# Patient Record
Sex: MALE | Race: BLACK OR AFRICAN AMERICAN | Employment: OTHER | URBAN - METROPOLITAN AREA
[De-identification: names, ages, dates, MRNs, and addresses within clinical notes are randomized per-mention and may not be internally consistent; named-entity substitution may affect disease eponyms.]

---

## 2017-02-21 ENCOUNTER — HOSPITAL ENCOUNTER (OUTPATIENT)
Dept: PAIN MANAGEMENT | Age: 62
Discharge: HOME | End: 2017-02-21
Attending: PSYCHOLOGIST | Admitting: PSYCHOLOGIST
Payer: MEDICARE

## 2017-02-21 ENCOUNTER — HOSPITAL ENCOUNTER (OUTPATIENT)
Dept: PAIN MANAGEMENT | Age: 62
Discharge: HOME | End: 2017-02-21
Attending: ANESTHESIOLOGY | Admitting: ANESTHESIOLOGY
Payer: MEDICARE

## 2017-02-21 VITALS
TEMPERATURE: 98 F | HEART RATE: 68 BPM | DIASTOLIC BLOOD PRESSURE: 70 MMHG | SYSTOLIC BLOOD PRESSURE: 156 MMHG | RESPIRATION RATE: 12 BRPM

## 2017-02-21 DIAGNOSIS — M48.061 SPINAL STENOSIS, LUMBAR REGION, WITHOUT NEUROGENIC CLAUDICATION: Primary | ICD-10-CM

## 2017-02-21 PROCEDURE — 99213 OFFICE O/P EST LOW 20 MIN: CPT

## 2017-02-21 PROCEDURE — 99214 OFFICE O/P EST MOD 30 MIN: CPT

## 2017-02-21 RX ORDER — MELOXICAM 15 MG/1
15 TABLET ORAL DAILY
Qty: 30 TABLET | Refills: 0 | Status: SHIPPED | OUTPATIENT
Start: 2017-02-23 | End: 2017-03-20 | Stop reason: SDUPTHER

## 2017-02-21 RX ORDER — OXYCODONE HYDROCHLORIDE 5 MG/1
5 TABLET ORAL EVERY 6 HOURS PRN
Qty: 120 TABLET | Refills: 0 | Status: SHIPPED | OUTPATIENT
Start: 2017-02-23 | End: 2017-03-20 | Stop reason: SDUPTHER

## 2017-02-21 ASSESSMENT — PAIN SCALES - GENERAL: PAINLEVEL_OUTOF10: 7

## 2017-02-21 ASSESSMENT — PAIN DESCRIPTION - PROGRESSION: CLINICAL_PROGRESSION: GRADUALLY WORSENING

## 2017-02-21 ASSESSMENT — PAIN DESCRIPTION - DESCRIPTORS: DESCRIPTORS: CONSTANT;ACHING;DULL

## 2017-02-21 ASSESSMENT — PAIN DESCRIPTION - PAIN TYPE: TYPE: CHRONIC PAIN

## 2017-02-21 ASSESSMENT — PAIN DESCRIPTION - ORIENTATION: ORIENTATION: LEFT;LOWER

## 2017-02-21 ASSESSMENT — PAIN DESCRIPTION - LOCATION: LOCATION: LEG;BACK

## 2017-03-13 RX ORDER — GABAPENTIN 300 MG/1
300 CAPSULE ORAL 3 TIMES DAILY
Qty: 90 CAPSULE | Refills: 5 | Status: SHIPPED | OUTPATIENT
Start: 2017-03-13 | End: 2017-04-17 | Stop reason: SDUPTHER

## 2017-03-20 ENCOUNTER — HOSPITAL ENCOUNTER (OUTPATIENT)
Dept: PAIN MANAGEMENT | Age: 62
Discharge: HOME | End: 2017-03-20
Payer: MEDICARE

## 2017-03-20 VITALS
HEIGHT: 72 IN | BODY MASS INDEX: 32.51 KG/M2 | HEART RATE: 66 BPM | DIASTOLIC BLOOD PRESSURE: 56 MMHG | SYSTOLIC BLOOD PRESSURE: 134 MMHG | TEMPERATURE: 98 F | WEIGHT: 240 LBS | RESPIRATION RATE: 16 BRPM

## 2017-03-20 DIAGNOSIS — M48.061 SPINAL STENOSIS, LUMBAR REGION, WITHOUT NEUROGENIC CLAUDICATION: Primary | ICD-10-CM

## 2017-03-20 PROCEDURE — 99214 OFFICE O/P EST MOD 30 MIN: CPT

## 2017-03-20 RX ORDER — MELOXICAM 15 MG/1
15 TABLET ORAL DAILY
Qty: 30 TABLET | Refills: 0 | Status: SHIPPED | OUTPATIENT
Start: 2017-03-25 | End: 2017-04-17 | Stop reason: SDUPTHER

## 2017-03-20 RX ORDER — OXYCODONE HYDROCHLORIDE 5 MG/1
5 TABLET ORAL EVERY 6 HOURS PRN
Qty: 120 TABLET | Refills: 0 | Status: SHIPPED | OUTPATIENT
Start: 2017-03-25 | End: 2017-04-21 | Stop reason: SDUPTHER

## 2017-03-20 ASSESSMENT — PAIN DESCRIPTION - FREQUENCY: FREQUENCY: CONTINUOUS

## 2017-03-20 ASSESSMENT — PAIN SCALES - GENERAL: PAINLEVEL_OUTOF10: 5

## 2017-03-20 ASSESSMENT — PAIN DESCRIPTION - PAIN TYPE: TYPE: CHRONIC PAIN

## 2017-03-20 ASSESSMENT — PAIN DESCRIPTION - PROGRESSION: CLINICAL_PROGRESSION: NOT CHANGED

## 2017-03-20 ASSESSMENT — PAIN DESCRIPTION - DESCRIPTORS: DESCRIPTORS: CONSTANT;ACHING

## 2017-03-20 ASSESSMENT — PAIN DESCRIPTION - LOCATION: LOCATION: BACK;BUTTOCKS;LEG

## 2017-03-20 ASSESSMENT — PAIN DESCRIPTION - ORIENTATION: ORIENTATION: LEFT

## 2017-04-03 ENCOUNTER — HOSPITAL ENCOUNTER (OUTPATIENT)
Dept: PAIN MANAGEMENT | Age: 62
Discharge: HOME | End: 2017-04-03
Payer: MEDICARE

## 2017-04-03 VITALS
HEART RATE: 60 BPM | OXYGEN SATURATION: 98 % | BODY MASS INDEX: 33.18 KG/M2 | DIASTOLIC BLOOD PRESSURE: 77 MMHG | RESPIRATION RATE: 16 BRPM | SYSTOLIC BLOOD PRESSURE: 159 MMHG | HEIGHT: 72 IN | WEIGHT: 245 LBS | TEMPERATURE: 97 F

## 2017-04-03 DIAGNOSIS — M54.9 BACKACHE, UNSPECIFIED: ICD-10-CM

## 2017-04-03 DIAGNOSIS — M48.061 SPINAL STENOSIS, LUMBAR REGION, WITHOUT NEUROGENIC CLAUDICATION: ICD-10-CM

## 2017-04-03 PROCEDURE — 6360000002 HC RX W HCPCS

## 2017-04-03 PROCEDURE — 2500000003 HC RX 250 WO HCPCS

## 2017-04-03 PROCEDURE — 62323 NJX INTERLAMINAR LMBR/SAC: CPT

## 2017-04-03 PROCEDURE — 2580000003 HC RX 258: Performed by: ANESTHESIOLOGY

## 2017-04-03 PROCEDURE — 6360000002 HC RX W HCPCS: Performed by: ANESTHESIOLOGY

## 2017-04-03 RX ORDER — MIDAZOLAM HYDROCHLORIDE 1 MG/ML
0.5 INJECTION INTRAMUSCULAR; INTRAVENOUS 4 TIMES DAILY PRN
Status: DISCONTINUED | OUTPATIENT
Start: 2017-04-03 | End: 2017-04-04 | Stop reason: HOSPADM

## 2017-04-03 RX ORDER — FENTANYL CITRATE 50 UG/ML
50 INJECTION, SOLUTION INTRAMUSCULAR; INTRAVENOUS PRN
Status: COMPLETED | OUTPATIENT
Start: 2017-04-03 | End: 2017-04-03

## 2017-04-03 RX ORDER — LIDOCAINE HYDROCHLORIDE 10 MG/ML
1 INJECTION, SOLUTION EPIDURAL; INFILTRATION; INTRACAUDAL; PERINEURAL ONCE
Status: DISCONTINUED | OUTPATIENT
Start: 2017-04-03 | End: 2017-04-04 | Stop reason: HOSPADM

## 2017-04-03 RX ORDER — SODIUM CHLORIDE, SODIUM LACTATE, POTASSIUM CHLORIDE, CALCIUM CHLORIDE 600; 310; 30; 20 MG/100ML; MG/100ML; MG/100ML; MG/100ML
500 INJECTION, SOLUTION INTRAVENOUS CONTINUOUS
Status: DISCONTINUED | OUTPATIENT
Start: 2017-04-03 | End: 2017-04-04 | Stop reason: HOSPADM

## 2017-04-03 RX ADMIN — FENTANYL CITRATE 50 MCG: 50 INJECTION, SOLUTION INTRAMUSCULAR; INTRAVENOUS at 10:43

## 2017-04-03 RX ADMIN — MIDAZOLAM HYDROCHLORIDE 1 MG: 1 INJECTION, SOLUTION INTRAMUSCULAR; INTRAVENOUS at 10:39

## 2017-04-03 RX ADMIN — SODIUM CHLORIDE, POTASSIUM CHLORIDE, SODIUM LACTATE AND CALCIUM CHLORIDE 500 ML: 600; 310; 30; 20 INJECTION, SOLUTION INTRAVENOUS at 10:26

## 2017-04-03 RX ADMIN — FENTANYL CITRATE 50 MCG: 50 INJECTION, SOLUTION INTRAMUSCULAR; INTRAVENOUS at 10:39

## 2017-04-03 RX ADMIN — MIDAZOLAM HYDROCHLORIDE 1 MG: 1 INJECTION, SOLUTION INTRAMUSCULAR; INTRAVENOUS at 10:43

## 2017-04-03 ASSESSMENT — PAIN SCALES - GENERAL: PAINLEVEL_OUTOF10: 4

## 2017-04-03 ASSESSMENT — PAIN - FUNCTIONAL ASSESSMENT
PAIN_FUNCTIONAL_ASSESSMENT: 0-10
PAIN_FUNCTIONAL_ASSESSMENT: 0-10

## 2017-04-03 ASSESSMENT — PAIN DESCRIPTION - DESCRIPTORS: DESCRIPTORS: CONSTANT;ACHING;SHARP

## 2017-04-17 ENCOUNTER — HOSPITAL ENCOUNTER (OUTPATIENT)
Age: 62
Discharge: HOME | End: 2017-04-17
Payer: MEDICARE

## 2017-04-17 ENCOUNTER — HOSPITAL ENCOUNTER (OUTPATIENT)
Dept: GENERAL RADIOLOGY | Age: 62
Discharge: HOME | End: 2017-04-17
Payer: MEDICARE

## 2017-04-17 ENCOUNTER — OFFICE VISIT (OUTPATIENT)
Dept: INTERNAL MEDICINE | Age: 62
End: 2017-04-17
Payer: MEDICARE

## 2017-04-17 VITALS
DIASTOLIC BLOOD PRESSURE: 58 MMHG | HEART RATE: 68 BPM | WEIGHT: 245 LBS | HEIGHT: 72 IN | SYSTOLIC BLOOD PRESSURE: 139 MMHG | OXYGEN SATURATION: 98 % | BODY MASS INDEX: 33.18 KG/M2

## 2017-04-17 DIAGNOSIS — E79.0 HYPERURICEMIA: ICD-10-CM

## 2017-04-17 DIAGNOSIS — N18.30 CKD (CHRONIC KIDNEY DISEASE) STAGE 3, GFR 30-59 ML/MIN (HCC): ICD-10-CM

## 2017-04-17 DIAGNOSIS — D17.1 LIPOMA OF TORSO: ICD-10-CM

## 2017-04-17 DIAGNOSIS — R07.89 RIGHT-SIDED CHEST WALL PAIN: ICD-10-CM

## 2017-04-17 DIAGNOSIS — I10 ESSENTIAL HYPERTENSION: ICD-10-CM

## 2017-04-17 DIAGNOSIS — R07.89 RIGHT-SIDED CHEST WALL PAIN: Primary | ICD-10-CM

## 2017-04-17 DIAGNOSIS — E78.2 MIXED HYPERLIPIDEMIA: ICD-10-CM

## 2017-04-17 DIAGNOSIS — N18.3 CKD (CHRONIC KIDNEY DISEASE), STAGE 3 (MODERATE): ICD-10-CM

## 2017-04-17 LAB
-: ABNORMAL
ALBUMIN SERPL-MCNC: 4.6 G/DL (ref 3.5–5.2)
ALBUMIN/GLOBULIN RATIO: 1.3 (ref 1–2.5)
ALP BLD-CCNC: 92 U/L (ref 40–129)
ALT SERPL-CCNC: 30 U/L (ref 5–41)
AMORPHOUS: ABNORMAL
ANION GAP SERPL CALCULATED.3IONS-SCNC: 15 MMOL/L (ref 9–17)
AST SERPL-CCNC: 34 U/L
BACTERIA: ABNORMAL
BILIRUB SERPL-MCNC: 0.88 MG/DL (ref 0.3–1.2)
BILIRUBIN DIRECT: 0.21 MG/DL
BILIRUBIN URINE: NEGATIVE
BILIRUBIN, INDIRECT: 0.67 MG/DL (ref 0–1)
BUN BLDV-MCNC: 14 MG/DL (ref 6–20)
BUN/CREAT BLD: ABNORMAL (ref 9–20)
CALCIUM SERPL-MCNC: 9.2 MG/DL (ref 8.6–10.4)
CASTS UA: ABNORMAL /LPF (ref 0–8)
CHLORIDE BLD-SCNC: 101 MMOL/L (ref 98–107)
CHOLESTEROL/HDL RATIO: 4.4
CHOLESTEROL: 209 MG/DL
CO2: 24 MMOL/L (ref 20–31)
COLOR: YELLOW
CREAT SERPL-MCNC: 1.29 MG/DL (ref 0.7–1.2)
CRYSTALS, UA: ABNORMAL /HPF
EPITHELIAL CELLS UA: ABNORMAL /HPF (ref 0–5)
GFR AFRICAN AMERICAN: >60 ML/MIN
GFR NON-AFRICAN AMERICAN: 57 ML/MIN
GFR SERPL CREATININE-BSD FRML MDRD: ABNORMAL ML/MIN/{1.73_M2}
GFR SERPL CREATININE-BSD FRML MDRD: ABNORMAL ML/MIN/{1.73_M2}
GLOBULIN: NORMAL G/DL (ref 1.5–3.8)
GLUCOSE BLD-MCNC: 99 MG/DL (ref 70–99)
GLUCOSE URINE: NEGATIVE
HCT VFR BLD CALC: 37.3 % (ref 41–53)
HDLC SERPL-MCNC: 47 MG/DL
HEMOGLOBIN: 12.6 G/DL (ref 13.5–17.5)
KETONES, URINE: ABNORMAL
LDL CHOLESTEROL: 114 MG/DL (ref 0–130)
LEUKOCYTE ESTERASE, URINE: ABNORMAL
MCH RBC QN AUTO: 30.9 PG (ref 26–34)
MCHC RBC AUTO-ENTMCNC: 33.7 G/DL (ref 31–37)
MCV RBC AUTO: 91.7 FL (ref 80–100)
MUCUS: ABNORMAL
NITRITE, URINE: NEGATIVE
OTHER OBSERVATIONS UA: ABNORMAL
PDW BLD-RTO: 13.4 % (ref 12.5–15.4)
PH UA: 5.5 (ref 5–8)
PLATELET # BLD: 307 K/UL (ref 140–450)
PMV BLD AUTO: 8 FL (ref 6–12)
POTASSIUM SERPL-SCNC: 4.4 MMOL/L (ref 3.7–5.3)
PROTEIN UA: NEGATIVE
RBC # BLD: 4.07 M/UL (ref 4.5–5.9)
RBC UA: ABNORMAL /HPF (ref 0–4)
RENAL EPITHELIAL, UA: ABNORMAL /HPF
SODIUM BLD-SCNC: 140 MMOL/L (ref 135–144)
SPECIFIC GRAVITY UA: 1.02 (ref 1–1.03)
TOTAL PROTEIN: 8.1 G/DL (ref 6.4–8.3)
TRICHOMONAS: ABNORMAL
TRIGL SERPL-MCNC: 242 MG/DL
TURBIDITY: CLEAR
URIC ACID: 4.9 MG/DL (ref 3.4–7)
URINE HGB: NEGATIVE
UROBILINOGEN, URINE: NORMAL
VLDLC SERPL CALC-MCNC: ABNORMAL MG/DL (ref 1–30)
WBC # BLD: 4.1 K/UL (ref 3.5–11)
WBC UA: ABNORMAL /HPF (ref 0–5)
YEAST: ABNORMAL

## 2017-04-17 PROCEDURE — 80061 LIPID PANEL: CPT

## 2017-04-17 PROCEDURE — 81001 URINALYSIS AUTO W/SCOPE: CPT

## 2017-04-17 PROCEDURE — 80076 HEPATIC FUNCTION PANEL: CPT

## 2017-04-17 PROCEDURE — 85027 COMPLETE CBC AUTOMATED: CPT

## 2017-04-17 PROCEDURE — 36415 COLL VENOUS BLD VENIPUNCTURE: CPT

## 2017-04-17 PROCEDURE — 99214 OFFICE O/P EST MOD 30 MIN: CPT | Performed by: INTERNAL MEDICINE

## 2017-04-17 PROCEDURE — 80048 BASIC METABOLIC PNL TOTAL CA: CPT

## 2017-04-17 PROCEDURE — 71101 X-RAY EXAM UNILAT RIBS/CHEST: CPT

## 2017-04-17 PROCEDURE — 84550 ASSAY OF BLOOD/URIC ACID: CPT

## 2017-04-17 RX ORDER — GABAPENTIN 300 MG/1
300 CAPSULE ORAL 3 TIMES DAILY
Qty: 90 CAPSULE | Refills: 5 | Status: SHIPPED | OUTPATIENT
Start: 2017-04-17 | End: 2017-06-20 | Stop reason: SDUPTHER

## 2017-04-17 RX ORDER — PANTOPRAZOLE SODIUM 20 MG/1
TABLET, DELAYED RELEASE ORAL
Qty: 30 TABLET | Refills: 5 | Status: SHIPPED | OUTPATIENT
Start: 2017-04-17 | End: 2017-06-20 | Stop reason: SDUPTHER

## 2017-04-17 RX ORDER — ALLOPURINOL 300 MG/1
TABLET ORAL
Qty: 30 TABLET | Refills: 5 | Status: SHIPPED | OUTPATIENT
Start: 2017-04-17 | End: 2017-06-20 | Stop reason: SDUPTHER

## 2017-04-17 RX ORDER — MELOXICAM 15 MG/1
15 TABLET ORAL DAILY
Qty: 30 TABLET | Refills: 2 | Status: SHIPPED | OUTPATIENT
Start: 2017-04-17 | End: 2017-05-17

## 2017-04-20 ENCOUNTER — TELEPHONE (OUTPATIENT)
Dept: INTERNAL MEDICINE | Age: 62
End: 2017-04-20

## 2017-04-21 ENCOUNTER — HOSPITAL ENCOUNTER (OUTPATIENT)
Dept: PAIN MANAGEMENT | Age: 62
Discharge: HOME | End: 2017-04-21
Payer: MEDICARE

## 2017-04-21 VITALS
TEMPERATURE: 97.8 F | DIASTOLIC BLOOD PRESSURE: 69 MMHG | HEART RATE: 69 BPM | SYSTOLIC BLOOD PRESSURE: 158 MMHG | RESPIRATION RATE: 12 BRPM

## 2017-04-21 DIAGNOSIS — M25.50 PAIN IN JOINT, MULTIPLE SITES: Primary | Chronic | ICD-10-CM

## 2017-04-21 DIAGNOSIS — M48.061 SPINAL STENOSIS, LUMBAR REGION, WITHOUT NEUROGENIC CLAUDICATION: ICD-10-CM

## 2017-04-21 PROCEDURE — 99214 OFFICE O/P EST MOD 30 MIN: CPT

## 2017-04-21 RX ORDER — TIZANIDINE 2 MG/1
2 TABLET ORAL NIGHTLY PRN
Qty: 14 TABLET | Refills: 0 | Status: SHIPPED | OUTPATIENT
Start: 2017-04-21 | End: 2017-05-09 | Stop reason: SDUPTHER

## 2017-04-21 RX ORDER — OXYCODONE HYDROCHLORIDE 5 MG/1
5 TABLET ORAL EVERY 6 HOURS PRN
Qty: 120 TABLET | Refills: 0 | Status: SHIPPED | OUTPATIENT
Start: 2017-04-24 | End: 2017-05-19 | Stop reason: SDUPTHER

## 2017-04-21 ASSESSMENT — ENCOUNTER SYMPTOMS
SHORTNESS OF BREATH: 0
COUGH: 0
BACK PAIN: 1
CONSTIPATION: 0

## 2017-04-21 ASSESSMENT — PAIN DESCRIPTION - PROGRESSION: CLINICAL_PROGRESSION: GRADUALLY WORSENING

## 2017-04-21 ASSESSMENT — PAIN SCALES - GENERAL: PAINLEVEL_OUTOF10: 7

## 2017-04-21 ASSESSMENT — PAIN DESCRIPTION - FREQUENCY: FREQUENCY: CONTINUOUS

## 2017-04-21 ASSESSMENT — PAIN DESCRIPTION - PAIN TYPE: TYPE: CHRONIC PAIN

## 2017-04-21 ASSESSMENT — PAIN DESCRIPTION - ORIENTATION: ORIENTATION: LOWER;MID

## 2017-04-21 ASSESSMENT — PAIN DESCRIPTION - LOCATION: LOCATION: BACK

## 2017-04-24 ASSESSMENT — ENCOUNTER SYMPTOMS
NAUSEA: 0
BACK PAIN: 1
ABDOMINAL PAIN: 0
EYE REDNESS: 0
BLURRED VISION: 0
CONSTIPATION: 0
SHORTNESS OF BREATH: 0
COUGH: 0
WHEEZING: 0

## 2017-05-05 ENCOUNTER — TELEPHONE (OUTPATIENT)
Dept: INTERNAL MEDICINE | Age: 62
End: 2017-05-05

## 2017-05-09 RX ORDER — TIZANIDINE 4 MG/1
4 TABLET ORAL NIGHTLY PRN
Qty: 30 TABLET | Refills: 3 | Status: SHIPPED | OUTPATIENT
Start: 2017-05-09 | End: 2017-05-19 | Stop reason: SDUPTHER

## 2017-05-16 ENCOUNTER — OFFICE VISIT (OUTPATIENT)
Dept: INTERNAL MEDICINE | Age: 62
End: 2017-05-16
Payer: MEDICARE

## 2017-05-16 VITALS
HEIGHT: 72 IN | DIASTOLIC BLOOD PRESSURE: 76 MMHG | BODY MASS INDEX: 32.51 KG/M2 | HEART RATE: 65 BPM | SYSTOLIC BLOOD PRESSURE: 163 MMHG | WEIGHT: 240 LBS

## 2017-05-16 DIAGNOSIS — E78.00 PURE HYPERCHOLESTEROLEMIA: ICD-10-CM

## 2017-05-16 DIAGNOSIS — N18.3 CKD (CHRONIC KIDNEY DISEASE), STAGE 3 (MODERATE): ICD-10-CM

## 2017-05-16 DIAGNOSIS — R03.0 ELEVATED BP WITHOUT DIAGNOSIS OF HYPERTENSION: Primary | ICD-10-CM

## 2017-05-16 PROCEDURE — 99213 OFFICE O/P EST LOW 20 MIN: CPT | Performed by: INTERNAL MEDICINE

## 2017-05-16 RX ORDER — AMLODIPINE BESYLATE 2.5 MG/1
2.5 TABLET ORAL DAILY
Qty: 30 TABLET | Refills: 1 | Status: SHIPPED | OUTPATIENT
Start: 2017-05-16 | End: 2017-06-20 | Stop reason: SDUPTHER

## 2017-05-16 RX ORDER — BLOOD PRESSURE TEST KIT
KIT MISCELLANEOUS
Qty: 1 DEVICE | Refills: 0 | Status: SHIPPED | OUTPATIENT
Start: 2017-05-16 | End: 2017-06-20

## 2017-05-19 ENCOUNTER — HOSPITAL ENCOUNTER (OUTPATIENT)
Dept: PAIN MANAGEMENT | Age: 62
Discharge: HOME | End: 2017-05-19
Payer: MEDICARE

## 2017-05-19 VITALS
BODY MASS INDEX: 32.23 KG/M2 | HEART RATE: 63 BPM | RESPIRATION RATE: 16 BRPM | SYSTOLIC BLOOD PRESSURE: 145 MMHG | DIASTOLIC BLOOD PRESSURE: 61 MMHG | TEMPERATURE: 98 F | WEIGHT: 238 LBS | HEIGHT: 72 IN

## 2017-05-19 DIAGNOSIS — M48.061 SPINAL STENOSIS, LUMBAR REGION, WITHOUT NEUROGENIC CLAUDICATION: ICD-10-CM

## 2017-05-19 PROCEDURE — 80307 DRUG TEST PRSMV CHEM ANLYZR: CPT

## 2017-05-19 PROCEDURE — 99213 OFFICE O/P EST LOW 20 MIN: CPT

## 2017-05-19 PROCEDURE — 99214 OFFICE O/P EST MOD 30 MIN: CPT

## 2017-05-19 RX ORDER — TIZANIDINE 4 MG/1
4 TABLET ORAL NIGHTLY PRN
Qty: 30 TABLET | Refills: 3 | Status: SHIPPED | OUTPATIENT
Start: 2017-05-24 | End: 2017-09-15 | Stop reason: SDUPTHER

## 2017-05-19 RX ORDER — OXYCODONE HYDROCHLORIDE 5 MG/1
5 TABLET ORAL EVERY 6 HOURS PRN
Qty: 120 TABLET | Refills: 0 | Status: SHIPPED | OUTPATIENT
Start: 2017-05-24 | End: 2017-06-20 | Stop reason: SDUPTHER

## 2017-05-19 ASSESSMENT — PAIN DESCRIPTION - LOCATION: LOCATION: BACK;BUTTOCKS;LEG

## 2017-05-19 ASSESSMENT — PAIN DESCRIPTION - DESCRIPTORS: DESCRIPTORS: CONSTANT;ACHING;DULL

## 2017-05-19 ASSESSMENT — PAIN DESCRIPTION - PROGRESSION: CLINICAL_PROGRESSION: NOT CHANGED

## 2017-05-19 ASSESSMENT — ENCOUNTER SYMPTOMS
CONSTIPATION: 0
COUGH: 0
SHORTNESS OF BREATH: 0
BACK PAIN: 1

## 2017-05-19 ASSESSMENT — PAIN DESCRIPTION - ORIENTATION: ORIENTATION: LEFT

## 2017-05-19 ASSESSMENT — PAIN DESCRIPTION - PAIN TYPE: TYPE: CHRONIC PAIN

## 2017-05-19 ASSESSMENT — PAIN SCALES - GENERAL: PAINLEVEL_OUTOF10: 5

## 2017-05-19 ASSESSMENT — PAIN DESCRIPTION - FREQUENCY: FREQUENCY: CONTINUOUS

## 2017-05-21 LAB
6-ACETYLMORPHINE, UR: NOT DETECTED
7-AMINOCLONAZEPAM, URINE: NOT DETECTED
ALPHA-OH-ALPRAZ, URINE: NOT DETECTED
ALPRAZOLAM, URINE: NOT DETECTED
AMPHETAMINES, URINE: NOT DETECTED
BARBITURATES, URINE: NOT DETECTED
BENZOYLECGONINE, UR: NOT DETECTED
BUPRENORPHINE URINE: NOT DETECTED
CARISOPRODOL, UR: NOT DETECTED
CLONAZEPAM, URINE: NOT DETECTED
CODEINE, URINE: NOT DETECTED
CREATININE URINE: 247.2 MG/DL (ref 20–400)
DIAZEPAM, URINE: NOT DETECTED
EER PAIN MGT DRUG PANEL, HIGH RES/EMIT U: NORMAL
ETHYL GLUCURONIDE UR: NOT DETECTED
FENTANYL URINE: NOT DETECTED
HYDROCODONE, URINE: NOT DETECTED
HYDROMORPHONE, URINE: NOT DETECTED
LORAZEPAM, URINE: NOT DETECTED
MARIJUANA METAB, UR: NOT DETECTED
MDA, UR: NOT DETECTED
MDEA, EVE, UR: NOT DETECTED
MDMA URINE: NOT DETECTED
MEPERIDINE METAB, UR: NOT DETECTED
METHADONE, URINE: NOT DETECTED
METHAMPHETAMINE, URINE: NOT DETECTED
METHYLPHENIDATE: NOT DETECTED
MIDAZOLAM, URINE: NOT DETECTED
MORPHINE URINE: NOT DETECTED
NORBUPRENORPHINE, URINE: NOT DETECTED
NORDIAZEPAM, URINE: NOT DETECTED
NORFENTANYL, URINE: NOT DETECTED
NORHYDROCODONE, URINE: NOT DETECTED
NOROXYCODONE, URINE: PRESENT
NOROXYMORPHONE, URINE: PRESENT
OXAZEPAM, URINE: NOT DETECTED
OXYCODONE URINE: PRESENT
OXYMORPHONE, URINE: PRESENT
PAIN MGT DRUG PANEL, HI RES, UR: NORMAL
PCP,URINE: NOT DETECTED
PHENTERMINE, UR: NOT DETECTED
PROPOXYPHENE, URINE: NOT DETECTED
TAPENTADOL, URINE: NOT DETECTED
TAPENTADOL-O-SULFATE, URINE: NOT DETECTED
TEMAZEPAM, URINE: NOT DETECTED
TRAMADOL, URINE: NOT DETECTED
ZOLPIDEM, URINE: NOT DETECTED

## 2017-05-21 ASSESSMENT — ENCOUNTER SYMPTOMS
EYE REDNESS: 0
ABDOMINAL PAIN: 0
SHORTNESS OF BREATH: 0
BACK PAIN: 1
COUGH: 0
BLURRED VISION: 0
CONSTIPATION: 0
NAUSEA: 0

## 2017-06-05 ENCOUNTER — TELEPHONE (OUTPATIENT)
Dept: INTERNAL MEDICINE | Age: 62
End: 2017-06-05

## 2017-06-08 ENCOUNTER — HOSPITAL ENCOUNTER (OUTPATIENT)
Age: 62
Discharge: HOME | End: 2017-06-08
Payer: MEDICARE

## 2017-06-08 DIAGNOSIS — D64.9 NORMOCYTIC ANEMIA: ICD-10-CM

## 2017-06-08 LAB
FOLATE: >20 NG/ML
IRON SATURATION: 5 % (ref 20–55)
IRON: 12 UG/DL (ref 59–158)
TOTAL IRON BINDING CAPACITY: 257 UG/DL (ref 250–450)
UNSATURATED IRON BINDING CAPACITY: 245 UG/DL (ref 112–347)
VITAMIN B-12: 1059 PG/ML (ref 211–946)

## 2017-06-08 PROCEDURE — 83550 IRON BINDING TEST: CPT

## 2017-06-08 PROCEDURE — 82746 ASSAY OF FOLIC ACID SERUM: CPT

## 2017-06-08 PROCEDURE — 36415 COLL VENOUS BLD VENIPUNCTURE: CPT

## 2017-06-08 PROCEDURE — 82607 VITAMIN B-12: CPT

## 2017-06-08 PROCEDURE — 83540 ASSAY OF IRON: CPT

## 2017-06-20 ENCOUNTER — HOSPITAL ENCOUNTER (OUTPATIENT)
Dept: PAIN MANAGEMENT | Age: 62
Discharge: HOME | End: 2017-06-20
Payer: MEDICARE

## 2017-06-20 ENCOUNTER — OFFICE VISIT (OUTPATIENT)
Dept: INTERNAL MEDICINE | Age: 62
End: 2017-06-20
Payer: MEDICARE

## 2017-06-20 VITALS
HEIGHT: 72 IN | HEART RATE: 62 BPM | SYSTOLIC BLOOD PRESSURE: 134 MMHG | WEIGHT: 239 LBS | BODY MASS INDEX: 32.37 KG/M2 | DIASTOLIC BLOOD PRESSURE: 62 MMHG

## 2017-06-20 VITALS
BODY MASS INDEX: 32.23 KG/M2 | RESPIRATION RATE: 16 BRPM | DIASTOLIC BLOOD PRESSURE: 74 MMHG | WEIGHT: 238 LBS | TEMPERATURE: 98 F | HEART RATE: 65 BPM | SYSTOLIC BLOOD PRESSURE: 165 MMHG | HEIGHT: 72 IN

## 2017-06-20 DIAGNOSIS — I10 ESSENTIAL HYPERTENSION: Primary | ICD-10-CM

## 2017-06-20 DIAGNOSIS — N18.30 CKD (CHRONIC KIDNEY DISEASE) STAGE 3, GFR 30-59 ML/MIN (HCC): ICD-10-CM

## 2017-06-20 DIAGNOSIS — M25.511 CHRONIC PAIN OF BOTH SHOULDERS: ICD-10-CM

## 2017-06-20 DIAGNOSIS — D50.9 IRON DEFICIENCY ANEMIA, UNSPECIFIED IRON DEFICIENCY ANEMIA TYPE: ICD-10-CM

## 2017-06-20 DIAGNOSIS — G89.29 CHRONIC PAIN OF BOTH SHOULDERS: ICD-10-CM

## 2017-06-20 DIAGNOSIS — H65.191 OTHER ACUTE NONSUPPURATIVE OTITIS MEDIA OF RIGHT EAR, RECURRENCE NOT SPECIFIED: ICD-10-CM

## 2017-06-20 DIAGNOSIS — M25.512 CHRONIC PAIN OF BOTH SHOULDERS: ICD-10-CM

## 2017-06-20 PROCEDURE — 99214 OFFICE O/P EST MOD 30 MIN: CPT | Performed by: INTERNAL MEDICINE

## 2017-06-20 PROCEDURE — 99214 OFFICE O/P EST MOD 30 MIN: CPT

## 2017-06-20 RX ORDER — MELOXICAM 15 MG/1
15 TABLET ORAL DAILY
COMMUNITY
End: 2017-06-20 | Stop reason: SDUPTHER

## 2017-06-20 RX ORDER — OXYCODONE HYDROCHLORIDE 5 MG/1
5 TABLET ORAL EVERY 6 HOURS PRN
Qty: 120 TABLET | Refills: 0 | Status: SHIPPED | OUTPATIENT
Start: 2017-06-23 | End: 2017-06-20 | Stop reason: SDUPTHER

## 2017-06-20 RX ORDER — PANTOPRAZOLE SODIUM 20 MG/1
TABLET, DELAYED RELEASE ORAL
Qty: 30 TABLET | Refills: 5 | Status: SHIPPED | OUTPATIENT
Start: 2017-06-20 | End: 2017-11-14 | Stop reason: SDUPTHER

## 2017-06-20 RX ORDER — AMLODIPINE BESYLATE 2.5 MG/1
2.5 TABLET ORAL DAILY
Qty: 30 TABLET | Refills: 5 | Status: SHIPPED | OUTPATIENT
Start: 2017-06-20 | End: 2017-12-14 | Stop reason: SDUPTHER

## 2017-06-20 RX ORDER — LANOLIN ALCOHOL/MO/W.PET/CERES
325 CREAM (GRAM) TOPICAL 2 TIMES DAILY
Qty: 60 TABLET | Refills: 3 | Status: SHIPPED | OUTPATIENT
Start: 2017-06-20 | End: 2017-10-14 | Stop reason: SDUPTHER

## 2017-06-20 RX ORDER — MELOXICAM 15 MG/1
15 TABLET ORAL DAILY
Qty: 30 TABLET | Refills: 0 | Status: SHIPPED | OUTPATIENT
Start: 2017-06-20 | End: 2017-08-07 | Stop reason: SDUPTHER

## 2017-06-20 RX ORDER — ALLOPURINOL 300 MG/1
TABLET ORAL
Qty: 30 TABLET | Refills: 5 | Status: SHIPPED | OUTPATIENT
Start: 2017-06-20 | End: 2017-11-21 | Stop reason: SDUPTHER

## 2017-06-20 RX ORDER — OXYCODONE HYDROCHLORIDE 5 MG/1
5 TABLET ORAL EVERY 8 HOURS PRN
Qty: 90 TABLET | Refills: 0 | Status: SHIPPED | OUTPATIENT
Start: 2017-06-23 | End: 2017-07-23

## 2017-06-20 RX ORDER — AMOXICILLIN 500 MG/1
500 CAPSULE ORAL 3 TIMES DAILY
Qty: 21 CAPSULE | Refills: 0 | Status: SHIPPED | OUTPATIENT
Start: 2017-06-20 | End: 2017-06-27

## 2017-06-20 RX ORDER — GABAPENTIN 300 MG/1
300 CAPSULE ORAL 3 TIMES DAILY
Qty: 90 CAPSULE | Refills: 5 | Status: SHIPPED | OUTPATIENT
Start: 2017-06-20 | End: 2017-11-21 | Stop reason: SDUPTHER

## 2017-06-20 ASSESSMENT — ENCOUNTER SYMPTOMS
VOMITING: 0
BLURRED VISION: 0
CONSTIPATION: 0
HEARTBURN: 0
SHORTNESS OF BREATH: 0
BACK PAIN: 1
EYE REDNESS: 0
ABDOMINAL PAIN: 0
COUGH: 0
SORE THROAT: 0
BLOOD IN STOOL: 0
NAUSEA: 0

## 2017-06-20 ASSESSMENT — PAIN DESCRIPTION - LOCATION: LOCATION: BACK;ARM;SHOULDER

## 2017-06-20 ASSESSMENT — PAIN DESCRIPTION - FREQUENCY: FREQUENCY: CONTINUOUS

## 2017-06-20 ASSESSMENT — PAIN DESCRIPTION - ONSET: ONSET: ON-GOING

## 2017-06-20 ASSESSMENT — PAIN DESCRIPTION - ORIENTATION: ORIENTATION: LEFT;RIGHT;LOWER

## 2017-06-20 ASSESSMENT — PAIN SCALES - GENERAL: PAINLEVEL_OUTOF10: 8

## 2017-06-20 ASSESSMENT — PAIN DESCRIPTION - PAIN TYPE: TYPE: CHRONIC PAIN

## 2017-06-20 ASSESSMENT — PAIN DESCRIPTION - PROGRESSION: CLINICAL_PROGRESSION: NOT CHANGED

## 2017-06-20 ASSESSMENT — PAIN DESCRIPTION - DESCRIPTORS: DESCRIPTORS: CONSTANT;DULL;ACHING

## 2017-06-22 ENCOUNTER — TELEPHONE (OUTPATIENT)
Dept: ORTHOPEDIC SURGERY | Age: 62
End: 2017-06-22

## 2017-06-28 ENCOUNTER — TELEPHONE (OUTPATIENT)
Dept: ORTHOPEDIC SURGERY | Age: 62
End: 2017-06-28

## 2017-07-21 ENCOUNTER — HOSPITAL ENCOUNTER (OUTPATIENT)
Dept: PAIN MANAGEMENT | Age: 62
Discharge: HOME | End: 2017-07-21
Payer: MEDICARE

## 2017-07-21 VITALS
TEMPERATURE: 98 F | RESPIRATION RATE: 16 BRPM | HEART RATE: 67 BPM | DIASTOLIC BLOOD PRESSURE: 54 MMHG | SYSTOLIC BLOOD PRESSURE: 111 MMHG

## 2017-07-21 DIAGNOSIS — M47.816 FACET ARTHROPATHY, LUMBAR: ICD-10-CM

## 2017-07-21 DIAGNOSIS — M48.061 SPINAL STENOSIS, LUMBAR REGION, WITHOUT NEUROGENIC CLAUDICATION: Primary | ICD-10-CM

## 2017-07-21 PROCEDURE — 99214 OFFICE O/P EST MOD 30 MIN: CPT

## 2017-07-21 RX ORDER — MELOXICAM 15 MG/1
15 TABLET ORAL DAILY
Qty: 30 TABLET | Refills: 0 | Status: SHIPPED | OUTPATIENT
Start: 2017-07-23 | End: 2017-08-22 | Stop reason: SDUPTHER

## 2017-07-21 RX ORDER — OXYCODONE HYDROCHLORIDE 5 MG/1
5 TABLET ORAL EVERY 12 HOURS
Qty: 60 TABLET | Refills: 0 | Status: SHIPPED | OUTPATIENT
Start: 2017-07-23 | End: 2017-08-22 | Stop reason: SDUPTHER

## 2017-07-21 ASSESSMENT — ENCOUNTER SYMPTOMS
COUGH: 0
BACK PAIN: 1
SHORTNESS OF BREATH: 0
CONSTIPATION: 0

## 2017-07-21 ASSESSMENT — PAIN DESCRIPTION - FREQUENCY: FREQUENCY: INTERMITTENT

## 2017-07-21 ASSESSMENT — PAIN DESCRIPTION - LOCATION: LOCATION: BACK

## 2017-07-21 ASSESSMENT — PAIN DESCRIPTION - ONSET: ONSET: ON-GOING

## 2017-07-21 ASSESSMENT — PAIN DESCRIPTION - PAIN TYPE: TYPE: CHRONIC PAIN

## 2017-07-21 ASSESSMENT — PAIN DESCRIPTION - DESCRIPTORS: DESCRIPTORS: SHARP

## 2017-07-21 ASSESSMENT — PAIN SCALES - GENERAL: PAINLEVEL_OUTOF10: 7

## 2017-07-21 ASSESSMENT — PAIN DESCRIPTION - PROGRESSION: CLINICAL_PROGRESSION: NOT CHANGED

## 2017-07-21 ASSESSMENT — PAIN DESCRIPTION - ORIENTATION: ORIENTATION: RIGHT

## 2017-07-26 ENCOUNTER — TELEPHONE (OUTPATIENT)
Dept: INTERNAL MEDICINE | Age: 62
End: 2017-07-26

## 2017-07-26 DIAGNOSIS — M25.512 BILATERAL SHOULDER PAIN, UNSPECIFIED CHRONICITY: Primary | ICD-10-CM

## 2017-07-26 DIAGNOSIS — M25.511 BILATERAL SHOULDER PAIN, UNSPECIFIED CHRONICITY: Primary | ICD-10-CM

## 2017-08-07 ENCOUNTER — NURSE ONLY (OUTPATIENT)
Dept: INTERNAL MEDICINE | Age: 62
End: 2017-08-07

## 2017-08-07 ENCOUNTER — OFFICE VISIT (OUTPATIENT)
Dept: INTERNAL MEDICINE | Age: 62
End: 2017-08-07
Payer: MEDICARE

## 2017-08-07 VITALS
WEIGHT: 238 LBS | DIASTOLIC BLOOD PRESSURE: 68 MMHG | SYSTOLIC BLOOD PRESSURE: 164 MMHG | HEIGHT: 72 IN | BODY MASS INDEX: 32.23 KG/M2 | HEART RATE: 62 BPM

## 2017-08-07 DIAGNOSIS — Z71.89 ACP (ADVANCE CARE PLANNING): Primary | ICD-10-CM

## 2017-08-07 DIAGNOSIS — N18.30 CKD (CHRONIC KIDNEY DISEASE) STAGE 3, GFR 30-59 ML/MIN (HCC): ICD-10-CM

## 2017-08-07 DIAGNOSIS — G89.29 CHRONIC BILATERAL LOW BACK PAIN WITHOUT SCIATICA: ICD-10-CM

## 2017-08-07 DIAGNOSIS — D50.9 IRON DEFICIENCY ANEMIA, UNSPECIFIED IRON DEFICIENCY ANEMIA TYPE: Primary | ICD-10-CM

## 2017-08-07 DIAGNOSIS — I10 ESSENTIAL HYPERTENSION: ICD-10-CM

## 2017-08-07 DIAGNOSIS — E78.00 PURE HYPERCHOLESTEROLEMIA: ICD-10-CM

## 2017-08-07 DIAGNOSIS — M54.50 CHRONIC BILATERAL LOW BACK PAIN WITHOUT SCIATICA: ICD-10-CM

## 2017-08-07 PROCEDURE — 99214 OFFICE O/P EST MOD 30 MIN: CPT | Performed by: INTERNAL MEDICINE

## 2017-08-07 ASSESSMENT — ENCOUNTER SYMPTOMS
BLOOD IN STOOL: 0
SHORTNESS OF BREATH: 0
ABDOMINAL PAIN: 0
BACK PAIN: 1
CONSTIPATION: 0
NAUSEA: 0
COUGH: 0

## 2017-08-18 ENCOUNTER — TELEPHONE (OUTPATIENT)
Dept: INTERNAL MEDICINE | Age: 62
End: 2017-08-18

## 2017-08-21 ENCOUNTER — OFFICE VISIT (OUTPATIENT)
Dept: ORTHOPEDIC SURGERY | Age: 62
End: 2017-08-21
Payer: MEDICARE

## 2017-08-21 DIAGNOSIS — M75.42 SHOULDER IMPINGEMENT, LEFT: Primary | ICD-10-CM

## 2017-08-21 PROCEDURE — 20610 DRAIN/INJ JOINT/BURSA W/O US: CPT | Performed by: STUDENT IN AN ORGANIZED HEALTH CARE EDUCATION/TRAINING PROGRAM

## 2017-08-21 PROCEDURE — 99202 OFFICE O/P NEW SF 15 MIN: CPT | Performed by: STUDENT IN AN ORGANIZED HEALTH CARE EDUCATION/TRAINING PROGRAM

## 2017-08-21 RX ORDER — BUPIVACAINE HYDROCHLORIDE 2.5 MG/ML
2 INJECTION, SOLUTION INFILTRATION; PERINEURAL ONCE
Status: COMPLETED | OUTPATIENT
Start: 2017-08-21 | End: 2017-08-21

## 2017-08-21 RX ORDER — METHYLPREDNISOLONE ACETATE 80 MG/ML
80 INJECTION, SUSPENSION INTRA-ARTICULAR; INTRALESIONAL; INTRAMUSCULAR; SOFT TISSUE ONCE
Status: COMPLETED | OUTPATIENT
Start: 2017-08-21 | End: 2017-08-21

## 2017-08-21 RX ADMIN — METHYLPREDNISOLONE ACETATE 80 MG: 80 INJECTION, SUSPENSION INTRA-ARTICULAR; INTRALESIONAL; INTRAMUSCULAR; SOFT TISSUE at 17:05

## 2017-08-21 RX ADMIN — BUPIVACAINE HYDROCHLORIDE 5 MG: 2.5 INJECTION, SOLUTION INFILTRATION; PERINEURAL at 17:04

## 2017-08-22 ENCOUNTER — HOSPITAL ENCOUNTER (OUTPATIENT)
Dept: PAIN MANAGEMENT | Age: 62
Discharge: HOME | End: 2017-08-22
Payer: MEDICARE

## 2017-08-22 VITALS
HEART RATE: 67 BPM | SYSTOLIC BLOOD PRESSURE: 132 MMHG | DIASTOLIC BLOOD PRESSURE: 68 MMHG | RESPIRATION RATE: 16 BRPM | TEMPERATURE: 97.9 F

## 2017-08-22 DIAGNOSIS — M48.061 SPINAL STENOSIS, LUMBAR REGION, WITHOUT NEUROGENIC CLAUDICATION: ICD-10-CM

## 2017-08-22 DIAGNOSIS — M47.816 FACET ARTHROPATHY, LUMBAR: ICD-10-CM

## 2017-08-22 PROCEDURE — 99213 OFFICE O/P EST LOW 20 MIN: CPT

## 2017-08-22 PROCEDURE — 99214 OFFICE O/P EST MOD 30 MIN: CPT

## 2017-08-22 RX ORDER — MELOXICAM 15 MG/1
15 TABLET ORAL DAILY
Qty: 30 TABLET | Refills: 0 | Status: SHIPPED | OUTPATIENT
Start: 2017-08-22 | End: 2017-09-19 | Stop reason: SDUPTHER

## 2017-08-22 RX ORDER — OXYCODONE HYDROCHLORIDE 5 MG/1
5 TABLET ORAL EVERY 12 HOURS
Qty: 60 TABLET | Refills: 0 | Status: SHIPPED | OUTPATIENT
Start: 2017-08-22 | End: 2017-09-19 | Stop reason: SDUPTHER

## 2017-08-22 ASSESSMENT — PAIN SCALES - GENERAL: PAINLEVEL_OUTOF10: 7

## 2017-08-22 ASSESSMENT — PAIN DESCRIPTION - PAIN TYPE: TYPE: CHRONIC PAIN

## 2017-08-22 ASSESSMENT — PAIN DESCRIPTION - ONSET: ONSET: ON-GOING

## 2017-08-22 ASSESSMENT — ENCOUNTER SYMPTOMS
SHORTNESS OF BREATH: 0
BOWEL INCONTINENCE: 0
CONSTIPATION: 0
BACK PAIN: 1
COUGH: 0

## 2017-08-22 ASSESSMENT — PAIN DESCRIPTION - FREQUENCY: FREQUENCY: CONTINUOUS

## 2017-08-22 ASSESSMENT — PAIN DESCRIPTION - DESCRIPTORS: DESCRIPTORS: CONSTANT;ACHING

## 2017-08-22 ASSESSMENT — PAIN DESCRIPTION - ORIENTATION: ORIENTATION: RIGHT;LEFT;LOWER

## 2017-09-18 RX ORDER — TIZANIDINE 4 MG/1
4 TABLET ORAL NIGHTLY PRN
Qty: 30 TABLET | Refills: 3 | Status: SHIPPED | OUTPATIENT
Start: 2017-09-18 | End: 2018-01-15 | Stop reason: SDUPTHER

## 2017-09-19 ENCOUNTER — HOSPITAL ENCOUNTER (OUTPATIENT)
Dept: PAIN MANAGEMENT | Age: 62
Discharge: HOME | End: 2017-09-19
Payer: MEDICARE

## 2017-09-19 VITALS
RESPIRATION RATE: 16 BRPM | HEIGHT: 72 IN | HEART RATE: 67 BPM | BODY MASS INDEX: 32.23 KG/M2 | SYSTOLIC BLOOD PRESSURE: 151 MMHG | DIASTOLIC BLOOD PRESSURE: 68 MMHG | TEMPERATURE: 98 F | WEIGHT: 238 LBS

## 2017-09-19 DIAGNOSIS — M47.816 FACET ARTHROPATHY, LUMBAR: ICD-10-CM

## 2017-09-19 DIAGNOSIS — M48.061 SPINAL STENOSIS, LUMBAR REGION, WITHOUT NEUROGENIC CLAUDICATION: ICD-10-CM

## 2017-09-19 PROCEDURE — 99214 OFFICE O/P EST MOD 30 MIN: CPT

## 2017-09-19 PROCEDURE — 99214 OFFICE O/P EST MOD 30 MIN: CPT | Performed by: NURSE PRACTITIONER

## 2017-09-19 RX ORDER — OXYCODONE HYDROCHLORIDE 5 MG/1
5 TABLET ORAL EVERY 12 HOURS
Qty: 60 TABLET | Refills: 0 | Status: SHIPPED | OUTPATIENT
Start: 2017-09-21 | End: 2017-10-17 | Stop reason: SDUPTHER

## 2017-09-19 RX ORDER — MELOXICAM 15 MG/1
15 TABLET ORAL DAILY
Qty: 30 TABLET | Refills: 0 | Status: SHIPPED | OUTPATIENT
Start: 2017-09-21 | End: 2017-11-21 | Stop reason: SDUPTHER

## 2017-09-19 ASSESSMENT — PAIN DESCRIPTION - ORIENTATION: ORIENTATION: LOWER

## 2017-09-19 ASSESSMENT — PAIN DESCRIPTION - PROGRESSION: CLINICAL_PROGRESSION: NOT CHANGED

## 2017-09-19 ASSESSMENT — PAIN DESCRIPTION - FREQUENCY: FREQUENCY: CONTINUOUS

## 2017-09-19 ASSESSMENT — ENCOUNTER SYMPTOMS
BOWEL INCONTINENCE: 0
COUGH: 0
SHORTNESS OF BREATH: 0
CONSTIPATION: 0
BACK PAIN: 1

## 2017-09-19 ASSESSMENT — PAIN SCALES - GENERAL: PAINLEVEL_OUTOF10: 7

## 2017-09-19 ASSESSMENT — PAIN DESCRIPTION - DESCRIPTORS: DESCRIPTORS: ACHING;CONSTANT

## 2017-09-19 ASSESSMENT — PAIN DESCRIPTION - LOCATION: LOCATION: BACK

## 2017-09-19 ASSESSMENT — PAIN DESCRIPTION - PAIN TYPE: TYPE: CHRONIC PAIN

## 2017-09-19 ASSESSMENT — PAIN DESCRIPTION - ONSET: ONSET: ON-GOING

## 2017-09-25 ENCOUNTER — HOSPITAL ENCOUNTER (OUTPATIENT)
Dept: PAIN MANAGEMENT | Age: 62
Discharge: HOME | End: 2017-09-25
Payer: MEDICARE

## 2017-09-25 DIAGNOSIS — M54.9 CHRONIC BACK PAIN, UNSPECIFIED BACK LOCATION, UNSPECIFIED BACK PAIN LATERALITY: ICD-10-CM

## 2017-09-25 DIAGNOSIS — G89.29 CHRONIC BACK PAIN, UNSPECIFIED BACK LOCATION, UNSPECIFIED BACK PAIN LATERALITY: ICD-10-CM

## 2017-09-25 RX ORDER — LIDOCAINE HYDROCHLORIDE 10 MG/ML
0.3 INJECTION, SOLUTION EPIDURAL; INFILTRATION; INTRACAUDAL; PERINEURAL ONCE
Status: DISCONTINUED | OUTPATIENT
Start: 2017-09-25 | End: 2017-09-26 | Stop reason: HOSPADM

## 2017-09-25 RX ORDER — SODIUM CHLORIDE, SODIUM LACTATE, POTASSIUM CHLORIDE, CALCIUM CHLORIDE 600; 310; 30; 20 MG/100ML; MG/100ML; MG/100ML; MG/100ML
500 INJECTION, SOLUTION INTRAVENOUS CONTINUOUS
Status: DISCONTINUED | OUTPATIENT
Start: 2017-09-25 | End: 2017-09-26 | Stop reason: HOSPADM

## 2017-09-25 RX ORDER — MIDAZOLAM HYDROCHLORIDE 1 MG/ML
0.5 INJECTION INTRAMUSCULAR; INTRAVENOUS 4 TIMES DAILY PRN
Status: DISCONTINUED | OUTPATIENT
Start: 2017-09-25 | End: 2017-09-26 | Stop reason: HOSPADM

## 2017-09-25 RX ORDER — FENTANYL CITRATE 50 UG/ML
50 INJECTION, SOLUTION INTRAMUSCULAR; INTRAVENOUS PRN
Status: DISCONTINUED | OUTPATIENT
Start: 2017-09-25 | End: 2017-09-26 | Stop reason: HOSPADM

## 2017-10-09 ENCOUNTER — HOSPITAL ENCOUNTER (OUTPATIENT)
Dept: PAIN MANAGEMENT | Age: 62
Discharge: HOME | End: 2017-10-09
Payer: MEDICARE

## 2017-10-09 VITALS
TEMPERATURE: 98 F | DIASTOLIC BLOOD PRESSURE: 74 MMHG | BODY MASS INDEX: 32.23 KG/M2 | HEIGHT: 72 IN | HEART RATE: 63 BPM | OXYGEN SATURATION: 98 % | WEIGHT: 238 LBS | SYSTOLIC BLOOD PRESSURE: 171 MMHG | RESPIRATION RATE: 16 BRPM

## 2017-10-09 DIAGNOSIS — M54.9 CHRONIC BACK PAIN, UNSPECIFIED BACK LOCATION, UNSPECIFIED BACK PAIN LATERALITY: ICD-10-CM

## 2017-10-09 DIAGNOSIS — G89.29 CHRONIC BACK PAIN, UNSPECIFIED BACK LOCATION, UNSPECIFIED BACK PAIN LATERALITY: ICD-10-CM

## 2017-10-09 DIAGNOSIS — M48.061 SPINAL STENOSIS, LUMBAR REGION, WITHOUT NEUROGENIC CLAUDICATION: ICD-10-CM

## 2017-10-09 DIAGNOSIS — M47.816 FACET ARTHROPATHY, LUMBAR: ICD-10-CM

## 2017-10-09 PROCEDURE — 62323 NJX INTERLAMINAR LMBR/SAC: CPT | Performed by: ANESTHESIOLOGY

## 2017-10-09 PROCEDURE — 6360000002 HC RX W HCPCS

## 2017-10-09 PROCEDURE — 62323 NJX INTERLAMINAR LMBR/SAC: CPT

## 2017-10-09 PROCEDURE — 2500000003 HC RX 250 WO HCPCS

## 2017-10-09 PROCEDURE — 6360000002 HC RX W HCPCS: Performed by: ANESTHESIOLOGY

## 2017-10-09 PROCEDURE — 2580000003 HC RX 258: Performed by: ANESTHESIOLOGY

## 2017-10-09 RX ORDER — MIDAZOLAM HYDROCHLORIDE 1 MG/ML
0.5 INJECTION INTRAMUSCULAR; INTRAVENOUS 4 TIMES DAILY PRN
Status: DISCONTINUED | OUTPATIENT
Start: 2017-10-09 | End: 2017-10-10 | Stop reason: HOSPADM

## 2017-10-09 RX ORDER — LIDOCAINE HYDROCHLORIDE 10 MG/ML
0.3 INJECTION, SOLUTION EPIDURAL; INFILTRATION; INTRACAUDAL; PERINEURAL ONCE
Status: DISCONTINUED | OUTPATIENT
Start: 2017-10-09 | End: 2017-10-10 | Stop reason: HOSPADM

## 2017-10-09 RX ORDER — SODIUM CHLORIDE, SODIUM LACTATE, POTASSIUM CHLORIDE, CALCIUM CHLORIDE 600; 310; 30; 20 MG/100ML; MG/100ML; MG/100ML; MG/100ML
500 INJECTION, SOLUTION INTRAVENOUS CONTINUOUS
Status: DISCONTINUED | OUTPATIENT
Start: 2017-10-09 | End: 2017-10-10 | Stop reason: HOSPADM

## 2017-10-09 RX ORDER — FENTANYL CITRATE 50 UG/ML
50 INJECTION, SOLUTION INTRAMUSCULAR; INTRAVENOUS PRN
Status: DISCONTINUED | OUTPATIENT
Start: 2017-10-09 | End: 2017-10-10 | Stop reason: HOSPADM

## 2017-10-09 RX ADMIN — MIDAZOLAM HYDROCHLORIDE 1 MG: 1 INJECTION, SOLUTION INTRAMUSCULAR; INTRAVENOUS at 12:37

## 2017-10-09 RX ADMIN — FENTANYL CITRATE 50 MCG: 50 INJECTION INTRAMUSCULAR; INTRAVENOUS at 12:37

## 2017-10-09 RX ADMIN — SODIUM CHLORIDE, POTASSIUM CHLORIDE, SODIUM LACTATE AND CALCIUM CHLORIDE 500 ML: 600; 310; 30; 20 INJECTION, SOLUTION INTRAVENOUS at 12:24

## 2017-10-09 ASSESSMENT — PAIN DESCRIPTION - DESCRIPTORS: DESCRIPTORS: CONSTANT;ACHING;DULL

## 2017-10-09 ASSESSMENT — PAIN - FUNCTIONAL ASSESSMENT
PAIN_FUNCTIONAL_ASSESSMENT: 0-10

## 2017-10-09 ASSESSMENT — PAIN SCALES - GENERAL: PAINLEVEL_OUTOF10: 5

## 2017-10-09 ASSESSMENT — ACTIVITIES OF DAILY LIVING (ADL): EFFECT OF PAIN ON DAILY ACTIVITIES: 4/5

## 2017-10-10 NOTE — PROCEDURES
morphine is similar  to what he has had in the past.  We will follow up with him.         Vicky Dallas    D: 10/09/2017 15:18:21       T: 10/09/2017 17:32:09     [email or web address]  Job#: [id number]     Doc#: [id number]    CC:  Dr. Aleksandr Peng

## 2017-10-14 DIAGNOSIS — D50.9 IRON DEFICIENCY ANEMIA, UNSPECIFIED IRON DEFICIENCY ANEMIA TYPE: ICD-10-CM

## 2017-10-16 RX ORDER — FERROUS SULFATE 325(65) MG
TABLET ORAL
Qty: 60 TABLET | Refills: 0 | Status: SHIPPED | OUTPATIENT
Start: 2017-10-16 | End: 2017-11-14 | Stop reason: SDUPTHER

## 2017-10-17 ENCOUNTER — HOSPITAL ENCOUNTER (OUTPATIENT)
Dept: PAIN MANAGEMENT | Age: 62
Discharge: HOME | End: 2017-10-17
Payer: MEDICARE

## 2017-10-17 VITALS
SYSTOLIC BLOOD PRESSURE: 140 MMHG | HEART RATE: 78 BPM | DIASTOLIC BLOOD PRESSURE: 84 MMHG | RESPIRATION RATE: 14 BRPM | TEMPERATURE: 97.9 F

## 2017-10-17 PROCEDURE — 80307 DRUG TEST PRSMV CHEM ANLYZR: CPT

## 2017-10-17 PROCEDURE — 99213 OFFICE O/P EST LOW 20 MIN: CPT | Performed by: ANESTHESIOLOGY

## 2017-10-17 PROCEDURE — 99215 OFFICE O/P EST HI 40 MIN: CPT

## 2017-10-17 RX ORDER — OXYCODONE HYDROCHLORIDE 5 MG/1
5 TABLET ORAL EVERY 12 HOURS
Qty: 60 TABLET | Refills: 0 | Status: SHIPPED | OUTPATIENT
Start: 2017-10-21 | End: 2017-11-15 | Stop reason: HOSPADM

## 2017-10-17 ASSESSMENT — PAIN DESCRIPTION - ORIENTATION: ORIENTATION: LEFT

## 2017-10-17 ASSESSMENT — PAIN DESCRIPTION - FREQUENCY: FREQUENCY: CONTINUOUS

## 2017-10-17 ASSESSMENT — PAIN SCALES - GENERAL: PAINLEVEL_OUTOF10: 4

## 2017-10-17 ASSESSMENT — PAIN DESCRIPTION - PROGRESSION: CLINICAL_PROGRESSION: GRADUALLY WORSENING

## 2017-10-17 ASSESSMENT — PAIN DESCRIPTION - PAIN TYPE: TYPE: CHRONIC PAIN

## 2017-10-17 ASSESSMENT — PAIN DESCRIPTION - DESCRIPTORS: DESCRIPTORS: ACHING;CONSTANT;DULL

## 2017-10-17 ASSESSMENT — PAIN DESCRIPTION - LOCATION: LOCATION: BACK;SHOULDER

## 2017-10-20 LAB
6-ACETYLMORPHINE, UR: NOT DETECTED
7-AMINOCLONAZEPAM, URINE: NOT DETECTED
ALPHA-OH-ALPRAZ, URINE: NOT DETECTED
ALPRAZOLAM, URINE: NOT DETECTED
AMPHETAMINES, URINE: NOT DETECTED
BARBITURATES, URINE: NOT DETECTED
BENZOYLECGONINE, UR: NOT DETECTED
BUPRENORPHINE URINE: NOT DETECTED
CARISOPRODOL, UR: NOT DETECTED
CLONAZEPAM, URINE: NOT DETECTED
CODEINE, URINE: NOT DETECTED
CREATININE URINE: 149.1 MG/DL (ref 20–400)
DIAZEPAM, URINE: NOT DETECTED
EER PAIN MGT DRUG PANEL, HIGH RES/EMIT U: NORMAL
ETHYL GLUCURONIDE UR: PRESENT
FENTANYL URINE: NOT DETECTED
HYDROCODONE, URINE: NOT DETECTED
HYDROMORPHONE, URINE: NOT DETECTED
LORAZEPAM, URINE: NOT DETECTED
MARIJUANA METAB, UR: NOT DETECTED
MDA, UR: NOT DETECTED
MDEA, EVE, UR: NOT DETECTED
MDMA URINE: NOT DETECTED
MEPERIDINE METAB, UR: NOT DETECTED
METHADONE, URINE: NOT DETECTED
METHAMPHETAMINE, URINE: NOT DETECTED
METHYLPHENIDATE: NOT DETECTED
MIDAZOLAM, URINE: NOT DETECTED
MORPHINE URINE: NOT DETECTED
NORBUPRENORPHINE, URINE: NOT DETECTED
NORDIAZEPAM, URINE: NOT DETECTED
NORFENTANYL, URINE: NOT DETECTED
NORHYDROCODONE, URINE: NOT DETECTED
NOROXYCODONE, URINE: PRESENT
NOROXYMORPHONE, URINE: PRESENT
OXAZEPAM, URINE: NOT DETECTED
OXYCODONE URINE: PRESENT
OXYMORPHONE, URINE: PRESENT
PAIN MGT DRUG PANEL, HI RES, UR: NORMAL
PCP,URINE: NOT DETECTED
PHENTERMINE, UR: NOT DETECTED
PROPOXYPHENE, URINE: NOT DETECTED
TAPENTADOL, URINE: NOT DETECTED
TAPENTADOL-O-SULFATE, URINE: NOT DETECTED
TEMAZEPAM, URINE: NOT DETECTED
TRAMADOL, URINE: NOT DETECTED
ZOLPIDEM, URINE: NOT DETECTED

## 2017-10-23 ENCOUNTER — OFFICE VISIT (OUTPATIENT)
Dept: ORTHOPEDIC SURGERY | Age: 62
End: 2017-10-23
Payer: MEDICARE

## 2017-10-23 VITALS — HEIGHT: 72 IN | WEIGHT: 238.1 LBS | BODY MASS INDEX: 32.25 KG/M2

## 2017-10-23 DIAGNOSIS — G89.29 CHRONIC LEFT SHOULDER PAIN: Primary | ICD-10-CM

## 2017-10-23 DIAGNOSIS — M25.512 CHRONIC LEFT SHOULDER PAIN: Primary | ICD-10-CM

## 2017-10-23 PROCEDURE — 1036F TOBACCO NON-USER: CPT | Performed by: STUDENT IN AN ORGANIZED HEALTH CARE EDUCATION/TRAINING PROGRAM

## 2017-10-23 PROCEDURE — 3017F COLORECTAL CA SCREEN DOC REV: CPT | Performed by: STUDENT IN AN ORGANIZED HEALTH CARE EDUCATION/TRAINING PROGRAM

## 2017-10-23 PROCEDURE — G8427 DOCREV CUR MEDS BY ELIG CLIN: HCPCS | Performed by: STUDENT IN AN ORGANIZED HEALTH CARE EDUCATION/TRAINING PROGRAM

## 2017-10-23 PROCEDURE — G8484 FLU IMMUNIZE NO ADMIN: HCPCS | Performed by: STUDENT IN AN ORGANIZED HEALTH CARE EDUCATION/TRAINING PROGRAM

## 2017-10-23 PROCEDURE — 99213 OFFICE O/P EST LOW 20 MIN: CPT | Performed by: STUDENT IN AN ORGANIZED HEALTH CARE EDUCATION/TRAINING PROGRAM

## 2017-10-23 PROCEDURE — G8417 CALC BMI ABV UP PARAM F/U: HCPCS | Performed by: STUDENT IN AN ORGANIZED HEALTH CARE EDUCATION/TRAINING PROGRAM

## 2017-11-13 ENCOUNTER — APPOINTMENT (OUTPATIENT)
Dept: GENERAL RADIOLOGY | Age: 62
End: 2017-11-13
Payer: MEDICARE

## 2017-11-13 ENCOUNTER — HOSPITAL ENCOUNTER (EMERGENCY)
Age: 62
Discharge: HOME | End: 2017-11-13
Attending: EMERGENCY MEDICINE
Payer: MEDICARE

## 2017-11-13 VITALS
SYSTOLIC BLOOD PRESSURE: 176 MMHG | RESPIRATION RATE: 16 BRPM | HEIGHT: 72 IN | TEMPERATURE: 96.6 F | OXYGEN SATURATION: 97 % | DIASTOLIC BLOOD PRESSURE: 73 MMHG | HEART RATE: 68 BPM | BODY MASS INDEX: 32.51 KG/M2 | WEIGHT: 240 LBS

## 2017-11-13 DIAGNOSIS — S61.212A LACERATION OF RIGHT MIDDLE FINGER WITHOUT FOREIGN BODY WITHOUT DAMAGE TO NAIL, INITIAL ENCOUNTER: Primary | ICD-10-CM

## 2017-11-13 PROCEDURE — 6370000000 HC RX 637 (ALT 250 FOR IP): Performed by: EMERGENCY MEDICINE

## 2017-11-13 PROCEDURE — 99283 EMERGENCY DEPT VISIT LOW MDM: CPT

## 2017-11-13 PROCEDURE — 73140 X-RAY EXAM OF FINGER(S): CPT

## 2017-11-13 RX ORDER — AMOXICILLIN AND CLAVULANATE POTASSIUM 875; 125 MG/1; MG/1
1 TABLET, FILM COATED ORAL 2 TIMES DAILY
Qty: 10 TABLET | Refills: 0 | Status: SHIPPED | OUTPATIENT
Start: 2017-11-13 | End: 2017-11-18

## 2017-11-13 RX ORDER — AMOXICILLIN AND CLAVULANATE POTASSIUM 875; 125 MG/1; MG/1
1 TABLET, FILM COATED ORAL ONCE
Status: COMPLETED | OUTPATIENT
Start: 2017-11-13 | End: 2017-11-13

## 2017-11-13 RX ADMIN — AMOXICILLIN AND CLAVULANATE POTASSIUM 1 TABLET: 875; 125 TABLET, FILM COATED ORAL at 11:39

## 2017-11-13 ASSESSMENT — PAIN DESCRIPTION - LOCATION: LOCATION: FINGER (COMMENT WHICH ONE)

## 2017-11-13 ASSESSMENT — ENCOUNTER SYMPTOMS
COUGH: 0
NAUSEA: 0
SORE THROAT: 0
VOMITING: 0
EYE PAIN: 0
DIARRHEA: 0
ABDOMINAL PAIN: 0
EYE DISCHARGE: 0
SHORTNESS OF BREATH: 0

## 2017-11-13 ASSESSMENT — PAIN DESCRIPTION - ORIENTATION: ORIENTATION: RIGHT

## 2017-11-13 ASSESSMENT — PAIN SCALES - GENERAL: PAINLEVEL_OUTOF10: 5

## 2017-11-13 ASSESSMENT — PAIN DESCRIPTION - PAIN TYPE: TYPE: ACUTE PAIN
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
## 2017-11-14 DIAGNOSIS — D50.9 IRON DEFICIENCY ANEMIA, UNSPECIFIED IRON DEFICIENCY ANEMIA TYPE: ICD-10-CM

## 2017-11-14 RX ORDER — FERROUS SULFATE 325(65) MG
TABLET ORAL
Qty: 60 TABLET | Refills: 0 | Status: SHIPPED | OUTPATIENT
Start: 2017-11-14 | End: 2017-12-14 | Stop reason: SDUPTHER

## 2017-11-14 RX ORDER — PANTOPRAZOLE SODIUM 20 MG/1
TABLET, DELAYED RELEASE ORAL
Qty: 30 TABLET | Refills: 0 | Status: SHIPPED | OUTPATIENT
Start: 2017-11-14 | End: 2017-12-14 | Stop reason: SDUPTHER

## 2017-11-14 NOTE — TELEPHONE ENCOUNTER
E-scribe request for FERROUS SULFATE 325 MG TABLET. Please review and e-scribe if applicable. Last Visit Date:  8/7/17  Next Visit Date:  11/21/2017    Hemoglobin A1C (%)   Date Value   12/14/2016 5.1   01/17/2013 4.9             ( goal A1C is < 7)   Microalb/Crt.  Ratio (mcg/mg creat)   Date Value   01/22/2013 9     LDL Cholesterol (mg/dL)   Date Value   04/17/2017 114       (goal LDL is <100)   AST (U/L)   Date Value   04/17/2017 34     ALT (U/L)   Date Value   04/17/2017 30     BUN (mg/dL)   Date Value   04/17/2017 14     BP Readings from Last 3 Encounters:   11/13/17 (!) 176/73   10/17/17 (!) 140/84   10/09/17 (!) 171/74          (goal 120/80)        Patient Active Problem List:     Hyperlipidemia     Gout     CKD (chronic kidney disease) stage 3, GFR 30-59 ml/min     Wrist pain, right     Swelling of joint, wrist, right     Pain in joint, multiple sites     Obesity     Adjustment reaction     Vertigo     Pain in joint     Spinal stenosis, lumbar region, without neurogenic claudication     Facet arthropathy, lumbar     Chronic back pain     Acute bilateral low back pain with sciatica      ----JF

## 2017-11-21 ENCOUNTER — OFFICE VISIT (OUTPATIENT)
Dept: INTERNAL MEDICINE | Age: 62
End: 2017-11-21
Payer: MEDICARE

## 2017-11-21 VITALS
DIASTOLIC BLOOD PRESSURE: 72 MMHG | WEIGHT: 243.8 LBS | BODY MASS INDEX: 33.07 KG/M2 | HEART RATE: 74 BPM | SYSTOLIC BLOOD PRESSURE: 147 MMHG

## 2017-11-21 DIAGNOSIS — M48.061 SPINAL STENOSIS, LUMBAR REGION, WITHOUT NEUROGENIC CLAUDICATION: Primary | ICD-10-CM

## 2017-11-21 DIAGNOSIS — N18.30 CKD (CHRONIC KIDNEY DISEASE) STAGE 3, GFR 30-59 ML/MIN (HCC): ICD-10-CM

## 2017-11-21 DIAGNOSIS — R42 DIZZINESS: ICD-10-CM

## 2017-11-21 DIAGNOSIS — Z23 NEEDS FLU SHOT: ICD-10-CM

## 2017-11-21 DIAGNOSIS — Z13.1 SCREENING FOR DIABETES MELLITUS: ICD-10-CM

## 2017-11-21 DIAGNOSIS — I10 ESSENTIAL HYPERTENSION: ICD-10-CM

## 2017-11-21 DIAGNOSIS — E79.0 HYPERURICEMIA: ICD-10-CM

## 2017-11-21 DIAGNOSIS — S61.212D LACERATION OF RIGHT MIDDLE FINGER WITHOUT FOREIGN BODY WITHOUT DAMAGE TO NAIL, SUBSEQUENT ENCOUNTER: ICD-10-CM

## 2017-11-21 LAB — HBA1C MFR BLD: 5 %

## 2017-11-21 PROCEDURE — 90688 IIV4 VACCINE SPLT 0.5 ML IM: CPT | Performed by: INTERNAL MEDICINE

## 2017-11-21 PROCEDURE — G8417 CALC BMI ABV UP PARAM F/U: HCPCS | Performed by: INTERNAL MEDICINE

## 2017-11-21 PROCEDURE — 90471 IMMUNIZATION ADMIN: CPT | Performed by: INTERNAL MEDICINE

## 2017-11-21 PROCEDURE — 83036 HEMOGLOBIN GLYCOSYLATED A1C: CPT | Performed by: INTERNAL MEDICINE

## 2017-11-21 PROCEDURE — 1036F TOBACCO NON-USER: CPT | Performed by: INTERNAL MEDICINE

## 2017-11-21 PROCEDURE — 3017F COLORECTAL CA SCREEN DOC REV: CPT | Performed by: INTERNAL MEDICINE

## 2017-11-21 PROCEDURE — G8427 DOCREV CUR MEDS BY ELIG CLIN: HCPCS | Performed by: INTERNAL MEDICINE

## 2017-11-21 PROCEDURE — 99214 OFFICE O/P EST MOD 30 MIN: CPT | Performed by: INTERNAL MEDICINE

## 2017-11-21 PROCEDURE — G8484 FLU IMMUNIZE NO ADMIN: HCPCS | Performed by: INTERNAL MEDICINE

## 2017-11-21 RX ORDER — OXYCODONE HYDROCHLORIDE 5 MG/1
5 TABLET ORAL EVERY 12 HOURS
Qty: 30 TABLET | Refills: 0 | Status: SHIPPED | OUTPATIENT
Start: 2017-11-21 | End: 2017-12-20 | Stop reason: SDUPTHER

## 2017-11-21 RX ORDER — GABAPENTIN 300 MG/1
300 CAPSULE ORAL 3 TIMES DAILY
Qty: 90 CAPSULE | Refills: 5 | Status: SHIPPED | OUTPATIENT
Start: 2017-11-21 | End: 2018-07-16 | Stop reason: SDUPTHER

## 2017-11-21 RX ORDER — ALLOPURINOL 300 MG/1
TABLET ORAL
Qty: 30 TABLET | Refills: 5 | Status: SHIPPED | OUTPATIENT
Start: 2017-11-21 | End: 2018-06-17 | Stop reason: SDUPTHER

## 2017-11-21 ASSESSMENT — ENCOUNTER SYMPTOMS: BACK PAIN: 1

## 2017-11-21 NOTE — PATIENT INSTRUCTIONS
Your script for oxycodone has been printed and given to you, you can take it to the pharmacy of your choice. Your medications for this visit were escribed to your preferred pharmacy. Your referral for Pain Management has Faxed to Dr Yoel Arciniega at [phone] P # [phone]. Avs was given and reviewed appt card given with next appt.  MM
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
## 2017-11-22 RX ORDER — MELOXICAM 15 MG/1
15 TABLET ORAL DAILY
Qty: 30 TABLET | Refills: 0 | Status: SHIPPED | OUTPATIENT
Start: 2017-11-22 | End: 2017-12-14 | Stop reason: SDUPTHER

## 2017-11-26 ASSESSMENT — ENCOUNTER SYMPTOMS
SINUS PAIN: 0
NAUSEA: 0
ABDOMINAL PAIN: 0
COUGH: 0
DIARRHEA: 0
EYE REDNESS: 0
BLOOD IN STOOL: 0
SHORTNESS OF BREATH: 0
HEARTBURN: 0
SPUTUM PRODUCTION: 0
BLURRED VISION: 0

## 2017-12-14 DIAGNOSIS — I10 ESSENTIAL HYPERTENSION: ICD-10-CM

## 2017-12-14 DIAGNOSIS — M48.061 SPINAL STENOSIS, LUMBAR REGION, WITHOUT NEUROGENIC CLAUDICATION: ICD-10-CM

## 2017-12-14 DIAGNOSIS — D50.9 IRON DEFICIENCY ANEMIA, UNSPECIFIED IRON DEFICIENCY ANEMIA TYPE: ICD-10-CM

## 2017-12-14 DIAGNOSIS — S61.212D LACERATION OF RIGHT MIDDLE FINGER WITHOUT FOREIGN BODY WITHOUT DAMAGE TO NAIL, SUBSEQUENT ENCOUNTER: ICD-10-CM

## 2017-12-14 RX ORDER — MELOXICAM 15 MG/1
TABLET ORAL
Qty: 30 TABLET | Refills: 0 | OUTPATIENT
Start: 2017-12-14

## 2017-12-14 RX ORDER — OXYCODONE HYDROCHLORIDE 5 MG/1
5 TABLET ORAL EVERY 12 HOURS
Qty: 30 TABLET | Refills: 0 | Status: CANCELLED | OUTPATIENT
Start: 2017-12-14 | End: 2017-12-29

## 2017-12-14 RX ORDER — PANTOPRAZOLE SODIUM 20 MG/1
TABLET, DELAYED RELEASE ORAL
Qty: 30 TABLET | Refills: 0 | Status: SHIPPED | OUTPATIENT
Start: 2017-12-14 | End: 2018-01-15 | Stop reason: SDUPTHER

## 2017-12-14 RX ORDER — AMLODIPINE BESYLATE 2.5 MG/1
2.5 TABLET ORAL DAILY
Qty: 30 TABLET | Refills: 5 | Status: SHIPPED | OUTPATIENT
Start: 2017-12-14 | End: 2018-03-15 | Stop reason: SDUPTHER

## 2017-12-14 RX ORDER — OXYCODONE HYDROCHLORIDE 5 MG/1
TABLET ORAL
Qty: 30 TABLET | Refills: 0 | OUTPATIENT
Start: 2017-12-14

## 2017-12-14 RX ORDER — FERROUS SULFATE 325(65) MG
TABLET ORAL
Qty: 60 TABLET | Refills: 0 | OUTPATIENT
Start: 2017-12-14

## 2017-12-14 RX ORDER — MELOXICAM 15 MG/1
15 TABLET ORAL DAILY
Qty: 30 TABLET | Refills: 0 | Status: SHIPPED | OUTPATIENT
Start: 2017-12-14 | End: 2018-01-15 | Stop reason: SDUPTHER

## 2017-12-14 RX ORDER — PANTOPRAZOLE SODIUM 20 MG/1
TABLET, DELAYED RELEASE ORAL
Qty: 30 TABLET | Refills: 0 | OUTPATIENT
Start: 2017-12-14

## 2017-12-20 ENCOUNTER — TELEPHONE (OUTPATIENT)
Dept: INTERNAL MEDICINE | Age: 62
End: 2017-12-20

## 2017-12-20 DIAGNOSIS — S61.212D LACERATION OF RIGHT MIDDLE FINGER WITHOUT FOREIGN BODY WITHOUT DAMAGE TO NAIL, SUBSEQUENT ENCOUNTER: ICD-10-CM

## 2017-12-20 DIAGNOSIS — M48.061 SPINAL STENOSIS, LUMBAR REGION, WITHOUT NEUROGENIC CLAUDICATION: ICD-10-CM

## 2017-12-20 RX ORDER — OXYCODONE HYDROCHLORIDE 5 MG/1
5 TABLET ORAL EVERY 12 HOURS
Qty: 30 TABLET | Refills: 0 | Status: SHIPPED | OUTPATIENT
Start: 2017-12-20 | End: 2018-01-04

## 2017-12-20 RX ORDER — OXYCODONE HYDROCHLORIDE 5 MG/1
5 TABLET ORAL EVERY 12 HOURS
Qty: 60 TABLET | Refills: 0 | Status: CANCELLED | OUTPATIENT
Start: 2017-12-20 | End: 2018-01-19

## 2017-12-20 NOTE — TELEPHONE ENCOUNTER
Pt calling pain mgmt appt not starting to January pt needs pain meds refilled   Next Visit Date:  Future Appointments  Date Time Provider Jonathan Manei   3/6/2018 10:00 AM Mara Arciniega MD [address] [facility]   Topic Date Due    Hepatitis C screen  1955    HIV screen  04/21/1972    Zostavax vaccine  12/20/2017 (Originally 4/21/2017)    DTaP/Tdap/Td vaccine (1 - Tdap) 12/14/2022 (Originally 9/14/2014)    Lipid screen  04/17/2022    Colon cancer screen colonoscopy  11/06/2024    Flu vaccine  Completed       Hemoglobin A1C (%)   Date Value   11/21/2017 5.0   12/14/2016 5.1   01/17/2013 4.9             ( goal A1C is < 7)   Microalb/Crt.  Ratio (mcg/mg creat)   Date Value   01/22/2013 9     LDL Cholesterol (mg/dL)   Date Value   04/17/2017 114       (goal LDL is <100)   AST (U/L)   Date Value   04/17/2017 34     ALT (U/L)   Date Value   04/17/2017 30     BUN (mg/dL)   Date Value   04/17/2017 14     BP Readings from Last 3 Encounters:   11/21/17 (!) 147/72   11/13/17 (!) 176/73   10/17/17 (!) 140/84          (goal 120/80)    All Future Testing planned in [facility]  Lab Frequency Next Occurrence   Fluoro For Surgical Procedures Once 03/29/2017   XR Shoulder Right 2 VW Once 12/10/2017   XR Shoulder Left 2 VW Once 12/10/2017   Fluoro For Surgical Procedures Once 10/04/2017   MRI SHOULDER LEFT WO CONTRAST Once 12/24/2017   Nasal cannula oxygen                 Patient Active Problem List:     Hyperlipidemia     Gout     CKD (chronic kidney disease) stage 3, GFR 30-59 ml/min     Wrist pain, right     Swelling of joint, wrist, right     Pain in joint, multiple sites     Obesity     Adjustment reaction     Vertigo     Pain in joint     Spinal stenosis, lumbar region, without neurogenic claudication     Facet arthropathy, lumbar     Chronic back pain     Acute bilateral low back pain with sciatica

## 2017-12-29 RX ORDER — EPINEPHRINE 0.3 MG/.3ML
0.3 INJECTION SUBCUTANEOUS
Qty: 1 EACH | Refills: 1 | Status: SHIPPED | OUTPATIENT
Start: 2017-12-29 | End: 2017-12-29

## 2018-01-09 DIAGNOSIS — S61.212D LACERATION OF RIGHT MIDDLE FINGER WITHOUT FOREIGN BODY WITHOUT DAMAGE TO NAIL, SUBSEQUENT ENCOUNTER: ICD-10-CM

## 2018-01-09 DIAGNOSIS — M48.061 SPINAL STENOSIS, LUMBAR REGION, WITHOUT NEUROGENIC CLAUDICATION: ICD-10-CM

## 2018-01-09 NOTE — TELEPHONE ENCOUNTER
Patient request for Oxycodone 5 MG. Please review and e-scribe if applicable. Last Visit Date: 11/21/17  Next Visit Date:  3/6/2018    Hemoglobin A1C (%)   Date Value   11/21/2017 5.0   12/14/2016 5.1   01/17/2013 4.9             ( goal A1C is < 7)   Microalb/Crt.  Ratio (mcg/mg creat)   Date Value   01/22/2013 9     LDL Cholesterol (mg/dL)   Date Value   04/17/2017 114       (goal LDL is <100)   AST (U/L)   Date Value   04/17/2017 34     ALT (U/L)   Date Value   04/17/2017 30     BUN (mg/dL)   Date Value   04/17/2017 14     BP Readings from Last 3 Encounters:   11/21/17 (!) 147/72   11/13/17 (!) 176/73   10/17/17 (!) 140/84          (goal 120/80)        Patient Active Problem List:     Hyperlipidemia     Gout     CKD (chronic kidney disease) stage 3, GFR 30-59 ml/min     Wrist pain, right     Swelling of joint, wrist, right     Pain in joint, multiple sites     Obesity     Adjustment reaction     Vertigo     Pain in joint     Spinal stenosis, lumbar region, without neurogenic claudication     Facet arthropathy, lumbar     Chronic back pain     Acute bilateral low back pain with sciatica      MA

## 2018-01-10 RX ORDER — OXYCODONE HYDROCHLORIDE 5 MG/1
5 TABLET ORAL EVERY 12 HOURS
Qty: 30 TABLET | Refills: 0 | OUTPATIENT
Start: 2018-01-10 | End: 2018-01-25

## 2018-01-19 ENCOUNTER — TELEPHONE (OUTPATIENT)
Dept: INTERNAL MEDICINE | Age: 63
End: 2018-01-19

## 2018-03-15 ENCOUNTER — OFFICE VISIT (OUTPATIENT)
Dept: INTERNAL MEDICINE | Age: 63
End: 2018-03-15
Payer: MEDICARE

## 2018-03-15 ENCOUNTER — HOSPITAL ENCOUNTER (OUTPATIENT)
Age: 63
Setting detail: SPECIMEN
Discharge: HOME | End: 2018-03-15
Payer: MEDICARE

## 2018-03-15 VITALS
BODY MASS INDEX: 32.78 KG/M2 | OXYGEN SATURATION: 100 % | WEIGHT: 242 LBS | HEIGHT: 72 IN | HEART RATE: 68 BPM | DIASTOLIC BLOOD PRESSURE: 62 MMHG | SYSTOLIC BLOOD PRESSURE: 128 MMHG

## 2018-03-15 DIAGNOSIS — M54.6 CHRONIC BILATERAL THORACIC BACK PAIN: ICD-10-CM

## 2018-03-15 DIAGNOSIS — M25.50 PAIN IN JOINT, MULTIPLE SITES: Chronic | ICD-10-CM

## 2018-03-15 DIAGNOSIS — I10 ESSENTIAL HYPERTENSION: Primary | ICD-10-CM

## 2018-03-15 DIAGNOSIS — Z23 NEED FOR PROPHYLACTIC VACCINATION AND INOCULATION AGAINST VARICELLA: ICD-10-CM

## 2018-03-15 DIAGNOSIS — G89.29 CHRONIC PAIN OF LEFT KNEE: ICD-10-CM

## 2018-03-15 DIAGNOSIS — I10 ESSENTIAL HYPERTENSION: ICD-10-CM

## 2018-03-15 DIAGNOSIS — N18.30 CKD (CHRONIC KIDNEY DISEASE) STAGE 3, GFR 30-59 ML/MIN (HCC): ICD-10-CM

## 2018-03-15 DIAGNOSIS — G89.29 CHRONIC BILATERAL THORACIC BACK PAIN: ICD-10-CM

## 2018-03-15 DIAGNOSIS — M25.562 CHRONIC PAIN OF LEFT KNEE: ICD-10-CM

## 2018-03-15 LAB
ANION GAP SERPL CALCULATED.3IONS-SCNC: 14 MMOL/L (ref 9–17)
BUN BLDV-MCNC: 25 MG/DL (ref 8–23)
BUN/CREAT BLD: ABNORMAL (ref 9–20)
CALCIUM SERPL-MCNC: 9.5 MG/DL (ref 8.6–10.4)
CHLORIDE BLD-SCNC: 102 MMOL/L (ref 98–107)
CO2: 24 MMOL/L (ref 20–31)
CREAT SERPL-MCNC: 1.38 MG/DL (ref 0.7–1.2)
GFR AFRICAN AMERICAN: >60 ML/MIN
GFR NON-AFRICAN AMERICAN: 53 ML/MIN
GFR SERPL CREATININE-BSD FRML MDRD: ABNORMAL ML/MIN/{1.73_M2}
GFR SERPL CREATININE-BSD FRML MDRD: ABNORMAL ML/MIN/{1.73_M2}
GLUCOSE BLD-MCNC: 95 MG/DL (ref 70–99)
POTASSIUM SERPL-SCNC: 4.7 MMOL/L (ref 3.7–5.3)
SODIUM BLD-SCNC: 140 MMOL/L (ref 135–144)

## 2018-03-15 PROCEDURE — G8427 DOCREV CUR MEDS BY ELIG CLIN: HCPCS | Performed by: INTERNAL MEDICINE

## 2018-03-15 PROCEDURE — 99214 OFFICE O/P EST MOD 30 MIN: CPT | Performed by: INTERNAL MEDICINE

## 2018-03-15 PROCEDURE — G8482 FLU IMMUNIZE ORDER/ADMIN: HCPCS | Performed by: INTERNAL MEDICINE

## 2018-03-15 PROCEDURE — 99213 OFFICE O/P EST LOW 20 MIN: CPT

## 2018-03-15 PROCEDURE — 3017F COLORECTAL CA SCREEN DOC REV: CPT | Performed by: INTERNAL MEDICINE

## 2018-03-15 PROCEDURE — 36415 COLL VENOUS BLD VENIPUNCTURE: CPT

## 2018-03-15 PROCEDURE — 80048 BASIC METABOLIC PNL TOTAL CA: CPT

## 2018-03-15 PROCEDURE — G8417 CALC BMI ABV UP PARAM F/U: HCPCS | Performed by: INTERNAL MEDICINE

## 2018-03-15 PROCEDURE — 1036F TOBACCO NON-USER: CPT | Performed by: INTERNAL MEDICINE

## 2018-03-15 RX ORDER — TIZANIDINE 4 MG/1
4 TABLET ORAL NIGHTLY PRN
Qty: 30 TABLET | Refills: 3 | Status: SHIPPED | OUTPATIENT
Start: 2018-03-15 | End: 2018-05-21 | Stop reason: SDUPTHER

## 2018-03-15 RX ORDER — AMLODIPINE BESYLATE 2.5 MG/1
2.5 TABLET ORAL DAILY
Qty: 30 TABLET | Refills: 5 | Status: SHIPPED | OUTPATIENT
Start: 2018-03-15 | End: 2018-09-16 | Stop reason: SDUPTHER

## 2018-03-15 ASSESSMENT — PATIENT HEALTH QUESTIONNAIRE - PHQ9
SUM OF ALL RESPONSES TO PHQ9 QUESTIONS 1 & 2: 0
1. LITTLE INTEREST OR PLEASURE IN DOING THINGS: 0
SUM OF ALL RESPONSES TO PHQ QUESTIONS 1-9: 0
2. FEELING DOWN, DEPRESSED OR HOPELESS: 0

## 2018-03-15 ASSESSMENT — ENCOUNTER SYMPTOMS
BLURRED VISION: 0
SPUTUM PRODUCTION: 0
NAUSEA: 0
COUGH: 0
BACK PAIN: 1
ABDOMINAL PAIN: 0
BLOOD IN STOOL: 0
CONSTIPATION: 0
EYE REDNESS: 0
SHORTNESS OF BREATH: 0

## 2018-03-15 NOTE — PROGRESS NOTES
[facility]    Progress Note    Date of patient's visit: 3/15/2018    Patient's Name:  Aristides Julian    YOB: 1955            Patient Care Team:  Mary Marinelli MD as PCP - Willow Coley MD as Referring Physician (Internal Medicine)    REASON FOR VISIT: Routine outpatient follow     Chief Complaint   Patient presents with    Hypertension     follow up   [place] refused vaccine          HISTORY OF PRESENT ILLNESS:    History was obtained from the patient. Aristides Julian is a 63 y.o. is here for Follow-up of his hypertension and chronic kidney disease. He continues to have aches and pains in most of his body. He has bilateral shoulder pain. He is complaining of left knee pain and feels there is fluid around it. He is also having mid back pain. He says he is going to pain management and they're always focusing on his lower back. He says he's had some injections in his spine which help only temporarily. He is on Percocet for pain. 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 He is again doing weight training. He also does martial arts. He is worried about not being able to lose abdominal wall fat. MRI Lumbar spine     Impression:       Prominent circumferential disc bulge and posterior osteophyte complex as well as facet arthropathy, ligamentum flavum hypertrophy and significant anterior epidural fat, especially posterior to the mid L3 vertebral body resulting in marked spinal canal    stenosis. There is also marked right and moderate left neuroforaminal stenosis.       Circumferential disc and facet arthropathy at L4-L5 producing moderate spinal canal and marked left neuroforaminal stenosis. Right neuroforamina is moderately stenosed.       For better details of individual levels refer above.       Thoracic spine 2014  TECHNIQUE AND FINDINGS:   Bilateral oblique views of the Height: 6' (1.829 m)     Body mass index is 32.82 kg/m². BP Readings from Last 3 Encounters:   03/15/18 (!) 149/60   11/21/17 (!) 147/72   11/13/17 (!) 176/73        Wt Readings from Last 3 Encounters:   03/15/18 242 lb (109.8 kg)   11/21/17 243 lb 12.8 oz (110.6 kg)   11/13/17 240 lb (108.9 kg)       Physical Exam   Constitutional: He is oriented to person, place, and time and well-developed, well-nourished, and in no distress. No distress. HENT:   Head: Normocephalic and atraumatic. Mouth/Throat: Oropharynx is clear and moist.   Eyes: Conjunctivae and EOM are normal. Pupils are equal, round, and reactive to light. No scleral icterus. Neck: Normal range of motion. Neck supple. No thyromegaly present. Cardiovascular: Normal rate, regular rhythm and normal heart sounds. Pulmonary/Chest: Effort normal and breath sounds normal. He has no wheezes. He has no rales. Abdominal: Soft. Bowel sounds are normal.   Musculoskeletal: He exhibits tenderness. He exhibits no edema. Left knee: He exhibits decreased range of motion and swelling. He exhibits no effusion. Tenderness found. Medial joint line tenderness noted. Thoracic back: He exhibits decreased range of motion and tenderness. He exhibits no bony tenderness, no deformity and no spasm. Neurological: He is alert and oriented to person, place, and time. Skin: He is not diaphoretic. Nursing note and vitals reviewed.           LABORATORY FINDINGS:    CBC:  Lab Results   Component Value Date    WBC 4.1 04/17/2017    HGB 12.6 04/17/2017     04/17/2017     09/10/2011     BMP:    Lab Results   Component Value Date     04/17/2017    K 4.4 04/17/2017     04/17/2017    CO2 24 04/17/2017    BUN 14 04/17/2017    CREATININE 1.29 04/17/2017    GLUCOSE 99 04/17/2017    GLUCOSE 108 09/10/2011     HEMOGLOBIN A1C:   Lab Results   Component Value Date    LABA1C 5.0 11/21/2017     MICROALBUMIN URINE:   Lab Results   Component Value Date    MICROALBUR <6 01/22/2013     FASTING LIPID PANEL:  Lab Results   Component Value Date    CHOL 209 (H) 04/17/2017    HDL 47 04/17/2017    TRIG 242 (H) 04/17/2017     Lab Results   Component Value Date    LDLCHOLESTEROL 114 04/17/2017       LIVER PROFILE:  Lab Results   Component Value Date    ALT 30 04/17/2017    AST 34 04/17/2017    PROT 8.1 04/17/2017    PROT 7.1 01/17/2013    BILITOT 0.88 04/17/2017    BILIDIR 0.21 04/17/2017    LABALBU 4.6 04/17/2017    LABALBU 4.4 09/10/2011      THYROID FUNCTION:   Lab Results   Component Value Date    TSH 2.26 03/27/2015      URINE ANALYSIS: No results found for: LABURIN  ASSESSMENT AND PLAN:    1. Essential hypertension    - Basic Metabolic Panel; Future  - amLODIPine (NORVASC) 2.5 MG tablet; Take 1 tablet by mouth daily  Dispense: 30 tablet; Refill: 5    2. Chronic pain of left knee    - XR Knee Left Ap Lateral and Oblique; Future    3. CKD (chronic kidney disease) stage 3, GFR 30-59 ml/min    - Basic Metabolic Panel; Future    4. Chronic bilateral thoracic back pain    - XR THORACIC SPINE (2 VIEWS); Future  - tiZANidine (ZANAFLEX) 4 MG tablet; Take 1 tablet by mouth nightly as needed (spasms)  Dispense: 30 tablet; Refill: 3    5. Pain in joint, multiple sites    - tiZANidine (ZANAFLEX) 4 MG tablet; Take 1 tablet by mouth nightly as needed (spasms)  Dispense: 30 tablet; Refill: 3              FOLLOW UP AND INSTRUCTIONS:   1. No Follow-up on file. 2. V[place]ha [place] received counseling on the following healthy behaviors: nutrition, exercise and medication adherence    3. Reviewed prior labs and health maintenance. 4. Discussed use, benefit, and side effects of prescribed medications. Barriers to medication compliance addressed. All patient questions answered. Pt voiced understanding.          Kvng Lindquist  Attending Physician, [address] [place] [address], Internal Medicine Residency Program  [address] [place] [address]  3/15/2018, 1:52 PM

## 2018-03-20 ENCOUNTER — HOSPITAL ENCOUNTER (OUTPATIENT)
Dept: GENERAL RADIOLOGY | Age: 63
Discharge: HOME | End: 2018-03-22
Payer: MEDICARE

## 2018-03-20 ENCOUNTER — HOSPITAL ENCOUNTER (OUTPATIENT)
Age: 63
Discharge: HOME | End: 2018-03-22
Payer: MEDICARE

## 2018-03-20 DIAGNOSIS — G89.29 CHRONIC PAIN OF LEFT KNEE: ICD-10-CM

## 2018-03-20 DIAGNOSIS — M25.562 CHRONIC PAIN OF LEFT KNEE: ICD-10-CM

## 2018-03-20 PROCEDURE — 73562 X-RAY EXAM OF KNEE 3: CPT

## 2018-04-09 ENCOUNTER — OFFICE VISIT (OUTPATIENT)
Dept: ORTHOPEDIC SURGERY | Age: 63
End: 2018-04-09
Payer: MEDICARE

## 2018-04-09 VITALS — BODY MASS INDEX: 32.78 KG/M2 | HEIGHT: 72 IN | WEIGHT: 242 LBS

## 2018-04-09 DIAGNOSIS — M75.41 ROTATOR CUFF IMPINGEMENT SYNDROME OF RIGHT SHOULDER: ICD-10-CM

## 2018-04-09 DIAGNOSIS — G89.29 CHRONIC PAIN OF BOTH SHOULDERS: Primary | ICD-10-CM

## 2018-04-09 DIAGNOSIS — M25.512 CHRONIC PAIN OF BOTH SHOULDERS: Primary | ICD-10-CM

## 2018-04-09 DIAGNOSIS — M25.511 CHRONIC PAIN OF BOTH SHOULDERS: Primary | ICD-10-CM

## 2018-04-09 DIAGNOSIS — M75.102 ROTATOR CUFF SYNDROME OF LEFT SHOULDER: ICD-10-CM

## 2018-04-09 PROCEDURE — G8417 CALC BMI ABV UP PARAM F/U: HCPCS | Performed by: STUDENT IN AN ORGANIZED HEALTH CARE EDUCATION/TRAINING PROGRAM

## 2018-04-09 PROCEDURE — 3017F COLORECTAL CA SCREEN DOC REV: CPT | Performed by: STUDENT IN AN ORGANIZED HEALTH CARE EDUCATION/TRAINING PROGRAM

## 2018-04-09 PROCEDURE — 1036F TOBACCO NON-USER: CPT | Performed by: STUDENT IN AN ORGANIZED HEALTH CARE EDUCATION/TRAINING PROGRAM

## 2018-04-09 PROCEDURE — 99213 OFFICE O/P EST LOW 20 MIN: CPT | Performed by: STUDENT IN AN ORGANIZED HEALTH CARE EDUCATION/TRAINING PROGRAM

## 2018-04-09 PROCEDURE — 20610 DRAIN/INJ JOINT/BURSA W/O US: CPT | Performed by: STUDENT IN AN ORGANIZED HEALTH CARE EDUCATION/TRAINING PROGRAM

## 2018-04-09 PROCEDURE — G8427 DOCREV CUR MEDS BY ELIG CLIN: HCPCS | Performed by: STUDENT IN AN ORGANIZED HEALTH CARE EDUCATION/TRAINING PROGRAM

## 2018-04-09 RX ORDER — METHYLPREDNISOLONE ACETATE 80 MG/ML
80 INJECTION, SUSPENSION INTRA-ARTICULAR; INTRALESIONAL; INTRAMUSCULAR; SOFT TISSUE ONCE
Status: COMPLETED | OUTPATIENT
Start: 2018-04-09 | End: 2018-04-10

## 2018-04-09 RX ORDER — BUPIVACAINE HYDROCHLORIDE 2.5 MG/ML
2 INJECTION, SOLUTION INFILTRATION; PERINEURAL ONCE
Status: COMPLETED | OUTPATIENT
Start: 2018-04-09 | End: 2018-04-10

## 2018-04-09 RX ORDER — OXYCODONE HYDROCHLORIDE AND ACETAMINOPHEN 5; 325 MG/1; MG/1
1 TABLET ORAL EVERY 4 HOURS PRN
COMMUNITY
End: 2020-10-12 | Stop reason: DRUGHIGH

## 2018-04-10 RX ADMIN — BUPIVACAINE HYDROCHLORIDE 5 MG: 2.5 INJECTION, SOLUTION INFILTRATION; PERINEURAL at 10:50

## 2018-04-10 RX ADMIN — METHYLPREDNISOLONE ACETATE 80 MG: 80 INJECTION, SUSPENSION INTRA-ARTICULAR; INTRALESIONAL; INTRAMUSCULAR; SOFT TISSUE at 10:50

## 2018-04-10 RX ADMIN — BUPIVACAINE HYDROCHLORIDE 5 MG: 2.5 INJECTION, SOLUTION INFILTRATION; PERINEURAL at 10:49

## 2018-04-16 DIAGNOSIS — D50.9 IRON DEFICIENCY ANEMIA, UNSPECIFIED IRON DEFICIENCY ANEMIA TYPE: ICD-10-CM

## 2018-04-16 DIAGNOSIS — M25.50 PAIN IN JOINT, MULTIPLE SITES: Chronic | ICD-10-CM

## 2018-04-17 RX ORDER — FERROUS SULFATE 325(65) MG
TABLET ORAL
Qty: 60 TABLET | Refills: 3 | Status: SHIPPED | OUTPATIENT
Start: 2018-04-17 | End: 2018-08-16 | Stop reason: SDUPTHER

## 2018-04-17 RX ORDER — MELOXICAM 15 MG/1
TABLET ORAL
Qty: 30 TABLET | Refills: 1 | Status: SHIPPED | OUTPATIENT
Start: 2018-04-17 | End: 2018-06-17 | Stop reason: SDUPTHER

## 2018-05-10 ENCOUNTER — TELEPHONE (OUTPATIENT)
Dept: ORTHOPEDIC SURGERY | Age: 63
End: 2018-05-10

## 2018-05-11 DIAGNOSIS — M25.511 CHRONIC PAIN OF BOTH SHOULDERS: Primary | ICD-10-CM

## 2018-05-11 DIAGNOSIS — M75.102 ROTATOR CUFF SYNDROME OF LEFT SHOULDER: ICD-10-CM

## 2018-05-11 DIAGNOSIS — M25.512 CHRONIC PAIN OF BOTH SHOULDERS: Primary | ICD-10-CM

## 2018-05-11 DIAGNOSIS — M75.41 ROTATOR CUFF IMPINGEMENT SYNDROME OF RIGHT SHOULDER: ICD-10-CM

## 2018-05-11 DIAGNOSIS — G89.29 CHRONIC PAIN OF BOTH SHOULDERS: Primary | ICD-10-CM

## 2018-05-21 DIAGNOSIS — M25.50 PAIN IN JOINT, MULTIPLE SITES: Chronic | ICD-10-CM

## 2018-05-21 DIAGNOSIS — M54.6 CHRONIC BILATERAL THORACIC BACK PAIN: ICD-10-CM

## 2018-05-21 DIAGNOSIS — G89.29 CHRONIC BILATERAL THORACIC BACK PAIN: ICD-10-CM

## 2018-05-22 RX ORDER — TIZANIDINE 4 MG/1
4 TABLET ORAL NIGHTLY PRN
Qty: 30 TABLET | Refills: 3 | Status: SHIPPED | OUTPATIENT
Start: 2018-05-22 | End: 2018-12-27 | Stop reason: SDUPTHER

## 2018-05-23 ENCOUNTER — HOSPITAL ENCOUNTER (OUTPATIENT)
Dept: MRI IMAGING | Age: 63
Discharge: HOME | End: 2018-05-25
Payer: MEDICARE

## 2018-05-23 DIAGNOSIS — M25.512 CHRONIC PAIN OF BOTH SHOULDERS: ICD-10-CM

## 2018-05-23 DIAGNOSIS — M75.102 ROTATOR CUFF SYNDROME OF LEFT SHOULDER: ICD-10-CM

## 2018-05-23 DIAGNOSIS — M75.41 ROTATOR CUFF IMPINGEMENT SYNDROME OF RIGHT SHOULDER: ICD-10-CM

## 2018-05-23 DIAGNOSIS — G89.29 CHRONIC PAIN OF BOTH SHOULDERS: ICD-10-CM

## 2018-05-23 DIAGNOSIS — M25.511 CHRONIC PAIN OF BOTH SHOULDERS: ICD-10-CM

## 2018-05-23 PROCEDURE — 73221 MRI JOINT UPR EXTREM W/O DYE: CPT

## 2018-06-17 DIAGNOSIS — E79.0 HYPERURICEMIA: ICD-10-CM

## 2018-06-17 DIAGNOSIS — M25.50 PAIN IN JOINT, MULTIPLE SITES: Chronic | ICD-10-CM

## 2018-06-18 RX ORDER — ALLOPURINOL 300 MG/1
TABLET ORAL
Qty: 30 TABLET | Refills: 5 | Status: SHIPPED | OUTPATIENT
Start: 2018-06-18 | End: 2018-12-17 | Stop reason: SDUPTHER

## 2018-06-18 RX ORDER — PANTOPRAZOLE SODIUM 20 MG/1
TABLET, DELAYED RELEASE ORAL
Qty: 30 TABLET | Refills: 3 | Status: SHIPPED | OUTPATIENT
Start: 2018-06-18 | End: 2018-10-16 | Stop reason: SDUPTHER

## 2018-06-18 RX ORDER — MELOXICAM 15 MG/1
TABLET ORAL
Qty: 30 TABLET | Refills: 1 | Status: SHIPPED | OUTPATIENT
Start: 2018-06-18 | End: 2018-08-16 | Stop reason: SDUPTHER

## 2018-06-20 ENCOUNTER — TELEPHONE (OUTPATIENT)
Dept: ORTHOPEDIC SURGERY | Age: 63
End: 2018-06-20

## 2018-06-21 ENCOUNTER — TELEPHONE (OUTPATIENT)
Dept: INTERNAL MEDICINE | Age: 63
End: 2018-06-21

## 2018-06-25 ENCOUNTER — OFFICE VISIT (OUTPATIENT)
Dept: ORTHOPEDIC SURGERY | Age: 63
End: 2018-06-25
Payer: MEDICARE

## 2018-06-25 VITALS — HEIGHT: 72 IN | WEIGHT: 246.6 LBS | BODY MASS INDEX: 33.4 KG/M2

## 2018-06-25 DIAGNOSIS — M75.102 ROTATOR CUFF TEAR ARTHROPATHY OF BOTH SHOULDERS: Primary | ICD-10-CM

## 2018-06-25 DIAGNOSIS — M75.101 ROTATOR CUFF TEAR ARTHROPATHY OF BOTH SHOULDERS: Primary | ICD-10-CM

## 2018-06-25 DIAGNOSIS — M12.812 ROTATOR CUFF TEAR ARTHROPATHY OF BOTH SHOULDERS: Primary | ICD-10-CM

## 2018-06-25 DIAGNOSIS — M12.811 ROTATOR CUFF TEAR ARTHROPATHY OF BOTH SHOULDERS: Primary | ICD-10-CM

## 2018-06-25 PROCEDURE — G8427 DOCREV CUR MEDS BY ELIG CLIN: HCPCS | Performed by: STUDENT IN AN ORGANIZED HEALTH CARE EDUCATION/TRAINING PROGRAM

## 2018-06-25 PROCEDURE — 1036F TOBACCO NON-USER: CPT | Performed by: STUDENT IN AN ORGANIZED HEALTH CARE EDUCATION/TRAINING PROGRAM

## 2018-06-25 PROCEDURE — 20610 DRAIN/INJ JOINT/BURSA W/O US: CPT | Performed by: STUDENT IN AN ORGANIZED HEALTH CARE EDUCATION/TRAINING PROGRAM

## 2018-06-25 PROCEDURE — 99213 OFFICE O/P EST LOW 20 MIN: CPT | Performed by: STUDENT IN AN ORGANIZED HEALTH CARE EDUCATION/TRAINING PROGRAM

## 2018-06-25 PROCEDURE — G8417 CALC BMI ABV UP PARAM F/U: HCPCS | Performed by: STUDENT IN AN ORGANIZED HEALTH CARE EDUCATION/TRAINING PROGRAM

## 2018-06-25 PROCEDURE — 3017F COLORECTAL CA SCREEN DOC REV: CPT | Performed by: STUDENT IN AN ORGANIZED HEALTH CARE EDUCATION/TRAINING PROGRAM

## 2018-06-25 RX ORDER — BUPIVACAINE HYDROCHLORIDE 2.5 MG/ML
2 INJECTION, SOLUTION INFILTRATION; PERINEURAL ONCE
Status: COMPLETED | OUTPATIENT
Start: 2018-06-25 | End: 2018-06-26

## 2018-06-25 RX ORDER — METHYLPREDNISOLONE ACETATE 80 MG/ML
80 INJECTION, SUSPENSION INTRA-ARTICULAR; INTRALESIONAL; INTRAMUSCULAR; SOFT TISSUE ONCE
Status: COMPLETED | OUTPATIENT
Start: 2018-06-25 | End: 2018-06-26

## 2018-06-26 ENCOUNTER — HOSPITAL ENCOUNTER (OUTPATIENT)
Age: 63
Setting detail: SPECIMEN
Discharge: HOME | End: 2018-06-26
Payer: MEDICARE

## 2018-06-26 ENCOUNTER — OFFICE VISIT (OUTPATIENT)
Dept: INTERNAL MEDICINE | Age: 63
End: 2018-06-26
Payer: MEDICARE

## 2018-06-26 VITALS
SYSTOLIC BLOOD PRESSURE: 138 MMHG | DIASTOLIC BLOOD PRESSURE: 68 MMHG | WEIGHT: 248 LBS | HEART RATE: 73 BPM | BODY MASS INDEX: 33.59 KG/M2 | HEIGHT: 72 IN

## 2018-06-26 DIAGNOSIS — Z11.3 SCREENING EXAMINATION FOR STD (SEXUALLY TRANSMITTED DISEASE): ICD-10-CM

## 2018-06-26 DIAGNOSIS — Z11.3 SCREENING EXAMINATION FOR STD (SEXUALLY TRANSMITTED DISEASE): Primary | ICD-10-CM

## 2018-06-26 LAB
HEPATITIS C ANTIBODY: NONREACTIVE
HIV AG/AB: NONREACTIVE
T. PALLIDUM, IGG: NONREACTIVE

## 2018-06-26 PROCEDURE — 3017F COLORECTAL CA SCREEN DOC REV: CPT | Performed by: INTERNAL MEDICINE

## 2018-06-26 PROCEDURE — 87389 HIV-1 AG W/HIV-1&-2 AB AG IA: CPT

## 2018-06-26 PROCEDURE — 36415 COLL VENOUS BLD VENIPUNCTURE: CPT

## 2018-06-26 PROCEDURE — 1036F TOBACCO NON-USER: CPT | Performed by: INTERNAL MEDICINE

## 2018-06-26 PROCEDURE — 99213 OFFICE O/P EST LOW 20 MIN: CPT | Performed by: INTERNAL MEDICINE

## 2018-06-26 PROCEDURE — G8427 DOCREV CUR MEDS BY ELIG CLIN: HCPCS | Performed by: INTERNAL MEDICINE

## 2018-06-26 PROCEDURE — G8417 CALC BMI ABV UP PARAM F/U: HCPCS | Performed by: INTERNAL MEDICINE

## 2018-06-26 PROCEDURE — 87491 CHLMYD TRACH DNA AMP PROBE: CPT

## 2018-06-26 PROCEDURE — 87591 N.GONORRHOEAE DNA AMP PROB: CPT

## 2018-06-26 PROCEDURE — 86803 HEPATITIS C AB TEST: CPT

## 2018-06-26 PROCEDURE — 86695 HERPES SIMPLEX TYPE 1 TEST: CPT

## 2018-06-26 PROCEDURE — 86780 TREPONEMA PALLIDUM: CPT

## 2018-06-26 PROCEDURE — 86696 HERPES SIMPLEX TYPE 2 TEST: CPT

## 2018-06-26 RX ORDER — LANOLIN ALCOHOL/MO/W.PET/CERES
400 CREAM (GRAM) TOPICAL DAILY
COMMUNITY
Start: 2018-06-18 | End: 2018-11-20 | Stop reason: SDUPTHER

## 2018-06-26 RX ORDER — BACLOFEN 10 MG/1
10 TABLET ORAL 2 TIMES DAILY
COMMUNITY
Start: 2018-06-18 | End: 2018-12-28 | Stop reason: ALTCHOICE

## 2018-06-26 RX ADMIN — BUPIVACAINE HYDROCHLORIDE 5 MG: 2.5 INJECTION, SOLUTION INFILTRATION; PERINEURAL at 09:07

## 2018-06-26 RX ADMIN — METHYLPREDNISOLONE ACETATE 80 MG: 80 INJECTION, SUSPENSION INTRA-ARTICULAR; INTRALESIONAL; INTRAMUSCULAR; SOFT TISSUE at 09:07

## 2018-06-26 ASSESSMENT — ENCOUNTER SYMPTOMS
COUGH: 0
SPUTUM PRODUCTION: 0
CONSTIPATION: 0
ABDOMINAL PAIN: 0
SHORTNESS OF BREATH: 0
BACK PAIN: 0
NAUSEA: 0

## 2018-06-27 LAB
C. TRACHOMATIS DNA ,URINE: NEGATIVE
N. GONORRHOEAE DNA, URINE: NEGATIVE

## 2018-06-28 LAB
HERPES SIMPLEX VIRUS 1 IGG: 1.68
HERPES SIMPLEX VIRUS 2 IGG: 8.34

## 2018-07-05 ENCOUNTER — OFFICE VISIT (OUTPATIENT)
Dept: INTERNAL MEDICINE | Age: 63
End: 2018-07-05
Payer: MEDICARE

## 2018-07-05 VITALS
DIASTOLIC BLOOD PRESSURE: 86 MMHG | SYSTOLIC BLOOD PRESSURE: 166 MMHG | WEIGHT: 238 LBS | BODY MASS INDEX: 32.28 KG/M2 | HEART RATE: 69 BPM

## 2018-07-05 DIAGNOSIS — R89.4 POSITIVE TEST FOR HERPES SIMPLEX VIRUS ANTIBODY: ICD-10-CM

## 2018-07-05 DIAGNOSIS — N18.30 CKD (CHRONIC KIDNEY DISEASE) STAGE 3, GFR 30-59 ML/MIN (HCC): ICD-10-CM

## 2018-07-05 DIAGNOSIS — I10 ESSENTIAL HYPERTENSION: Primary | ICD-10-CM

## 2018-07-05 PROCEDURE — G8427 DOCREV CUR MEDS BY ELIG CLIN: HCPCS | Performed by: INTERNAL MEDICINE

## 2018-07-05 PROCEDURE — G8417 CALC BMI ABV UP PARAM F/U: HCPCS | Performed by: INTERNAL MEDICINE

## 2018-07-05 PROCEDURE — 1036F TOBACCO NON-USER: CPT | Performed by: INTERNAL MEDICINE

## 2018-07-05 PROCEDURE — 99211 OFF/OP EST MAY X REQ PHY/QHP: CPT | Performed by: INTERNAL MEDICINE

## 2018-07-05 PROCEDURE — 3017F COLORECTAL CA SCREEN DOC REV: CPT | Performed by: INTERNAL MEDICINE

## 2018-07-05 PROCEDURE — 99213 OFFICE O/P EST LOW 20 MIN: CPT | Performed by: INTERNAL MEDICINE

## 2018-07-05 NOTE — PROGRESS NOTES
[facility]    Progress Note    Date of patient's visit: 7/5/2018    Patient's Name:  Javier Garay    YOB: 1955            Patient Care Team:  Efrem Caballero MD as PCP - Carol Dunlap MD as PCP - S Attributed Provider  Efrem Caballero MD as Referring Physician (Internal Medicine)    REASON FOR VISIT: Routine outpatient follow     Chief Complaint   Patient presents with   Larrie Bulls Results     pt had labs 6/26/18 present for results          HISTORY OF PRESENT ILLNESS:    History was obtained from the patient. Javier Garay is a 66 y.o. is here for Follow-up on his test results. Herpes virus IgG is positive. Other labs are negative. He is a little distressed about the herpes diagnosis. Blood pressure is high. No acute complaints.         Past Medical History:   Diagnosis Date    Allergic     allergic reaction to shrimp    Chronic back pain     Chronic kidney disease     Gout     Hyperlipidemia     Hypertension     Obesity 9/19/2014    Osteoarthritis     Restless leg syndrome     Shoulder pain     Spinal stenosis, lumbar region, without neurogenic claudication 4/13/2016    Swelling of joint, wrist, right     Thoracic back pain     Wears glasses     READING    Wrist pain, right        Past Surgical History:   Procedure Laterality Date    COLONOSCOPY  11/2014    int hemorrhoids, repeat in 10 years    NASAL ENDOSCOPY Bilateral 10/17/2016    NASAL ENDOSCOPY; RIGHT NASAL CAUTERY    NERVE BLOCK  11/02/2016    duramorph 1.5mg celestone 9mg    NERVE BLOCK  04/03/2017    duramorph 1.5mg & decadron 10mg    NERVE BLOCK  10/09/2017    duramorph epidural, decadron 10mg, morphine 1.5mg         ALLERGIES      Allergies   Allergen Reactions    Codeine Other (See Comments)     Don;t agree with him    Shrimp Flavor Hives     ITCH, HIVES       MEDICATIONS:      Current Outpatient Prescriptions on File Prior to Visit   Medication Sig Left Arm    Position: Sitting    Cuff Size: Large Adult    Pulse: 69    Weight: 238 lb (108 kg)      Body mass index is 32.28 kg/m². BP Readings from Last 3 Encounters:   07/05/18 (!) 166/86   06/26/18 138/68   03/15/18 128/62        Wt Readings from Last 3 Encounters:   07/05/18 238 lb (108 kg)   06/26/18 248 lb (112.5 kg)   06/25/18 246 lb 9.6 oz (111.9 kg)       Physical Exam      HENT:  Normocephalic, Atraumatic Neck- Normal range of motion, No tenderness, Supple, No stridor. Eyes:  PERRL, EOMI, Conjunctiva normal, No discharge. Respiratory:  Normal breath sounds, No respiratory distress, No wheezing  Cardiovascular:  Normal heart rate, Normal rhythm,  Musculoskeletal:  Intact distal pulses, No edema  Integument:  Warm, Dry, No erythema, No rash. Lymphatic:  No lymphadenopathy noted.    Neurologic:  Alert & oriented x 3    LABORATORY FINDINGS:    CBC:  Lab Results   Component Value Date    WBC 4.1 04/17/2017    HGB 12.6 04/17/2017     04/17/2017     09/10/2011     BMP:    Lab Results   Component Value Date     03/15/2018    K 4.7 03/15/2018     03/15/2018    CO2 24 03/15/2018    BUN 25 03/15/2018    CREATININE 1.38 03/15/2018    GLUCOSE 95 03/15/2018    GLUCOSE 108 09/10/2011     HEMOGLOBIN A1C:   Lab Results   Component Value Date    LABA1C 5.0 11/21/2017     MICROALBUMIN URINE:   Lab Results   Component Value Date    MICROALBUR <6 01/22/2013     FASTING LIPID PANEL:  Lab Results   Component Value Date    CHOL 209 (H) 04/17/2017    HDL 47 04/17/2017    TRIG 242 (H) 04/17/2017     Lab Results   Component Value Date    LDLCHOLESTEROL 114 04/17/2017       LIVER PROFILE:  Lab Results   Component Value Date    ALT 30 04/17/2017    AST 34 04/17/2017    PROT 8.1 04/17/2017    PROT 7.1 01/17/2013    BILITOT 0.88 04/17/2017    BILIDIR 0.21 04/17/2017    LABALBU 4.6 04/17/2017    LABALBU 4.4 09/10/2011      THYROID FUNCTION:   Lab Results   Component Value Date    TSH 2.26 03/27/2015 URINE ANALYSIS: No results found for: LABURIN  ASSESSMENT AND PLAN:    1. Essential hypertension  Same meds    2. CKD (chronic kidney disease) stage 3, GFR 30-59 ml/min  Avoid NSAID's    3. Positive test for herpes simplex virus antibody  Asymptomatic  Barrier contraception  Call if symptoms          FOLLOW UP AND INSTRUCTIONS:   1. No Follow-up on file. 2. Fely Grandchild received counseling on the following healthy behaviors: nutrition and exercise    3. Reviewed prior labs and health maintenance.           Simone Clark  Attending Physician, [address] [place] [address], Internal Medicine Residency Program  [address] [place] [address]  7/5/2018, 4:04 PM

## 2018-07-05 NOTE — PROGRESS NOTES
Visit Information    Have you changed or started any medications since your last visit including any over-the-counter medicines, vitamins, or herbal medicines? no   Are you having any side effects from any of your medications? -  no  Have you stopped taking any of your medications? Is so, why? -  no    Have you seen any other physician or provider since your last visit? No  Have you had any other diagnostic tests since your last visit? Yes - Records Obtained  Have you been seen in the emergency room and/or had an admission to a hospital since we last saw you? No  Have you had your routine dental cleaning in the past 6 months? no    Have you activated your [employer] account? If not, what are your barriers?  Yes     Patient Care Team:  Shanti Bridges MD as PCP - Elina Wilcox MD as PCP - S Attributed Provider  Shanti Bridges MD as Referring Physician (Internal Medicine)    Medical History Review  Past Medical, Family, and Social History reviewed and does not contribute to the patient presenting condition    Health Maintenance   Topic Date Due    Shingles Vaccine (1 of 2 - 2 Dose Series) 09/26/2018 (Originally 4/21/2007)    DTaP/Tdap/Td vaccine (1 - Tdap) 12/14/2022 (Originally 9/14/2014)    Flu vaccine (1) 09/01/2018    Potassium monitoring  03/15/2019    Creatinine monitoring  03/15/2019    Lipid screen  04/17/2022    Colon cancer screen colonoscopy  11/06/2024    Hepatitis C screen  Completed    HIV screen  Completed

## 2018-07-08 ASSESSMENT — ENCOUNTER SYMPTOMS
COUGH: 0
BACK PAIN: 1
SHORTNESS OF BREATH: 0

## 2018-07-16 DIAGNOSIS — M48.061 SPINAL STENOSIS, LUMBAR REGION, WITHOUT NEUROGENIC CLAUDICATION: ICD-10-CM

## 2018-07-17 RX ORDER — GABAPENTIN 300 MG/1
CAPSULE ORAL
Qty: 90 CAPSULE | Refills: 5 | Status: SHIPPED | OUTPATIENT
Start: 2018-07-17 | End: 2019-01-17 | Stop reason: SDUPTHER

## 2018-08-16 DIAGNOSIS — M25.50 PAIN IN JOINT, MULTIPLE SITES: Chronic | ICD-10-CM

## 2018-08-16 DIAGNOSIS — D50.9 IRON DEFICIENCY ANEMIA, UNSPECIFIED IRON DEFICIENCY ANEMIA TYPE: ICD-10-CM

## 2018-08-17 RX ORDER — FERROUS SULFATE 325(65) MG
TABLET ORAL
Qty: 60 TABLET | Refills: 3 | Status: SHIPPED | OUTPATIENT
Start: 2018-08-17 | End: 2018-12-17 | Stop reason: SDUPTHER

## 2018-08-17 RX ORDER — MELOXICAM 15 MG/1
TABLET ORAL
Qty: 30 TABLET | Refills: 1 | Status: SHIPPED | OUTPATIENT
Start: 2018-08-17 | End: 2018-10-22 | Stop reason: SDUPTHER

## 2018-09-16 DIAGNOSIS — I10 ESSENTIAL HYPERTENSION: ICD-10-CM

## 2018-09-17 RX ORDER — AMLODIPINE BESYLATE 2.5 MG/1
TABLET ORAL
Qty: 30 TABLET | Refills: 5 | Status: SHIPPED | OUTPATIENT
Start: 2018-09-17 | End: 2019-03-04 | Stop reason: SDUPTHER

## 2018-10-18 DIAGNOSIS — M25.50 PAIN IN JOINT, MULTIPLE SITES: Chronic | ICD-10-CM

## 2018-10-18 NOTE — TELEPHONE ENCOUNTER
mobic pending for refill       Health Maintenance   Topic Date Due    Shingles Vaccine (1 of 2 - 2 Dose Series) 04/21/2007    Flu vaccine (1) 09/01/2018    DTaP/Tdap/Td vaccine (1 - Tdap) 12/14/2022 (Originally 9/14/2014)    Potassium monitoring  03/15/2019    Creatinine monitoring  03/15/2019    Lipid screen  04/17/2022    Colon cancer screen colonoscopy  11/06/2024    Hepatitis C screen  Completed    HIV screen  Completed             (applicable per patient's age: Cancer Screenings, Depression Screening, Fall Risk Screening, Immunizations)    Hemoglobin A1C (%)   Date Value   11/21/2017 5.0   12/14/2016 5.1   01/17/2013 4.9     Microalb/Crt.  Ratio (mcg/mg creat)   Date Value   01/22/2013 9     LDL Cholesterol (mg/dL)   Date Value   04/17/2017 114     AST (U/L)   Date Value   04/17/2017 34     ALT (U/L)   Date Value   04/17/2017 30     BUN (mg/dL)   Date Value   03/15/2018 25 (H)      (goal A1C is < 7)   (goal LDL is <100) need 30-50% reduction from baseline     BP Readings from Last 3 Encounters:   07/05/18 (!) 166/86   06/26/18 138/68   03/15/18 128/62    (goal /80)      All Future Testing planned in [facility]:  Lab Frequency Next Occurrence   XR THORACIC SPINE (2 VIEWS) Once 10/17/2018   Nasal cannula oxygen         Next Visit Date:  Future Appointments  Date Time Provider Jonathan Manei   10/22/2018 10:15 AM Malachi Pastrana MD [facility] [address] [facility]   10/29/2018 3:10 PM SCHEDULE, [facility] [address]            Patient Active Problem List:     Hyperlipidemia     Gout     CKD (chronic kidney disease) stage 3, GFR 30-59 ml/min ([facility])     Wrist pain, right     Swelling of joint, wrist, right     Pain in joint, multiple sites     Obesity     Adjustment reaction     Vertigo     Pain in joint     Spinal stenosis, lumbar region, without neurogenic claudication     Facet arthropathy, lumbar     Chronic back pain     Acute bilateral low back pain with sciatica

## 2018-10-19 RX ORDER — MELOXICAM 15 MG/1
TABLET ORAL
Qty: 30 TABLET | Refills: 1 | Status: SHIPPED | OUTPATIENT
Start: 2018-10-19 | End: 2018-10-22

## 2018-10-22 ENCOUNTER — TELEPHONE (OUTPATIENT)
Dept: INTERNAL MEDICINE | Age: 63
End: 2018-10-22

## 2018-10-22 ENCOUNTER — OFFICE VISIT (OUTPATIENT)
Dept: INTERNAL MEDICINE | Age: 63
End: 2018-10-22
Payer: MEDICARE

## 2018-10-22 VITALS
DIASTOLIC BLOOD PRESSURE: 73 MMHG | WEIGHT: 248 LBS | BODY MASS INDEX: 33.63 KG/M2 | HEART RATE: 59 BPM | SYSTOLIC BLOOD PRESSURE: 138 MMHG

## 2018-10-22 DIAGNOSIS — I10 ESSENTIAL HYPERTENSION: Primary | ICD-10-CM

## 2018-10-22 DIAGNOSIS — M48.061 SPINAL STENOSIS, LUMBAR REGION, WITHOUT NEUROGENIC CLAUDICATION: ICD-10-CM

## 2018-10-22 DIAGNOSIS — D64.9 NORMOCYTIC ANEMIA: ICD-10-CM

## 2018-10-22 DIAGNOSIS — E78.00 PURE HYPERCHOLESTEROLEMIA: ICD-10-CM

## 2018-10-22 DIAGNOSIS — F51.01 PRIMARY INSOMNIA: ICD-10-CM

## 2018-10-22 DIAGNOSIS — N18.30 CKD (CHRONIC KIDNEY DISEASE) STAGE 3, GFR 30-59 ML/MIN (HCC): ICD-10-CM

## 2018-10-22 DIAGNOSIS — B35.1 ONYCHOMYCOSIS OF TOENAIL: ICD-10-CM

## 2018-10-22 DIAGNOSIS — Z23 NEEDS FLU SHOT: ICD-10-CM

## 2018-10-22 PROCEDURE — G8417 CALC BMI ABV UP PARAM F/U: HCPCS | Performed by: INTERNAL MEDICINE

## 2018-10-22 PROCEDURE — G8427 DOCREV CUR MEDS BY ELIG CLIN: HCPCS | Performed by: INTERNAL MEDICINE

## 2018-10-22 PROCEDURE — 99211 OFF/OP EST MAY X REQ PHY/QHP: CPT | Performed by: INTERNAL MEDICINE

## 2018-10-22 PROCEDURE — 1036F TOBACCO NON-USER: CPT | Performed by: INTERNAL MEDICINE

## 2018-10-22 PROCEDURE — G8482 FLU IMMUNIZE ORDER/ADMIN: HCPCS | Performed by: INTERNAL MEDICINE

## 2018-10-22 PROCEDURE — 99213 OFFICE O/P EST LOW 20 MIN: CPT | Performed by: INTERNAL MEDICINE

## 2018-10-22 PROCEDURE — 90686 IIV4 VACC NO PRSV 0.5 ML IM: CPT | Performed by: INTERNAL MEDICINE

## 2018-10-22 PROCEDURE — 3017F COLORECTAL CA SCREEN DOC REV: CPT | Performed by: INTERNAL MEDICINE

## 2018-10-22 RX ORDER — ESZOPICLONE 2 MG/1
2 TABLET, FILM COATED ORAL NIGHTLY
Qty: 30 TABLET | Refills: 0 | Status: SHIPPED | OUTPATIENT
Start: 2018-10-22 | End: 2018-12-17 | Stop reason: SDUPTHER

## 2018-10-22 ASSESSMENT — ENCOUNTER SYMPTOMS: BACK PAIN: 1

## 2018-10-22 NOTE — PROGRESS NOTES
[facility]    Progress Note    Date of patient's visit: 10/22/2018    Patient's Name:  Judge Yang    YOB: 1955            Patient Care Team:  Rosalio Swartz MD as PCP - Karin Billingsley MD as PCP - S Attributed Provider  Rosalio Swartz MD as Referring Physician (Internal Medicine)    REASON FOR VISIT: Routine outpatient follow     Chief Complaint   Patient presents with   [facility] Hypertension    Health Maintenance         HISTORY OF PRESENT ILLNESS:    History was obtained from the patient. Judge Yang is a 66 y.o. is here for Follow-up on his chronic medical problems including hypertension, chronic kidney disease, chronic low back pain and osteoarthritis of several joints. He has severe osteoarthritis also of shoulders. He is seeing pain management and orthopedics. He has been told he needs surgery but should not be lifting heavy weights more than 20 pounds. Patient is not agreeable to that. He feels he needs to keep bodybuilding. He is currently lifting more than 250 pounds. He wants to defer surgery and continue with pain management and injections. He says his pain management physician needs MRI this and he is going to get it from the hospital.  No other current complaints. His blood pressure is controlled. He needs lab work done. Has had onychomycosis of his toenails especially the great toe on both feet with significant thickening and darkening of his nails. He would like to treat it. He says he has cut back on his beer intake. He understands he may have liver function problems and will need to be followed up      MRI shoulder right  mpression   1. Chronic retracted full-thickness tear of supraspinatus to the level of the   glenohumeral joint.  Severe atrophy and fatty degeneration of supraspinatus.    2. Probable at least chronic full-thickness tearing of the anterior superior   half of infraspinatus with severe state.   Neurological: Negative for dizziness, tremors, seizures, weakness and numbness. Hematological: Negative for adenopathy. Does not bruise/bleed easily. Psychiatric/Behavioral: Positive for sleep disturbance. Negative for decreased concentration, dysphoric mood, self-injury and suicidal ideas. The patient is not nervous/anxious. PHYSICAL EXAM:     Vitals:    10/22/18 1021   BP: 138/73   Site: Right Upper Arm   Position: Sitting   Cuff Size: Large Adult   Pulse: 59   Weight: 248 lb (112.5 kg)     Body mass index is 33.63 kg/m². BP Readings from Last 3 Encounters:   10/22/18 138/73   07/05/18 (!) 166/86   06/26/18 138/68        Wt Readings from Last 3 Encounters:   10/22/18 248 lb (112.5 kg)   07/05/18 238 lb (108 kg)   06/26/18 248 lb (112.5 kg)       Physical Exam      HENT: Normocephalic, Atraumatic, Neck- Normal range of motion, No tenderness, Supple, No stridor. Eyes:  PERRL, EOMI, Conjunctiva normal, No discharge. Respiratory:  Normalbreath sounds, No respiratory distress, No wheezing, No chest tenderness. Cardiovascular:  Normal heart rate, Normal rhythm, No murmurs  Musculoskeletal:  Intact distal pulses, No edema, No tenderness, decreased ROM of shoulders. Back- Notenderness. Integument:  Warm, Dry, No erythema, No rash. Lymphatic:  No lymphadenopathy noted.    Neurologic:  Alert & oriented x 3, Normal motor function, Normal sensory function    LABORATORY FINDINGS:    CBC:  Lab Results   Component Value Date    WBC 4.1 04/17/2017    HGB 12.6 04/17/2017     04/17/2017     09/10/2011     BMP:    Lab Results   Component Value Date     03/15/2018    K 4.7 03/15/2018     03/15/2018    CO2 24 03/15/2018    BUN 25 03/15/2018    CREATININE 1.38 03/15/2018    GLUCOSE 95 03/15/2018    GLUCOSE 108 09/10/2011     HEMOGLOBIN A1C:   Lab Results   Component Value Date    LABA1C 5.0 11/21/2017     MICROALBUMIN URINE:   Lab Results   Component Value Date    MICROALBUR <6 01/22/2013     FASTING LIPID [email or web address]  Lab Results   Component Value Date    LDLCHOLESTEROL 114 04/17/2017       LIVER PROFILE:  Lab Results   Component Value Date    ALT 30 04/17/2017    AST 34 04/17/2017    PROT 8.1 04/17/2017    PROT 7.1 01/17/2013    BILITOT 0.88 04/17/2017    BILIDIR 0.21 04/17/2017    LABALBU 4.6 04/17/2017    LABALBU 4.4 09/10/2011      THYROID FUNCTION:   Lab Results   Component Value Date    TSH 2.26 03/27/2015      URINEANALYSIS: No results found for: LABURIN  ASSESSMENT AND PLAN:    1. Essential hypertension  Same meds    - Basic Metabolic Panel; Future    2. CKD (chronic kidney disease) stage 3, GFR 30-59 ml/min (HCC)  Avoid NSAID's    - Basic Metabolic Panel; Future    3. Onychomycosis of toenail  lamisil after labs    - Hepatic Function Panel; Standing    4. Pure hypercholesterolemia    - Hepatic Function Panel; Standing  - Lipid Panel; Future    5. Normocytic anemia    - CBC With Auto Differential; Future    6. Primary insomnia  Advised caution  Avoid daily use    - eszopiclone (LUNESTA) 2 MG TABS; Take 1 tablet by mouth nightly. Spreadshirt Rad Dispense: 30 tablet; Refill: 0    7. Needs flu shot    - INFLUENZA, QUADV, 3 YRS AND OLDER, IM, PF, PREFILL SYR OR SDV, 0.5ML (FLUZONE QUADV, PF)  - AK ADMIN INFLUENZA VIRUS VAC    8. Spinal stenosis, lumbar region, without neurogenic claudication  Follow up with Pain management          FOLLOW UP AND INSTRUCTIONS:   Return in about 6 months (around 4/22/2019). 1. Gayathri Cortez received counseling on the following healthy behaviors: nutrition, exercise and medication adherence    2. Reviewed prior labs and health maintenance. 3. Discussed use, benefit, and side effects of prescribed medications. Barriers to medication compliance addressed. All patient questions answered. Pt voiced understanding.          Arlette Dixon  Attending Physician, [address] [place] [address], Internal Medicine Residency

## 2018-10-29 ENCOUNTER — OFFICE VISIT (OUTPATIENT)
Dept: ORTHOPEDIC SURGERY | Age: 63
End: 2018-10-29
Payer: MEDICARE

## 2018-10-29 VITALS — BODY MASS INDEX: 33.59 KG/M2 | WEIGHT: 248.02 LBS | HEIGHT: 72 IN

## 2018-10-29 DIAGNOSIS — M12.812 ROTATOR CUFF ARTHROPATHY OF BOTH SHOULDERS: Primary | ICD-10-CM

## 2018-10-29 DIAGNOSIS — M12.811 ROTATOR CUFF ARTHROPATHY OF BOTH SHOULDERS: Primary | ICD-10-CM

## 2018-10-29 PROCEDURE — 3017F COLORECTAL CA SCREEN DOC REV: CPT | Performed by: STUDENT IN AN ORGANIZED HEALTH CARE EDUCATION/TRAINING PROGRAM

## 2018-10-29 PROCEDURE — G8482 FLU IMMUNIZE ORDER/ADMIN: HCPCS | Performed by: STUDENT IN AN ORGANIZED HEALTH CARE EDUCATION/TRAINING PROGRAM

## 2018-10-29 PROCEDURE — G8427 DOCREV CUR MEDS BY ELIG CLIN: HCPCS | Performed by: STUDENT IN AN ORGANIZED HEALTH CARE EDUCATION/TRAINING PROGRAM

## 2018-10-29 PROCEDURE — 1036F TOBACCO NON-USER: CPT | Performed by: STUDENT IN AN ORGANIZED HEALTH CARE EDUCATION/TRAINING PROGRAM

## 2018-10-29 PROCEDURE — G8417 CALC BMI ABV UP PARAM F/U: HCPCS | Performed by: STUDENT IN AN ORGANIZED HEALTH CARE EDUCATION/TRAINING PROGRAM

## 2018-10-29 PROCEDURE — 20610 DRAIN/INJ JOINT/BURSA W/O US: CPT | Performed by: STUDENT IN AN ORGANIZED HEALTH CARE EDUCATION/TRAINING PROGRAM

## 2018-10-29 RX ORDER — BUPIVACAINE HYDROCHLORIDE 2.5 MG/ML
2 INJECTION, SOLUTION INFILTRATION; PERINEURAL ONCE
Status: COMPLETED | OUTPATIENT
Start: 2018-10-29 | End: 2018-10-30

## 2018-10-29 RX ORDER — METHYLPREDNISOLONE ACETATE 80 MG/ML
80 INJECTION, SUSPENSION INTRA-ARTICULAR; INTRALESIONAL; INTRAMUSCULAR; SOFT TISSUE ONCE
Status: COMPLETED | OUTPATIENT
Start: 2018-10-29 | End: 2018-10-30

## 2018-10-29 ASSESSMENT — ENCOUNTER SYMPTOMS
SHORTNESS OF BREATH: 0
DIARRHEA: 0
WHEEZING: 0
CONSTIPATION: 0
COUGH: 0
ABDOMINAL PAIN: 0

## 2018-10-29 NOTE — PROGRESS NOTES
[address] [place] [address]  Dept: [phone]  Dept Fax: [phone]        Ambulatory Follow Up    Subjective:   Chico Mcgill is a 66y.o. year old male who presents to our office today for routine followup regarding his   1. Rotator cuff arthropathy of both shoulders    . Chief Complaint   Patient presents with    Shoulder Pain     bilateral- L>R       HPI   The patient is a 66 y.o. male who is being seen for bilateral shoulder pain worse on the left. Patient was here 4 months ago received corticosteroid injections at that time. Patient states that he had moderate relief after the last injection. The patient continues to lift weights despite the pain. Patient states that he has significant pain when he does this refuses to stop because he desires to stay fit and active. MRIs were performed of his bilateral shoulders at his last visit which demonstrates significant rotator cuff arthropathy in bilateral shoulders with glenohumeral arthritis. He is requesting further injections today into his bilateral shoulders. We had an extensive discussion with the patient regarding options of total shoulder replacement versus reverse total shoulder, and patient states that he would be unable to follow the weight restrictions. The patient reports he is going to see a pain management specialist for possible nerve block and ablations. The patient also requests a referral to a physician that performs stem cell injections. Patient denies any fevers,chills, nausea, vomiting, chest pain, shortness of breath. Review of Systems  Constitutional: Negative for fever and chills. Respiratory: Negative for cough, shortness of breath and wheezing. Cardiovascular: Negative for chest pain and palpitations. GI: Denies any nausea, vomiting, abdominal pain   Musculoskeletal: Positive for Bilateral shoulder pain worse on the left.     I have reviewed the CC, HPI, ROS,

## 2018-10-30 RX ADMIN — BUPIVACAINE HYDROCHLORIDE 5 MG: 2.5 INJECTION, SOLUTION INFILTRATION; PERINEURAL at 10:05

## 2018-10-30 RX ADMIN — METHYLPREDNISOLONE ACETATE 80 MG: 80 INJECTION, SUSPENSION INTRA-ARTICULAR; INTRALESIONAL; INTRAMUSCULAR; SOFT TISSUE at 10:06

## 2018-10-30 RX ADMIN — BUPIVACAINE HYDROCHLORIDE 5 MG: 2.5 INJECTION, SOLUTION INFILTRATION; PERINEURAL at 10:06

## 2018-11-20 NOTE — TELEPHONE ENCOUNTER
region, without neurogenic claudication     Facet arthropathy, lumbar     Chronic back pain     Acute bilateral low back pain with sciatica

## 2018-11-21 RX ORDER — LANOLIN ALCOHOL/MO/W.PET/CERES
400 CREAM (GRAM) TOPICAL DAILY
Qty: 30 TABLET | Refills: 0 | Status: SHIPPED | OUTPATIENT
Start: 2018-11-21 | End: 2018-12-17 | Stop reason: SDUPTHER

## 2018-12-27 DIAGNOSIS — M54.6 CHRONIC BILATERAL THORACIC BACK PAIN: ICD-10-CM

## 2018-12-27 DIAGNOSIS — G89.29 CHRONIC BILATERAL THORACIC BACK PAIN: ICD-10-CM

## 2018-12-27 DIAGNOSIS — M25.50 PAIN IN JOINT, MULTIPLE SITES: Chronic | ICD-10-CM

## 2018-12-28 RX ORDER — TIZANIDINE 4 MG/1
TABLET ORAL
Qty: 30 TABLET | Refills: 3 | Status: SHIPPED | OUTPATIENT
Start: 2018-12-28 | End: 2019-05-24 | Stop reason: SDUPTHER

## 2019-01-17 DIAGNOSIS — M48.061 SPINAL STENOSIS, LUMBAR REGION, WITHOUT NEUROGENIC CLAUDICATION: ICD-10-CM

## 2019-01-18 RX ORDER — GABAPENTIN 300 MG/1
CAPSULE ORAL
Qty: 90 CAPSULE | Refills: 5 | Status: SHIPPED | OUTPATIENT
Start: 2019-01-18 | End: 2019-08-29 | Stop reason: SDUPTHER

## 2019-02-01 DIAGNOSIS — M25.511 RIGHT SHOULDER PAIN, UNSPECIFIED CHRONICITY: Primary | ICD-10-CM

## 2019-02-04 ENCOUNTER — OFFICE VISIT (OUTPATIENT)
Dept: ORTHOPEDIC SURGERY | Age: 64
End: 2019-02-04
Payer: MEDICARE

## 2019-02-04 VITALS — WEIGHT: 248.02 LBS | BODY MASS INDEX: 33.59 KG/M2 | HEIGHT: 72 IN

## 2019-02-04 DIAGNOSIS — M12.812 ROTATOR CUFF ARTHROPATHY OF BOTH SHOULDERS: Primary | ICD-10-CM

## 2019-02-04 DIAGNOSIS — M12.811 ROTATOR CUFF ARTHROPATHY OF BOTH SHOULDERS: Primary | ICD-10-CM

## 2019-02-04 PROCEDURE — G8427 DOCREV CUR MEDS BY ELIG CLIN: HCPCS | Performed by: STUDENT IN AN ORGANIZED HEALTH CARE EDUCATION/TRAINING PROGRAM

## 2019-02-04 PROCEDURE — 20550 NJX 1 TENDON SHEATH/LIGAMENT: CPT | Performed by: STUDENT IN AN ORGANIZED HEALTH CARE EDUCATION/TRAINING PROGRAM

## 2019-02-04 PROCEDURE — G8482 FLU IMMUNIZE ORDER/ADMIN: HCPCS | Performed by: STUDENT IN AN ORGANIZED HEALTH CARE EDUCATION/TRAINING PROGRAM

## 2019-02-04 PROCEDURE — G8417 CALC BMI ABV UP PARAM F/U: HCPCS | Performed by: STUDENT IN AN ORGANIZED HEALTH CARE EDUCATION/TRAINING PROGRAM

## 2019-02-04 PROCEDURE — 3017F COLORECTAL CA SCREEN DOC REV: CPT | Performed by: STUDENT IN AN ORGANIZED HEALTH CARE EDUCATION/TRAINING PROGRAM

## 2019-02-04 PROCEDURE — 1036F TOBACCO NON-USER: CPT | Performed by: STUDENT IN AN ORGANIZED HEALTH CARE EDUCATION/TRAINING PROGRAM

## 2019-02-04 PROCEDURE — 99213 OFFICE O/P EST LOW 20 MIN: CPT | Performed by: STUDENT IN AN ORGANIZED HEALTH CARE EDUCATION/TRAINING PROGRAM

## 2019-02-07 DIAGNOSIS — F51.01 PRIMARY INSOMNIA: ICD-10-CM

## 2019-02-11 RX ORDER — PANTOPRAZOLE SODIUM 20 MG/1
TABLET, DELAYED RELEASE ORAL
Qty: 30 TABLET | Refills: 3 | Status: SHIPPED | OUTPATIENT
Start: 2019-02-11 | End: 2019-06-01 | Stop reason: HOSPADM

## 2019-02-11 RX ORDER — ESZOPICLONE 2 MG/1
TABLET, FILM COATED ORAL
Qty: 30 TABLET | Refills: 1 | Status: SHIPPED | OUTPATIENT
Start: 2019-02-11 | End: 2019-03-13

## 2019-04-02 ENCOUNTER — HOSPITAL ENCOUNTER (OUTPATIENT)
Age: 64
Setting detail: SPECIMEN
Discharge: HOME | End: 2019-04-02
Payer: MEDICARE

## 2019-04-02 DIAGNOSIS — E78.00 PURE HYPERCHOLESTEROLEMIA: ICD-10-CM

## 2019-04-02 DIAGNOSIS — D64.9 NORMOCYTIC ANEMIA: ICD-10-CM

## 2019-04-02 DIAGNOSIS — I10 ESSENTIAL HYPERTENSION: ICD-10-CM

## 2019-04-02 DIAGNOSIS — N18.30 CKD (CHRONIC KIDNEY DISEASE) STAGE 3, GFR 30-59 ML/MIN (HCC): ICD-10-CM

## 2019-04-02 LAB
ABSOLUTE EOS #: 0.17 K/UL (ref 0–0.44)
ABSOLUTE IMMATURE GRANULOCYTE: <0.03 K/UL (ref 0–0.3)
ABSOLUTE LYMPH #: 1.13 K/UL (ref 1.1–3.7)
ABSOLUTE MONO #: 0.64 K/UL (ref 0.1–1.2)
ALBUMIN SERPL-MCNC: 4.6 G/DL (ref 3.5–5.2)
ALBUMIN/GLOBULIN RATIO: 1.4 (ref 1–2.5)
ALP BLD-CCNC: 85 U/L (ref 40–129)
ALT SERPL-CCNC: 20 U/L (ref 5–41)
ANION GAP SERPL CALCULATED.3IONS-SCNC: 16 MMOL/L (ref 9–17)
AST SERPL-CCNC: 32 U/L
BASOPHILS # BLD: 1 % (ref 0–2)
BASOPHILS ABSOLUTE: 0.05 K/UL (ref 0–0.2)
BILIRUB SERPL-MCNC: 1.45 MG/DL (ref 0.3–1.2)
BILIRUBIN DIRECT: 0.28 MG/DL
BILIRUBIN, INDIRECT: 1.17 MG/DL (ref 0–1)
BUN BLDV-MCNC: 14 MG/DL (ref 8–23)
BUN/CREAT BLD: NORMAL (ref 9–20)
CALCIUM SERPL-MCNC: 9.7 MG/DL (ref 8.6–10.4)
CHLORIDE BLD-SCNC: 102 MMOL/L (ref 98–107)
CHOLESTEROL/HDL RATIO: 5.2
CHOLESTEROL: 257 MG/DL
CO2: 23 MMOL/L (ref 20–31)
CREAT SERPL-MCNC: 1.1 MG/DL (ref 0.7–1.2)
DIFFERENTIAL TYPE: ABNORMAL
EOSINOPHILS RELATIVE PERCENT: 4 % (ref 1–4)
GFR AFRICAN AMERICAN: >60 ML/MIN
GFR NON-AFRICAN AMERICAN: >60 ML/MIN
GFR SERPL CREATININE-BSD FRML MDRD: NORMAL ML/MIN/{1.73_M2}
GFR SERPL CREATININE-BSD FRML MDRD: NORMAL ML/MIN/{1.73_M2}
GLOBULIN: ABNORMAL G/DL (ref 1.5–3.8)
GLUCOSE BLD-MCNC: 98 MG/DL (ref 70–99)
HCT VFR BLD CALC: 43.2 % (ref 40.7–50.3)
HDLC SERPL-MCNC: 49 MG/DL
HEMOGLOBIN: 13.8 G/DL (ref 13–17)
IMMATURE GRANULOCYTES: 0 %
LDL CHOLESTEROL: 184 MG/DL (ref 0–130)
LYMPHOCYTES # BLD: 28 % (ref 24–43)
MCH RBC QN AUTO: 31.2 PG (ref 25.2–33.5)
MCHC RBC AUTO-ENTMCNC: 31.9 G/DL (ref 28.4–34.8)
MCV RBC AUTO: 97.5 FL (ref 82.6–102.9)
MONOCYTES # BLD: 16 % (ref 3–12)
NRBC AUTOMATED: 0 PER 100 WBC
PDW BLD-RTO: 12.1 % (ref 11.8–14.4)
PLATELET # BLD: 251 K/UL (ref 138–453)
PLATELET ESTIMATE: ABNORMAL
PMV BLD AUTO: 11.8 FL (ref 8.1–13.5)
POTASSIUM SERPL-SCNC: 4.4 MMOL/L (ref 3.7–5.3)
RBC # BLD: 4.43 M/UL (ref 4.21–5.77)
RBC # BLD: ABNORMAL 10*6/UL
SEG NEUTROPHILS: 51 % (ref 36–65)
SEGMENTED NEUTROPHILS ABSOLUTE COUNT: 1.99 K/UL (ref 1.5–8.1)
SODIUM BLD-SCNC: 141 MMOL/L (ref 135–144)
TOTAL PROTEIN: 8 G/DL (ref 6.4–8.3)
TRIGL SERPL-MCNC: 120 MG/DL
VLDLC SERPL CALC-MCNC: ABNORMAL MG/DL (ref 1–30)
WBC # BLD: 4 K/UL (ref 3.5–11.3)
WBC # BLD: ABNORMAL 10*3/UL

## 2019-04-02 PROCEDURE — 80076 HEPATIC FUNCTION PANEL: CPT

## 2019-04-02 PROCEDURE — 80061 LIPID PANEL: CPT

## 2019-04-02 PROCEDURE — 36415 COLL VENOUS BLD VENIPUNCTURE: CPT

## 2019-04-02 PROCEDURE — 80048 BASIC METABOLIC PNL TOTAL CA: CPT

## 2019-04-02 PROCEDURE — 85025 COMPLETE CBC W/AUTO DIFF WBC: CPT

## 2019-04-03 DIAGNOSIS — E78.5 DYSLIPIDEMIA: Primary | ICD-10-CM

## 2019-04-03 RX ORDER — ATORVASTATIN CALCIUM 80 MG/1
80 TABLET, FILM COATED ORAL DAILY
Qty: 30 TABLET | Refills: 2 | Status: SHIPPED | OUTPATIENT
Start: 2019-04-03 | End: 2019-07-05 | Stop reason: SDUPTHER

## 2019-04-25 ENCOUNTER — TELEPHONE (OUTPATIENT)
Dept: INTERNAL MEDICINE | Age: 64
End: 2019-04-25

## 2019-05-24 DIAGNOSIS — M25.50 PAIN IN JOINT, MULTIPLE SITES: Chronic | ICD-10-CM

## 2019-05-24 DIAGNOSIS — M54.6 CHRONIC BILATERAL THORACIC BACK PAIN: ICD-10-CM

## 2019-05-24 DIAGNOSIS — G89.29 CHRONIC BILATERAL THORACIC BACK PAIN: ICD-10-CM

## 2019-05-24 DIAGNOSIS — D50.9 IRON DEFICIENCY ANEMIA, UNSPECIFIED IRON DEFICIENCY ANEMIA TYPE: ICD-10-CM

## 2019-05-24 RX ORDER — TIZANIDINE 4 MG/1
TABLET ORAL
Qty: 30 TABLET | Refills: 1 | Status: SHIPPED | OUTPATIENT
Start: 2019-05-24 | End: 2019-07-25 | Stop reason: SDUPTHER

## 2019-05-24 NOTE — TELEPHONE ENCOUNTER
Request for meloxicam and iron. Next Visit Date:  Future Appointments   Date Time Provider Jonathan Manei   6/12/2019 10:30 AM Pablo Salgado MD [address] [facility] Maintenance   Topic Date Due    Shingles Vaccine (1 of 2) 04/21/2007    DTaP/Tdap/Td vaccine (1 - Tdap) 12/14/2022 (Originally 9/14/2014)    Potassium monitoring  04/02/2020    Creatinine monitoring  04/02/2020    Lipid screen  04/02/2024    Colon cancer screen colonoscopy  11/06/2024    Flu vaccine  Completed    Hepatitis C screen  Completed    HIV screen  Completed    Pneumococcal 0-64 years Vaccine  Aged Out       Hemoglobin A1C (%)   Date Value   11/21/2017 5.0   12/14/2016 5.1   01/17/2013 4.9             ( goal A1C is < 7)   Microalb/Crt.  Ratio (mcg/mg creat)   Date Value   01/22/2013 9     LDL Cholesterol (mg/dL)   Date Value   04/02/2019 184 (H)       (goal LDL is <100)   AST (U/L)   Date Value   04/02/2019 32     ALT (U/L)   Date Value   04/02/2019 20     BUN (mg/dL)   Date Value   04/02/2019 14     BP Readings from Last 3 Encounters:   10/22/18 138/73   07/05/18 (!) 166/86   06/26/18 138/68          (goal 120/80)    All Future Testing planned in [facility]  Lab Frequency Next Occurrence   XR THORACIC SPINE (2 VIEWS) Once 07/31/2019   Nasal cannula oxygen           Patient Active Problem List:     Hyperlipidemia     Gout     CKD (chronic kidney disease) stage 3, GFR 30-59 ml/min ([facility])     Wrist pain, right     Swelling of joint, wrist, right     Pain in joint, multiple sites     Obesity     Adjustment reaction     Vertigo     Pain in joint     Spinal stenosis, lumbar region, without neurogenic claudication     Facet arthropathy, lumbar     Chronic back pain     Acute bilateral low back pain with sciatica

## 2019-05-24 NOTE — TELEPHONE ENCOUNTER
Darrell request for tizanidine    Next Visit Date:  Future Appointments   Date Time Provider Jonathan Lundberg   6/12/2019 10:30 AM Camryn Leonardo MD [address] [facility]   Topic Date Due    Shingles Vaccine (1 of 2) 04/21/2007    DTaP/Tdap/Td vaccine (1 - Tdap) 12/14/2022 (Originally 9/14/2014)    Potassium monitoring  04/02/2020    Creatinine monitoring  04/02/2020    Lipid screen  04/02/2024    Colon cancer screen colonoscopy  11/06/2024    Flu vaccine  Completed    Hepatitis C screen  Completed    HIV screen  Completed    Pneumococcal 0-64 years Vaccine  Aged Out             (applicable per patient's age: Cancer Screenings, Depression Screening, Fall Risk Screening, Immunizations)    Hemoglobin A1C (%)   Date Value   11/21/2017 5.0   12/14/2016 5.1   01/17/2013 4.9     Microalb/Crt.  Ratio (mcg/mg creat)   Date Value   01/22/2013 9     LDL Cholesterol (mg/dL)   Date Value   04/02/2019 184 (H)     AST (U/L)   Date Value   04/02/2019 32     ALT (U/L)   Date Value   04/02/2019 20     BUN (mg/dL)   Date Value   04/02/2019 14      (goal A1C is < 7)   (goal LDL is <100) need 30-50% reduction from baseline     BP Readings from Last 3 Encounters:   10/22/18 138/73   07/05/18 (!) 166/86   06/26/18 138/68    (goal /80)      All Future Testing planned in [facility]:  Lab Frequency Next Occurrence   XR THORACIC SPINE (2 VIEWS) Once 07/31/2019   Nasal cannula oxygen              Patient Active Problem List:     Hyperlipidemia     Gout     CKD (chronic kidney disease) stage 3, GFR 30-59 ml/min (HCC)     Wrist pain, right     Swelling of joint, wrist, right     Pain in joint, multiple sites     Obesity     Adjustment reaction     Vertigo     Pain in joint     Spinal stenosis, lumbar region, without neurogenic claudication     Facet arthropathy, lumbar     Chronic back pain     Acute bilateral low back pain with sciatica

## 2019-05-28 RX ORDER — FERROUS SULFATE 325(65) MG
TABLET ORAL
Qty: 60 TABLET | Refills: 3 | Status: SHIPPED | OUTPATIENT
Start: 2019-05-28 | End: 2019-09-26 | Stop reason: SDUPTHER

## 2019-05-28 RX ORDER — MELOXICAM 15 MG/1
TABLET ORAL
Qty: 30 TABLET | Refills: 1 | Status: SHIPPED | OUTPATIENT
Start: 2019-05-28 | End: 2019-07-25 | Stop reason: SDUPTHER

## 2019-05-31 ENCOUNTER — HOSPITAL ENCOUNTER (EMERGENCY)
Age: 64
Discharge: HOME | End: 2019-06-01
Attending: EMERGENCY MEDICINE
Payer: MEDICARE

## 2019-05-31 DIAGNOSIS — L50.9 URTICARIA: Primary | ICD-10-CM

## 2019-05-31 PROCEDURE — 99283 EMERGENCY DEPT VISIT LOW MDM: CPT

## 2019-05-31 PROCEDURE — 6370000000 HC RX 637 (ALT 250 FOR IP): Performed by: EMERGENCY MEDICINE

## 2019-05-31 RX ORDER — FAMOTIDINE 20 MG/1
20 TABLET, FILM COATED ORAL ONCE
Status: COMPLETED | OUTPATIENT
Start: 2019-05-31 | End: 2019-05-31

## 2019-05-31 RX ORDER — PREDNISONE 20 MG/1
60 TABLET ORAL ONCE
Status: COMPLETED | OUTPATIENT
Start: 2019-05-31 | End: 2019-05-31

## 2019-05-31 RX ORDER — DIPHENHYDRAMINE HCL 25 MG
50 TABLET ORAL ONCE
Status: COMPLETED | OUTPATIENT
Start: 2019-05-31 | End: 2019-05-31

## 2019-05-31 RX ADMIN — DIPHENHYDRAMINE HCL 50 MG: 25 TABLET ORAL at 23:32

## 2019-05-31 RX ADMIN — FAMOTIDINE 20 MG: 20 TABLET, FILM COATED ORAL at 23:32

## 2019-05-31 RX ADMIN — PREDNISONE 60 MG: 20 TABLET ORAL at 23:33

## 2019-05-31 ASSESSMENT — ENCOUNTER SYMPTOMS
TROUBLE SWALLOWING: 0
SHORTNESS OF BREATH: 0

## 2019-06-01 VITALS
SYSTOLIC BLOOD PRESSURE: 133 MMHG | OXYGEN SATURATION: 99 % | DIASTOLIC BLOOD PRESSURE: 59 MMHG | TEMPERATURE: 98.4 F | BODY MASS INDEX: 31.15 KG/M2 | WEIGHT: 230 LBS | HEIGHT: 72 IN | RESPIRATION RATE: 18 BRPM | HEART RATE: 75 BPM

## 2019-06-01 RX ORDER — DIPHENHYDRAMINE HCL 25 MG
CAPSULE ORAL
Qty: 20 CAPSULE | Refills: 0 | Status: SHIPPED | OUTPATIENT
Start: 2019-06-01 | End: 2020-10-12 | Stop reason: ALTCHOICE

## 2019-06-01 RX ORDER — FAMOTIDINE 20 MG/1
20 TABLET, FILM COATED ORAL 2 TIMES DAILY
Qty: 10 TABLET | Refills: 0 | Status: SHIPPED | OUTPATIENT
Start: 2019-06-01 | End: 2019-06-12

## 2019-06-01 RX ORDER — PREDNISONE 10 MG/1
TABLET ORAL
Qty: 20 TABLET | Refills: 0 | Status: SHIPPED | OUTPATIENT
Start: 2019-06-01 | End: 2019-06-11

## 2019-06-01 NOTE — ED PROVIDER NOTES
ANTIHISTAMINE, H2 BLOCKER, STEROIDS. NO MORE SHRIMP. F/U WITH DR. GONZALES-CALL Monday. RETURN IF SX WORSEN, PERSIST OR PROGRESS         Mavis Duke MD  06/01/19 0012  SIGNED OUT TO DR. CAVAZOS.       Mavis Duke MD  06/01/19 2608

## 2019-06-01 NOTE — ED PROVIDER NOTES
daily 4/3/19   Amairani Box MD   amLODIPine (NORVASC) 2.5 MG tablet TAKE 1 TABLET BY MOUTH ONCE DAILY. 3/5/19   Gomez Ordonez MD   pantoprazole (PROTONIX) 20 MG tablet TAKE 1 TABLET BY MOUTH ONCE DAILY. 2/11/19   Gomez Ordonez MD   gabapentin (NEURONTIN) 300 MG capsule TAKE 1 CAPSULE BY MOUTH 3 TIMES A DAY. 1/18/19 2/17/19  Gomez Ordonez MD   magnesium oxide (MAG-OX) 400 (241.3 Mg) MG TABS tablet TAKE 1 TABLET BY MOUTH ONCE DAILY. 12/18/18   Gomez Ordonez MD   allopurinol (ZYLOPRIM) 300 MG tablet TAKE 1 TABLET BY MOUTH ONCE DAILY. 12/18/18   Gomez Ordonez MD   oxyCODONE-acetaminophen (PERCOCET) 5-325 MG per tablet Take 1 tablet by mouth every 4 hours as needed for Pain. Historical Provider, MD       REVIEW OF SYSTEMS    (2-9 systems for level 4, 10 or more for level 5)      Review of Systems   HENT: Negative for drooling and trouble swallowing. No throat swelling or voice change   Respiratory: Negative for shortness of breath. Skin: Positive for rash (hives). PHYSICAL EXAM   (up to 7 for level 4, 8 or more for level 5)      INITIAL VITALS:   BP (!) 134/109   Pulse 75   Temp 98.4 °F (36.9 °C) (Oral)   Resp 18   Ht 6' (1.829 m)   Wt 230 lb (104.3 kg)   SpO2 97%   BMI 31.19 kg/m²     Physical Exam   Constitutional: He is oriented to person, place, and time. He appears well-developed and well-nourished. No distress. Sitting comfortably on exam room bed speaking in full non labored sentences and paragraphs. HENT:   Head: Normocephalic and atraumatic. No drooling   Eyes: Right eye exhibits no discharge. Left eye exhibits no discharge. Neck: No tracheal deviation present. Cardiovascular: Normal rate, regular rhythm and normal heart sounds. Pulmonary/Chest: Effort normal and breath sounds normal. No stridor. No respiratory distress. He has no wheezes. He has no rales. Musculoskeletal: Normal range of motion. He exhibits no deformity.    Neurological: He is alert and oriented to person, place, and time. Skin: Skin is warm and dry. Rash (urticarial rash to patient's face and a few spots on chest) noted. He is not diaphoretic. Psychiatric: He has a normal mood and affect. His speech is normal and behavior is normal.       WORK-UP     PLAN (LABS / IMAGING /EKG):  No orders of the defined types were placed in this encounter. MEDICATIONS ORDERED:  Orders Placed This Encounter   Medications    famotidine (PEPCID) tablet 20 mg    diphenhydrAMINE (BENADRYL) tablet 50 mg    predniSONE (DELTASONE) tablet 60 mg       DIAGNOSTIC RESULTS / EMERGENCY DEPARTMENT COURSE /MDM / DIFFERENTIAL DIAGNOSIS     LABS:  No results found for this visit on 05/31/19. RADIOLOGY:  None    EKG  None    All EKG's are interpreted by the Emergency Department Physician who either signs or Co-signs this chart in the absence of a cardiologist.    DIFFERENTIAL DIAGNOSIS:  Urticaria, anaphylaxis    EMERGENCY DEPARTMENT COURSE & MDM:  60 y.o. male presents with a chief complaint of urticaria. Patient's vitals are stable. Patient is well-appearing and in no acute distress. Clinical picture not consistent with anaphylaxis. We'll give patient prednisone, Pepcid, and Benadryl for his allergic reaction. As patient has no airway concerns or compromise at this time, we'll hold off on epinephrine. ED Course as of Jun 01 0033   Sat Jun 01, 2019   0029 On reassessment, patient reports that his pruritus is better, the rash is better, and he is having no difficulty breathing, trouble swallowing, or sensation that his throat is swelling up. We will discharge patient this time with recommendation to use Benadryl and Pepcid and steroids. Patient strongly encouraged to return if he has any worsening of his symptoms or for any other care concern. Additional verbal and written discharge instructions and return precautions were given to patient. All questions were answered prior to discharge.      [BJ] ED Course User Index  [BJ] Kylie Gilmore DO     PROCEDURES:  None    CONSULTS:  None    CRITICAL CARE:  Please see attending physician note. FINAL IMPRESSION      1. Urticaria          DISPOSITION / PLAN     DISPOSITION  Discharge    PATIENT REFERRED TO:  No follow-up provider specified.     DISCHARGE MEDICATIONS:  New Prescriptions    No medications on file       Kylie Gilmore DO  Emergency Medicine Resident    (Please note that portions ofthis note were completed with a voice recognition program.  Efforts were made to edit the dictations but occasionally words are mis-transcribed.)        Kylie Gilmore DO  Resident  06/01/19 [id number]

## 2019-06-01 NOTE — ED NOTES
Patient alert and oriented x3. No acute distress. Patient with hives all over body after eating shrimp.    Will medicate and continue to monitor     Laila Smalls RN  05/31/19 0083

## 2019-06-01 NOTE — ED PROVIDER NOTES
EMERGENCY MEDICINE SIGN-OUT    EMERGENCY DEPARTMENT COURSE:     The pt enjoys shrimp but has occasional allergic reaction to it. The pt has epipen at home but does not take it this time has no airway involvmenet and breathing well on discharge no distress    Current Medications:   Previous Medications    ALLOPURINOL (ZYLOPRIM) 300 MG TABLET    TAKE 1 TABLET BY MOUTH ONCE DAILY. AMLODIPINE (NORVASC) 2.5 MG TABLET    TAKE 1 TABLET BY MOUTH ONCE DAILY. ATORVASTATIN (LIPITOR) 80 MG TABLET    Take 1 tablet by mouth daily    FERROUS SULFATE 325 (65 FE) MG TABLET    TAKE 1 TABLET BY MOUTH TWICE DAILY. GABAPENTIN (NEURONTIN) 300 MG CAPSULE    TAKE 1 CAPSULE BY MOUTH 3 TIMES A DAY. MAGNESIUM OXIDE (MAG-OX) 400 (241.3 MG) MG TABS TABLET    TAKE 1 TABLET BY MOUTH ONCE DAILY. MELOXICAM (MOBIC) 15 MG TABLET    TAKE 1 TABLET BY MOUTH ONE TIME A DAY    OXYCODONE-ACETAMINOPHEN (PERCOCET) 5-325 MG PER TABLET    Take 1 tablet by mouth every 4 hours as needed for Pain. TIZANIDINE (ZANAFLEX) 4 MG TABLET    TAKE 1 TABLET BY MOUTH ONCE DAILY AT NIGHT AS NEEDED FOR SPASMS. Likely Diagnosis and Pending tests   Allergic reaction    Probable DISPOSITION:       Pt given steroids and H2 blockers with no airway involvement and has epipen at home.       Jayde Crum DO  6/1/2019  12:41 AM            Jayde Crum,   06/01/19 [id number]

## 2019-06-12 ENCOUNTER — OFFICE VISIT (OUTPATIENT)
Dept: INTERNAL MEDICINE | Age: 64
End: 2019-06-12
Payer: MEDICARE

## 2019-06-12 VITALS
HEART RATE: 64 BPM | TEMPERATURE: 97.8 F | WEIGHT: 243.2 LBS | OXYGEN SATURATION: 95 % | SYSTOLIC BLOOD PRESSURE: 134 MMHG | DIASTOLIC BLOOD PRESSURE: 58 MMHG | BODY MASS INDEX: 32.98 KG/M2

## 2019-06-12 DIAGNOSIS — I10 ESSENTIAL HYPERTENSION: ICD-10-CM

## 2019-06-12 DIAGNOSIS — M12.811 ROTATOR CUFF ARTHROPATHY OF BOTH SHOULDERS: ICD-10-CM

## 2019-06-12 DIAGNOSIS — E79.0 HYPERURICEMIA: ICD-10-CM

## 2019-06-12 DIAGNOSIS — Z91.018 FOOD ALLERGY: Primary | ICD-10-CM

## 2019-06-12 DIAGNOSIS — M17.0 PRIMARY OSTEOARTHRITIS OF BOTH KNEES: ICD-10-CM

## 2019-06-12 DIAGNOSIS — M25.50 PAIN IN JOINT, MULTIPLE SITES: ICD-10-CM

## 2019-06-12 DIAGNOSIS — M12.812 ROTATOR CUFF ARTHROPATHY OF BOTH SHOULDERS: ICD-10-CM

## 2019-06-12 DIAGNOSIS — E78.00 PURE HYPERCHOLESTEROLEMIA: ICD-10-CM

## 2019-06-12 PROCEDURE — 99213 OFFICE O/P EST LOW 20 MIN: CPT | Performed by: INTERNAL MEDICINE

## 2019-06-12 PROCEDURE — 3017F COLORECTAL CA SCREEN DOC REV: CPT | Performed by: INTERNAL MEDICINE

## 2019-06-12 PROCEDURE — 1036F TOBACCO NON-USER: CPT | Performed by: INTERNAL MEDICINE

## 2019-06-12 PROCEDURE — 99211 OFF/OP EST MAY X REQ PHY/QHP: CPT | Performed by: INTERNAL MEDICINE

## 2019-06-12 PROCEDURE — G8427 DOCREV CUR MEDS BY ELIG CLIN: HCPCS | Performed by: INTERNAL MEDICINE

## 2019-06-12 PROCEDURE — G8417 CALC BMI ABV UP PARAM F/U: HCPCS | Performed by: INTERNAL MEDICINE

## 2019-06-12 RX ORDER — ALLOPURINOL 300 MG/1
TABLET ORAL
Qty: 30 TABLET | Refills: 5 | Status: SHIPPED | OUTPATIENT
Start: 2019-06-12 | End: 2019-12-27

## 2019-06-12 RX ORDER — AMLODIPINE BESYLATE 5 MG/1
TABLET ORAL
Qty: 30 TABLET | Refills: 3 | Status: SHIPPED | OUTPATIENT
Start: 2019-06-12 | End: 2019-10-25 | Stop reason: SDUPTHER

## 2019-06-12 RX ORDER — PREDNISONE 50 MG/1
TABLET ORAL
COMMUNITY
Start: 2019-06-05 | End: 2020-06-11 | Stop reason: ALTCHOICE

## 2019-06-12 RX ORDER — EPINEPHRINE 0.3 MG/.3ML
0.3 INJECTION SUBCUTANEOUS ONCE
Qty: 0.3 ML | Refills: 0 | Status: SHIPPED | OUTPATIENT
Start: 2019-06-12 | End: 2019-06-12

## 2019-06-12 RX ORDER — MAGNESIUM OXIDE 400 MG/1
TABLET ORAL
COMMUNITY
Start: 2019-06-05 | End: 2019-10-11 | Stop reason: SDUPTHER

## 2019-06-12 RX ORDER — BACLOFEN 10 MG/1
TABLET ORAL
COMMUNITY
Start: 2019-06-05 | End: 2019-09-30 | Stop reason: SDUPTHER

## 2019-06-12 ASSESSMENT — PATIENT HEALTH QUESTIONNAIRE - PHQ9
SUM OF ALL RESPONSES TO PHQ QUESTIONS 1-9: 0
2. FEELING DOWN, DEPRESSED OR HOPELESS: 0
SUM OF ALL RESPONSES TO PHQ QUESTIONS 1-9: 0
1. LITTLE INTEREST OR PLEASURE IN DOING THINGS: 0
SUM OF ALL RESPONSES TO PHQ9 QUESTIONS 1 & 2: 0

## 2019-06-12 NOTE — PATIENT INSTRUCTIONS
Labs given to patient, they will have them done before their next visit. -Xray order given to pt, this test does not need to be scheduled, please take order with you to registration. Referral to Orthopedic was placed, summary of care printed and faxed to office, phone numbers given to pt, they will contact office for an appt    Patient was given a printed script for a Handicap Placard. Patient needs to take the printed script to the [facility]. Patient will be contacted to schedule their next appointment.

## 2019-06-12 NOTE — PROGRESS NOTES
Visit Information    Have you changed or started any medications since your last visit including any over-the-counter medicines, vitamins, or herbal medicines? no   Are you having any side effects from any of your medications? -  no  Have you stopped taking any of your medications? Is so, why? -  no    Have you seen any other physician or provider since your last visit? Yes - Records Obtained  Have you had any other diagnostic tests since your last visit? Yes - Records Obtained  Have you been seen in the emergency room and/or had an admission to a hospital since we last saw you? Yes - Records Obtained  Have you had your routine dental cleaning in the past 6 months? no    Have you activated your [employer] account? If not, what are your barriers?  Yes     Patient Care Team:  Mariposa Mendez MD as PCP - Claudetta Florence, MD as PCP - [facility]  Mariposa Mendez MD as Referring Physician (Internal Medicine)    Medical History Review  Past Medical, Family, and Social History reviewed and does contribute to the patient presenting condition    Health Maintenance   Topic Date Due    Shingles Vaccine (1 of 2) 04/21/2007    DTaP/Tdap/Td vaccine (1 - Tdap) 12/14/2022 (Originally 9/14/2014)    Potassium monitoring  04/02/2020    Creatinine monitoring  04/02/2020    Lipid screen  04/02/2024    Colon cancer screen colonoscopy  11/06/2024    Flu vaccine  Completed    Hepatitis C screen  Completed    HIV screen  Completed    Pneumococcal 0-64 years Vaccine  Aged Out

## 2019-06-12 NOTE — PROGRESS NOTES
[facility]    Progress Note    Date of patient's visit: 6/12/2019    Patient's Name:  Elvia Mullins    YOB: 1955            Patient Care Team:  Emre Regan MD as PCP - Ambreen Fraser MD as PCP - [facility] Empaneled Provider  Emre Regan MD as Referring Physician (Internal Medicine)    REASON FOR VISIT: Routine outpatient follow     Chief Complaint   Patient presents with    Hypertension     Follow up from missed appt on 5/31.  Urticaria     Pt was seen at [facility] ER on 5/31 after eating shrimp. He states that he knows he is allergic but ate shrimp anyways.  Medication Refill     States that he needs his water pills, steroids, and other medications refilled. He states that he has a tournament coming up and its very important for him to have a high dose water pill refilled or he will get them on the street          HISTORY OF PRESENT ILLNESS:    History was obtained from the patient. Elvia Mullins is a 60 y.o. is here for follow-up. He continues to have a lot of pain in his shoulders and back. But he is also now noticing pain around his right elbow and knee. He has been going to pain management and they are doing injections in these areas. He has more of a bursitis. He has seen his orthopedic physician at [place] [address] at [facility].  They have referred him to Dr. Ruy Troncoso as he was interested in stem cell injections but he has had problems getting an appointment. He would like me to send another referral over. He states he has been compliant with all his medications. His largest lipid profile was worse than previous. He was on a statin. He was told to increase his statin to 80 mg daily and he says he has been taking Lipitor though he does not bring his medications. His blood pressure is also still a little high. He is on low-dose amlodipine. He was recently seen in the ER for urticaria secondary to shrimp allergy.   He needs a new EpiPen. He was treated with prednisone and Benadryl. He is requesting handicap placard as he has difficulty ambulating to 4 because of his back and knee pains. Past Medical History:   Diagnosis Date    Allergic     allergic reaction to shrimp    Chronic back pain     Chronic kidney disease     Gout     Hyperlipidemia     Hypertension     Obesity 9/19/2014    Osteoarthritis     Restless leg syndrome     Shoulder pain     Spinal stenosis, lumbar region, without neurogenic claudication 4/13/2016    Swelling of joint, wrist, right     Thoracic back pain     Wears glasses     READING    Wrist pain, right        Past Surgical History:   Procedure Laterality Date    COLONOSCOPY  11/2014    int hemorrhoids, repeat in 10 years    NASAL ENDOSCOPY Bilateral 10/17/2016    NASAL ENDOSCOPY; RIGHT NASAL CAUTERY    NERVE BLOCK  11/02/2016    duramorph 1.5mg celestone 9mg    NERVE BLOCK  04/03/2017    duramorph 1.5mg & decadron 10mg    NERVE BLOCK  10/09/2017    duramorph epidural, decadron 10mg, morphine 1.5mg         ALLERGIES      Allergies   Allergen Reactions    Codeine Other (See Comments)     Don;t agree with him    Shrimp (Diagnostic) Hives       MEDICATIONS:      Current Outpatient Medications on File Prior to Visit   Medication Sig Dispense Refill    baclofen (LIORESAL) 10 MG tablet       predniSONE (DELTASONE) 50 MG tablet       magnesium oxide (MAG-OX) 400 MG tablet       famotidine (PEPCID) 20 MG tablet Take 1 tablet by mouth 2 times daily for 5 days 10 tablet 0    diphenhydrAMINE (BENADRYL) 25 MG capsule Take 1-2 tablets by mouth every 6 hours as needed for itching. 20 capsule 0    ferrous sulfate 325 (65 Fe) MG tablet TAKE 1 TABLET BY MOUTH TWICE DAILY. 60 tablet 3    meloxicam (MOBIC) 15 MG tablet TAKE 1 TABLET BY MOUTH ONE TIME A DAY 30 tablet 1    tiZANidine (ZANAFLEX) 4 MG tablet TAKE 1 TABLET BY MOUTH ONCE DAILY AT NIGHT AS NEEDED FOR SPASMS.  30 tablet 1    MICROALBUMIN URINE:   Lab Results   Component Value Date    MICROALBUR <6 01/22/2013     FASTING LIPID [email or web address]  Lab Results   Component Value Date    LDLCHOLESTEROL 184 (H) 04/02/2019       LIVER PROFILE:  Lab Results   Component Value Date    ALT 20 04/02/2019    AST 32 04/02/2019    PROT 8.0 04/02/2019    PROT 7.1 01/17/2013    BILITOT 1.45 04/02/2019    BILIDIR 0.28 04/02/2019    LABALBU 4.6 04/02/2019    LABALBU 4.4 09/10/2011      THYROID FUNCTION:   Lab Results   Component Value Date    TSH 2.26 03/27/2015      URINEANALYSIS: No results found for: LABURIN  ASSESSMENT AND PLAN:    1. Pain in joint, multiple sites    - Handicap Placard MISC; by Does not apply route OA knees, LBP chronic  Dispense: 1 each; Refill: 0  - Handicap Placard MISC; by Does not apply route OA knees, low back pain chronic  Dispense: 1 each; Refill: 0    2. Pure hypercholesterolemia    - Lipid Panel; Future  - Hepatic Function Panel; Future    3. Essential hypertension  Low salt diet    - amLODIPine (NORVASC) 5 MG tablet; TAKE 1 TABLET BY MOUTH ONCE DAILY. Dispense: 30 tablet; Refill: 3    4. Hyperuricemia    - Uric Acid; Future  - allopurinol (ZYLOPRIM) 300 MG tablet; TAKE 1 TABLET BY MOUTH ONCE DAILY. Dispense: 30 tablet; Refill: 5    5. Primary osteoarthritis of both knees    - XR KNEE BILATERAL STANDING; Future  - External Referral To Orthopedic Surgery  - Handicap Placard MISC; by Does not apply route OA knees, LBP chronic  Dispense: 1 each; Refill: 0  - Handicap Placard MISC; by Does not apply route OA knees, low back pain chronic  Dispense: 1 each; Refill: 0    6. Rotator cuff arthropathy of both shoulders    - External Referral To Orthopedic Surgery    7. Food allergy  Avoid allergens  911 after using epipen or to ER    - EPINEPHrine (EPIPEN 2-TANG) 0.3 MG/0.3ML SOAJ injection;  Inject 0.3 mLs into the skin once for 1 dose Use as directed for allergic reaction  Dispense: 0.3 mL; Refill: 0          FOLLOW UP AND INSTRUCTIONS:   Return in about 4 months (around 10/12/2019). 1. Pb Jacobo received counseling on the following healthy behaviors: nutrition, exercise and medication adherence    2. Reviewed prior labs and health maintenance. 3. Discussed use, benefit, and side effects of prescribed medications. Barriers to medication compliance addressed. All patient questions answered. Pt voiced understanding.      4. Patient given educational materials - see patient instructions    Yanira Alvarez  Attending Physician, [address] [place] [address], Internal Medicine Residency Program  [address] [place] [address]  6/12/2019, 10:56 AM

## 2019-06-21 ENCOUNTER — TELEPHONE (OUTPATIENT)
Dept: INTERNAL MEDICINE | Age: 64
End: 2019-06-21

## 2019-06-21 NOTE — TELEPHONE ENCOUNTER
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 Patient was seen on 6/12/19 requesting a high dose waterpill at the visit and is stating he never received one. No water pill was noted in patient's chart. Health Maintenance   Topic Date Due    Shingles Vaccine (1 of 2) 04/21/2007    DTaP/Tdap/Td vaccine (1 - Tdap) 12/14/2022 (Originally 9/14/2014)    Potassium monitoring  04/02/2020    Creatinine monitoring  04/02/2020    Lipid screen  04/02/2024    Colon cancer screen colonoscopy  11/06/2024    Flu vaccine  Completed    Hepatitis C screen  Completed    HIV screen  Completed    Pneumococcal 0-64 years Vaccine  Aged Out             (applicable per patient's age: Cancer Screenings, Depression Screening, Fall Risk Screening, Immunizations)    Hemoglobin A1C (%)   Date Value   11/21/2017 5.0   12/14/2016 5.1   01/17/2013 4.9     Microalb/Crt. Ratio (mcg/mg creat)   Date Value   01/22/2013 9     LDL Cholesterol (mg/dL)   Date Value   04/02/2019 184 (H)     AST (U/L)   Date Value   04/02/2019 32     ALT (U/L)   Date Value   04/02/2019 20     BUN (mg/dL)   Date Value   04/02/2019 14      (goal A1C is < 7)   (goal LDL is <100) need 30-50% reduction from baseline     BP Readings from Last 3 Encounters:   06/12/19 (!) 134/58   06/01/19 (!) 133/59   10/22/18 138/73    (goal /80)      All Future Testing planned in [facility]:  Lab Frequency Next Occurrence   XR THORACIC SPINE (2 VIEWS) Once 07/31/2019   Uric Acid Once 09/13/2019   Lipid Panel Once 09/13/2019   Hepatic Function Panel Once 09/13/2019   XR KNEE BILATERAL STANDING Once 06/12/2019   Nasal cannula oxygen         Next Visit Date:  No future appointments.          Patient Active Problem List:     Hyperlipidemia     Gout     CKD (chronic kidney disease) stage 3, GFR 30-59 ml/min ([facility])     Wrist pain, right     Swelling of joint, wrist, right     Pain in joint, multiple sites     Obesity     Adjustment reaction Vertigo     Pain in joint     Spinal stenosis, lumbar region, without neurogenic claudication     Facet arthropathy, lumbar     Chronic back pain     Acute bilateral low back pain with sciatica

## 2019-06-28 RX ORDER — PANTOPRAZOLE SODIUM 20 MG/1
TABLET, DELAYED RELEASE ORAL
Qty: 30 TABLET | Refills: 3 | Status: SHIPPED | OUTPATIENT
Start: 2019-06-28 | End: 2019-10-26 | Stop reason: SDUPTHER

## 2019-06-28 NOTE — TELEPHONE ENCOUNTER
Request for protonix. Next Visit Date:  No future appointments. Health Maintenance   Topic Date Due    Shingles Vaccine (1 of 2) 04/21/2007    DTaP/Tdap/Td vaccine (1 - Tdap) 12/14/2022 (Originally 9/14/2014)    Potassium monitoring  04/02/2020    Creatinine monitoring  04/02/2020    Lipid screen  04/02/2024    Colon cancer screen colonoscopy  11/06/2024    Flu vaccine  Completed    Hepatitis C screen  Completed    HIV screen  Completed    Pneumococcal 0-64 years Vaccine  Aged Out       Hemoglobin A1C (%)   Date Value   11/21/2017 5.0   12/14/2016 5.1   01/17/2013 4.9             ( goal A1C is < 7)   Microalb/Crt.  Ratio (mcg/mg creat)   Date Value   01/22/2013 9     LDL Cholesterol (mg/dL)   Date Value   04/02/2019 184 (H)       (goal LDL is <100)   AST (U/L)   Date Value   04/02/2019 32     ALT (U/L)   Date Value   04/02/2019 20     BUN (mg/dL)   Date Value   04/02/2019 14     BP Readings from Last 3 Encounters:   06/12/19 (!) 134/58   06/01/19 (!) 133/59   10/22/18 138/73          (goal 120/80)    All Future Testing planned in [facility]  Lab Frequency Next Occurrence   XR THORACIC SPINE (2 VIEWS) Once 07/31/2019   Uric Acid Once 09/13/2019   Lipid Panel Once 09/13/2019   Hepatic Function Panel Once 09/13/2019   XR KNEE BILATERAL STANDING Once 09/27/2019   Nasal cannula oxygen           Patient Active Problem List:     Hyperlipidemia     Gout     CKD (chronic kidney disease) stage 3, GFR 30-59 ml/min ([facility])     Wrist pain, right     Swelling of joint, wrist, right     Pain in joint, multiple sites     Obesity     Adjustment reaction     Vertigo     Pain in joint     Spinal stenosis, lumbar region, without neurogenic claudication     Facet arthropathy, lumbar     Chronic back pain     Acute bilateral low back pain with sciatica

## 2019-07-05 DIAGNOSIS — E78.5 DYSLIPIDEMIA: ICD-10-CM

## 2019-07-08 RX ORDER — ATORVASTATIN CALCIUM 80 MG/1
TABLET, FILM COATED ORAL
Qty: 30 TABLET | Refills: 2 | Status: SHIPPED | OUTPATIENT
Start: 2019-07-08 | End: 2019-10-25 | Stop reason: SDUPTHER

## 2019-07-25 DIAGNOSIS — G89.29 CHRONIC BILATERAL THORACIC BACK PAIN: ICD-10-CM

## 2019-07-25 DIAGNOSIS — M25.50 PAIN IN JOINT, MULTIPLE SITES: Chronic | ICD-10-CM

## 2019-07-25 DIAGNOSIS — M54.6 CHRONIC BILATERAL THORACIC BACK PAIN: ICD-10-CM

## 2019-07-26 RX ORDER — MELOXICAM 15 MG/1
TABLET ORAL
Qty: 30 TABLET | Refills: 2 | Status: SHIPPED | OUTPATIENT
Start: 2019-07-26 | End: 2019-10-26 | Stop reason: SDUPTHER

## 2019-07-26 RX ORDER — TIZANIDINE 4 MG/1
TABLET ORAL
Qty: 30 TABLET | Refills: 2 | Status: SHIPPED | OUTPATIENT
Start: 2019-07-26 | End: 2019-10-26 | Stop reason: SDUPTHER

## 2019-08-29 DIAGNOSIS — M48.061 SPINAL STENOSIS, LUMBAR REGION, WITHOUT NEUROGENIC CLAUDICATION: ICD-10-CM

## 2019-08-30 RX ORDER — GABAPENTIN 300 MG/1
CAPSULE ORAL
Qty: 90 CAPSULE | Refills: 0 | Status: SHIPPED | OUTPATIENT
Start: 2019-08-30 | End: 2019-10-03 | Stop reason: SDUPTHER

## 2019-09-23 ENCOUNTER — TELEPHONE (OUTPATIENT)
Dept: INTERNAL MEDICINE | Age: 64
End: 2019-09-23

## 2019-09-26 DIAGNOSIS — D50.9 IRON DEFICIENCY ANEMIA, UNSPECIFIED IRON DEFICIENCY ANEMIA TYPE: ICD-10-CM

## 2019-09-27 RX ORDER — FERROUS SULFATE 325(65) MG
TABLET ORAL
Qty: 60 TABLET | Refills: 3 | Status: SHIPPED | OUTPATIENT
Start: 2019-09-27 | End: 2020-01-27

## 2019-09-27 NOTE — TELEPHONE ENCOUNTER
E-scribing request for ferrous sulfate pt has no future appt. Last seen 6/12/19. Please advise. Health Maintenance   Topic Date Due    Shingles Vaccine (1 of 2) 04/21/2007    Flu vaccine (1) 09/01/2019    DTaP/Tdap/Td vaccine (1 - Tdap) 12/14/2022 (Originally 9/14/2014)    Lipid screen  04/02/2020    Potassium monitoring  04/02/2020    Creatinine monitoring  04/02/2020    Colon cancer screen colonoscopy  11/06/2024    Hepatitis C screen  Completed    HIV screen  Completed    Pneumococcal 0-64 years Vaccine  Aged Out             (applicable per patient's age: Cancer Screenings, Depression Screening, Fall Risk Screening, Immunizations)    Hemoglobin A1C (%)   Date Value   11/21/2017 5.0   12/14/2016 5.1   01/17/2013 4.9     Microalb/Crt. Ratio (mcg/mg creat)   Date Value   01/22/2013 9     LDL Cholesterol (mg/dL)   Date Value   04/02/2019 184 (H)     AST (U/L)   Date Value   04/02/2019 32     ALT (U/L)   Date Value   04/02/2019 20     BUN (mg/dL)   Date Value   04/02/2019 14      (goal A1C is < 7)   (goal LDL is <100) need 30-50% reduction from baseline     BP Readings from Last 3 Encounters:   06/12/19 (!) 134/58   06/01/19 (!) 133/59   10/22/18 138/73    (goal /80)      All Future Testing planned in [facility]:  Lab Frequency Next Occurrence   Uric Acid Once 12/23/2019   Lipid Panel Once 12/23/2019   Hepatic Function Panel Once 12/23/2019   XR KNEE BILATERAL STANDING Once 09/27/2019   Nasal cannula oxygen         Next Visit Date:  No future appointments.          Patient Active Problem List:     Hyperlipidemia     Gout     CKD (chronic kidney disease) stage 3, GFR 30-59 ml/min ([facility])     Wrist pain, right     Swelling of joint, wrist, right     Pain in joint, multiple sites     Obesity     Adjustment reaction     Vertigo     Pain in joint     Spinal stenosis, lumbar region, without neurogenic claudication     Facet arthropathy, lumbar     Chronic back pain     Acute bilateral low back pain with

## 2019-09-30 RX ORDER — MAGNESIUM OXIDE 400 MG/1
TABLET ORAL
Qty: 30 TABLET | OUTPATIENT
Start: 2019-09-30

## 2019-09-30 RX ORDER — PREDNISONE 50 MG/1
TABLET ORAL
OUTPATIENT
Start: 2019-09-30

## 2019-09-30 RX ORDER — BACLOFEN 10 MG/1
10 TABLET ORAL 2 TIMES DAILY
Qty: 30 TABLET | Refills: 0 | Status: SHIPPED | OUTPATIENT
Start: 2019-09-30 | End: 2020-03-31 | Stop reason: SDUPTHER

## 2019-10-03 DIAGNOSIS — M48.061 SPINAL STENOSIS, LUMBAR REGION, WITHOUT NEUROGENIC CLAUDICATION: ICD-10-CM

## 2019-10-03 RX ORDER — GABAPENTIN 300 MG/1
CAPSULE ORAL
Qty: 90 CAPSULE | Refills: 0 | Status: SHIPPED | OUTPATIENT
Start: 2019-10-03 | End: 2019-10-26 | Stop reason: SDUPTHER

## 2019-10-11 RX ORDER — MAGNESIUM OXIDE 400 MG/1
400 TABLET ORAL DAILY
Qty: 30 TABLET | Refills: 2 | Status: SHIPPED | OUTPATIENT
Start: 2019-10-11 | End: 2020-04-01 | Stop reason: SDUPTHER

## 2019-10-14 RX ORDER — PREDNISONE 50 MG/1
TABLET ORAL
OUTPATIENT
Start: 2019-10-14

## 2019-10-25 ENCOUNTER — OFFICE VISIT (OUTPATIENT)
Dept: INTERNAL MEDICINE | Age: 64
End: 2019-10-25
Payer: MEDICARE

## 2019-10-25 VITALS
WEIGHT: 224.6 LBS | HEART RATE: 66 BPM | HEIGHT: 72 IN | BODY MASS INDEX: 30.42 KG/M2 | SYSTOLIC BLOOD PRESSURE: 133 MMHG | DIASTOLIC BLOOD PRESSURE: 73 MMHG

## 2019-10-25 DIAGNOSIS — Z23 NEEDS FLU SHOT: ICD-10-CM

## 2019-10-25 DIAGNOSIS — I10 ESSENTIAL HYPERTENSION: Primary | ICD-10-CM

## 2019-10-25 DIAGNOSIS — M12.811 ROTATOR CUFF ARTHROPATHY OF BOTH SHOULDERS: ICD-10-CM

## 2019-10-25 DIAGNOSIS — M79.18 MYOFASCIAL PAIN SYNDROME OF THORACIC SPINE: ICD-10-CM

## 2019-10-25 DIAGNOSIS — M25.50 PAIN IN JOINT, MULTIPLE SITES: ICD-10-CM

## 2019-10-25 DIAGNOSIS — M54.50 CHRONIC BILATERAL LOW BACK PAIN WITHOUT SCIATICA: ICD-10-CM

## 2019-10-25 DIAGNOSIS — N18.30 CKD (CHRONIC KIDNEY DISEASE) STAGE 3, GFR 30-59 ML/MIN (HCC): ICD-10-CM

## 2019-10-25 DIAGNOSIS — E78.5 DYSLIPIDEMIA: ICD-10-CM

## 2019-10-25 DIAGNOSIS — M17.0 PRIMARY OSTEOARTHRITIS OF BOTH KNEES: ICD-10-CM

## 2019-10-25 DIAGNOSIS — M12.812 ROTATOR CUFF ARTHROPATHY OF BOTH SHOULDERS: ICD-10-CM

## 2019-10-25 DIAGNOSIS — G89.29 CHRONIC BILATERAL LOW BACK PAIN WITHOUT SCIATICA: ICD-10-CM

## 2019-10-25 PROCEDURE — G8482 FLU IMMUNIZE ORDER/ADMIN: HCPCS | Performed by: INTERNAL MEDICINE

## 2019-10-25 PROCEDURE — G8427 DOCREV CUR MEDS BY ELIG CLIN: HCPCS | Performed by: INTERNAL MEDICINE

## 2019-10-25 PROCEDURE — G8417 CALC BMI ABV UP PARAM F/U: HCPCS | Performed by: INTERNAL MEDICINE

## 2019-10-25 PROCEDURE — 1036F TOBACCO NON-USER: CPT | Performed by: INTERNAL MEDICINE

## 2019-10-25 PROCEDURE — 90688 IIV4 VACCINE SPLT 0.5 ML IM: CPT | Performed by: INTERNAL MEDICINE

## 2019-10-25 PROCEDURE — 99213 OFFICE O/P EST LOW 20 MIN: CPT | Performed by: INTERNAL MEDICINE

## 2019-10-25 PROCEDURE — 3017F COLORECTAL CA SCREEN DOC REV: CPT | Performed by: INTERNAL MEDICINE

## 2019-10-25 RX ORDER — ATORVASTATIN CALCIUM 80 MG/1
TABLET, FILM COATED ORAL
Qty: 30 TABLET | Refills: 3 | Status: SHIPPED | OUTPATIENT
Start: 2019-10-25 | End: 2020-04-01 | Stop reason: SDUPTHER

## 2019-10-25 RX ORDER — AMLODIPINE BESYLATE 5 MG/1
TABLET ORAL
Qty: 30 TABLET | Refills: 3 | Status: SHIPPED | OUTPATIENT
Start: 2019-10-25 | End: 2020-04-01 | Stop reason: SDUPTHER

## 2019-10-25 ASSESSMENT — ENCOUNTER SYMPTOMS
WHEEZING: 0
BACK PAIN: 1
SHORTNESS OF BREATH: 0
COUGH: 0
CONSTIPATION: 0
ABDOMINAL PAIN: 0

## 2019-10-28 RX ORDER — PANTOPRAZOLE SODIUM 20 MG/1
TABLET, DELAYED RELEASE ORAL
Qty: 30 TABLET | Refills: 3 | Status: SHIPPED | OUTPATIENT
Start: 2019-10-28 | End: 2020-02-28

## 2019-11-12 ENCOUNTER — HOSPITAL ENCOUNTER (OUTPATIENT)
Dept: GENERAL RADIOLOGY | Age: 64
Discharge: HOME | End: 2019-11-14
Payer: MEDICARE

## 2019-11-12 ENCOUNTER — HOSPITAL ENCOUNTER (OUTPATIENT)
Dept: MRI IMAGING | Age: 64
Discharge: HOME | End: 2019-11-14
Payer: MEDICARE

## 2019-11-12 ENCOUNTER — HOSPITAL ENCOUNTER (OUTPATIENT)
Age: 64
Discharge: HOME | End: 2019-11-14
Payer: MEDICARE

## 2019-11-12 DIAGNOSIS — M79.18 MYOFASCIAL PAIN SYNDROME OF THORACIC SPINE: ICD-10-CM

## 2019-11-12 PROCEDURE — 72146 MRI CHEST SPINE W/O DYE: CPT

## 2019-11-12 PROCEDURE — 72072 X-RAY EXAM THORAC SPINE 3VWS: CPT

## 2019-11-27 DIAGNOSIS — M48.061 SPINAL STENOSIS, LUMBAR REGION, WITHOUT NEUROGENIC CLAUDICATION: ICD-10-CM

## 2019-11-27 RX ORDER — GABAPENTIN 300 MG/1
CAPSULE ORAL
Qty: 90 CAPSULE | Refills: 0 | Status: SHIPPED | OUTPATIENT
Start: 2019-11-27 | End: 2019-12-27

## 2019-12-27 DIAGNOSIS — M48.061 SPINAL STENOSIS, LUMBAR REGION, WITHOUT NEUROGENIC CLAUDICATION: ICD-10-CM

## 2019-12-27 DIAGNOSIS — E79.0 HYPERURICEMIA: ICD-10-CM

## 2019-12-27 RX ORDER — ALLOPURINOL 300 MG/1
TABLET ORAL
Qty: 30 TABLET | Refills: 5 | Status: SHIPPED | OUTPATIENT
Start: 2019-12-27 | End: 2020-04-01 | Stop reason: SDUPTHER

## 2019-12-27 RX ORDER — GABAPENTIN 300 MG/1
CAPSULE ORAL
Qty: 90 CAPSULE | Refills: 2 | Status: SHIPPED | OUTPATIENT
Start: 2019-12-27 | End: 2020-03-31

## 2019-12-31 ENCOUNTER — TELEPHONE (OUTPATIENT)
Dept: INTERNAL MEDICINE | Age: 64
End: 2019-12-31

## 2020-01-01 ENCOUNTER — APPOINTMENT (OUTPATIENT)
Dept: GENERAL RADIOLOGY | Age: 65
End: 2020-01-01
Payer: MEDICARE

## 2020-01-01 ENCOUNTER — HOSPITAL ENCOUNTER (EMERGENCY)
Age: 65
Discharge: HOME | End: 2020-01-01
Attending: EMERGENCY MEDICINE
Payer: MEDICARE

## 2020-01-01 ENCOUNTER — APPOINTMENT (OUTPATIENT)
Dept: CT IMAGING | Age: 65
End: 2020-01-01
Payer: MEDICARE

## 2020-01-01 VITALS
HEART RATE: 69 BPM | TEMPERATURE: 98.4 F | DIASTOLIC BLOOD PRESSURE: 66 MMHG | SYSTOLIC BLOOD PRESSURE: 174 MMHG | OXYGEN SATURATION: 97 % | HEIGHT: 72 IN | BODY MASS INDEX: 30.34 KG/M2 | WEIGHT: 224 LBS | RESPIRATION RATE: 12 BRPM

## 2020-01-01 LAB
ABSOLUTE EOS #: 0.28 K/UL (ref 0–0.44)
ABSOLUTE IMMATURE GRANULOCYTE: <0.03 K/UL (ref 0–0.3)
ABSOLUTE LYMPH #: 1.23 K/UL (ref 1.1–3.7)
ABSOLUTE MONO #: 0.65 K/UL (ref 0.1–1.2)
ALBUMIN SERPL-MCNC: 4 G/DL (ref 3.5–5.2)
ALBUMIN/GLOBULIN RATIO: 1.3 (ref 1–2.5)
ALP BLD-CCNC: 104 U/L (ref 40–129)
ALT SERPL-CCNC: 54 U/L (ref 5–41)
ANION GAP SERPL CALCULATED.3IONS-SCNC: 13 MMOL/L (ref 9–17)
AST SERPL-CCNC: 58 U/L
BASOPHILS # BLD: 1 % (ref 0–2)
BASOPHILS ABSOLUTE: 0.06 K/UL (ref 0–0.2)
BILIRUB SERPL-MCNC: 1 MG/DL (ref 0.3–1.2)
BUN BLDV-MCNC: 17 MG/DL (ref 8–23)
BUN/CREAT BLD: ABNORMAL (ref 9–20)
CALCIUM SERPL-MCNC: 8.6 MG/DL (ref 8.6–10.4)
CHLORIDE BLD-SCNC: 104 MMOL/L (ref 98–107)
CO2: 22 MMOL/L (ref 20–31)
CREAT SERPL-MCNC: 1.1 MG/DL (ref 0.7–1.2)
DIFFERENTIAL TYPE: ABNORMAL
EOSINOPHILS RELATIVE PERCENT: 7 % (ref 1–4)
GFR AFRICAN AMERICAN: >60 ML/MIN
GFR NON-AFRICAN AMERICAN: >60 ML/MIN
GFR SERPL CREATININE-BSD FRML MDRD: ABNORMAL ML/MIN/{1.73_M2}
GFR SERPL CREATININE-BSD FRML MDRD: ABNORMAL ML/MIN/{1.73_M2}
GLUCOSE BLD-MCNC: 94 MG/DL (ref 70–99)
HCT VFR BLD CALC: 36 % (ref 40.7–50.3)
HEMOGLOBIN: 12 G/DL (ref 13–17)
IMMATURE GRANULOCYTES: 0 %
LIPASE: 19 U/L (ref 13–60)
LYMPHOCYTES # BLD: 29 % (ref 24–43)
MCH RBC QN AUTO: 30.8 PG (ref 25.2–33.5)
MCHC RBC AUTO-ENTMCNC: 33.3 G/DL (ref 28.4–34.8)
MCV RBC AUTO: 92.5 FL (ref 82.6–102.9)
MONOCYTES # BLD: 15 % (ref 3–12)
NRBC AUTOMATED: 0 PER 100 WBC
PDW BLD-RTO: 12.1 % (ref 11.8–14.4)
PLATELET # BLD: 291 K/UL (ref 138–453)
PLATELET ESTIMATE: ABNORMAL
PMV BLD AUTO: 9.8 FL (ref 8.1–13.5)
POTASSIUM SERPL-SCNC: 4.3 MMOL/L (ref 3.7–5.3)
RBC # BLD: 3.89 M/UL (ref 4.21–5.77)
RBC # BLD: ABNORMAL 10*6/UL
SEG NEUTROPHILS: 48 % (ref 36–65)
SEGMENTED NEUTROPHILS ABSOLUTE COUNT: 1.99 K/UL (ref 1.5–8.1)
SODIUM BLD-SCNC: 139 MMOL/L (ref 135–144)
TOTAL PROTEIN: 7.2 G/DL (ref 6.4–8.3)
WBC # BLD: 4.2 K/UL (ref 3.5–11.3)
WBC # BLD: ABNORMAL 10*3/UL

## 2020-01-01 PROCEDURE — 85025 COMPLETE CBC W/AUTO DIFF WBC: CPT

## 2020-01-01 PROCEDURE — 96374 THER/PROPH/DIAG INJ IV PUSH: CPT

## 2020-01-01 PROCEDURE — 83690 ASSAY OF LIPASE: CPT

## 2020-01-01 PROCEDURE — 6360000004 HC RX CONTRAST MEDICATION: Performed by: STUDENT IN AN ORGANIZED HEALTH CARE EDUCATION/TRAINING PROGRAM

## 2020-01-01 PROCEDURE — 6360000002 HC RX W HCPCS: Performed by: STUDENT IN AN ORGANIZED HEALTH CARE EDUCATION/TRAINING PROGRAM

## 2020-01-01 PROCEDURE — 99284 EMERGENCY DEPT VISIT MOD MDM: CPT

## 2020-01-01 PROCEDURE — 74177 CT ABD & PELVIS W/CONTRAST: CPT

## 2020-01-01 PROCEDURE — 71046 X-RAY EXAM CHEST 2 VIEWS: CPT

## 2020-01-01 PROCEDURE — 80053 COMPREHEN METABOLIC PANEL: CPT

## 2020-01-01 RX ORDER — POLYETHYLENE GLYCOL 3350 17 G/17G
17 POWDER, FOR SOLUTION ORAL DAILY
Qty: 1 BOTTLE | Refills: 0 | Status: SHIPPED | OUTPATIENT
Start: 2020-01-01 | End: 2020-01-08

## 2020-01-01 RX ORDER — ONDANSETRON 4 MG/1
4 TABLET, FILM COATED ORAL EVERY 8 HOURS PRN
Qty: 20 TABLET | Refills: 0 | Status: SHIPPED | OUTPATIENT
Start: 2020-01-01 | End: 2020-04-01 | Stop reason: SDUPTHER

## 2020-01-01 RX ORDER — ONDANSETRON 2 MG/ML
4 INJECTION INTRAMUSCULAR; INTRAVENOUS ONCE
Status: COMPLETED | OUTPATIENT
Start: 2020-01-01 | End: 2020-01-01

## 2020-01-01 RX ORDER — DOCUSATE SODIUM 100 MG/1
100 CAPSULE, LIQUID FILLED ORAL 2 TIMES DAILY
Qty: 30 CAPSULE | Refills: 0 | Status: SHIPPED | OUTPATIENT
Start: 2020-01-01 | End: 2020-04-29 | Stop reason: SDUPTHER

## 2020-01-01 RX ADMIN — ONDANSETRON 4 MG: 2 INJECTION INTRAMUSCULAR; INTRAVENOUS at 09:55

## 2020-01-01 RX ADMIN — IOHEXOL 75 ML: 350 INJECTION, SOLUTION INTRAVENOUS at 10:47

## 2020-01-01 ASSESSMENT — ENCOUNTER SYMPTOMS
BACK PAIN: 0
COUGH: 1
BLOOD IN STOOL: 1
SHORTNESS OF BREATH: 0
VOMITING: 1
NAUSEA: 1
ABDOMINAL PAIN: 1

## 2020-01-01 NOTE — ED NOTES
Writer chaperoned Dr. Javier Pendleton with rectal exam, pt tolerated well. Hem Occult negative.       Zeb Client, RN  01/01/20 1002

## 2020-01-01 NOTE — ED PROVIDER NOTES
and regular rhythm. Heart sounds: No murmur. Pulmonary:      Effort: Pulmonary effort is normal. No respiratory distress. Breath sounds: No stridor. No wheezing. Abdominal:      General: There is no distension. Palpations: Abdomen is soft. Tenderness: There is tenderness in the periumbilical area. Musculoskeletal: Normal range of motion. General: No tenderness. Skin:     General: Skin is warm and dry. Capillary Refill: Capillary refill takes less than 2 seconds. Findings: No erythema or rash. Neurological:      Mental Status: He is alert and oriented to person, place, and time. Sensory: No sensory deficit. Deep Tendon Reflexes: Reflexes normal.   Psychiatric:         Behavior: Behavior normal.         DIFFERENTIAL  DIAGNOSIS     PLAN (LABS / IMAGING / EKG):  Orders Placed This Encounter   Procedures    CT ABDOMEN PELVIS W IV CONTRAST Additional Contrast? Oral    XR CHEST STANDARD (2 VW)    CBC Auto Differential    Comprehensive Metabolic Panel w/ Reflex to MG    Lipase       MEDICATIONS ORDERED:  Orders Placed This Encounter   Medications    ondansetron (ZOFRAN) injection 4 mg    iohexol (OMNIPAQUE 350) solution 75 mL    ondansetron (ZOFRAN) 4 MG tablet     Sig: Take 1 tablet by mouth every 8 hours as needed for Nausea     Dispense:  20 tablet     Refill:  0    docusate sodium (COLACE) 100 MG capsule     Sig: Take 1 capsule by mouth 2 times daily for 15 days     Dispense:  30 capsule     Refill:  0    polyethylene glycol (MIRALAX) powder     Sig: Take 17 g by mouth daily for 7 days PRN constipation     Dispense:  1 Bottle     Refill:  0       DDX: The patient arrives complaining of diffuse abdominal pain. Concerns for this patient that needed to be investigated include:   Aortic aneurysm  Aortic Dissection  Appendicitis  Bowel Obstruction  Metabolic disorders (DKA, SANJU, [place]'s and such)  Pancreatitis  Perforated bowel  Peritonitis  Mesenteric Ischemia        Initial MDM/Plan: 60 y.o. male who presents with periumbilical abdominal pain, hematemesis, hemoptysis, rectal bleeding that started 2 weeks ago, says he has been feeling more full, decreased appetite for the past 2 weeks. States that he does not have any fevers or chills but is noticed that he is feeling more nauseous, patient is inducing emesis manually, retching more and more, now noticing blood in his emesis. Will get basic blood work, belly labs, chest x-ray and CT abdomen pelvis with IV/oral contrast.  Patient is a 72-year-old and does not have history of colon cancer, had a colonoscopy done 4 years ago which was unremarkable according to the patient. Has no family history of colon cancer.      DIAGNOSTIC RESULTS / EMERGENCYDEPARTMENT COURSE / MDM     LABS:  Results for orders placed or performed during the hospital encounter of 01/01/20   CBC Auto Differential   Result Value Ref Range    WBC 4.2 3.5 - 11.3 k/uL    RBC 3.89 (L) 4.21 - 5.77 m/uL    Hemoglobin 12.0 (L) 13.0 - 17.0 g/dL    Hematocrit 36.0 (L) 40.7 - 50.3 %    MCV 92.5 82.6 - 102.9 fL    MCH 30.8 25.2 - 33.5 pg    MCHC 33.3 28.4 - 34.8 g/dL    RDW 12.1 11.8 - 14.4 %    Platelets 470 655 - 598 k/uL    MPV 9.8 8.1 - 13.5 fL    NRBC Automated 0.0 0.0 per 100 WBC    Differential Type NOT REPORTED     Seg Neutrophils 48 36 - 65 %    Lymphocytes 29 24 - 43 %    Monocytes 15 (H) 3 - 12 %    Eosinophils % 7 (H) 1 - 4 %    Basophils 1 0 - 2 %    Immature Granulocytes 0 0 %    Segs Absolute 1.99 1.50 - 8.10 k/uL    Absolute Lymph # 1.23 1.10 - 3.70 k/uL    Absolute Mono # 0.65 0.10 - 1.20 k/uL    Absolute Eos # 0.28 0.00 - 0.44 k/uL    Basophils Absolute 0.06 0.00 - 0.20 k/uL    Absolute Immature Granulocyte <0.03 0.00 - 0.30 k/uL    WBC Morphology NOT REPORTED     RBC Morphology NOT REPORTED     Platelet Estimate NOT REPORTED    Comprehensive Metabolic Panel w/ Reflex to MG   Result Value Ref Range    Glucose 94 70 - 99 mg/dL    BUN 17 8 edit the dictations but occasionally words are mis-transcribed.)     Vipul Collins MD  Resident  01/01/20 [id number]

## 2020-01-27 RX ORDER — MELOXICAM 15 MG/1
TABLET ORAL
Qty: 30 TABLET | Refills: 2 | Status: SHIPPED | OUTPATIENT
Start: 2020-01-27 | End: 2020-05-06

## 2020-01-27 RX ORDER — FERROUS SULFATE 325(65) MG
TABLET ORAL
Qty: 60 TABLET | Refills: 3 | Status: SHIPPED | OUTPATIENT
Start: 2020-01-27 | End: 2020-04-01 | Stop reason: SDUPTHER

## 2020-01-27 RX ORDER — TIZANIDINE 4 MG/1
TABLET ORAL
Qty: 30 TABLET | Refills: 2 | Status: SHIPPED | OUTPATIENT
Start: 2020-01-27 | End: 2020-04-01 | Stop reason: SDUPTHER

## 2020-01-27 NOTE — TELEPHONE ENCOUNTER
Request for tizanidine, meloxicam, ferrous sulfate - medications pended. Please fill if appropriate. Next Visit Date:  No future appointments. Health Maintenance   Topic Date Due    Shingles Vaccine (1 of 2) 04/21/2007    DTaP/Tdap/Td vaccine (1 - Tdap) 12/14/2022 (Originally 4/21/1966)    Lipid screen  04/02/2020    Potassium monitoring  01/01/2021    Creatinine monitoring  01/01/2021    Colon cancer screen colonoscopy  11/06/2024    Flu vaccine  Completed    Hepatitis C screen  Completed    HIV screen  Completed    Pneumococcal 0-64 years Vaccine  Aged Out       Hemoglobin A1C (%)   Date Value   11/21/2017 5.0   12/14/2016 5.1   01/17/2013 4.9             ( goal A1C is < 7)   Microalb/Crt.  Ratio (mcg/mg creat)   Date Value   01/22/2013 9     LDL Cholesterol (mg/dL)   Date Value   04/02/2019 184 (H)       (goal LDL is <100)   AST (U/L)   Date Value   01/01/2020 58 (H)     ALT (U/L)   Date Value   01/01/2020 54 (H)     BUN (mg/dL)   Date Value   01/01/2020 17     BP Readings from Last 3 Encounters:   01/01/20 (!) 174/66   10/25/19 133/73   06/12/19 (!) 134/58          (goal 120/80)    All Future Testing planned in [facility]  Lab Frequency Next Occurrence   Uric Acid Once 03/31/2020   Lipid Panel Once 03/31/2020   Hepatic Function Panel Once 03/31/2020   XR KNEE BILATERAL STANDING Once 06/12/2020   Nasal cannula oxygen           Patient Active Problem List:     Hyperlipidemia     Gout     CKD (chronic kidney disease) stage 3, GFR 30-59 ml/min ([facility])     Wrist pain, right     Swelling of joint, wrist, right     Pain in joint, multiple sites     Obesity     Adjustment reaction     Vertigo     Pain in joint     Spinal stenosis, lumbar region, without neurogenic claudication     Facet arthropathy, lumbar     Chronic back pain     Acute bilateral low back pain with sciatica

## 2020-02-27 NOTE — TELEPHONE ENCOUNTER
Request for Protonix. Pt on wait list for appt in April    Next Visit Date:  No future appointments. Health Maintenance   Topic Date Due    Shingles Vaccine (1 of 2) 04/21/2007    DTaP/Tdap/Td vaccine (1 - Tdap) 12/14/2022 (Originally 4/21/1966)    Lipid screen  04/02/2020    Potassium monitoring  01/01/2021    Creatinine monitoring  01/01/2021    Colon cancer screen colonoscopy  11/06/2024    Flu vaccine  Completed    Hepatitis C screen  Completed    HIV screen  Completed    Hepatitis A vaccine  Aged Out    Hepatitis B vaccine  Aged Out    Hib vaccine  Aged Out    Meningococcal (ACWY) vaccine  Aged Out    Pneumococcal 0-64 years Vaccine  Aged Out       Hemoglobin A1C (%)   Date Value   11/21/2017 5.0   12/14/2016 5.1   01/17/2013 4.9             ( goal A1C is < 7)   Microalb/Crt.  Ratio (mcg/mg creat)   Date Value   01/22/2013 9     LDL Cholesterol (mg/dL)   Date Value   04/02/2019 184 (H)       (goal LDL is <100)   AST (U/L)   Date Value   01/01/2020 58 (H)     ALT (U/L)   Date Value   01/01/2020 54 (H)     BUN (mg/dL)   Date Value   01/01/2020 17     BP Readings from Last 3 Encounters:   01/01/20 (!) 174/66   10/25/19 133/73   06/12/19 (!) 134/58          (goal 120/80)    All Future Testing planned in [facility]  Lab Frequency Next Occurrence   Uric Acid Once 03/31/2020   Lipid Panel Once 03/31/2020   Hepatic Function Panel Once 03/31/2020   XR KNEE BILATERAL STANDING Once 06/12/2020   Nasal cannula oxygen           Patient Active Problem List:     Hyperlipidemia     Gout     CKD (chronic kidney disease) stage 3, GFR 30-59 ml/min ([facility])     Wrist pain, right     Swelling of joint, wrist, right     Pain in joint, multiple sites     Obesity     Adjustment reaction     Vertigo     Pain in joint     Spinal stenosis, lumbar region, without neurogenic claudication     Facet arthropathy, lumbar     Chronic back pain     Acute bilateral low back pain with sciatica

## 2020-02-28 RX ORDER — PANTOPRAZOLE SODIUM 20 MG/1
TABLET, DELAYED RELEASE ORAL
Qty: 30 TABLET | Refills: 3 | Status: SHIPPED | OUTPATIENT
Start: 2020-02-28 | End: 2020-04-01 | Stop reason: SDUPTHER

## 2020-03-10 ENCOUNTER — TELEPHONE (OUTPATIENT)
Dept: INTERNAL MEDICINE | Age: 65
End: 2020-03-10

## 2020-03-10 NOTE — TELEPHONE ENCOUNTER
PC from pt stating he has been feeling dizzy the past couple of days. Pt states that he will be laying in his bed on his back and all of sudden get dizzy. He only wants to see Dr. Abraham Wyatt. Told pt Dr. Abraham Wyatt will not be here this week. I suggested other providers and walk in but pt declined. Please advise.

## 2020-03-30 NOTE — TELEPHONE ENCOUNTER
Request for gabapentin - medication pended. Patient on wait list. Please fill if appropriate. Next Visit Date:  No future appointments. Health Maintenance   Topic Date Due    Shingles Vaccine (1 of 2) 04/21/2007    Lipid screen  04/02/2020    DTaP/Tdap/Td vaccine (1 - Tdap) 12/14/2022 (Originally 4[birth year]21/1974)    Potassium monitoring  01/01/2021    Creatinine monitoring  01/01/2021    Colon cancer screen colonoscopy  11/06/2024    Flu vaccine  Completed    Hepatitis C screen  Completed    HIV screen  Completed    Hepatitis A vaccine  Aged Out    Hepatitis B vaccine  Aged Out    Hib vaccine  Aged Out    Meningococcal (ACWY) vaccine  Aged Out    Pneumococcal 0-64 years Vaccine  Aged Out       Hemoglobin A1C (%)   Date Value   11/21/2017 5.0   12/14/2016 5.1   01/17/2013 4.9             ( goal A1C is < 7)   Microalb/Crt.  Ratio (mcg/mg creat)   Date Value   01/22/2013 9     LDL Cholesterol (mg/dL)   Date Value   04/02/2019 184 (H)       (goal LDL is <100)   AST (U/L)   Date Value   01/01/2020 58 (H)     ALT (U/L)   Date Value   01/01/2020 54 (H)     BUN (mg/dL)   Date Value   01/01/2020 17     BP Readings from Last 3 Encounters:   01/01/20 (!) 174/66   10/25/19 133/73   06/12/19 (!) 134/58          (goal 120/80)    All Future Testing planned in [facility]  Lab Frequency Next Occurrence   Uric Acid Once 03/31/2020   Lipid Panel Once 03/31/2020   Hepatic Function Panel Once 03/31/2020   XR KNEE BILATERAL STANDING Once 06/12/2020   Nasal cannula oxygen PRN          Patient Active Problem List:     Hyperlipidemia     Gout     CKD (chronic kidney disease) stage 3, GFR 30-59 ml/min ([facility])     Wrist pain, right     Swelling of joint, wrist, right     Pain in joint, multiple sites     Obesity     Adjustment reaction     Vertigo     Pain in joint     Spinal stenosis, lumbar region, without neurogenic claudication     Facet arthropathy, lumbar     Chronic back pain     Acute bilateral low back pain with sciatica

## 2020-03-31 RX ORDER — BACLOFEN 10 MG/1
10 TABLET ORAL 2 TIMES DAILY
Qty: 30 TABLET | Refills: 0 | Status: SHIPPED | OUTPATIENT
Start: 2020-03-31 | End: 2020-04-01 | Stop reason: SDUPTHER

## 2020-03-31 RX ORDER — GABAPENTIN 300 MG/1
CAPSULE ORAL
Qty: 90 CAPSULE | Refills: 2 | OUTPATIENT
Start: 2020-03-31 | End: 2020-04-30

## 2020-03-31 RX ORDER — GABAPENTIN 300 MG/1
CAPSULE ORAL
Qty: 90 CAPSULE | Refills: 2 | Status: SHIPPED | OUTPATIENT
Start: 2020-03-31 | End: 2020-06-30

## 2020-04-01 ENCOUNTER — TELEMEDICINE (OUTPATIENT)
Dept: INTERNAL MEDICINE | Age: 65
End: 2020-04-01
Payer: MEDICARE

## 2020-04-01 PROCEDURE — 99214 OFFICE O/P EST MOD 30 MIN: CPT | Performed by: INTERNAL MEDICINE

## 2020-04-01 RX ORDER — ALLOPURINOL 300 MG/1
300 TABLET ORAL DAILY
Qty: 30 TABLET | Refills: 5 | Status: SHIPPED | OUTPATIENT
Start: 2020-04-01 | End: 2020-10-12 | Stop reason: SDUPTHER

## 2020-04-01 RX ORDER — MELOXICAM 15 MG/1
15 TABLET ORAL DAILY
Qty: 30 TABLET | Refills: 5 | Status: CANCELLED | OUTPATIENT
Start: 2020-04-01

## 2020-04-01 RX ORDER — GABAPENTIN 300 MG/1
CAPSULE ORAL
Qty: 90 CAPSULE | Refills: 2 | Status: CANCELLED | OUTPATIENT
Start: 2020-04-01

## 2020-04-01 RX ORDER — BACLOFEN 10 MG/1
10 TABLET ORAL 2 TIMES DAILY
Qty: 30 TABLET | Refills: 5 | Status: SHIPPED | OUTPATIENT
Start: 2020-04-01 | End: 2020-05-01

## 2020-04-01 RX ORDER — PANTOPRAZOLE SODIUM 40 MG/1
40 TABLET, DELAYED RELEASE ORAL DAILY
Qty: 30 TABLET | Refills: 2 | Status: SHIPPED | OUTPATIENT
Start: 2020-04-01 | End: 2020-07-30

## 2020-04-01 RX ORDER — TIZANIDINE 4 MG/1
4 TABLET ORAL EVERY 6 HOURS PRN
Qty: 30 TABLET | Refills: 5 | Status: SHIPPED | OUTPATIENT
Start: 2020-04-01 | End: 2020-06-30

## 2020-04-01 RX ORDER — FERROUS SULFATE 325(65) MG
TABLET ORAL
Qty: 60 TABLET | Refills: 5 | Status: SHIPPED | OUTPATIENT
Start: 2020-04-01 | End: 2020-05-01

## 2020-04-01 RX ORDER — MAGNESIUM OXIDE 400 MG/1
400 TABLET ORAL DAILY
Qty: 30 TABLET | Refills: 2 | Status: SHIPPED | OUTPATIENT
Start: 2020-04-01 | End: 2020-07-30

## 2020-04-01 RX ORDER — MECLIZINE HYDROCHLORIDE 25 MG/1
25 TABLET ORAL 3 TIMES DAILY PRN
Qty: 15 TABLET | Refills: 0 | Status: SHIPPED | OUTPATIENT
Start: 2020-04-01 | End: 2020-05-29

## 2020-04-01 RX ORDER — ONDANSETRON 4 MG/1
4 TABLET, FILM COATED ORAL EVERY 8 HOURS PRN
Qty: 20 TABLET | Refills: 0 | Status: SHIPPED | OUTPATIENT
Start: 2020-04-01 | End: 2020-05-01

## 2020-04-01 RX ORDER — ATORVASTATIN CALCIUM 80 MG/1
TABLET, FILM COATED ORAL
Qty: 30 TABLET | Refills: 5 | Status: SHIPPED | OUTPATIENT
Start: 2020-04-01 | End: 2020-10-12 | Stop reason: SDUPTHER

## 2020-04-01 RX ORDER — AMLODIPINE BESYLATE 5 MG/1
TABLET ORAL
Qty: 30 TABLET | Refills: 5 | Status: SHIPPED | OUTPATIENT
Start: 2020-04-01 | End: 2020-06-11 | Stop reason: ALTCHOICE

## 2020-04-01 ASSESSMENT — ENCOUNTER SYMPTOMS
COUGH: 0
SHORTNESS OF BREATH: 0
SORE THROAT: 0
VOMITING: 1
SINUS PAIN: 0
ABDOMINAL PAIN: 0
WHEEZING: 0
CONSTIPATION: 0
VOICE CHANGE: 0
DIARRHEA: 0
PHOTOPHOBIA: 0
NAUSEA: 1
RHINORRHEA: 0
ANAL BLEEDING: 1
BACK PAIN: 1

## 2020-04-01 NOTE — PROGRESS NOTES
(LIPITOR) 80 MG tablet TAKE 1 TABLET BY MOUTH ONCE DAILY. Yes Etelvina Walton MD   amLODIPine (NORVASC) 5 MG tablet TAKE 1 TABLET BY MOUTH ONCE DAILY. Yes Etelvina Walton MD   magnesium oxide (MAG-OX) 400 MG tablet Take 1 tablet by mouth daily Yes Etelvina Walton MD   diphenhydrAMINE (BENADRYL) 25 MG capsule Take 1-2 tablets by mouth every 6 hours as needed for itching. Yes Gamaliel Quintanilla DO   oxyCODONE-acetaminophen (PERCOCET) 5-325 MG per tablet Take 1 tablet by mouth every 4 hours as needed for Pain. Yes Historical Provider, MD   predniSONE (DELTASONE) 50 MG tablet   Historical Provider, MD       Social History     Tobacco Use    Smoking status: Never Smoker    Smokeless tobacco: Never Used   Substance Use Topics    Alcohol use: No    Drug use: No            PHYSICAL EXAMINATION:  [ INSTRUCTIONS:  \"[x]\" Indicates a positive item  \"[]\" Indicates a negative item  -- DELETE ALL ITEMS NOT EXAMINED]  Vital Signs: (As obtained by patient/caregiver or practitioner observation)    Blood pressure-  Heart rate-    Respiratory rate-    Temperature-  Pulse oximetry-     Constitutional: [x] Appears well-developed and well-nourished [x] No apparent distress      [] Abnormal-   Mental status  [x] Alert and awake  [x] Oriented to person/place/time [x]Able to follow commands      Eyes:  EOM    [x]  Normal  [] Abnormal-  Sclera  [x]  Normal  [] Abnormal -         Discharge [x]  None visible  [] Abnormal -    HENT:   [x] Normocephalic, atraumatic.   [] Abnormal   [] Mouth/Throat: Mucous membranes are moist.     External Ears [] Normal  [] Abnormal-     Neck: [x] No visualized mass     Pulmonary/Chest: [x] Respiratory effort normal.  [x] No visualized signs of difficulty breathing or respiratory distress        [] Abnormal-      Musculoskeletal:   [x] Normal gait with no signs of ataxia         [] Normal range of motion of neck        [] Abnormal-       Neurological:        [] No Facial Asymmetry (Cranial nerve 7 motor

## 2020-04-24 ENCOUNTER — TELEPHONE (OUTPATIENT)
Dept: GASTROENTEROLOGY | Age: 65
End: 2020-04-24

## 2020-04-28 ENCOUNTER — VIRTUAL VISIT (OUTPATIENT)
Dept: GASTROENTEROLOGY | Age: 65
End: 2020-04-28
Payer: MEDICARE

## 2020-04-28 PROCEDURE — G8417 CALC BMI ABV UP PARAM F/U: HCPCS | Performed by: INTERNAL MEDICINE

## 2020-04-28 PROCEDURE — 99204 OFFICE O/P NEW MOD 45 MIN: CPT | Performed by: INTERNAL MEDICINE

## 2020-04-28 PROCEDURE — 1036F TOBACCO NON-USER: CPT | Performed by: INTERNAL MEDICINE

## 2020-04-28 PROCEDURE — 3017F COLORECTAL CA SCREEN DOC REV: CPT | Performed by: INTERNAL MEDICINE

## 2020-04-28 ASSESSMENT — ENCOUNTER SYMPTOMS
TROUBLE SWALLOWING: 0
BACK PAIN: 0
NAUSEA: 0
WHEEZING: 0
VOICE CHANGE: 0
DIARRHEA: 0
ANAL BLEEDING: 0
COUGH: 0
SORE THROAT: 0
ABDOMINAL PAIN: 1
ABDOMINAL DISTENTION: 0
CONSTIPATION: 0
RECTAL PAIN: 0
BLOOD IN STOOL: 0
SINUS PRESSURE: 0
VOMITING: 0
CHOKING: 0

## 2020-04-28 NOTE — PROGRESS NOTES
Review of Systems   Constitutional: Positive for fatigue. Negative for appetite change and unexpected weight change. HENT: Negative for dental problem, postnasal drip, sinus pressure, sore throat, trouble swallowing and voice change. Eyes: Positive for visual disturbance. Respiratory: Negative for cough, choking and wheezing. Cardiovascular: Negative for chest pain, palpitations and leg swelling. Gastrointestinal: Positive for abdominal pain. Negative for abdominal distention, anal bleeding, blood in stool, constipation, diarrhea, nausea, rectal pain and vomiting. Genitourinary: Negative for difficulty urinating. Musculoskeletal: Negative for arthralgias, back pain, gait problem and myalgias. Allergic/Immunologic: Negative for environmental allergies and food allergies. Neurological: Positive for light-headedness. Negative for dizziness, weakness, numbness and headaches. Hematological: Does not bruise/bleed easily. Psychiatric/Behavioral: Positive for sleep disturbance. The patient is nervous/anxious.
Flulaval, Fluarix and 3 yrs and older Afluria) 11/21/2017, 10/25/2019    Influenza, Nohelia Hasting, IM, PF (6 mo and older Fluzone, Flulaval, Fluarix, and 3 yrs and older Afluria) 10/22/2018    Td, unspecified formulation 09/13/2014   ,   Health Maintenance   Topic Date Due    Shingles Vaccine (1 of 2) 04/21/2007    Lipid screen  04/02/2020    DTaP/Tdap/Td vaccine (1 - Tdap) 12/14/2022 (Originally 4/21/1974)    Potassium monitoring  01/01/2021    Creatinine monitoring  01/01/2021    Colon cancer screen colonoscopy  11/06/2024    Flu vaccine  Completed    Hepatitis C screen  Completed    HIV screen  Completed    Hepatitis A vaccine  Aged Out    Hepatitis B vaccine  Aged Out    Hib vaccine  Aged Out    Meningococcal (ACWY) vaccine  Aged Out    Pneumococcal 0-64 years Vaccine  Aged Out       PHYSICAL EXAMINATION:  [ INSTRUCTIONS:  \"[x]\" Indicates a positive item  \"[]\" Indicates a negative item  -- DELETE ALL ITEMS NOT EXAMINED]  Vital Signs: (As obtained by patient/caregiver or practitioner observation)    Blood pressure-  Heart rate-    Respiratory rate-    Temperature-  Pulse oximetry-     Constitutional: [] Appears well-developed and well-nourished [] No apparent distress      [] Abnormal-   Mental status  [] Alert and awake  [] Oriented to person/place/time []Able to follow commands      Eyes:  EOM    []  Normal  [] Abnormal-  Sclera  []  Normal  [] Abnormal -         Discharge []  None visible  [] Abnormal -    HENT:   [] Normocephalic, atraumatic.   [] Abnormal   [] Mouth/Throat: Mucous membranes are moist.     External Ears [] Normal  [] Abnormal-     Neck: [] No visualized mass     Pulmonary/Chest: [] Respiratory effort normal.  [] No visualized signs of difficulty breathing or respiratory distress        [] Abnormal-      Musculoskeletal:   [] Normal gait with no signs of ataxia         [] Normal range of motion of neck        [] Abnormal-       Neurological:        [] No Facial Asymmetry (Cranial nerve

## 2020-04-29 RX ORDER — DOCUSATE SODIUM 100 MG/1
100 CAPSULE, LIQUID FILLED ORAL 2 TIMES DAILY
Qty: 30 CAPSULE | Refills: 0 | Status: SHIPPED | OUTPATIENT
Start: 2020-04-29 | End: 2020-05-14

## 2020-04-29 NOTE — TELEPHONE ENCOUNTER
Refill request for Dokusate. If appropriate please send medication(s) to patients pharmacy. Next appt: Patient is currently on wait list for provider. Last appt: 4/1/2020    Health Maintenance   Topic Date Due    Shingles Vaccine (1 of 2) 04/21/2007    Lipid screen  04/02/2020    DTaP/Tdap/Td vaccine (1 - Tdap) 12/14/2022 (Originally 4/21/1976)    Potassium monitoring  01/01/2021    Creatinine monitoring  01/01/2021    Colon cancer screen colonoscopy  11/06/2024    Flu vaccine  Completed    Hepatitis C screen  Completed    HIV screen  Completed    Hepatitis A vaccine  Aged Out    Hepatitis B vaccine  Aged Out    Hib vaccine  Aged Out    Meningococcal (ACWY) vaccine  Aged Out    Pneumococcal 0-64 years Vaccine  Aged Out       Hemoglobin A1C (%)   Date Value   11/21/2017 5.0   12/14/2016 5.1   01/17/2013 4.9             ( goal A1C is < 7)   Microalb/Crt.  Ratio (mcg/mg creat)   Date Value   01/22/2013 9     LDL Cholesterol (mg/dL)   Date Value   04/02/2019 184 (H)       (goal LDL is <100)   AST (U/L)   Date Value   01/01/2020 58 (H)     ALT (U/L)   Date Value   01/01/2020 54 (H)     BUN (mg/dL)   Date Value   01/01/2020 17     BP Readings from Last 3 Encounters:   01/01/20 (!) 174/66   10/25/19 133/73   06/12/19 (!) 134/58          (goal 120/80)          Patient Active Problem List:     Hyperlipidemia     Gout     CKD (chronic kidney disease) stage 3, GFR 30-59 ml/min ([facility])     Wrist pain, right     Swelling of joint, wrist, right     Pain in joint, multiple sites     Obesity     Adjustment reaction     Vertigo     Pain in joint     Spinal stenosis, lumbar region, without neurogenic claudication     Facet arthropathy, lumbar     Chronic back pain     Acute bilateral low back pain with sciatica

## 2020-04-30 ENCOUNTER — TELEPHONE (OUTPATIENT)
Dept: GASTROENTEROLOGY | Age: 65
End: 2020-04-30

## 2020-05-06 RX ORDER — MELOXICAM 15 MG/1
TABLET ORAL
Qty: 30 TABLET | Refills: 2 | Status: SHIPPED | OUTPATIENT
Start: 2020-05-06 | End: 2020-06-30

## 2020-05-06 NOTE — TELEPHONE ENCOUNTER
Health Maintenance   Topic Date Due    Shingles Vaccine (1 of 2) 04/21/2007    Lipid screen  04/02/2020    DTaP/Tdap/Td vaccine (1 - Tdap) 12/14/2022 (Originally 4/21/1976)    Potassium monitoring  01/01/2021    Creatinine monitoring  01/01/2021    Colon cancer screen colonoscopy  11/06/2024    Flu vaccine  Completed    Hepatitis C screen  Completed    HIV screen  Completed    Hepatitis A vaccine  Aged Out    Hepatitis B vaccine  Aged Out    Hib vaccine  Aged Out    Meningococcal (ACWY) vaccine  Aged Out    Pneumococcal 0-64 years Vaccine  Aged Out             (applicable per patient's age: Cancer Screenings, Depression Screening, Fall Risk Screening, Immunizations)    Hemoglobin A1C (%)   Date Value   11/21/2017 5.0   12/14/2016 5.1   01/17/2013 4.9     Microalb/Crt. Ratio (mcg/mg creat)   Date Value   01/22/2013 9     LDL Cholesterol (mg/dL)   Date Value   04/02/2019 184 (H)     AST (U/L)   Date Value   01/01/2020 58 (H)     ALT (U/L)   Date Value   01/01/2020 54 (H)     BUN (mg/dL)   Date Value   01/01/2020 17      (goal A1C is < 7)   (goal LDL is <100) need 30-50% reduction from baseline     BP Readings from Last 3 Encounters:   01/01/20 (!) 174/66   10/25/19 133/73   06/12/19 (!) 134/58    (goal /80)      All Future Testing planned in [facility]:  Lab Frequency Next Occurrence   XR KNEE BILATERAL STANDING Once 06/12/2020   CBC With Auto Differential Once 27/80/8579   Basic Metabolic Panel Once 56/72/1553   Hepatic Function Panel Once 07/03/2020   EGD Once 07/29/2020   Nasal cannula oxygen PRN        Next Visit Date:  No future appointments.          Patient Active Problem List:     Hyperlipidemia     Gout     CKD (chronic kidney disease) stage 3, GFR 30-59 ml/min ([facility])     Wrist pain, right     Swelling of joint, wrist, right     Pain in joint, multiple sites     Obesity     Adjustment reaction     Vertigo     Pain in joint     Spinal stenosis, lumbar region, without neurogenic claudication Facet arthropathy, lumbar     Chronic back pain     Acute bilateral low back pain with sciatica

## 2020-05-12 RX ORDER — POLYETHYLENE GLYCOL 3350, SODIUM SULFATE ANHYDROUS, SODIUM BICARBONATE, SODIUM CHLORIDE, POTASSIUM CHLORIDE 236; 22.74; 6.74; 5.86; 2.97 G/4L; G/4L; G/4L; G/4L; G/4L
4 POWDER, FOR SOLUTION ORAL ONCE
Qty: 4000 ML | Refills: 0 | Status: SHIPPED | OUTPATIENT
Start: 2020-05-12 | End: 2020-05-12

## 2020-05-12 NOTE — TELEPHONE ENCOUNTER
Contacted [facility] to schedule colon.egd and follow up appointment. Hailey Matthews stated that he does not remember speaking with Dr. Edwardo Warren and handed the phone to his significant other Dillingham Player to assist with scheduling. Patient is now scheduled for colon/egd at [facility] with Dr. Edwardo Warren on 5/27/20, Covid testing is scheduled at the [facility] site on 5/24/20 at 330PM. Golyltely bowel prep and CLD was discussed and routed for signature and 3 month follow up scheduled.  Instructions and appointment card e-mailed to [email or web address].

## 2020-05-24 ENCOUNTER — HOSPITAL ENCOUNTER (OUTPATIENT)
Dept: PREADMISSION TESTING | Age: 65
Setting detail: SPECIMEN
Discharge: HOME | End: 2020-05-28
Payer: MEDICARE

## 2020-05-24 PROCEDURE — U0004 COV-19 TEST NON-CDC HGH THRU: HCPCS

## 2020-05-26 LAB
SARS-COV-2, PCR: NOT DETECTED
SARS-COV-2, RAPID: NORMAL
SARS-COV-2: NORMAL
SOURCE: NORMAL

## 2020-05-27 ENCOUNTER — TELEPHONE (OUTPATIENT)
Dept: PRIMARY CARE CLINIC | Age: 65
End: 2020-05-27

## 2020-05-27 ENCOUNTER — ANESTHESIA EVENT (OUTPATIENT)
Dept: ENDOSCOPY | Age: 65
End: 2020-05-27
Payer: MEDICARE

## 2020-05-27 ENCOUNTER — HOSPITAL ENCOUNTER (OUTPATIENT)
Age: 65
Setting detail: OUTPATIENT SURGERY
Discharge: HOME | End: 2020-05-27
Attending: INTERNAL MEDICINE | Admitting: INTERNAL MEDICINE
Payer: MEDICARE

## 2020-05-27 ENCOUNTER — ANESTHESIA (OUTPATIENT)
Dept: ENDOSCOPY | Age: 65
End: 2020-05-27
Payer: MEDICARE

## 2020-05-27 VITALS
SYSTOLIC BLOOD PRESSURE: 158 MMHG | HEART RATE: 66 BPM | DIASTOLIC BLOOD PRESSURE: 53 MMHG | TEMPERATURE: 97.7 F | BODY MASS INDEX: 31.15 KG/M2 | OXYGEN SATURATION: 98 % | HEIGHT: 72 IN | WEIGHT: 230 LBS | RESPIRATION RATE: 18 BRPM

## 2020-05-27 VITALS
OXYGEN SATURATION: 95 % | SYSTOLIC BLOOD PRESSURE: 103 MMHG | DIASTOLIC BLOOD PRESSURE: 37 MMHG | RESPIRATION RATE: 24 BRPM

## 2020-05-27 LAB
EKG ATRIAL RATE: 68 BPM
EKG P AXIS: 4 DEGREES
EKG P-R INTERVAL: 202 MS
EKG Q-T INTERVAL: 408 MS
EKG QRS DURATION: 86 MS
EKG QTC CALCULATION (BAZETT): 433 MS
EKG R AXIS: -6 DEGREES
EKG T AXIS: 21 DEGREES
EKG VENTRICULAR RATE: 68 BPM
GFR NON-AFRICAN AMERICAN: >60 ML/MIN
GFR SERPL CREATININE-BSD FRML MDRD: >60 ML/MIN
GFR SERPL CREATININE-BSD FRML MDRD: NORMAL ML/MIN/{1.73_M2}
GLUCOSE BLD-MCNC: 108 MG/DL (ref 74–100)
POC CREATININE: 1.08 MG/DL (ref 0.51–1.19)
POC POTASSIUM: 3.7 MMOL/L (ref 3.5–4.5)

## 2020-05-27 PROCEDURE — 93005 ELECTROCARDIOGRAM TRACING: CPT | Performed by: ANESTHESIOLOGY

## 2020-05-27 PROCEDURE — 2580000003 HC RX 258: Performed by: ANESTHESIOLOGY

## 2020-05-27 PROCEDURE — 45380 COLONOSCOPY AND BIOPSY: CPT | Performed by: INTERNAL MEDICINE

## 2020-05-27 PROCEDURE — 2709999900 HC NON-CHARGEABLE SUPPLY: Performed by: INTERNAL MEDICINE

## 2020-05-27 PROCEDURE — 43239 EGD BIOPSY SINGLE/MULTIPLE: CPT | Performed by: INTERNAL MEDICINE

## 2020-05-27 PROCEDURE — 82947 ASSAY GLUCOSE BLOOD QUANT: CPT

## 2020-05-27 PROCEDURE — 6360000002 HC RX W HCPCS: Performed by: NURSE ANESTHETIST, CERTIFIED REGISTERED

## 2020-05-27 PROCEDURE — 88305 TISSUE EXAM BY PATHOLOGIST: CPT

## 2020-05-27 PROCEDURE — 7100000040 HC SPAR PHASE II RECOVERY - FIRST 15 MIN: Performed by: INTERNAL MEDICINE

## 2020-05-27 PROCEDURE — 84132 ASSAY OF SERUM POTASSIUM: CPT

## 2020-05-27 PROCEDURE — 82565 ASSAY OF CREATININE: CPT

## 2020-05-27 PROCEDURE — 3700000000 HC ANESTHESIA ATTENDED CARE: Performed by: INTERNAL MEDICINE

## 2020-05-27 PROCEDURE — 3700000001 HC ADD 15 MINUTES (ANESTHESIA): Performed by: INTERNAL MEDICINE

## 2020-05-27 PROCEDURE — 88312 SPECIAL STAINS GROUP 1: CPT

## 2020-05-27 PROCEDURE — 93010 ELECTROCARDIOGRAM REPORT: CPT | Performed by: INTERNAL MEDICINE

## 2020-05-27 PROCEDURE — 3609010300 HC COLONOSCOPY W/BIOPSY SINGLE/MULTIPLE: Performed by: INTERNAL MEDICINE

## 2020-05-27 PROCEDURE — 3609012400 HC EGD TRANSORAL BIOPSY SINGLE/MULTIPLE: Performed by: INTERNAL MEDICINE

## 2020-05-27 PROCEDURE — 7100000041 HC SPAR PHASE II RECOVERY - ADDTL 15 MIN: Performed by: INTERNAL MEDICINE

## 2020-05-27 RX ORDER — LABETALOL HYDROCHLORIDE 5 MG/ML
5 INJECTION, SOLUTION INTRAVENOUS EVERY 10 MIN PRN
Status: DISCONTINUED | OUTPATIENT
Start: 2020-05-27 | End: 2020-05-27 | Stop reason: HOSPADM

## 2020-05-27 RX ORDER — FENTANYL CITRATE 50 UG/ML
25 INJECTION, SOLUTION INTRAMUSCULAR; INTRAVENOUS EVERY 5 MIN PRN
Status: DISCONTINUED | OUTPATIENT
Start: 2020-05-27 | End: 2020-05-27 | Stop reason: HOSPADM

## 2020-05-27 RX ORDER — PROPOFOL 10 MG/ML
INJECTION, EMULSION INTRAVENOUS PRN
Status: DISCONTINUED | OUTPATIENT
Start: 2020-05-27 | End: 2020-05-27 | Stop reason: SDUPTHER

## 2020-05-27 RX ORDER — DIPHENHYDRAMINE HYDROCHLORIDE 50 MG/ML
12.5 INJECTION INTRAMUSCULAR; INTRAVENOUS
Status: DISCONTINUED | OUTPATIENT
Start: 2020-05-27 | End: 2020-05-27 | Stop reason: HOSPADM

## 2020-05-27 RX ORDER — MIDAZOLAM HYDROCHLORIDE 1 MG/ML
0.5 INJECTION INTRAMUSCULAR; INTRAVENOUS 4 TIMES DAILY PRN
Status: DISCONTINUED | OUTPATIENT
Start: 2020-05-27 | End: 2020-05-27 | Stop reason: HOSPADM

## 2020-05-27 RX ORDER — ONDANSETRON 2 MG/ML
4 INJECTION INTRAMUSCULAR; INTRAVENOUS
Status: DISCONTINUED | OUTPATIENT
Start: 2020-05-27 | End: 2020-05-27 | Stop reason: HOSPADM

## 2020-05-27 RX ORDER — SODIUM CHLORIDE, SODIUM LACTATE, POTASSIUM CHLORIDE, CALCIUM CHLORIDE 600; 310; 30; 20 MG/100ML; MG/100ML; MG/100ML; MG/100ML
INJECTION, SOLUTION INTRAVENOUS CONTINUOUS
Status: DISCONTINUED | OUTPATIENT
Start: 2020-05-27 | End: 2020-05-27 | Stop reason: HOSPADM

## 2020-05-27 RX ADMIN — PROPOFOL 50 MG: 10 INJECTION, EMULSION INTRAVENOUS at 12:12

## 2020-05-27 RX ADMIN — PROPOFOL 100 MG: 10 INJECTION, EMULSION INTRAVENOUS at 12:22

## 2020-05-27 RX ADMIN — PROPOFOL 50 MG: 10 INJECTION, EMULSION INTRAVENOUS at 12:27

## 2020-05-27 RX ADMIN — PROPOFOL 100 MG: 10 INJECTION, EMULSION INTRAVENOUS at 12:25

## 2020-05-27 RX ADMIN — PROPOFOL 100 MG: 10 INJECTION, EMULSION INTRAVENOUS at 12:11

## 2020-05-27 RX ADMIN — PROPOFOL 50 MG: 10 INJECTION, EMULSION INTRAVENOUS at 12:30

## 2020-05-27 RX ADMIN — PROPOFOL 50 MG: 10 INJECTION, EMULSION INTRAVENOUS at 12:13

## 2020-05-27 RX ADMIN — PROPOFOL 50 MG: 10 INJECTION, EMULSION INTRAVENOUS at 12:17

## 2020-05-27 RX ADMIN — PROPOFOL 50 MG: 10 INJECTION, EMULSION INTRAVENOUS at 12:32

## 2020-05-27 RX ADMIN — PROPOFOL 50 MG: 10 INJECTION, EMULSION INTRAVENOUS at 12:28

## 2020-05-27 RX ADMIN — PROPOFOL 50 MG: 10 INJECTION, EMULSION INTRAVENOUS at 12:18

## 2020-05-27 RX ADMIN — PROPOFOL 50 MG: 10 INJECTION, EMULSION INTRAVENOUS at 12:16

## 2020-05-27 RX ADMIN — PROPOFOL 50 MG: 10 INJECTION, EMULSION INTRAVENOUS at 12:14

## 2020-05-27 RX ADMIN — PROPOFOL 50 MG: 10 INJECTION, EMULSION INTRAVENOUS at 12:15

## 2020-05-27 RX ADMIN — PROPOFOL 50 MG: 10 INJECTION, EMULSION INTRAVENOUS at 12:26

## 2020-05-27 RX ADMIN — SODIUM CHLORIDE, POTASSIUM CHLORIDE, SODIUM LACTATE AND CALCIUM CHLORIDE: 600; 310; 30; 20 INJECTION, SOLUTION INTRAVENOUS at 11:26

## 2020-05-27 ASSESSMENT — PULMONARY FUNCTION TESTS
PIF_VALUE: 0

## 2020-05-27 ASSESSMENT — PAIN SCALES - GENERAL
PAINLEVEL_OUTOF10: 0

## 2020-05-27 ASSESSMENT — PAIN - FUNCTIONAL ASSESSMENT: PAIN_FUNCTIONAL_ASSESSMENT: 0-10

## 2020-05-27 ASSESSMENT — LIFESTYLE VARIABLES: SMOKING_STATUS: 0

## 2020-05-27 NOTE — OP NOTE
[facility]     PROCEDURE DATE: 05/27/20    REFERRING PHYSICIAN: No ref. provider found     PRIMARY CARE PROVIDER: Etelvina Walton MD    ATTENDING PHYSICIAN: Chin Tracey MD     HISTORY: Mr. Luis Bradford is a 63 y.o. male who presents to the [place] [address] Endoscopy unit for upper endoscopy. The patient's clinical history is remarkable for Fe def anemia. He is currently medically stable and appropriate for the planned procedure. PREOPERATIVE DIAGNOSIS: Fe def anemia. PROCEDURES:   1) Transoral Upper Endoscopy, biopsy. POSTOPERATIVE DIAGNOSIS:   Moderate esophagitis (?Pill induced)     SPECIMENS: biopsy    MEDICATIONS:   MAC per anesthesia    EBL: minimal    INSTRUMENT: Olympus GIF-H190 flexible Gastroscope. PREPARATION: The nature and character of the procedure as well as risks, benefits, and alternatives were discussed with the patient and informed consent was obtained. Complications were said to include, but were not limited to: medication allergy, medication reaction, cardiovascular and respiratory problems, bleeding, perforation, infection, and/or missed diagnosis. Following arrival in the endoscopy room, the patient was placed in the left lateral decubitus position and final time-out accomplished in the presence of the nursing staff. Baseline vital signs were obtained and reviewed, and IV sedation was subsequently initiated. FINDINGS:   Esophagus: The esophagus was inspected to the Z-line. The endoscopic exam showed linear ulceration and erythema in mid esophagus (? Pill induced). Biopsy obtained. Small sliding hiatal hernia. Stomach: The stomach was inspected in both forward and retroflex fashion and was appropriately distensible. The cardia, fundus, incisura, antrum and pylorus were identified via direct visualization. The endoscopic exam showed no pathology.      Duodenum: The proximal small bowel was inspected through the bulb, sweep, and second

## 2020-05-27 NOTE — H&P
History and Physical    Pt Name: Lennox Degree  MRN: [id number]  YOB: 1955  Date of evaluation: 5/27/2020    SUBJECTIVE:   History of Chief Complaint:    Patient complains of nausea/vomiting, occasional hematemesis. She denies any abdominal pain, does take NSAIDs regularly. He has not had an EGD in the past as far as he remembers, but has had a colonoscopy. Patient also complains of intermittent blood per rectum. He has been scheduled for EGD today. Past Medical History    has a past medical history of Chronic back pain, Chronic kidney disease, Gout, Hyperlipidemia, Hypertension, Obesity, Osteoarthritis, Restless leg syndrome, Shoulder pain, Shrimp allergy, Spinal stenosis, lumbar region, without neurogenic claudication, Swelling of joint, wrist, right, Thoracic back pain, Vertigo, Wears glasses, and Wrist pain, right. Past Surgical History   has a past surgical history that includes nasal endoscopy (Bilateral, 10/17/2016); Nerve Block (11/02/2016); Nerve Block (04/03/2017); Colonoscopy (11/2014); and Nerve Block (10/09/2017). Medications  Prior to Admission medications    Medication Sig Start Date End Date Taking? Authorizing Provider   meloxicam (MOBIC) 15 MG tablet TAKE 1 TABLET BY MOUTH ONCE DAILY. 5/6/20  Yes Esperanza Hope MD   amLODIPine (NORVASC) 2.5 MG tablet TAKE 1 TABLET BY MOUTH ONE TIME A DAY 5/1/20  Yes Esperanza Hope MD   baclofen (LIORESAL) 10 MG tablet TAKE 1 TABLET BY MOUTH 2 TIMES A DAY 5/1/20  Yes Esperanza Hope MD   ondansetron (ZOFRAN) 4 MG tablet TAKE 1 TABLET BY MOUTH EVERY 8 HOURS AS NEEDED FOR NAUSEA 5/1/20  Yes Esperanza Hope MD   ferrous sulfate (IRON 325) 325 (65 Fe) MG tablet TAKE 1 TABLET BY MOUTH TWICE DAILY. 5/1/20  Yes Esperanza Hope MD   allopurinol (ZYLOPRIM) 300 MG tablet Take 1 tablet by mouth daily 4/1/20  Yes Esperanza Hope MD   amLODIPine (NORVASC) 5 MG tablet TAKE 1 TABLET BY MOUTH ONCE DAILY.  4/1/20  Yes Esperanza Hope MD   atorvastatin

## 2020-05-27 NOTE — ANESTHESIA PRE PROCEDURE
NERVE BLOCK  2017    duramorph 1.5mg & decadron 10mg    NERVE BLOCK  10/09/2017    duramorph epidural, decadron 10mg, morphine 1.5mg     Social History     Tobacco Use    Smoking status: Never Smoker    Smokeless tobacco: Never Used   Substance Use Topics    Alcohol use: No    Drug use: No         Vital Signs (Current)   Vitals:    20 1113   BP: (!) 175/59   Pulse: 69   Resp: 20   Temp: 97.9 °F (36.6 °C)   SpO2: 97%     Vital Signs Statistics (for past 48 hrs)     Temp  Av.9 °F (36.6 °C)  Min: 97.9 °F (36.6 °C)   Min taken time: 20 1113  Max: 97.9 °F (36.6 °C)   Max taken time: 20 1113  Pulse  Av  Min: 69   Min taken time: 20 1113  Max: 69   Max taken time: 20 1113  Resp  Av  Min: 20   Min taken time: 20 1113  Max: 20   Max taken time: 20 1113  BP  Min: 175/59   Min taken time: 20 1113  Max: 175/59   Max taken time: 20 1113  SpO2  Av %  Min: 97 %   Min taken time: 20 1113  Max: 97 %   Max taken time: 20 1113  BP Readings from Last 3 Encounters:   20 (!) 175/59   20 (!) 174/66   10/25/19 133/73       BMI  Body mass index is 31.19 kg/m².     CBC   Lab Results   Component Value Date    WBC 4.2 2020    RBC 3.89 2020    RBC 3.74 09/10/2011    HGB 12.0 2020    HCT 36.0 2020    MCV 92.5 2020    RDW 12.1 2020     2020     09/10/2011       CMP    Lab Results   Component Value Date     2020    K 4.3 2020     2020    CO2 22 2020    BUN 17 2020    CREATININE 1.08 2020    CREATININE 1.10 2020    GFRAA >60 2020    LABGLOM >60 2020    GLUCOSE 94 2020    GLUCOSE 108 09/10/2011    PROT 7.2 2020    PROT 7.1 2013    CALCIUM 8.6 2020    BILITOT 1.00 2020    ALKPHOS 104 2020    AST 58 2020    ALT 54 2020       BMP    Lab Results   Component Value Date     hours.    Coags: No results found for: PROTIME, INR, APTT    HCG (If Applicable): No results found for: PREGTESTUR, PREGSERUM, HCG, HCGQUANT     ABGs: No results found for: PHART, PO2ART, ZEE9NSE, JCJ5PIO, BEART, T7NPBLHI     Type & Screen (If Applicable):  No results found for: LABABO, LABRH    Drug/Infectious Status (If Applicable):  Lab Results   Component Value Date    HEPCAB NONREACTIVE 06/26/2018       COVID-19 Screening (If Applicable):   Lab Results   Component Value Date    COVID19 Not Detected 05/24/2020         Anesthesia Evaluation   no history of anesthetic complications:   Airway: Mallampati: II     Neck ROM: full   Dental:          Pulmonary:       (-) not a current smoker                           Cardiovascular:    (+) hypertension:,                ROS comment: -cp,syn,pnd     Neuro/Psych:   (+) neuromuscular disease:,    (-) seizures and CVA           GI/Hepatic/Renal:   (+) renal disease: CRI,          ROS comment: GI bleed. Endo/Other:    (+) : arthritis:., .                  ROS comment: gout Abdominal:           Vascular:                                      Anesthesia Plan      MAC     ASA 2     (Asa 2)  Induction: intravenous.                           Keiry Monzon MD   5/27/2020

## 2020-05-28 LAB — SURGICAL PATHOLOGY REPORT: NORMAL

## 2020-05-29 RX ORDER — MELOXICAM 15 MG/1
TABLET ORAL
Qty: 30 TABLET | Refills: 2 | OUTPATIENT
Start: 2020-05-29

## 2020-05-29 RX ORDER — ONDANSETRON 4 MG/1
4 TABLET, FILM COATED ORAL EVERY 8 HOURS PRN
Qty: 20 TABLET | Refills: 0 | Status: SHIPPED | OUTPATIENT
Start: 2020-05-29 | End: 2020-06-21

## 2020-05-29 RX ORDER — MECLIZINE HYDROCHLORIDE 25 MG/1
TABLET ORAL
Qty: 15 TABLET | Refills: 0 | Status: SHIPPED | OUTPATIENT
Start: 2020-05-29 | End: 2020-06-11 | Stop reason: ALTCHOICE

## 2020-06-04 ENCOUNTER — TELEPHONE (OUTPATIENT)
Dept: GASTROENTEROLOGY | Age: 65
End: 2020-06-04

## 2020-06-11 ENCOUNTER — HOSPITAL ENCOUNTER (OUTPATIENT)
Age: 65
Setting detail: SPECIMEN
Discharge: HOME | End: 2020-06-11
Payer: MEDICARE

## 2020-06-11 ENCOUNTER — OFFICE VISIT (OUTPATIENT)
Dept: INTERNAL MEDICINE | Age: 65
End: 2020-06-11
Payer: MEDICARE

## 2020-06-11 ENCOUNTER — TELEPHONE (OUTPATIENT)
Dept: INTERNAL MEDICINE | Age: 65
End: 2020-06-11

## 2020-06-11 VITALS
SYSTOLIC BLOOD PRESSURE: 114 MMHG | BODY MASS INDEX: 30.24 KG/M2 | WEIGHT: 223 LBS | HEART RATE: 69 BPM | DIASTOLIC BLOOD PRESSURE: 58 MMHG

## 2020-06-11 LAB
ABSOLUTE EOS #: 0 K/UL (ref 0–0.44)
ABSOLUTE IMMATURE GRANULOCYTE: 0 K/UL (ref 0–0.3)
ABSOLUTE LYMPH #: 0.43 K/UL (ref 1.1–3.7)
ABSOLUTE MONO #: 0.1 K/UL (ref 0.1–1.2)
ALBUMIN SERPL-MCNC: 4.2 G/DL (ref 3.5–5.2)
ALBUMIN/GLOBULIN RATIO: 1.4 (ref 1–2.5)
ALP BLD-CCNC: 107 U/L (ref 40–129)
ALT SERPL-CCNC: 48 U/L (ref 5–41)
ANION GAP SERPL CALCULATED.3IONS-SCNC: 17 MMOL/L (ref 9–17)
AST SERPL-CCNC: 48 U/L
BASOPHILS # BLD: 0 % (ref 0–2)
BASOPHILS ABSOLUTE: 0 K/UL (ref 0–0.2)
BILIRUB SERPL-MCNC: 0.89 MG/DL (ref 0.3–1.2)
BILIRUBIN DIRECT: 0.23 MG/DL
BILIRUBIN, INDIRECT: 0.66 MG/DL (ref 0–1)
BUN BLDV-MCNC: 15 MG/DL (ref 8–23)
BUN/CREAT BLD: ABNORMAL (ref 9–20)
CALCIUM SERPL-MCNC: 9.3 MG/DL (ref 8.6–10.4)
CHLORIDE BLD-SCNC: 102 MMOL/L (ref 98–107)
CO2: 21 MMOL/L (ref 20–31)
CREAT SERPL-MCNC: 1.19 MG/DL (ref 0.7–1.2)
DIFFERENTIAL TYPE: ABNORMAL
EOSINOPHILS RELATIVE PERCENT: 0 % (ref 1–4)
GFR AFRICAN AMERICAN: >60 ML/MIN
GFR NON-AFRICAN AMERICAN: >60 ML/MIN
GFR SERPL CREATININE-BSD FRML MDRD: ABNORMAL ML/MIN/{1.73_M2}
GFR SERPL CREATININE-BSD FRML MDRD: ABNORMAL ML/MIN/{1.73_M2}
GLOBULIN: ABNORMAL G/DL (ref 1.5–3.8)
GLUCOSE BLD-MCNC: 114 MG/DL (ref 70–99)
HCT VFR BLD CALC: 32.9 % (ref 40.7–50.3)
HEMOGLOBIN: 11.1 G/DL (ref 13–17)
IMMATURE GRANULOCYTES: 0 %
LYMPHOCYTES # BLD: 9 % (ref 24–43)
MCH RBC QN AUTO: 32.6 PG (ref 25.2–33.5)
MCHC RBC AUTO-ENTMCNC: 33.7 G/DL (ref 28.4–34.8)
MCV RBC AUTO: 96.5 FL (ref 82.6–102.9)
MONOCYTES # BLD: 2 % (ref 3–12)
MORPHOLOGY: NORMAL
NRBC AUTOMATED: 0 PER 100 WBC
PDW BLD-RTO: 13.1 % (ref 11.8–14.4)
PLATELET # BLD: 343 K/UL (ref 138–453)
PLATELET ESTIMATE: ABNORMAL
PMV BLD AUTO: 9.9 FL (ref 8.1–13.5)
POTASSIUM SERPL-SCNC: 4.5 MMOL/L (ref 3.7–5.3)
RBC # BLD: 3.41 M/UL (ref 4.21–5.77)
RBC # BLD: ABNORMAL 10*6/UL
SEG NEUTROPHILS: 89 % (ref 36–65)
SEGMENTED NEUTROPHILS ABSOLUTE COUNT: 4.27 K/UL (ref 1.5–8.1)
SODIUM BLD-SCNC: 140 MMOL/L (ref 135–144)
TOTAL PROTEIN: 7.3 G/DL (ref 6.4–8.3)
TSH SERPL DL<=0.05 MIU/L-ACNC: 0.68 MIU/L (ref 0.3–5)
WBC # BLD: 4.8 K/UL (ref 3.5–11.3)
WBC # BLD: ABNORMAL 10*3/UL

## 2020-06-11 PROCEDURE — G8427 DOCREV CUR MEDS BY ELIG CLIN: HCPCS | Performed by: INTERNAL MEDICINE

## 2020-06-11 PROCEDURE — 99211 OFF/OP EST MAY X REQ PHY/QHP: CPT | Performed by: INTERNAL MEDICINE

## 2020-06-11 PROCEDURE — 99214 OFFICE O/P EST MOD 30 MIN: CPT | Performed by: INTERNAL MEDICINE

## 2020-06-11 PROCEDURE — 1036F TOBACCO NON-USER: CPT | Performed by: INTERNAL MEDICINE

## 2020-06-11 PROCEDURE — 3017F COLORECTAL CA SCREEN DOC REV: CPT | Performed by: INTERNAL MEDICINE

## 2020-06-11 PROCEDURE — G8417 CALC BMI ABV UP PARAM F/U: HCPCS | Performed by: INTERNAL MEDICINE

## 2020-06-11 RX ORDER — PREDNISONE 20 MG/1
20 TABLET ORAL DAILY
COMMUNITY
End: 2020-10-12 | Stop reason: ALTCHOICE

## 2020-06-11 RX ORDER — MELATONIN 10 MG
CAPSULE ORAL
Status: ON HOLD | COMMUNITY
End: 2022-09-27

## 2020-06-11 RX ORDER — NAPROXEN SOD/DIPHENHYDRAMINE 220MG-25MG
1 TABLET ORAL NIGHTLY
COMMUNITY
End: 2021-05-04 | Stop reason: ALTCHOICE

## 2020-06-11 RX ORDER — NAPROXEN SODIUM 220 MG
220 TABLET ORAL 2 TIMES DAILY WITH MEALS
COMMUNITY
End: 2021-04-01 | Stop reason: ALTCHOICE

## 2020-06-11 ASSESSMENT — ENCOUNTER SYMPTOMS
ABDOMINAL PAIN: 0
COUGH: 0
CONSTIPATION: 0
BACK PAIN: 1
BLOOD IN STOOL: 0
WHEEZING: 0
EYE REDNESS: 0
SHORTNESS OF BREATH: 0
NAUSEA: 1

## 2020-06-11 NOTE — PROGRESS NOTES
Visit Information    Have you changed or started any medications since your last visit including any over-the-counter medicines, vitamins, or herbal medicines? no   Are you having any side effects from any of your medications? -  no  Have you stopped taking any of your medications? Is so, why? -  no    Have you seen any other physician or provider since your last visit? No  Have you had any other diagnostic tests since your last visit? No  Have you been seen in the emergency room and/or had an admission to a hospital since we last saw you? No  Have you had your routine dental cleaning in the past 6 months? no    Have you activated your [employer] account? If not, what are your barriers?  Yes     Patient Care Team:  Valdez Duke MD as PCP - Prince Coronado MD as PCP - [facility][1]  Valdez Duke MD as Referring Physician (Internal Medicine)  Latrice Lock MD as Consulting Physician (Gastroenterology)    Medical History Review  Past Medical, Family, and Social History reviewed and does contribute to the patient presenting condition    Health Maintenance   Topic Date Due    Shingles Vaccine (1 of 2) 04/21/2007    Lipid screen  04/02/2020    DTaP/Tdap/Td vaccine (1 - Tdap) 12/14/2022 (Originally 4/21/1974)    Potassium monitoring  05/27/2021    Creatinine monitoring  05/27/2021    Colon cancer screen colonoscopy  05/27/2030    Flu vaccine  Completed    Hepatitis C screen  Completed    HIV screen  Completed    Hepatitis A vaccine  Aged Out    Hepatitis B vaccine  Aged Out    Hib vaccine  Aged Out    Meningococcal (ACWY) vaccine  Aged Out    Pneumococcal 0-64 years Vaccine  Aged Out

## 2020-06-11 NOTE — PROGRESS NOTES
Vertigo     Wears glasses     READING    Wrist pain, right        Past Surgical History:   Procedure Laterality Date    COLONOSCOPY  11/2014    int hemorrhoids, repeat in 10 years    COLONOSCOPY  05/27/2020    COLONOSCOPY N/A 5/27/2020    COLONOSCOPY WITH BIOPSY performed by Aneudy Ball MD at [address] Bilateral 10/17/2016    NASAL ENDOSCOPY; RIGHT NASAL CAUTERY    NERVE BLOCK  11/02/2016    duramorph 1.5mg celestone 9mg    NERVE BLOCK  04/03/2017    duramorph 1.5mg & decadron 10mg    NERVE BLOCK  10/09/2017    duramorph epidural, decadron 10mg, morphine 1.5mg    UPPER GASTROINTESTINAL ENDOSCOPY  05/27/2020    UPPER GASTROINTESTINAL ENDOSCOPY N/A 5/27/2020    EGD BIOPSY performed by Aneudy Ball MD at [facility]         ALLERGIES      Allergies   Allergen Reactions    Codeine Other (See Comments)     Don;t agree with him    Shrimp (Diagnostic) Hives       MEDICATIONS:      Current Outpatient Medications on File Prior to Visit   Medication Sig Dispense Refill    gabapentin (NEURONTIN) 300 MG capsule Take 300 mg by mouth 3 times daily.  predniSONE (DELTASONE) 20 MG tablet Take 20 mg by mouth daily      diclofenac sodium (VOLTAREN) 1 % GEL Apply 2 g topically 2 times daily      Melatonin 10 MG CAPS Take by mouth      naproxen sodium (ALEVE) 220 MG tablet Take 220 mg by mouth 2 times daily (with meals)      ondansetron (ZOFRAN) 4 MG tablet TAKE 1 TABLET BY MOUTH EVERY 8 HOURS AS NEEDED FOR NAUSEA 20 tablet 0    meloxicam (MOBIC) 15 MG tablet TAKE 1 TABLET BY MOUTH ONCE DAILY. 30 tablet 2    amLODIPine (NORVASC) 2.5 MG tablet TAKE 1 TABLET BY MOUTH ONE TIME A DAY 30 tablet 5    baclofen (LIORESAL) 10 MG tablet TAKE 1 TABLET BY MOUTH 2 TIMES A DAY 30 tablet 0    ferrous sulfate (IRON 325) 325 (65 Fe) MG tablet TAKE 1 TABLET BY MOUTH TWICE DAILY.  60 tablet 3    allopurinol (ZYLOPRIM) 300 MG tablet Take 1 tablet by mouth daily 30 tablet 5    atorvastatin patient is not hyperactive. PHYSICAL EXAM:     Vitals:    06/11/20 1316 06/11/20 1317   BP: 128/65 (!) 114/58   Site: Left Upper Arm Left Upper Arm   Position: Sitting Standing   Cuff Size: Large Adult Large Adult   Pulse: 63 69   Weight: 223 lb (101.2 kg)      Body mass index is 30.24 kg/m². BP Readings from Last 3 Encounters:   06/11/20 (!) 114/58   05/27/20 (!) 158/53   05/27/20 (!) 103/37        Wt Readings from Last 3 Encounters:   06/11/20 223 lb (101.2 kg)   05/27/20 230 lb (104.3 kg)   01/01/20 224 lb (101.6 kg)       Physical Exam  Vitals signs and nursing note reviewed. Constitutional:       Appearance: Normal appearance. HENT:      Head: Normocephalic and atraumatic. Eyes:      General: No scleral icterus. Extraocular Movements: Extraocular movements intact. Conjunctiva/sclera: Conjunctivae normal.      Pupils: Pupils are equal, round, and reactive to light. Cardiovascular:      Rate and Rhythm: Normal rate and regular rhythm. Heart sounds: No murmur. Pulmonary:      Effort: Pulmonary effort is normal.      Breath sounds: Normal breath sounds. No wheezing. Musculoskeletal:      Right lower leg: No edema. Left lower leg: No edema. Skin:     General: Skin is warm and dry. Neurological:      General: No focal deficit present. Mental Status: He is alert and oriented to person, place, and time. Cranial Nerves: No cranial nerve deficit. Motor: No weakness.       Gait: Gait normal.             LABORATORY FINDINGS:    CBC:  Lab Results   Component Value Date    WBC 4.2 01/01/2020    HGB 12.0 01/01/2020     01/01/2020     09/10/2011     BMP:    Lab Results   Component Value Date     01/01/2020    K 4.3 01/01/2020     01/01/2020    CO2 22 01/01/2020    BUN 17 01/01/2020    CREATININE 1.08 05/27/2020    CREATININE 1.10 01/01/2020    GLUCOSE 94 01/01/2020    GLUCOSE 108 09/10/2011     HEMOGLOBIN A1C:   Lab Results   Component Value

## 2020-06-11 NOTE — TELEPHONE ENCOUNTER
Patient calling in stating he takes 9 medications in the morning that are making him tired & woozy to the point that he is unable to walk/drive. Patient is barely able to give me his name &  without stumbling over his words. Scheduled telephone visit for 10AM this morning.

## 2020-06-20 ENCOUNTER — HOSPITAL ENCOUNTER (EMERGENCY)
Age: 65
Discharge: HOME | End: 2020-06-21
Attending: EMERGENCY MEDICINE
Payer: MEDICARE

## 2020-06-20 ENCOUNTER — APPOINTMENT (OUTPATIENT)
Dept: GENERAL RADIOLOGY | Age: 65
End: 2020-06-20
Payer: MEDICARE

## 2020-06-20 LAB
ABSOLUTE EOS #: 0.13 K/UL (ref 0–0.44)
ABSOLUTE IMMATURE GRANULOCYTE: <0.03 K/UL (ref 0–0.3)
ABSOLUTE LYMPH #: 0.96 K/UL (ref 1.1–3.7)
ABSOLUTE MONO #: 0.37 K/UL (ref 0.1–1.2)
ALBUMIN SERPL-MCNC: 4.1 G/DL (ref 3.5–5.2)
ALBUMIN/GLOBULIN RATIO: 1.4 (ref 1–2.5)
ALP BLD-CCNC: 103 U/L (ref 40–129)
ALT SERPL-CCNC: 40 U/L (ref 5–41)
ANION GAP SERPL CALCULATED.3IONS-SCNC: 18 MMOL/L (ref 9–17)
AST SERPL-CCNC: 49 U/L
BASOPHILS # BLD: 1 % (ref 0–2)
BASOPHILS ABSOLUTE: 0.03 K/UL (ref 0–0.2)
BILIRUB SERPL-MCNC: 0.88 MG/DL (ref 0.3–1.2)
BUN BLDV-MCNC: 12 MG/DL (ref 8–23)
BUN/CREAT BLD: ABNORMAL (ref 9–20)
CALCIUM IONIZED: 1.14 MMOL/L (ref 1.13–1.33)
CALCIUM SERPL-MCNC: 9 MG/DL (ref 8.6–10.4)
CHLORIDE BLD-SCNC: 100 MMOL/L (ref 98–107)
CO2: 20 MMOL/L (ref 20–31)
CREAT SERPL-MCNC: 0.93 MG/DL (ref 0.7–1.2)
DIFFERENTIAL TYPE: ABNORMAL
EOSINOPHILS RELATIVE PERCENT: 4 % (ref 1–4)
GFR AFRICAN AMERICAN: >60 ML/MIN
GFR NON-AFRICAN AMERICAN: >60 ML/MIN
GFR SERPL CREATININE-BSD FRML MDRD: ABNORMAL ML/MIN/{1.73_M2}
GFR SERPL CREATININE-BSD FRML MDRD: ABNORMAL ML/MIN/{1.73_M2}
GLUCOSE BLD-MCNC: 95 MG/DL (ref 70–99)
HCT VFR BLD CALC: 33.9 % (ref 40.7–50.3)
HEMOGLOBIN: 11.4 G/DL (ref 13–17)
IMMATURE GRANULOCYTES: 0 %
LIPASE: 21 U/L (ref 13–60)
LYMPHOCYTES # BLD: 26 % (ref 24–43)
MAGNESIUM: 2.4 MG/DL (ref 1.6–2.6)
MCH RBC QN AUTO: 31.8 PG (ref 25.2–33.5)
MCHC RBC AUTO-ENTMCNC: 33.6 G/DL (ref 28.4–34.8)
MCV RBC AUTO: 94.4 FL (ref 82.6–102.9)
MONOCYTES # BLD: 10 % (ref 3–12)
MYOGLOBIN: 41 NG/ML (ref 28–72)
NRBC AUTOMATED: 0 PER 100 WBC
PDW BLD-RTO: 12.6 % (ref 11.8–14.4)
PHOSPHORUS: 2.7 MG/DL (ref 2.5–4.5)
PLATELET # BLD: 272 K/UL (ref 138–453)
PLATELET ESTIMATE: ABNORMAL
PMV BLD AUTO: 9.8 FL (ref 8.1–13.5)
POTASSIUM SERPL-SCNC: 4.7 MMOL/L (ref 3.7–5.3)
RBC # BLD: 3.59 M/UL (ref 4.21–5.77)
RBC # BLD: ABNORMAL 10*6/UL
SEG NEUTROPHILS: 59 % (ref 36–65)
SEGMENTED NEUTROPHILS ABSOLUTE COUNT: 2.17 K/UL (ref 1.5–8.1)
SODIUM BLD-SCNC: 138 MMOL/L (ref 135–144)
TOTAL CK: 137 U/L (ref 39–308)
TOTAL PROTEIN: 7.1 G/DL (ref 6.4–8.3)
TROPONIN INTERP: NORMAL
TROPONIN T: NORMAL NG/ML
TROPONIN, HIGH SENSITIVITY: 9 NG/L (ref 0–22)
WBC # BLD: 3.7 K/UL (ref 3.5–11.3)
WBC # BLD: ABNORMAL 10*3/UL

## 2020-06-20 PROCEDURE — 6360000002 HC RX W HCPCS: Performed by: EMERGENCY MEDICINE

## 2020-06-20 PROCEDURE — 2580000003 HC RX 258: Performed by: EMERGENCY MEDICINE

## 2020-06-20 PROCEDURE — 83735 ASSAY OF MAGNESIUM: CPT

## 2020-06-20 PROCEDURE — 71046 X-RAY EXAM CHEST 2 VIEWS: CPT

## 2020-06-20 PROCEDURE — 96375 TX/PRO/DX INJ NEW DRUG ADDON: CPT

## 2020-06-20 PROCEDURE — 93005 ELECTROCARDIOGRAM TRACING: CPT | Performed by: EMERGENCY MEDICINE

## 2020-06-20 PROCEDURE — 82550 ASSAY OF CK (CPK): CPT

## 2020-06-20 PROCEDURE — 83874 ASSAY OF MYOGLOBIN: CPT

## 2020-06-20 PROCEDURE — 82330 ASSAY OF CALCIUM: CPT

## 2020-06-20 PROCEDURE — 96374 THER/PROPH/DIAG INJ IV PUSH: CPT

## 2020-06-20 PROCEDURE — 84100 ASSAY OF PHOSPHORUS: CPT

## 2020-06-20 PROCEDURE — 99284 EMERGENCY DEPT VISIT MOD MDM: CPT

## 2020-06-20 PROCEDURE — 84484 ASSAY OF TROPONIN QUANT: CPT

## 2020-06-20 PROCEDURE — 80053 COMPREHEN METABOLIC PANEL: CPT

## 2020-06-20 PROCEDURE — 83690 ASSAY OF LIPASE: CPT

## 2020-06-20 PROCEDURE — 85025 COMPLETE CBC W/AUTO DIFF WBC: CPT

## 2020-06-20 RX ORDER — ONDANSETRON 2 MG/ML
4 INJECTION INTRAMUSCULAR; INTRAVENOUS ONCE
Status: COMPLETED | OUTPATIENT
Start: 2020-06-20 | End: 2020-06-20

## 2020-06-20 RX ORDER — 0.9 % SODIUM CHLORIDE 0.9 %
1000 INTRAVENOUS SOLUTION INTRAVENOUS ONCE
Status: DISCONTINUED | OUTPATIENT
Start: 2020-06-20 | End: 2020-06-20

## 2020-06-20 RX ORDER — 0.9 % SODIUM CHLORIDE 0.9 %
2000 INTRAVENOUS SOLUTION INTRAVENOUS ONCE
Status: COMPLETED | OUTPATIENT
Start: 2020-06-20 | End: 2020-06-21

## 2020-06-20 RX ORDER — PROMETHAZINE HYDROCHLORIDE 25 MG/ML
12.5 INJECTION, SOLUTION INTRAMUSCULAR; INTRAVENOUS ONCE
Status: COMPLETED | OUTPATIENT
Start: 2020-06-20 | End: 2020-06-20

## 2020-06-20 RX ADMIN — ONDANSETRON 4 MG: 2 INJECTION, SOLUTION INTRAMUSCULAR; INTRAVENOUS at 22:21

## 2020-06-20 RX ADMIN — PROMETHAZINE HYDROCHLORIDE 12.5 MG: 25 INJECTION INTRAMUSCULAR; INTRAVENOUS at 23:03

## 2020-06-20 RX ADMIN — SODIUM CHLORIDE 2000 ML: 9 INJECTION, SOLUTION INTRAVENOUS at 22:22

## 2020-06-21 VITALS
SYSTOLIC BLOOD PRESSURE: 154 MMHG | DIASTOLIC BLOOD PRESSURE: 67 MMHG | HEART RATE: 59 BPM | HEIGHT: 72 IN | RESPIRATION RATE: 15 BRPM | BODY MASS INDEX: 31.15 KG/M2 | OXYGEN SATURATION: 95 % | TEMPERATURE: 97.4 F | WEIGHT: 230 LBS

## 2020-06-21 RX ORDER — ONDANSETRON 4 MG/1
4 TABLET, FILM COATED ORAL EVERY 8 HOURS PRN
Qty: 5 TABLET | Refills: 0 | Status: SHIPPED | OUTPATIENT
Start: 2020-06-21 | End: 2021-04-01 | Stop reason: ALTCHOICE

## 2020-06-21 ASSESSMENT — ENCOUNTER SYMPTOMS
ABDOMINAL PAIN: 1
CONSTIPATION: 0
SHORTNESS OF BREATH: 0
BACK PAIN: 0
NAUSEA: 1
DIARRHEA: 0
VOMITING: 1
COLOR CHANGE: 0

## 2020-06-21 NOTE — ED PROVIDER NOTES
for level 5)    Review of Systems   Constitutional: Positive for fatigue. Respiratory: Negative for shortness of breath. Cardiovascular: Negative for chest pain and leg swelling. Gastrointestinal: Positive for abdominal pain, nausea and vomiting. Negative for constipation and diarrhea. Genitourinary: Negative for hematuria. Musculoskeletal: Positive for myalgias. Negative for back pain. Skin: Negative for color change. Neurological: Positive for weakness. Negative for dizziness, syncope, light-headedness and numbness. PHYSICAL EXAM   (up to 7 for level 4, 8 or more for level 5)    VITALS:   Vitals:    06/20/20 2246 06/20/20 2315 06/20/20 2347 06/21/20 0001   BP: (!) 158/66 (!) 152/64 (!) 172/67 (!) 154/67   Pulse: 62 62 60 59   Resp: 18 16 16 15   Temp:       TempSrc:       SpO2: 96% 97% 95% 95%   Weight:       Height:           Physical Exam  Vitals signs and nursing note reviewed. Constitutional:       General: He is in acute distress. Appearance: He is well-developed. He is not diaphoretic. HENT:      Head: Normocephalic and atraumatic. Mouth/Throat:      Mouth: Mucous membranes are moist.   Eyes:      Conjunctiva/sclera: Conjunctivae normal.      Pupils: Pupils are equal, round, and reactive to light. Neck:      Musculoskeletal: Normal range of motion. Cardiovascular:      Rate and Rhythm: Normal rate and regular rhythm. Heart sounds: Murmur present. Systolic murmur present with a grade of 3/6. Pulmonary:      Effort: Pulmonary effort is normal. No respiratory distress. Breath sounds: Normal breath sounds. No wheezing, rhonchi or rales. Abdominal:      General: There is no distension. Palpations: Abdomen is soft. Tenderness: There is generalized abdominal tenderness. There is no guarding or rebound. Musculoskeletal: Normal range of motion. Right lower leg: No edema. Left lower leg: No edema. Skin:     General: Skin is warm and dry. Coloration: Skin is not pale. Findings: No erythema. Neurological:      General: No focal deficit present. Mental Status: He is alert.    Psychiatric:         Behavior: Behavior normal.         DIFFERENTIAL  DIAGNOSIS   PLAN (LABS / IMAGING / EKG):  Orders Placed This Encounter   Procedures    XR CHEST STANDARD (2 VW)    CBC WITH AUTO DIFFERENTIAL    Comprehensive Metabolic Panel    LIPASE    MAGNESIUM    PHOSPHORUS    Calcium, Ionized    Troponin    CK    MYOGLOBIN, SERUM    Telemetry monitoring    EKG 12 Lead    Insert peripheral IV       MEDICATIONS ORDERED:  Orders Placed This Encounter   Medications    DISCONTD: 0.9 % sodium chloride bolus    ondansetron (ZOFRAN) injection 4 mg    0.9 % sodium chloride bolus    promethazine (PHENERGAN) injection 12.5 mg    ondansetron (ZOFRAN) 4 MG tablet     Sig: Take 1 tablet by mouth every 8 hours as needed for Nausea     Dispense:  5 tablet     Refill:  0       DDX:   Rhabdomyolysis, heat exhaustion, heat stroke, dehydration, electrolyte abnormality     DIAGNOSTIC RESULTS / EMERGENCYDEPARTMENT COURSE / MDM   LABS:  Labs Reviewed   CBC WITH AUTO DIFFERENTIAL - Abnormal; Notable for the following components:       Result Value    RBC 3.59 (*)     Hemoglobin 11.4 (*)     Hematocrit 33.9 (*)     Absolute Lymph # 0.96 (*)     All other components within normal limits   COMPREHENSIVE METABOLIC PANEL - Abnormal; Notable for the following components:    Anion Gap 18 (*)     AST 49 (*)     All other components within normal limits   LIPASE   MAGNESIUM   PHOSPHORUS   CALCIUM, IONIZED   TROPONIN   CK   MYOGLOBIN, SERUM       RADIOLOGY:  Xr Chest Standard (2 Vw)    Result Date: 6/20/2020  EXAMINATION: TWO XRAY VIEWS OF THE CHEST 6/20/2020 7:35 pm COMPARISON: 01/01/2020 HISTORY: ORDERING SYSTEM PROVIDED HISTORY: abd pain, n/v TECHNOLOGIST PROVIDED HISTORY: abd pain, n/v Reason for Exam: vomitting x 2 days FINDINGS: The cardial pericardial silhouette is discharged and was instructed to return to the ED for new or worsening symptoms. Any changes to existing medications or new prescriptions were reviewed with patient and they expressed understanding of how to correctly take their medications and the possible side effects. PATIENT REFERRED TO:  MD Annalee Bryson [address] [facility] [address] [facility] [address]  [address]  [address] [facility] [address]  [phone]    As needed, If symptoms worsen      DISCHARGE MEDICATIONS:  Discharge Medication List as of 6/21/2020  1:47 AM          Denia De Leon  Emergency Medicine Resident Physician, PGY-2    (Please note that portions of this note were completed with a voice recognition program.  Efforts were made to edit the dictations but occasionally words are mis-transcribed.)        Rossy Frances DO  Resident  06/21/20 [id number]

## 2020-06-22 LAB
EKG ATRIAL RATE: 65 BPM
EKG P AXIS: 16 DEGREES
EKG P-R INTERVAL: 198 MS
EKG Q-T INTERVAL: 420 MS
EKG QRS DURATION: 90 MS
EKG QTC CALCULATION (BAZETT): 436 MS
EKG R AXIS: 16 DEGREES
EKG T AXIS: 35 DEGREES
EKG VENTRICULAR RATE: 65 BPM

## 2020-06-22 PROCEDURE — 93010 ELECTROCARDIOGRAM REPORT: CPT | Performed by: INTERNAL MEDICINE

## 2020-06-30 RX ORDER — MELOXICAM 15 MG/1
TABLET ORAL
Qty: 30 TABLET | Refills: 2 | Status: SHIPPED | OUTPATIENT
Start: 2020-06-30 | End: 2020-07-30

## 2020-06-30 RX ORDER — TIZANIDINE 4 MG/1
TABLET ORAL
Qty: 30 TABLET | Refills: 5 | Status: SHIPPED | OUTPATIENT
Start: 2020-06-30 | End: 2020-07-30

## 2020-06-30 RX ORDER — GABAPENTIN 300 MG/1
300 CAPSULE ORAL 3 TIMES DAILY
Qty: 90 CAPSULE | Refills: 0 | Status: SHIPPED | OUTPATIENT
Start: 2020-06-30 | End: 2020-07-30

## 2020-06-30 NOTE — TELEPHONE ENCOUNTER
Request for Gabapentin, Zanaflex,Meloxicam.    Looks like Zanaflex was dc'ed at last visit. Please advise. Next Visit Date:  Future Appointments   Date Time Provider Jonathan Tamika   8/19/2020  1:45 PM Karley Wyatt MD [facility] [address] Maintenance   Topic Date Due    Shingles Vaccine (1 of 2) 04/21/2007    Lipid screen  04/02/2020    DTaP/Tdap/Td vaccine (1 - Tdap) 12/14/2022 (Originally 4/21/1976)    Potassium monitoring  06/20/2021    Creatinine monitoring  06/20/2021    Colon cancer screen colonoscopy  05/27/2030    Flu vaccine  Completed    Hepatitis C screen  Completed    HIV screen  Completed    Hepatitis A vaccine  Aged Out    Hepatitis B vaccine  Aged Out    Hib vaccine  Aged Out    Meningococcal (ACWY) vaccine  Aged Out    Pneumococcal 0-64 years Vaccine  Aged Out       Hemoglobin A1C (%)   Date Value   11/21/2017 5.0   12/14/2016 5.1   01/17/2013 4.9             ( goal A1C is < 7)   Microalb/Crt.  Ratio (mcg/mg creat)   Date Value   01/22/2013 9     LDL Cholesterol (mg/dL)   Date Value   04/02/2019 184 (H)       (goal LDL is <100)   AST (U/L)   Date Value   06/20/2020 49 (H)     ALT (U/L)   Date Value   06/20/2020 40     BUN (mg/dL)   Date Value   06/20/2020 12     BP Readings from Last 3 Encounters:   06/21/20 (!) 154/67   06/11/20 (!) 114/58   05/27/20 (!) 158/53          (goal 120/80)    All Future Testing planned in [facility]  Lab Frequency Next Occurrence   EGD Once 07/29/2020   Nasal cannula oxygen PRN          Patient Active Problem List:     Hyperlipidemia     Gout     CKD (chronic kidney disease) stage 3, GFR 30-59 ml/min ([facility])     Wrist pain, right     Swelling of joint, wrist, right     Pain in joint, multiple sites     Obesity     Adjustment reaction     Vertigo     Pain in joint     Spinal stenosis, lumbar region, without neurogenic claudication     Facet arthropathy, lumbar     Chronic back pain     Acute bilateral low back pain with sciatica

## 2020-07-30 RX ORDER — GABAPENTIN 300 MG/1
CAPSULE ORAL
Qty: 90 CAPSULE | Refills: 0 | Status: SHIPPED | OUTPATIENT
Start: 2020-07-30 | End: 2020-08-28

## 2020-07-30 RX ORDER — TIZANIDINE 4 MG/1
TABLET ORAL
Qty: 30 TABLET | Refills: 5 | Status: SHIPPED | OUTPATIENT
Start: 2020-07-30 | End: 2021-01-28

## 2020-07-30 RX ORDER — MELOXICAM 15 MG/1
TABLET ORAL
Qty: 30 TABLET | Refills: 2 | Status: SHIPPED | OUTPATIENT
Start: 2020-07-30 | End: 2021-03-02

## 2020-07-30 RX ORDER — MAGNESIUM OXIDE 400 MG/1
TABLET ORAL
Qty: 30 TABLET | Refills: 2 | Status: SHIPPED | OUTPATIENT
Start: 2020-07-30 | End: 2022-07-28 | Stop reason: SDUPTHER

## 2020-07-30 RX ORDER — PANTOPRAZOLE SODIUM 40 MG/1
TABLET, DELAYED RELEASE ORAL
Qty: 30 TABLET | Refills: 2 | Status: SHIPPED | OUTPATIENT
Start: 2020-07-30 | End: 2021-06-01

## 2020-07-30 NOTE — TELEPHONE ENCOUNTER
Refill request for pended medications. If appropriate please send medication(s) to patients pharmacy. Next appt: Patient is currently on wait list for provider. Last appt: 6/11/2020    Health Maintenance   Topic Date Due    Shingles Vaccine (1 of 2) 04/21/2007    Lipid screen  04/02/2020    DTaP/Tdap/Td vaccine (1 - Tdap) 12/14/2022 (Originally 4/21/1976)    Flu vaccine (1) 09/01/2020    Potassium monitoring  06/20/2021    Creatinine monitoring  06/20/2021    Colon cancer screen colonoscopy  05/27/2030    Hepatitis C screen  Completed    HIV screen  Completed    Hepatitis A vaccine  Aged Out    Hepatitis B vaccine  Aged Out    Hib vaccine  Aged Out    Meningococcal (ACWY) vaccine  Aged Out    Pneumococcal 0-64 years Vaccine  Aged Out       Hemoglobin A1C (%)   Date Value   11/21/2017 5.0   12/14/2016 5.1   01/17/2013 4.9             ( goal A1C is < 7)   Microalb/Crt.  Ratio (mcg/mg creat)   Date Value   01/22/2013 9     LDL Cholesterol (mg/dL)   Date Value   04/02/2019 184 (H)       (goal LDL is <100)   AST (U/L)   Date Value   06/20/2020 49 (H)     ALT (U/L)   Date Value   06/20/2020 40     BUN (mg/dL)   Date Value   06/20/2020 12     BP Readings from Last 3 Encounters:   06/21/20 (!) 154/67   06/11/20 (!) 114/58   05/27/20 (!) 158/53          (goal 120/80)          Patient Active Problem List:     Hyperlipidemia     Gout     CKD (chronic kidney disease) stage 3, GFR 30-59 ml/min ([facility])     Wrist pain, right     Swelling of joint, wrist, right     Pain in joint, multiple sites     Obesity     Adjustment reaction     Vertigo     Pain in joint     Spinal stenosis, lumbar region, without neurogenic claudication     Facet arthropathy, lumbar     Chronic back pain     Acute bilateral low back pain with sciatica

## 2020-08-13 ENCOUNTER — APPOINTMENT (OUTPATIENT)
Dept: GENERAL RADIOLOGY | Age: 65
End: 2020-08-13
Payer: MEDICARE

## 2020-08-13 ENCOUNTER — HOSPITAL ENCOUNTER (EMERGENCY)
Age: 65
Discharge: HOME | End: 2020-08-13
Attending: EMERGENCY MEDICINE
Payer: MEDICARE

## 2020-08-13 VITALS
TEMPERATURE: 98.1 F | SYSTOLIC BLOOD PRESSURE: 139 MMHG | DIASTOLIC BLOOD PRESSURE: 65 MMHG | RESPIRATION RATE: 15 BRPM | BODY MASS INDEX: 29.84 KG/M2 | WEIGHT: 220 LBS | OXYGEN SATURATION: 94 % | HEART RATE: 62 BPM

## 2020-08-13 LAB
ABSOLUTE EOS #: 0.03 K/UL (ref 0–0.44)
ABSOLUTE IMMATURE GRANULOCYTE: <0.03 K/UL (ref 0–0.3)
ABSOLUTE LYMPH #: 0.83 K/UL (ref 1.1–3.7)
ABSOLUTE MONO #: 0.36 K/UL (ref 0.1–1.2)
ALBUMIN SERPL-MCNC: 4 G/DL (ref 3.5–5.2)
ALBUMIN/GLOBULIN RATIO: 1.4 (ref 1–2.5)
ALP BLD-CCNC: 86 U/L (ref 40–129)
ALT SERPL-CCNC: 29 U/L (ref 5–41)
ANION GAP SERPL CALCULATED.3IONS-SCNC: 16 MMOL/L (ref 9–17)
AST SERPL-CCNC: 35 U/L
BASOPHILS # BLD: 1 % (ref 0–2)
BASOPHILS ABSOLUTE: 0.03 K/UL (ref 0–0.2)
BILIRUB SERPL-MCNC: 0.79 MG/DL (ref 0.3–1.2)
BILIRUBIN DIRECT: 0.2 MG/DL
BILIRUBIN, INDIRECT: 0.59 MG/DL (ref 0–1)
BUN BLDV-MCNC: 11 MG/DL (ref 8–23)
BUN/CREAT BLD: ABNORMAL (ref 9–20)
CALCIUM SERPL-MCNC: 8.8 MG/DL (ref 8.6–10.4)
CHLORIDE BLD-SCNC: 101 MMOL/L (ref 98–107)
CO2: 22 MMOL/L (ref 20–31)
CREAT SERPL-MCNC: 1.08 MG/DL (ref 0.7–1.2)
DIFFERENTIAL TYPE: ABNORMAL
EKG ATRIAL RATE: 74 BPM
EKG P AXIS: 18 DEGREES
EKG P-R INTERVAL: 212 MS
EKG Q-T INTERVAL: 426 MS
EKG QRS DURATION: 88 MS
EKG QTC CALCULATION (BAZETT): 472 MS
EKG R AXIS: 7 DEGREES
EKG T AXIS: 22 DEGREES
EKG VENTRICULAR RATE: 74 BPM
EOSINOPHILS RELATIVE PERCENT: 1 % (ref 1–4)
GFR AFRICAN AMERICAN: >60 ML/MIN
GFR NON-AFRICAN AMERICAN: >60 ML/MIN
GFR SERPL CREATININE-BSD FRML MDRD: ABNORMAL ML/MIN/{1.73_M2}
GFR SERPL CREATININE-BSD FRML MDRD: ABNORMAL ML/MIN/{1.73_M2}
GLOBULIN: NORMAL G/DL (ref 1.5–3.8)
GLUCOSE BLD-MCNC: 136 MG/DL (ref 70–99)
HCT VFR BLD CALC: 31.8 % (ref 40.7–50.3)
HEMOGLOBIN: 10.7 G/DL (ref 13–17)
IMMATURE GRANULOCYTES: 0 %
LIPASE: 16 U/L (ref 13–60)
LYMPHOCYTES # BLD: 26 % (ref 24–43)
MAGNESIUM: 2.4 MG/DL (ref 1.6–2.6)
MCH RBC QN AUTO: 32.6 PG (ref 25.2–33.5)
MCHC RBC AUTO-ENTMCNC: 33.6 G/DL (ref 28.4–34.8)
MCV RBC AUTO: 97 FL (ref 82.6–102.9)
MONOCYTES # BLD: 12 % (ref 3–12)
NRBC AUTOMATED: 0 PER 100 WBC
PDW BLD-RTO: 13 % (ref 11.8–14.4)
PLATELET # BLD: 272 K/UL (ref 138–453)
PLATELET ESTIMATE: ABNORMAL
PMV BLD AUTO: 9.5 FL (ref 8.1–13.5)
POTASSIUM SERPL-SCNC: 4.3 MMOL/L (ref 3.7–5.3)
RBC # BLD: 3.28 M/UL (ref 4.21–5.77)
RBC # BLD: ABNORMAL 10*6/UL
SEG NEUTROPHILS: 60 % (ref 36–65)
SEGMENTED NEUTROPHILS ABSOLUTE COUNT: 1.88 K/UL (ref 1.5–8.1)
SODIUM BLD-SCNC: 139 MMOL/L (ref 135–144)
TOTAL PROTEIN: 6.8 G/DL (ref 6.4–8.3)
TROPONIN INTERP: NORMAL
TROPONIN T: NORMAL NG/ML
TROPONIN, HIGH SENSITIVITY: 13 NG/L (ref 0–22)
WBC # BLD: 3.1 K/UL (ref 3.5–11.3)
WBC # BLD: ABNORMAL 10*3/UL

## 2020-08-13 PROCEDURE — 80076 HEPATIC FUNCTION PANEL: CPT

## 2020-08-13 PROCEDURE — 93005 ELECTROCARDIOGRAM TRACING: CPT | Performed by: STUDENT IN AN ORGANIZED HEALTH CARE EDUCATION/TRAINING PROGRAM

## 2020-08-13 PROCEDURE — 99284 EMERGENCY DEPT VISIT MOD MDM: CPT

## 2020-08-13 PROCEDURE — 96372 THER/PROPH/DIAG INJ SC/IM: CPT

## 2020-08-13 PROCEDURE — 84484 ASSAY OF TROPONIN QUANT: CPT

## 2020-08-13 PROCEDURE — 96375 TX/PRO/DX INJ NEW DRUG ADDON: CPT

## 2020-08-13 PROCEDURE — 96374 THER/PROPH/DIAG INJ IV PUSH: CPT

## 2020-08-13 PROCEDURE — 85025 COMPLETE CBC W/AUTO DIFF WBC: CPT

## 2020-08-13 PROCEDURE — 93010 ELECTROCARDIOGRAM REPORT: CPT | Performed by: INTERNAL MEDICINE

## 2020-08-13 PROCEDURE — 2580000003 HC RX 258: Performed by: STUDENT IN AN ORGANIZED HEALTH CARE EDUCATION/TRAINING PROGRAM

## 2020-08-13 PROCEDURE — 83690 ASSAY OF LIPASE: CPT

## 2020-08-13 PROCEDURE — C9113 INJ PANTOPRAZOLE SODIUM, VIA: HCPCS | Performed by: STUDENT IN AN ORGANIZED HEALTH CARE EDUCATION/TRAINING PROGRAM

## 2020-08-13 PROCEDURE — 83735 ASSAY OF MAGNESIUM: CPT

## 2020-08-13 PROCEDURE — 80048 BASIC METABOLIC PNL TOTAL CA: CPT

## 2020-08-13 PROCEDURE — 6360000002 HC RX W HCPCS: Performed by: STUDENT IN AN ORGANIZED HEALTH CARE EDUCATION/TRAINING PROGRAM

## 2020-08-13 PROCEDURE — 71045 X-RAY EXAM CHEST 1 VIEW: CPT

## 2020-08-13 RX ORDER — SODIUM CHLORIDE 9 MG/ML
10 INJECTION INTRAVENOUS ONCE
Status: COMPLETED | OUTPATIENT
Start: 2020-08-13 | End: 2020-08-13

## 2020-08-13 RX ORDER — DICYCLOMINE HYDROCHLORIDE 10 MG/1
10 CAPSULE ORAL 4 TIMES DAILY
Qty: 28 CAPSULE | Refills: 0 | Status: SHIPPED | OUTPATIENT
Start: 2020-08-13 | End: 2021-01-12

## 2020-08-13 RX ORDER — DICYCLOMINE HYDROCHLORIDE 10 MG/ML
20 INJECTION INTRAMUSCULAR ONCE
Status: COMPLETED | OUTPATIENT
Start: 2020-08-13 | End: 2020-08-13

## 2020-08-13 RX ORDER — PROMETHAZINE HYDROCHLORIDE 12.5 MG/1
12.5 TABLET ORAL 3 TIMES DAILY PRN
Qty: 12 TABLET | Refills: 0 | Status: SHIPPED | OUTPATIENT
Start: 2020-08-13 | End: 2020-08-20

## 2020-08-13 RX ORDER — SODIUM CHLORIDE, SODIUM LACTATE, POTASSIUM CHLORIDE, AND CALCIUM CHLORIDE .6; .31; .03; .02 G/100ML; G/100ML; G/100ML; G/100ML
1000 INJECTION, SOLUTION INTRAVENOUS ONCE
Status: COMPLETED | OUTPATIENT
Start: 2020-08-13 | End: 2020-08-13

## 2020-08-13 RX ORDER — ONDANSETRON 2 MG/ML
4 INJECTION INTRAMUSCULAR; INTRAVENOUS ONCE
Status: COMPLETED | OUTPATIENT
Start: 2020-08-13 | End: 2020-08-13

## 2020-08-13 RX ORDER — PANTOPRAZOLE SODIUM 40 MG/10ML
40 INJECTION, POWDER, LYOPHILIZED, FOR SOLUTION INTRAVENOUS ONCE
Status: COMPLETED | OUTPATIENT
Start: 2020-08-13 | End: 2020-08-13

## 2020-08-13 RX ORDER — PROMETHAZINE HYDROCHLORIDE 25 MG/ML
12.5 INJECTION, SOLUTION INTRAMUSCULAR; INTRAVENOUS ONCE
Status: COMPLETED | OUTPATIENT
Start: 2020-08-13 | End: 2020-08-13

## 2020-08-13 RX ADMIN — DICYCLOMINE HYDROCHLORIDE 20 MG: 10 INJECTION INTRAMUSCULAR at 03:07

## 2020-08-13 RX ADMIN — PROMETHAZINE HYDROCHLORIDE 12.5 MG: 25 INJECTION INTRAMUSCULAR; INTRAVENOUS at 03:05

## 2020-08-13 RX ADMIN — SODIUM CHLORIDE 10 ML: 9 INJECTION, SOLUTION INTRAMUSCULAR; INTRAVENOUS; SUBCUTANEOUS at 03:14

## 2020-08-13 RX ADMIN — ONDANSETRON 4 MG: 2 INJECTION INTRAMUSCULAR; INTRAVENOUS at 01:28

## 2020-08-13 RX ADMIN — PANTOPRAZOLE SODIUM 40 MG: 40 INJECTION, POWDER, FOR SOLUTION INTRAVENOUS at 03:08

## 2020-08-13 RX ADMIN — SODIUM CHLORIDE, POTASSIUM CHLORIDE, SODIUM LACTATE AND CALCIUM CHLORIDE 1000 ML: 600; 310; 30; 20 INJECTION, SOLUTION INTRAVENOUS at 01:28

## 2020-08-13 ASSESSMENT — ENCOUNTER SYMPTOMS
VOMITING: 1
SHORTNESS OF BREATH: 0
RHINORRHEA: 0
ABDOMINAL PAIN: 1
NAUSEA: 1
COUGH: 0
DIARRHEA: 0

## 2020-08-13 NOTE — ED PROVIDER NOTES
[facility]     Emergency Department     Faculty Attestation    I performed a history and physical examination of the patient and discussed management with the resident. I have reviewed and agree with the residents findings including all diagnostic interpretations, and treatment plans as written. Any areas of disagreement are noted on the chart. I was personally present for the key portions of any procedures. I have documented in the chart those procedures where I was not present during the key portions. I have reviewed the emergency nurses triage note. I agree with the chief complaint, past medical history, past surgical history, allergies, medications, social and family history as documented unless otherwise noted below. Documentation of the HPI, Physical Exam and Medical Decision Making performed by [email or web address] is based on my personal performance of the HPI, PE and MDM. For Physician Assistant/ Nurse Practitioner cases/documentation I have personally evaluated this patient and have completed at least one if not all key elements of the E/M (history, physical exam, and MDM). Additional findings are as noted. 64 yo M c/o vomiting x 6 hours, no fever, no cp, no diarrhea, no injury,   pe afebrile, ill appearing, pt appears fatigued, vishal, no cervical tenderness, crepitus or deformity, chest non tender, abdomen mild diffuse tenderness, no rebound, no rigidity, abdomen protuberant,   No calf tenderness, 1 + ankle edema,      eval stable, pt feeling better, s/s improved, wishing to be discharged ,tolerating liquids,     EKG Interpretation    Interpreted by me  Sinus rhythm, heart rate 74, left axis, no ischemia, QT corrected 472    CRITICAL CARE: There was a high probability of clinically significant/life threatening deterioration in this patient's condition which required my urgent intervention. Total critical care time was 10 minutes.   This excludes any time for separately reportable procedures.        [facility] 24, DO  08/13/20 [address] [facility] [place], DO  08/13/20 6843

## 2020-08-13 NOTE — ED NOTES
Pt arrived to ED c/o emesis. Pt states he has been throwing up on and off for the last day. Pt states he has had recent \"stomach issues\" but is unsure what is causing them. Pt is drowsy on arrival. Pt states he took an advil PM pta. Pt denies CP, SOB, cough, fever, diarrhea and constipation. Pt on monitor. RR even and NL. NAD.  Wife bedside     Ji Gusman RN  08/13/20 [id number]

## 2020-08-13 NOTE — ED PROVIDER NOTES
[address]  Emergency Department Encounter  Emergency Medicine Resident     Pt Name: Frida Phoenix  MRN: [id number]  [place] 1955  Date of evaluation: 8/13/20  PCP:  Rah De Jesus MD    [address] [place] [address]       Chief Complaint   Patient presents with    Emesis       HISTORY OF PRESENT ILLNESS  (Location/Symptom, Timing/Onset, Context/Setting, Quality, Duration, Modifying Factors, Severity.)    Frida Phoenix is a 63 y.o. male who presents with nausea, vomiting, body aches ongoing since 7:00 tonight. Patient states that he was feeling well all day until just after karate practice when he began experiencing the nausea. States that he began vomiting soon afterwards. States that he tried to take a Advil PM to help him sleep however he continued to vomit. Patient was then brought in by his wife. States he ate some tuna fish at lunch which was his last meal.  States that he was able to complete his karate workout. Denies any balance around him being sick. Denies any fever. Denies any chest pain or shortness of breath. Denies any abdominal pain. PPE Worn:  Gloves: Yes  Eye Protection: Goggles  Mask: Surgical Mask  Gown: NO    PAST MEDICAL / SURGICAL / SOCIAL / FAMILY HISTORY    has a past medical history of Chronic back pain, Chronic kidney disease, Gout, Hyperlipidemia, Hypertension, Obesity, Osteoarthritis, Restless leg syndrome, Shoulder pain, Shrimp allergy, Spinal stenosis, lumbar region, without neurogenic claudication, Swelling of joint, wrist, right, Thoracic back pain, Vertigo, Wears glasses, and Wrist pain, right.     has a past surgical history that includes nasal endoscopy (Bilateral, 10/17/2016); Nerve Block (11/02/2016); Nerve Block (04/03/2017); Colonoscopy (11/2014); Nerve Block (10/09/2017); Upper gastrointestinal endoscopy (05/27/2020); Colonoscopy (05/27/2020); Upper gastrointestinal endoscopy (N/A, 5/27/2020); and Colonoscopy (N/A, 5/27/2020).     Social History Socioeconomic History    Marital status: Single     Spouse name: Not on file    Number of children: Not on file    Years of education: Not on file    Highest education level: Not on file   Occupational History    Not on file   Social Needs    Financial resource strain: Not on file    Food insecurity     Worry: Not on file     Inability: Not on file    Transportation needs     Medical: Not on file     Non-medical: Not on file   Tobacco Use    Smoking status: Never Smoker    Smokeless tobacco: Never Used   Substance and Sexual Activity    Alcohol use: No    Drug use: No    Sexual activity: Not on file   Lifestyle    Physical activity     Days per week: Not on file     Minutes per session: Not on file    Stress: Not on file   Relationships    Social connections     Talks on phone: Not on file     Gets together: Not on file     Attends Nondenominational service: Not on file     Active member of club or organization: Not on file     Attends meetings of clubs or organizations: Not on file     Relationship status: Not on file    Intimate partner violence     Fear of current or ex partner: Not on file     Emotionally abused: Not on file     Physically abused: Not on file     Forced sexual activity: Not on file   Other Topics Concern    Not on file   Social History Narrative    Not on file       Family History   Problem Relation Age of Onset    Heart Disease Brother 48    Other Father     Other Brother         HIV    Other Brother        Allergies:    Codeine and Shrimp (diagnostic)    Home Medications:  Prior to Admission medications    Medication Sig Start Date End Date Taking?  Authorizing Provider   dicyclomine (BENTYL) 10 MG capsule Take 1 capsule by mouth 4 times daily for 7 days 8/13/20 8/20/20 Yes Carolynn Kingsley MD   promethazine (PHENERGAN) 12.5 MG tablet Take 1 tablet by mouth 3 times daily as needed for Nausea 8/13/20 8/20/20 Yes Carolynn Kingsley MD   meloxicam (MOBIC) 15 MG tablet TAKE 1 TABLET BY MOUTH ONCE DAILY. 7/30/20   Sherri Wells MD   tiZANidine (ZANAFLEX) 4 MG tablet TAKE ONE TABLET BY MOUTH EVERY 6 HOURS AS NEEDED FOR PAIN 7/30/20   Sherri Wells MD   gabapentin (NEURONTIN) 300 MG capsule TAKE 1 CAPSULE BY MOUTH 3 TIMES A DAY FOR 90 DOSES. 7/30/20 8/29/20  Sherri Wells MD   pantoprazole (PROTONIX) 40 MG tablet TAKE 1 TABLET BY MOUTH ONE TIME A DAY 7/30/20   Sherri Wells MD   magnesium oxide (MAG-OX) 400 MG tablet TAKE 1 TABLET BY MOUTH ONE TIME A DAY 7/30/20   Sherri Wells MD   ondansetron (ZOFRAN) 4 MG tablet Take 1 tablet by mouth every 8 hours as needed for Nausea 6/21/20   Denia Ruelas DO   predniSONE (DELTASONE) 20 MG tablet Take 20 mg by mouth daily    Historical Provider, MD   diclofenac sodium (VOLTAREN) 1 % GEL Apply 2 g topically 2 times daily    Historical Provider, MD   Melatonin 10 MG CAPS Take by mouth    Historical Provider, MD   naproxen sodium (ALEVE) 220 MG tablet Take 220 mg by mouth 2 times daily (with meals)    Historical Provider, MD   naproxen-diphenhydrAMINE (ALEVE PM) 220-25 MG TABS Take 1 tablet by mouth nightly    Historical Provider, MD   amLODIPine (NORVASC) 2.5 MG tablet TAKE 1 TABLET BY MOUTH ONE TIME A DAY 5/1/20   Sherri Wells MD   baclofen (LIORESAL) 10 MG tablet TAKE 1 TABLET BY MOUTH 2 TIMES A DAY 5/1/20   Sherri Wells MD   ferrous sulfate (IRON 325) 325 (65 Fe) MG tablet TAKE 1 TABLET BY MOUTH TWICE DAILY. 5/1/20   Sherri Wells MD   allopurinol (ZYLOPRIM) 300 MG tablet Take 1 tablet by mouth daily 4/1/20   Sherri Wells MD   atorvastatin (LIPITOR) 80 MG tablet TAKE 1 TABLET BY MOUTH ONCE DAILY. 4/1/20   Sherri Wells MD   diphenhydrAMINE (BENADRYL) 25 MG capsule Take 1-2 tablets by mouth every 6 hours as needed for itching. 6/1/19   Rosey Arriaza DO   oxyCODONE-acetaminophen (PERCOCET) 5-325 MG per tablet Take 1 tablet by mouth every 4 hours as needed for Pain.     Historical Provider, MD       REVIEW OF SYSTEMS    (2-9 systems for level 4, 10 or more for level 5)    Review of Systems   Constitutional: Positive for appetite change and chills. Negative for fatigue and fever. HENT: Negative for congestion and rhinorrhea. Respiratory: Negative for cough and shortness of breath. Cardiovascular: Negative for chest pain. Gastrointestinal: Positive for abdominal pain, nausea and vomiting. Negative for diarrhea. Musculoskeletal: Positive for myalgias. Neurological: Negative for light-headedness and headaches. All other systems reviewed and are negative. PHYSICAL EXAM   (up to 7 for level 4, 8 or more for level 5)    INITIAL VITALS:   ED Triage Vitals   BP Temp Temp Source Pulse Resp SpO2 Height Weight   08/13/20 0052 08/13/20 0028 08/13/20 0028 08/13/20 0131 08/13/20 0131 08/13/20 0130 -- 08/13/20 0130   138/62 98.1 °F (36.7 °C) Oral 62 15 94 %  220 lb (99.8 kg)       Physical Exam  Vitals signs and nursing note reviewed. Constitutional:       General: He is not in acute distress. Appearance: Normal appearance. He is ill-appearing. He is not toxic-appearing. Cardiovascular:      Rate and Rhythm: Normal rate and regular rhythm. Pulses: Normal pulses. Radial pulses are 2+ on the right side and 2+ on the left side. Heart sounds: Normal heart sounds. No murmur. No friction rub. Pulmonary:      Effort: Pulmonary effort is normal. No respiratory distress. Breath sounds: Normal breath sounds. No decreased air movement. No decreased breath sounds, wheezing, rhonchi or rales. Abdominal:      General: Bowel sounds are normal. There is no distension. Palpations: Abdomen is soft. Tenderness: There is no abdominal tenderness. There is no right CVA tenderness, left CVA tenderness, guarding or rebound. Negative signs include Calles's sign and McBurney's sign. Comments: No tenderness to light or deep palpation   Skin:     General: Skin is warm and dry.       Capillary Refill: Capillary refill takes less than 2 seconds. Neurological:      General: No focal deficit present. Mental Status: He is alert and oriented to person, place, and time. Psychiatric:         Behavior: Behavior is cooperative.          DIFFERENTIAL  DIAGNOSIS   PLAN (LABS / IMAGING / EKG):  Orders Placed This Encounter   Procedures    XR CHEST PORTABLE    BASIC METABOLIC PANEL    MAGNESIUM    Hepatic Function Panel    Troponin    CBC Auto Differential    Lipase    POCT glucose    EKG 12 Lead       MEDICATIONS ORDERED:  Orders Placed This Encounter   Medications    ondansetron (ZOFRAN) injection 4 mg    lactated ringers bolus    promethazine (PHENERGAN) injection 12.5 mg    dicyclomine (BENTYL) injection 20 mg    AND Linked Order Group     pantoprazole (PROTONIX) injection 40 mg     sodium chloride (PF) 0.9 % injection 10 mL    dicyclomine (BENTYL) 10 MG capsule     Sig: Take 1 capsule by mouth 4 times daily for 7 days     Dispense:  28 capsule     Refill:  0    promethazine (PHENERGAN) 12.5 MG tablet     Sig: Take 1 tablet by mouth 3 times daily as needed for Nausea     Dispense:  12 tablet     Refill:  0         DIAGNOSTIC RESULTS / EMERGENCYDEPARTMENT COURSE / MDM   LABS:  Labs Reviewed   BASIC METABOLIC PANEL - Abnormal; Notable for the following components:       Result Value    Glucose 136 (*)     All other components within normal limits   CBC WITH AUTO DIFFERENTIAL - Abnormal; Notable for the following components:    WBC 3.1 (*)     RBC 3.28 (*)     Hemoglobin 10.7 (*)     Hematocrit 31.8 (*)     Absolute Lymph # 0.83 (*)     All other components within normal limits   MAGNESIUM   HEPATIC FUNCTION PANEL   TROPONIN   LIPASE   POCT GLUCOSE       RADIOLOGY:  Xr Chest Portable    Result Date: 8/13/2020  EXAMINATION: ONE XRAY VIEW OF THE CHEST 8/13/2020 12:38 am COMPARISON: June 20, 2020 HISTORY: ORDERING SYSTEM PROVIDED HISTORY: epigastric pain TECHNOLOGIST PROVIDED HISTORY: epigastric pain Reason for Exam: upright portable Acuity: Acute Type of Exam: Initial FINDINGS: Marginal inspiration is noted. Cardiomegaly is present. No focal area of consolidation or pneumothorax is present. Tortuosity of the aorta is again noted. Osseous structures are stable. Cardiomegaly, without evidence of CHF       EKG    EKG Interpretation    Interpreted by me    Rhythm: normal sinus   Rate: normal  Axis: normal  Ectopy: none  Conduction: First-degree AV block  ST Segments: no acute change  T Waves: no acute change  Q Waves: none    Clinical Impression: Sinus rhythm rate of 74. No ST segment changes, no arrhythmia, no ectopy. Leftward axis with good R wave progression    All EKG's are interpreted by the Emergency Department Physician whoeither signs or Co-signs this chart in the absence of a cardiologist.    Impression:  Patient presents with nausea, vomiting, chills, body aches. Onset around 7 PM.  Patient was able to last ate around noon, states he ate some tuna fish out of a can. No one else around patient has been ill. States he suddenly began vomiting and has not improved since that time. Denies any chest pain or shortness of breath. Denies any history of pancreatitis. Denies any alcohol abuse. We will plan to give patient fluids, check EKG, lab work, and reevaluate. Patient is somewhat fatigued as he did take some Benadryl prior to arrival.      EMERGENCY DEPARTMENT COURSE:  ED Course as of Aug 13 0735   Thu Aug 13, 2020   0253 Patient states he is still having some persistent nausea. Will give Protonix, Phenergan, Bentyl.    [AP]      ED Course User Index  [AP] Jose Jaramillo MD     As above, patient did improve slightly with Zofran but did significantly better after the Protonix, Phenergan, and Bentyl cocktail.   Patient safe for discharge at this time, given strict return precautions, clear liquid diet for 24 hours, follow-up with primary care provider    MDM  Number of Diagnoses or Management Options  Non-intractable vomiting with nausea, unspecified vomiting type: new, needed workup     Amount and/or Complexity of Data Reviewed  Clinical lab tests: ordered and reviewed  Tests in the radiology section of CPT®: ordered and reviewed  Review and summarize past medical records: yes  Discuss the patient with other providers: yes  Independent visualization of images, tracings, or specimens: yes    Risk of Complications, Morbidity, and/or Mortality  Presenting problems: low  Diagnostic procedures: low  Management options: low    Patient Progress  Patient progress: improved      PROCEDURES:  none    CONSULTS:  None    CRITICAL CARE:  Please see attending note    FINAL IMPRESSION     1. Non-intractable vomiting with nausea, unspecified vomiting type          DISPOSITION / PLAN   DISPOSITION Decision To Discharge 08/13/2020 04:10:24 AM      Evaluation and treatment course in the ED, and plan of care upon discharge was discussed in length with the patient. Patient had no further questions prior to being discharged and was instructed to return to the ED for new or worsening symptoms. Any changes to existing medications or new prescriptions were reviewed with patient and they expressed understanding of how to correctly take their medications and the possible side effects.     PATIENT REFERRED TO:  MD Annalee Martinez [facility]. [facility] [address] [place] [address] 909  [phone]    Schedule an appointment as soon as possible for a visit         DISCHARGE MEDICATIONS:  Discharge Medication List as of 8/13/2020  4:11 AM      START taking these medications    Details   dicyclomine (BENTYL) 10 MG capsule Take 1 capsule by mouth 4 times daily for 7 days, Disp-28 capsule,R-0Print      promethazine (PHENERGAN) 12.5 MG tablet Take 1 tablet by mouth 3 times daily as needed for Nausea, Disp-12 tablet,R-0Print             Audrey Bolus, DO  Emergency Medicine Resident Physician, PGY-2    (Please note that portions of this note were completed with a voice recognition program.  Efforts were made to edit the dictations but occasionally words are mis-transcribed.)         Era Gonzales MD  08/13/20 [id number]

## 2020-08-28 RX ORDER — GABAPENTIN 300 MG/1
CAPSULE ORAL
Qty: 90 CAPSULE | Refills: 1 | Status: SHIPPED | OUTPATIENT
Start: 2020-08-28 | End: 2020-10-30

## 2020-08-28 RX ORDER — PANTOPRAZOLE SODIUM 20 MG/1
TABLET, DELAYED RELEASE ORAL
Qty: 30 TABLET | Refills: 3 | OUTPATIENT
Start: 2020-08-28

## 2020-08-28 RX ORDER — MAGNESIUM OXIDE 400 MG/1
TABLET ORAL
Qty: 30 TABLET | Refills: 2 | OUTPATIENT
Start: 2020-08-28

## 2020-08-28 NOTE — TELEPHONE ENCOUNTER
Request for gabapentin - med pended. Other meds filled 7/30 2 refills - patient should be good until September. Next Visit Date:  Future Appointments   Date Time Provider Jonathan Lundberg   9/1/2020  3:15 PM Michael Venegas MD [facility]   Topic Date Due    Shingles Vaccine (1 of 2) 04/21/2007    Lipid screen  04/02/2020    DTaP/Tdap/Td vaccine (1 - Tdap) 12/14/2022 (Originally 4/21/1976)    Flu vaccine (1) 09/01/2020    Diabetes screen  11/21/2020    Potassium monitoring  08/13/2021    Creatinine monitoring  08/13/2021    Colon cancer screen colonoscopy  05/27/2030    Hepatitis C screen  Completed    HIV screen  Completed    Hepatitis A vaccine  Aged Out    Hepatitis B vaccine  Aged Out    Hib vaccine  Aged Out    Meningococcal (ACWY) vaccine  Aged Out    Pneumococcal 0-64 years Vaccine  Aged Out       Hemoglobin A1C (%)   Date Value   11/21/2017 5.0   12/14/2016 5.1   01/17/2013 4.9             ( goal A1C is < 7)   Microalb/Crt.  Ratio (mcg/mg creat)   Date Value   01/22/2013 9     LDL Cholesterol (mg/dL)   Date Value   04/02/2019 184 (H)       (goal LDL is <100)   AST (U/L)   Date Value   08/13/2020 35     ALT (U/L)   Date Value   08/13/2020 29     BUN (mg/dL)   Date Value   08/13/2020 11     BP Readings from Last 3 Encounters:   08/13/20 139/65   06/21/20 (!) 154/67   06/11/20 (!) 114/58          (goal 120/80)    All Future Testing planned in [facility]  Lab Frequency Next Occurrence   EGD Once 07/29/2020   Nasal cannula oxygen PRN          Patient Active Problem List:     Hyperlipidemia     Gout     CKD (chronic kidney disease) stage 3, GFR 30-59 ml/min ([facility])     Wrist pain, right     Swelling of joint, wrist, right     Pain in joint, multiple sites     Obesity     Adjustment reaction     Vertigo     Pain in joint     Spinal stenosis, lumbar region, without neurogenic claudication     Facet arthropathy, lumbar     Chronic back pain     Acute bilateral low back pain with sciatica

## 2020-10-12 ENCOUNTER — VIRTUAL VISIT (OUTPATIENT)
Dept: INTERNAL MEDICINE | Age: 65
End: 2020-10-12
Payer: MEDICARE

## 2020-10-12 PROCEDURE — 99214 OFFICE O/P EST MOD 30 MIN: CPT | Performed by: INTERNAL MEDICINE

## 2020-10-12 RX ORDER — AMLODIPINE BESYLATE 2.5 MG/1
TABLET ORAL
Qty: 30 TABLET | Refills: 5 | Status: SHIPPED | OUTPATIENT
Start: 2020-10-12 | End: 2021-04-01 | Stop reason: SDUPTHER

## 2020-10-12 RX ORDER — ZOSTER VACCINE RECOMBINANT, ADJUVANTED 50 MCG/0.5
0.5 KIT INTRAMUSCULAR SEE ADMIN INSTRUCTIONS
Qty: 0.5 ML | Refills: 0 | Status: SHIPPED | OUTPATIENT
Start: 2020-10-12 | End: 2021-04-01

## 2020-10-12 RX ORDER — EPINEPHRINE 0.3 MG/.3ML
0.3 INJECTION SUBCUTANEOUS ONCE
Qty: 0.3 ML | Refills: 0 | Status: SHIPPED | OUTPATIENT
Start: 2020-10-12 | End: 2022-04-01

## 2020-10-12 RX ORDER — ATORVASTATIN CALCIUM 80 MG/1
TABLET, FILM COATED ORAL
Qty: 30 TABLET | Refills: 5 | Status: SHIPPED | OUTPATIENT
Start: 2020-10-12 | End: 2021-04-01 | Stop reason: SDUPTHER

## 2020-10-12 RX ORDER — DIPHENHYDRAMINE HCL 25 MG
CAPSULE ORAL
Qty: 30 CAPSULE | Refills: 0 | Status: SHIPPED | OUTPATIENT
Start: 2020-10-12

## 2020-10-12 RX ORDER — OXYCODONE AND ACETAMINOPHEN 10; 325 MG/1; MG/1
1 TABLET ORAL 3 TIMES DAILY
COMMUNITY
Start: 2020-10-02

## 2020-10-12 RX ORDER — ALLOPURINOL 300 MG/1
300 TABLET ORAL DAILY
Qty: 30 TABLET | Refills: 5 | Status: SHIPPED | OUTPATIENT
Start: 2020-10-12 | End: 2021-06-29

## 2020-10-12 ASSESSMENT — ENCOUNTER SYMPTOMS
BLOOD IN STOOL: 0
COUGH: 0
SHORTNESS OF BREATH: 0
BACK PAIN: 1
CONSTIPATION: 0
ABDOMINAL PAIN: 0
WHEEZING: 0

## 2020-10-12 ASSESSMENT — PATIENT HEALTH QUESTIONNAIRE - PHQ9
2. FEELING DOWN, DEPRESSED OR HOPELESS: 0
SUM OF ALL RESPONSES TO PHQ9 QUESTIONS 1 & 2: 0
1. LITTLE INTEREST OR PLEASURE IN DOING THINGS: 0
SUM OF ALL RESPONSES TO PHQ QUESTIONS 1-9: 0
SUM OF ALL RESPONSES TO PHQ QUESTIONS 1-9: 0

## 2020-10-12 NOTE — PROGRESS NOTES
10/12/2020    TELEHEALTH EVALUATION -- Audio/Visual (During FEIUT-02 public health emergency)    HPI:    Geovanny Ray (:  1955) has requested an audio/video evaluation for the following concern(s):    Patient initiated a video visit via doxy. me. He is overall doing better than before though he continues to have chronic back pain. He states he is seeing his pain management doctor who has done several injections to his spine. The last less than a month. He had a allergic reaction to shrimp. He was seen in the emergency room. Symptoms are usually only hives. He is requesting an EpiPen and Benadryl to be kept at home. His last EpiPen has . No other concerns. He had labs done from the emergency room and from us back in . These were reviewed. He needs a lipid panel. He denies polyuria or polydipsia. He did undergo an EGD and colonoscopy this summer as well. His weight has stabilized now. In between he was losing a lot of weight. \      Colonoscopy   HISTORY: Mr. Geovanny Ray is a 63 y.o. male who presents to the [place] [address] Endoscopy unit for colonoscopy. The patient's clinical history is remarkable for iron def anemia. He is currently medically stable and appropriate for the planned procedure.         PREOPERATIVE DIAGNOSIS: iron deficiency anemia.      PROCEDURES:   Transanal Colonoscopy with biopsy.      POSTPROCEDURE DIAGNOSIS:  Mild patchy erythema  Grade II internal hemorrhoids    EGD   PREOPERATIVE DIAGNOSIS: Fe def anemia.      PROCEDURES:   1) Transoral Upper Endoscopy, biopsy.      POSTOPERATIVE DIAGNOSIS:   Moderate esophagitis (?Pill induced)      SPECIMENS: biopsy     MEDICATIONS:   MAC per anesthesia    Review of Systems   Constitutional: Negative for chills, fatigue and unexpected weight change. Respiratory: Negative for cough, shortness of breath and wheezing. Cardiovascular: Negative for chest pain, palpitations and leg swelling. Gastrointestinal: Negative for abdominal pain, blood in stool and constipation. Endocrine: Negative for polydipsia and polyuria. Genitourinary: Negative for dysuria and hematuria. Musculoskeletal: Positive for back pain. Negative for arthralgias. Skin: Negative for rash and wound. Neurological: Negative for dizziness, syncope, weakness, numbness and headaches. Psychiatric/Behavioral: Negative for dysphoric mood and sleep disturbance. Prior to Visit Medications    Medication Sig Taking? Authorizing Provider   oxyCODONE-acetaminophen (PERCOCET)  MG per tablet Take 1 tablet by mouth 3 times daily. Yes Historical Provider, MD   gabapentin (NEURONTIN) 300 MG capsule TAKE 1 CAPSULE BY MOUTH 3 TIMES A DAY Yes Phoebe Maria MD   dicyclomine (BENTYL) 10 MG capsule Take 1 capsule by mouth 4 times daily for 7 days Yes Thuy Mclaughlin MD   meloxicam (MOBIC) 15 MG tablet TAKE 1 TABLET BY MOUTH ONCE DAILY.  Yes Phoebe Maria MD   tiZANidine (ZANAFLEX) 4 MG tablet TAKE ONE TABLET BY MOUTH EVERY 6 HOURS AS NEEDED FOR PAIN Yes Phoebe Maria MD   pantoprazole (PROTONIX) 40 MG tablet TAKE 1 TABLET BY MOUTH ONE TIME A DAY Yes Phoebe Maria MD   magnesium oxide (MAG-OX) 400 MG tablet TAKE 1 TABLET BY MOUTH ONE TIME A DAY Yes Phoebe Maria MD   ondansetron (ZOFRAN) 4 MG tablet Take 1 tablet by mouth every 8 hours as needed for Nausea Yes Denia Ruelas,    diclofenac sodium (VOLTAREN) 1 % GEL Apply 2 g topically 2 times daily Yes Historical Provider, MD   Melatonin 10 MG CAPS Take by mouth Yes Historical Provider, MD   naproxen sodium (ALEVE) 220 MG tablet Take 220 mg by mouth 2 times daily (with meals) Yes Historical Provider, MD   naproxen-diphenhydrAMINE (ALEVE PM) 220-25 MG TABS Take 1 tablet by mouth nightly Yes Historical Provider, MD   amLODIPine (NORVASC) 2.5 MG tablet TAKE 1 TABLET BY MOUTH ONE TIME A DAY Yes Phoebe Maria MD   baclofen (LIORESAL) 10 MG tablet TAKE 1 TABLET BY MOUTH 2 TIMES A DAY Yes Gregorio Landis MD   ferrous sulfate (IRON 325) 325 (65 Fe) MG tablet TAKE 1 TABLET BY MOUTH TWICE DAILY. Yes Gregorio Landis MD   allopurinol (ZYLOPRIM) 300 MG tablet Take 1 tablet by mouth daily Yes Gregorio Landis MD   atorvastatin (LIPITOR) 80 MG tablet TAKE 1 TABLET BY MOUTH ONCE DAILY. Yes Gregorio Landis MD       Social History     Tobacco Use    Smoking status: Never Smoker    Smokeless tobacco: Never Used   Substance Use Topics    Alcohol use: No    Drug use: No            PHYSICAL EXAMINATION:  [ INSTRUCTIONS:  \"[x]\" Indicates a positive item  \"[]\" Indicates a negative item  -- DELETE ALL ITEMS NOT EXAMINED]  Vital Signs: (As obtained by patient/caregiver or practitioner observation)    Blood pressure-  Heart rate-    Respiratory rate-    Temperature-  Pulse oximetry-     Constitutional: [x] Appears well-developed and well-nourished [x] No apparent distress      [] Abnormal-   Mental status  [x] Alert and awake  [x] Oriented to person/place/time []Able to follow commands      Eyes:  EOM    [x]  Normal  [] Abnormal-  Sclera  [x]  Normal  [] Abnormal -         Discharge [x]  None visible  [] Abnormal -    HENT:   [x] Normocephalic, atraumatic.   [] Abnormal   [] Mouth/Throat: Mucous membranes are moist.     External Ears [] Normal  [] Abnormal-     Neck: [x] No visualized mass     Pulmonary/Chest: [x] Respiratory effort normal.  [x] No visualized signs of difficulty breathing or respiratory distress        [] Abnormal-      Musculoskeletal:   [] Normal gait with no signs of ataxia         [] Normal range of motion of neck        [] Abnormal-       Neurological:        [x] No Facial Asymmetry (Cranial nerve 7 motor function) (limited exam to video visit)          [x] No gaze palsy        [] Abnormal-         Skin:        [] No significant exanthematous lesions or discoloration noted on facial skin         [] Abnormal-            Psychiatric:       [x] Normal Affect [] No Hallucinations        [] Abnormal-     Other pertinent observable physical exam findings-     ASSESSMENT/PLAN:  1. Essential hypertension    - amLODIPine (NORVASC) 2.5 MG tablet; TAKE 1 TABLET BY MOUTH ONE TIME A DAY  Dispense: 30 tablet; Refill: 5    2. Dyslipidemia    - Lipid Panel; Future  - atorvastatin (LIPITOR) 80 MG tablet; TAKE 1 TABLET BY MOUTH ONCE DAILY. Dispense: 30 tablet; Refill: 5    3. Hyperuricemia    - allopurinol (ZYLOPRIM) 300 MG tablet; Take 1 tablet by mouth daily  Dispense: 30 tablet; Refill: 5    4. Food allergy    - EPINEPHrine (EPIPEN 2-TANG) 0.3 MG/0.3ML SOAJ injection; Inject 0.3 mLs into the skin once for 1 dose Use as directed for allergic reaction  Dispense: 0.3 mL; Refill: 0  - diphenhydrAMINE (BENADRYL) 25 MG capsule; Take 1-2 tablets by mouth every 6 hours as needed for itching. Dispense: 30 capsule; Refill: 0    5. Hyperglycemia    - Hemoglobin A1C; Future    6. Need for shingles vaccine    - zoster recombinant adjuvanted vaccine [facility]) 50 MCG/0.5ML SUSR injection; Inject 0.5 mLs into the muscle See Admin Instructions 1 dose now and repeat in 2-6 months  Dispense: 0.5 mL; Refill: 0    7. Normocytic anemia    - CBC With Auto Differential; Future      Return in about 6 months (around 4/12/2021). Sharmaine Min is a 63 y.o. male being evaluated by a Virtual Visit (video visit) encounter to address concerns as mentioned above. A caregiver was present when appropriate. Due to this being a TeleHealth encounter (During ZNAM-56 public health emergency), evaluation of the following organ systems was limited: Vitals/Constitutional/EENT/Resp/CV/GI//MS/Neuro/Skin/Heme-Lymph-Imm.   Pursuant to the emergency declaration under the [facility] waiver authority and the [employer] and [employer] General Act, this Virtual Visit was conducted with patient's (and/or legal guardian's) consent, to reduce the patient's risk of exposure to COVID-19 and provide necessary medical care. The patient (and/or legal guardian) has also been advised to contact this office for worsening conditions or problems, and seek emergency medical treatment and/or call 911 if deemed necessary. Patient identification was verified at the start of the visit: Yes    Total time spent on this encounter: 25 minutes    Services were provided through a video synchronous discussion virtually to substitute for in-person clinic visit. Patient and provider were located at their individual homes. --Myla Jama MD on 10/12/2020 at 8:43 AM    An electronic signature was used to authenticate this note.

## 2020-10-12 NOTE — PATIENT INSTRUCTIONS
-Pt due for 6 month f/u in April-- pt to call in March to set up an appt--reminder in [facility] to contact patient as well--AVS given to patient    -Bloodwork orders given to patient, they will have them done before their next visit. -Printed script for Shingrix signed and given to pt    -Notes from Dr Vicenta Arevalo requested    -ASHA More

## 2020-10-30 RX ORDER — GABAPENTIN 300 MG/1
CAPSULE ORAL
Qty: 90 CAPSULE | Refills: 1 | Status: SHIPPED | OUTPATIENT
Start: 2020-10-30 | End: 2021-01-05

## 2020-10-30 NOTE — TELEPHONE ENCOUNTER
Request for gabapentin - med pended. Please fill if appropriate. Next Visit Date:  No future appointments. Health Maintenance   Topic Date Due    Shingles Vaccine (1 of 2) 04/21/2007    Lipid screen  04/02/2020    Flu vaccine (1) 09/01/2020    DTaP/Tdap/Td vaccine (1 - Tdap) 12/14/2022 (Originally [birth year]21/1974)    Diabetes screen  11/21/2020    Potassium monitoring  08/13/2021    Creatinine monitoring  08/13/2021    Colon cancer screen colonoscopy  05/27/2030    Hepatitis C screen  Completed    HIV screen  Completed    Hepatitis A vaccine  Aged Out    Hepatitis B vaccine  Aged Out    Hib vaccine  Aged Out    Meningococcal (ACWY) vaccine  Aged Out    Pneumococcal 0-64 years Vaccine  Aged Out       Hemoglobin A1C (%)   Date Value   11/21/2017 5.0   12/14/2016 5.1   01/17/2013 4.9             ( goal A1C is < 7)   Microalb/Crt.  Ratio (mcg/mg creat)   Date Value   01/22/2013 9     LDL Cholesterol (mg/dL)   Date Value   04/02/2019 184 (H)       (goal LDL is <100)   AST (U/L)   Date Value   08/13/2020 35     ALT (U/L)   Date Value   08/13/2020 29     BUN (mg/dL)   Date Value   08/13/2020 11     BP Readings from Last 3 Encounters:   08/13/20 139/65   06/21/20 (!) 154/67   06/11/20 (!) 114/58          (goal 120/80)    All Future Testing planned in [facility]  Lab Frequency Next Occurrence   EGD Once 07/29/2020   Lipid Panel Once 01/20/2021   Hemoglobin A1C Once 01/13/2021   CBC With Auto Differential Once 01/20/2021   Nasal cannula oxygen PRN          Patient Active Problem List:     Hyperlipidemia     Gout     CKD (chronic kidney disease) stage 3, GFR 30-59 ml/min     Wrist pain, right     Swelling of joint, wrist, right     Pain in joint, multiple sites     Obesity     Adjustment reaction     Vertigo     Pain in joint     Spinal stenosis, lumbar region, without neurogenic claudication     Facet arthropathy, lumbar     Chronic back pain     Acute bilateral low back pain with sciatica

## 2020-12-08 ENCOUNTER — NURSE ONLY (OUTPATIENT)
Dept: INTERNAL MEDICINE | Age: 65
End: 2020-12-08
Payer: MEDICARE

## 2020-12-08 PROCEDURE — 90688 IIV4 VACCINE SPLT 0.5 ML IM: CPT

## 2020-12-08 NOTE — PROGRESS NOTES
Vaccine Information Sheet, \"Influenza - Inactivated\"  given to Rusty Nicholson, or parent/legal guardian of  Rusty Nicholson and verbalized understanding. Patient responses:    Is the person being vaccinated sick today? No    Does the person to be vaccinated have an allergy to a component of the vaccine? No    Has the person to be vaccinated ever had a reaction to the flu vaccine in the past?  No    Have you ever had [place] Syndrome? No    Flu vaccine given per order. Please see immunization tab.

## 2020-12-08 NOTE — PROGRESS NOTES
Pt is here for annual Flu vaccination. Pt presents with no complaints. Flu vaccine given in Right Deltoid. Tolerated immunization well, VIS given to patient.

## 2021-01-04 NOTE — TELEPHONE ENCOUNTER
Request for Gabapentin. Pt on wait list for 6 month f/u in April    Next Visit Date:  No future appointments. Health Maintenance   Topic Date Due    Lipid screen  04/02/2020    Diabetes screen  11/21/2020    DTaP/Tdap/Td vaccine (1 - Tdap) 12/14/2022 (Originally 4[birth year]21/1976)    Shingles Vaccine (2 of 2) 01/13/2021    Potassium monitoring  08/13/2021    Creatinine monitoring  08/13/2021    Colon cancer screen colonoscopy  05/27/2030    Flu vaccine  Completed    Hepatitis C screen  Completed    HIV screen  Completed    Hepatitis A vaccine  Aged Out    Hepatitis B vaccine  Aged Out    Hib vaccine  Aged Out    Meningococcal (ACWY) vaccine  Aged Out    Pneumococcal 0-64 years Vaccine  Aged Out       Hemoglobin A1C (%)   Date Value   11/21/2017 5.0   12/14/2016 5.1   01/17/2013 4.9             ( goal A1C is < 7)   Microalb/Crt.  Ratio (mcg/mg creat)   Date Value   01/22/2013 9     LDL Cholesterol (mg/dL)   Date Value   04/02/2019 184 (H)       (goal LDL is <100)   AST (U/L)   Date Value   08/13/2020 35     ALT (U/L)   Date Value   08/13/2020 29     BUN (mg/dL)   Date Value   08/13/2020 11     BP Readings from Last 3 Encounters:   08/13/20 139/65   06/21/20 (!) 154/67   06/11/20 (!) 114/58          (goal 120/80)    All Future Testing planned in [facility]  Lab Frequency Next Occurrence   EGD Once 07/29/2020   Lipid Panel Once 01/20/2021   Hemoglobin A1C Once 01/13/2021   CBC With Auto Differential Once 01/20/2021   Nasal cannula oxygen PRN          Patient Active Problem List:     Hyperlipidemia     Gout     CKD (chronic kidney disease) stage 3, GFR 30-59 ml/min     Wrist pain, right     Swelling of joint, wrist, right     Pain in joint, multiple sites     Obesity     Adjustment reaction     Vertigo     Pain in joint     Spinal stenosis, lumbar region, without neurogenic claudication     Facet arthropathy, lumbar     Chronic back pain     Acute bilateral low back pain with sciatica

## 2021-01-05 RX ORDER — GABAPENTIN 300 MG/1
CAPSULE ORAL
Qty: 90 CAPSULE | Refills: 1 | Status: SHIPPED | OUTPATIENT
Start: 2021-01-05 | End: 2021-03-02

## 2021-01-19 ENCOUNTER — TELEPHONE (OUTPATIENT)
Dept: INTERNAL MEDICINE | Age: 66
End: 2021-01-19

## 2021-01-19 DIAGNOSIS — M54.6 CHRONIC BILATERAL THORACIC BACK PAIN: ICD-10-CM

## 2021-01-19 DIAGNOSIS — G89.29 CHRONIC BILATERAL THORACIC BACK PAIN: ICD-10-CM

## 2021-01-19 DIAGNOSIS — M25.50 PAIN IN JOINT, MULTIPLE SITES: Primary | ICD-10-CM
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
## 2021-01-27 DIAGNOSIS — M54.6 CHRONIC BILATERAL THORACIC BACK PAIN: ICD-10-CM

## 2021-01-27 DIAGNOSIS — G89.29 CHRONIC BILATERAL THORACIC BACK PAIN: ICD-10-CM

## 2021-01-27 DIAGNOSIS — M25.50 PAIN IN JOINT, MULTIPLE SITES: Chronic | ICD-10-CM

## 2021-01-27 NOTE — TELEPHONE ENCOUNTER
Request for Zanaflex. I thought this was dc'ed at [facility] in June but I called pharmacy and they do not have any record of it. Please clarify and send if appropriate     Pt due for 6 month f/u in April    Next Visit Date:  No future appointments. Health Maintenance   Topic Date Due    Lipid screen  04/02/2020    Diabetes screen  11/21/2020    Shingles Vaccine (2 of 2) 01/13/2021    DTaP/Tdap/Td vaccine (1 - Tdap) 12/14/2022 (Originally 4[birth year]21/1974)    Potassium monitoring  08/13/2021    Creatinine monitoring  08/13/2021    Colon cancer screen colonoscopy  05/27/2030    Flu vaccine  Completed    Hepatitis C screen  Completed    HIV screen  Completed    Hepatitis A vaccine  Aged Out    Hepatitis B vaccine  Aged Out    Hib vaccine  Aged Out    Meningococcal (ACWY) vaccine  Aged Out    Pneumococcal 0-64 years Vaccine  Aged Out       Hemoglobin A1C (%)   Date Value   11/21/2017 5.0   12/14/2016 5.1   01/17/2013 4.9             ( goal A1C is < 7)   Microalb/Crt.  Ratio (mcg/mg creat)   Date Value   01/22/2013 9     LDL Cholesterol (mg/dL)   Date Value   04/02/2019 184 (H)       (goal LDL is <100)   AST (U/L)   Date Value   08/13/2020 35     ALT (U/L)   Date Value   08/13/2020 29     BUN (mg/dL)   Date Value   08/13/2020 11     BP Readings from Last 3 Encounters:   08/13/20 139/65   06/21/20 (!) 154/67   06/11/20 (!) 114/58          (goal 120/80)    All Future Testing planned in [facility]  Lab Frequency Next Occurrence   EGD Once 07/29/2020   Lipid Panel Once 01/20/2021   Hemoglobin A1C Once 04/21/2021   CBC With Auto Differential Once 01/20/2021   Nasal cannula oxygen PRN          Patient Active Problem List:     Hyperlipidemia     Gout     CKD (chronic kidney disease) stage 3, GFR 30-59 ml/min     Wrist pain, right     Swelling of joint, wrist, right     Pain in joint, multiple sites     Obesity     Adjustment reaction     Vertigo     Pain in joint     Spinal stenosis, lumbar region, without neurogenic claudication     Facet arthropathy, lumbar     Chronic back pain     Acute bilateral low back pain with sciatica

## 2021-01-28 RX ORDER — TIZANIDINE 4 MG/1
TABLET ORAL
Qty: 30 TABLET | Refills: 5 | Status: SHIPPED | OUTPATIENT
Start: 2021-01-28 | End: 2021-03-02
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
## 2021-02-05 NOTE — TELEPHONE ENCOUNTER
PC to patient - LM to contact office. Need to know if patient wants script mailed or picked up. Script in next day basket.

## 2021-02-27 DIAGNOSIS — G89.29 CHRONIC BILATERAL THORACIC BACK PAIN: ICD-10-CM

## 2021-02-27 DIAGNOSIS — M54.6 CHRONIC BILATERAL THORACIC BACK PAIN: ICD-10-CM

## 2021-02-27 DIAGNOSIS — M25.50 PAIN IN JOINT, MULTIPLE SITES: Chronic | ICD-10-CM

## 2021-03-01 NOTE — TELEPHONE ENCOUNTER
Request for Zanaflex,Meloxicam,gabapentin. Next Visit Date:  Future Appointments   Date Time Provider Jonathan Lundberg   4/1/2021  9:15 AM Mervat Snider MD [facility] Maintenance   Topic Date Due    Lipid screen  04/02/2020    Diabetes screen  11/21/2020    Shingles Vaccine (2 of 2) 01/13/2021    DTaP/Tdap/Td vaccine (1 - Tdap) 12/14/2022 (Originally 4/21/1976)    Potassium monitoring  08/13/2021    Creatinine monitoring  08/13/2021    Colon cancer screen colonoscopy  05/27/2030    Flu vaccine  Completed    Hepatitis C screen  Completed    HIV screen  Completed    Hepatitis A vaccine  Aged Out    Hepatitis B vaccine  Aged Out    Hib vaccine  Aged Out    Meningococcal (ACWY) vaccine  Aged Out    Pneumococcal 0-64 years Vaccine  Aged Out       Hemoglobin A1C (%)   Date Value   11/21/2017 5.0   12/14/2016 5.1   01/17/2013 4.9             ( goal A1C is < 7)   Microalb/Crt.  Ratio (mcg/mg creat)   Date Value   01/22/2013 9     LDL Cholesterol (mg/dL)   Date Value   04/02/2019 184 (H)       (goal LDL is <100)   AST (U/L)   Date Value   08/13/2020 35     ALT (U/L)   Date Value   08/13/2020 29     BUN (mg/dL)   Date Value   08/13/2020 11     BP Readings from Last 3 Encounters:   08/13/20 139/65   06/21/20 (!) 154/67   06/11/20 (!) 114/58          (goal 120/80)    All Future Testing planned in [facility]  Lab Frequency Next Occurrence   EGD Once 07/29/2020   Lipid Panel Once 03/28/2021   Hemoglobin A1C Once 04/21/2021   CBC With Auto Differential Once 03/28/2021   Nasal cannula oxygen PRN          Patient Active Problem List:     Hyperlipidemia     Gout     CKD (chronic kidney disease) stage 3, GFR 30-59 ml/min     Wrist pain, right     Swelling of joint, wrist, right     Pain in joint, multiple sites     Obesity     Adjustment reaction     Vertigo     Pain in joint     Spinal stenosis, lumbar region, without neurogenic claudication     Facet arthropathy, lumbar     Chronic back pain Acute bilateral low back pain with sciatica

## 2021-03-02 RX ORDER — GABAPENTIN 300 MG/1
CAPSULE ORAL
Qty: 90 CAPSULE | Refills: 1 | Status: SHIPPED | OUTPATIENT
Start: 2021-03-02 | End: 2021-04-30

## 2021-03-02 RX ORDER — TIZANIDINE 4 MG/1
TABLET ORAL
Qty: 30 TABLET | Refills: 5 | Status: SHIPPED | OUTPATIENT
Start: 2021-03-02 | End: 2021-03-30

## 2021-03-02 RX ORDER — MELOXICAM 15 MG/1
TABLET ORAL
Qty: 30 TABLET | Refills: 2 | Status: SHIPPED | OUTPATIENT
Start: 2021-03-02 | End: 2021-03-30

## 2021-04-01 ENCOUNTER — OFFICE VISIT (OUTPATIENT)
Dept: INTERNAL MEDICINE | Age: 66
End: 2021-04-01
Payer: MEDICARE

## 2021-04-01 VITALS
WEIGHT: 228 LBS | HEART RATE: 69 BPM | SYSTOLIC BLOOD PRESSURE: 156 MMHG | BODY MASS INDEX: 30.92 KG/M2 | DIASTOLIC BLOOD PRESSURE: 84 MMHG

## 2021-04-01 DIAGNOSIS — M25.50 PAIN IN JOINT, MULTIPLE SITES: ICD-10-CM

## 2021-04-01 DIAGNOSIS — M54.12 CERVICAL RADICULOPATHY: ICD-10-CM

## 2021-04-01 DIAGNOSIS — E78.5 DYSLIPIDEMIA: ICD-10-CM

## 2021-04-01 DIAGNOSIS — K20.90 ACUTE ESOPHAGITIS: ICD-10-CM

## 2021-04-01 DIAGNOSIS — D64.9 NORMOCYTIC ANEMIA: ICD-10-CM

## 2021-04-01 DIAGNOSIS — I35.8 AORTIC HEART MURMUR ON EXAMINATION: ICD-10-CM

## 2021-04-01 DIAGNOSIS — R42 VERTIGO: ICD-10-CM

## 2021-04-01 DIAGNOSIS — I10 UNCONTROLLED HYPERTENSION: Primary | ICD-10-CM

## 2021-04-01 PROCEDURE — 3017F COLORECTAL CA SCREEN DOC REV: CPT | Performed by: INTERNAL MEDICINE

## 2021-04-01 PROCEDURE — 99214 OFFICE O/P EST MOD 30 MIN: CPT | Performed by: INTERNAL MEDICINE

## 2021-04-01 PROCEDURE — G8427 DOCREV CUR MEDS BY ELIG CLIN: HCPCS | Performed by: INTERNAL MEDICINE

## 2021-04-01 PROCEDURE — G8417 CALC BMI ABV UP PARAM F/U: HCPCS | Performed by: INTERNAL MEDICINE

## 2021-04-01 PROCEDURE — 1036F TOBACCO NON-USER: CPT | Performed by: INTERNAL MEDICINE

## 2021-04-01 PROCEDURE — 99211 OFF/OP EST MAY X REQ PHY/QHP: CPT | Performed by: INTERNAL MEDICINE

## 2021-04-01 RX ORDER — ATORVASTATIN CALCIUM 80 MG/1
TABLET, FILM COATED ORAL
Qty: 30 TABLET | Refills: 5 | Status: ON HOLD | OUTPATIENT
Start: 2021-04-01 | End: 2021-08-29

## 2021-04-01 RX ORDER — AMLODIPINE BESYLATE 5 MG/1
TABLET ORAL
Qty: 30 TABLET | Refills: 2 | Status: SHIPPED | OUTPATIENT
Start: 2021-04-01 | End: 2021-10-08 | Stop reason: SDUPTHER

## 2021-04-01 SDOH — ECONOMIC STABILITY: INCOME INSECURITY: HOW HARD IS IT FOR YOU TO PAY FOR THE VERY BASICS LIKE FOOD, HOUSING, MEDICAL CARE, AND HEATING?: NOT HARD AT ALL

## 2021-04-01 SDOH — ECONOMIC STABILITY: TRANSPORTATION INSECURITY
IN THE PAST 12 MONTHS, HAS LACK OF TRANSPORTATION KEPT YOU FROM MEETINGS, WORK, OR FROM GETTING THINGS NEEDED FOR DAILY LIVING?: NO

## 2021-04-01 SDOH — ECONOMIC STABILITY: FOOD INSECURITY: WITHIN THE PAST 12 MONTHS, THE FOOD YOU BOUGHT JUST DIDN'T LAST AND YOU DIDN'T HAVE MONEY TO GET MORE.: NEVER TRUE

## 2021-04-01 ASSESSMENT — ENCOUNTER SYMPTOMS
BLOOD IN STOOL: 0
ABDOMINAL PAIN: 0
WHEEZING: 0
PHOTOPHOBIA: 0
BACK PAIN: 1
COUGH: 0
CONSTIPATION: 0
SHORTNESS OF BREATH: 0

## 2021-04-01 ASSESSMENT — PATIENT HEALTH QUESTIONNAIRE - PHQ9
2. FEELING DOWN, DEPRESSED OR HOPELESS: 0
SUM OF ALL RESPONSES TO PHQ9 QUESTIONS 1 & 2: 0
SUM OF ALL RESPONSES TO PHQ QUESTIONS 1-9: 0

## 2021-04-01 NOTE — PROGRESS NOTES
[facility]    Progress Note    Date of patient's visit: 4/1/2021    Patient's Name:  Lisa Guzman    YOB: 1955            Patient Care Team:  Hilary Harrison MD as PCP - Jann Smith MD as PCP - [facility]  Hilary Harrison MD as Referring Physician (Internal Medicine)  Luis Saldivar MD as Consulting Physician (Gastroenterology)    REASON FOR VISIT: Routine outpatient follow     Chief Complaint   Patient presents with    Hypertension    Dizziness     Pt states that his vertigo is back   [facility] Maintenance     labs reprinted    Tingling     pt c/o having a tingling burning sensation that starts in his neck and radiates down his arms, sometimes has the burning sensation in his gret toe on boh feet          HISTORY OF PRESENT ILLNESS:    History was obtained from the patient. Lisa Guzman is a 63 y.o. is here for follow-up on his blood pressure and the problem. He is complaining of a burning sensation in his neck that goes down to his bilateral arms. Sometimes his stool also has some numbness and burning. He is denying any weakness right now in his extremities but the radiculopathy is getting worse. He is also complaining of symptoms of vertigo that happens when he is changing position early in the morning or getting up suddenly. He feels a spinning sensation and he feels he is off balance and has to fall towards the right always. Some mild nausea. It only last for 15/22 but can recur few times during the day. No headaches. He states his blood pressure has been running high at his other specialist's office and it is running high. He did not bring his medications and I am not sure if he is taking everything. He has esophagitis. He had an EGD last time. He is supposed to be on PPI. Last time he was telling me he was having increased daytime sleepiness and falling asleep during class or while driving.   He has been getting 2 different muscle relaxers and gabapentin and Benadryl and other sedative medications. He has polypharmacy. He goes to several physicians including pain management. He has been advised to stop all his medications but I am not sure if he is taking it. He says he is feeling better these days so he must have stopped these different muscle relaxers. He is noted to have a heart murmur on exam in the aortic area it radiates to his carotids bilaterally. He is denying any symptoms of dizziness, dyspnea or chest pain or edema or PND. He has had this heart murmur since 2016 but it sounds louder today. On review of his chart it appears he had an echo done which did show aortic insufficiency in 2016. He was referred to cardiology for ROSHAN. Patient cannot remember if he saw cardiology. I do not have any reports from cardiology or any further investigations though a stress test is seen which was normal.      Echo 2016     CONCLUSIONS     Summary  Normal left ventricle size, wall thickness and function with an estimated EF  > 55%. Right atrium is mildly dilated . Normal right ventricular size and function. Severe aortic insufficiency. Eccentric jet. Reversal of flow noted in aorta. Posterior Mitral valve leaflet mobility is reduced, trivial mitral  regurgitation. Trivial tricuspid regurgitation.   No significant pericardial effusion is seen.       Past Medical History:   Diagnosis Date    Chronic back pain     Chronic kidney disease     Gout     Hyperlipidemia     Hypertension     Obesity 9/19/2014    Osteoarthritis     Restless leg syndrome     Shoulder pain     Shrimp allergy     allergic reaction to shrimp    Spinal stenosis, lumbar region, without neurogenic claudication 4/13/2016    Swelling of joint, wrist, right     Thoracic back pain     Vertigo     Wears glasses     READING    Wrist pain, right        Past Surgical History:   Procedure Laterality Date    COLONOSCOPY  11/2014 nervous/anxious. PHYSICAL EXAM:     Vitals:    04/01/21 0935 04/01/21 0940   BP: (!) 165/117 (!) 156/84   Site: Left Upper Arm    Position: Sitting    Cuff Size: Large Adult    Pulse: 69    Weight: 228 lb (103.4 kg)      Body mass index is 30.92 kg/m². BP Readings from Last 3 Encounters:   04/01/21 (!) 156/84   08/13/20 139/65   06/21/20 (!) 154/67        Wt Readings from Last 3 Encounters:   04/01/21 228 lb (103.4 kg)   08/13/20 220 lb (99.8 kg)   06/20/20 230 lb (104.3 kg)       Physical Exam  Constitutional:       Appearance: Normal appearance. HENT:      Head: Normocephalic and atraumatic. Eyes:      General: No scleral icterus. Extraocular Movements: Extraocular movements intact. Pupils: Pupils are equal, round, and reactive to light. Neck:      Musculoskeletal: Normal range of motion and neck supple. Vascular: Carotid bruit present. Cardiovascular:      Rate and Rhythm: Normal rate and regular rhythm. Heart sounds: Murmur present. Pulmonary:      Effort: Pulmonary effort is normal.      Breath sounds: Normal breath sounds. No wheezing or rales. Musculoskeletal:      Right lower leg: No edema. Left lower leg: No edema. Neurological:      General: No focal deficit present. Mental Status: He is alert and oriented to person, place, and time. Cranial Nerves: No cranial nerve deficit. Sensory: No sensory deficit. Motor: No weakness.       Gait: Gait normal.               LABORATORY FINDINGS:    CBC:  Lab Results   Component Value Date    WBC 3.1 08/13/2020    HGB 10.7 08/13/2020     08/13/2020     09/10/2011     BMP:    Lab Results   Component Value Date     08/13/2020    K 4.3 08/13/2020     08/13/2020    CO2 22 08/13/2020    BUN 11 08/13/2020    CREATININE 1.08 08/13/2020    GLUCOSE 136 08/13/2020    GLUCOSE 108 09/10/2011     HEMOGLOBIN A1C:   Lab Results   Component Value Date    LABA1C 5.0 11/21/2017     MICROALBUMIN URINE:   Lab Results   Component Value Date    MICROALBUR <6 01/22/2013     FASTING LIPID PANEL:  Lab Results   Component Value Date    CHOL 257 (H) 04/02/2019    HDL 49 04/02/2019    TRIG 120 04/02/2019     Lab Results   Component Value Date    LDLCHOLESTEROL 184 (H) 04/02/2019       LIVER PROFILE:  Lab Results   Component Value Date    ALT 29 08/13/2020    AST 35 08/13/2020    PROT 6.8 08/13/2020    PROT 7.1 01/17/2013    BILITOT 0.79 08/13/2020    BILIDIR 0.20 08/13/2020    LABALBU 4.0 08/13/2020    LABALBU 4.4 09/10/2011      THYROID FUNCTION:   Lab Results   Component Value Date    TSH 0.68 06/11/2020      URINEANALYSIS: No results found for: LABURIN  ASSESSMENT AND PLAN:    1. Uncontrolled hypertension  increase Norvasc    - MRI BRAIN WO CONTRAST; Future  - Comprehensive Metabolic Panel; Future  - amLODIPine (NORVASC) 5 MG tablet; TAKE 1 TABLET BY MOUTH ONE TIME A DAY  Dispense: 30 tablet; Refill: 2    2. Vertigo  Possibly BPV but lasting few weeks and has high BP  Will check for cerebellar conditions    - MRI BRAIN WO CONTRAST; Future    3. Cervical radiculopathy    - MRI CERVICAL SPINE WO CONTRAST; Future    4. Aortic heart murmur on examination    - ECHO Complete 2D W Doppler W Color; Future    5. Pain in joint, multiple sites  Following up with Pain management    6. Dyslipidemia    - Lipid Panel; Future  - atorvastatin (LIPITOR) 80 MG tablet; TAKE 1 TABLET BY MOUTH ONCE DAILY. Dispense: 30 tablet; Refill: 5    7. Acute esophagitis  On PPI    - CBC; Future    8. Normocytic anemia  He had EGD and colonoscopy last year      - Ferritin; Future  - Iron and TIBC; Future  - Vitamin B12 & Folate; Future          FOLLOW UP AND INSTRUCTIONS:   Return in about 4 weeks (around 4/29/2021). 1. Zachary Hernandez received counseling on the following healthy behaviors: nutrition, exercise and medication adherence    2. Reviewed prior labs and health maintenance.       3. Discussed use, benefit, and side effects of prescribed medications. Barriers to medication compliance addressed. All patient questions answered. Pt voiced understanding.        Israel Lundberg  Attending Physician, [address] [place] [address], Internal Medicine Residency Program  [address] [place] [address]  4/1/2021, 9:41 AM

## 2021-04-01 NOTE — PATIENT INSTRUCTIONS
Return To Clinic 5/4/2021 . After Visit Summary  given and reviewed. It is very important for your care that you keep your appointment. If for some reason you are unable to keep your appointment it is equally important that you call our office at [phone] to cancel your appointment and reschedule.  Failure to do so may result in your termination from our practice.     -Bloodwork orders given to patient, they will have them done before their next visit.     -Your doctor has ordered a MRI Brain, MRi Cervical Spine and ECHO for you, please contact our scheduling department at [phone] to set up an appointment to have this completed before your next visit.     -Zunilda Muir

## 2021-04-26 ENCOUNTER — HOSPITAL ENCOUNTER (OUTPATIENT)
Dept: NON INVASIVE DIAGNOSTICS | Age: 66
Discharge: HOME | End: 2021-04-26
Payer: MEDICARE

## 2021-04-26 ENCOUNTER — HOSPITAL ENCOUNTER (OUTPATIENT)
Dept: MRI IMAGING | Age: 66
Discharge: HOME | End: 2021-04-28
Payer: MEDICARE

## 2021-04-26 DIAGNOSIS — M54.12 CERVICAL RADICULOPATHY: ICD-10-CM

## 2021-04-26 DIAGNOSIS — I35.8 AORTIC HEART MURMUR ON EXAMINATION: ICD-10-CM

## 2021-04-26 DIAGNOSIS — R42 VERTIGO: ICD-10-CM

## 2021-04-26 DIAGNOSIS — I10 UNCONTROLLED HYPERTENSION: ICD-10-CM

## 2021-04-26 LAB
LV EF: 58 %
LVEF MODALITY: NORMAL

## 2021-04-26 PROCEDURE — 93306 TTE W/DOPPLER COMPLETE: CPT

## 2021-04-26 PROCEDURE — 70551 MRI BRAIN STEM W/O DYE: CPT

## 2021-04-26 PROCEDURE — 72141 MRI NECK SPINE W/O DYE: CPT

## 2021-04-28 DIAGNOSIS — I35.1 AORTIC VALVE INSUFFICIENCY, ETIOLOGY OF CARDIAC VALVE DISEASE UNSPECIFIED: Primary | ICD-10-CM

## 2021-04-29 NOTE — TELEPHONE ENCOUNTER
E-scribing request for gabapentin . Pt has future appt.     gabapentin is not on current medication list  Health Maintenance   Topic Date Due    COVID-19 Vaccine (1) Never done    Lipid screen  04/02/2020    Diabetes screen  11/21/2020    Shingles Vaccine (2 of 2) 03/31/2022 (Originally 1/13/2021)    DTaP/Tdap/Td vaccine (1 - Tdap) 12/14/2022 (Originally 4/21/1976)    Potassium monitoring  08/13/2021    Creatinine monitoring  08/13/2021    Colon cancer screen colonoscopy  05/27/2030    Flu vaccine  Completed    Hepatitis C screen  Completed    HIV screen  Completed    Hepatitis A vaccine  Aged Out    Hepatitis B vaccine  Aged Out    Hib vaccine  Aged Out    Meningococcal (ACWY) vaccine  Aged Out    Pneumococcal 0-64 years Vaccine  Aged Out             (applicable per patient's age: Cancer Screenings, Depression Screening, Fall Risk Screening, Immunizations)    Hemoglobin A1C (%)   Date Value   11/21/2017 5.0   12/14/2016 5.1   01/17/2013 4.9     Microalb/Crt.  Ratio (mcg/mg creat)   Date Value   01/22/2013 9     LDL Cholesterol (mg/dL)   Date Value   04/02/2019 184 (H)     AST (U/L)   Date Value   08/13/2020 35     ALT (U/L)   Date Value   08/13/2020 29     BUN (mg/dL)   Date Value   08/13/2020 11      (goal A1C is < 7)   (goal LDL is <100) need 30-50% reduction from baseline     BP Readings from Last 3 Encounters:   04/01/21 (!) 156/84   08/13/20 139/65   06/21/20 (!) 154/67    (goal /80)      All Future Testing planned in [facility]:  Lab Frequency Next Occurrence   Lipid Panel Once 06/06/2021   Hemoglobin A1C Once 06/29/2021   CBC With Auto Differential Once 06/06/2021   Comprehensive Metabolic Panel Once 14/99/5207   CBC Once 07/03/2021   Lipid Panel Once 07/03/2021   Ferritin Once 07/03/2021   Iron and TIBC Once 07/03/2021   Vitamin B12 & Folate Once 07/09/2021   Nasal cannula oxygen PRN        Next Visit Date:  Future Appointments   Date Time Provider Jonathan Lundberg   5/4/2021 11:00 AM Yuri Braxton MD [id number]            Patient Active Problem List:     Hyperlipidemia     Gout     CKD (chronic kidney disease) stage 3, GFR 30-59 ml/min     Wrist pain, right     Swelling of joint, wrist, right     Pain in joint, multiple sites     Obesity     Adjustment reaction     Vertigo     Pain in joint     Spinal stenosis, lumbar region, without neurogenic claudication     Facet arthropathy, lumbar     Chronic back pain     Acute bilateral low back pain with sciatica

## 2021-04-30 RX ORDER — GABAPENTIN 300 MG/1
CAPSULE ORAL
Qty: 90 CAPSULE | Refills: 1 | Status: SHIPPED | OUTPATIENT
Start: 2021-04-30 | End: 2021-06-01

## 2021-05-04 ENCOUNTER — VIRTUAL VISIT (OUTPATIENT)
Dept: INTERNAL MEDICINE | Age: 66
End: 2021-05-04
Payer: MEDICARE

## 2021-05-04 DIAGNOSIS — I35.1 AORTIC VALVE INSUFFICIENCY, ETIOLOGY OF CARDIAC VALVE DISEASE UNSPECIFIED: ICD-10-CM

## 2021-05-04 DIAGNOSIS — I10 UNCONTROLLED HYPERTENSION: ICD-10-CM

## 2021-05-04 DIAGNOSIS — M54.12 CERVICAL RADICULOPATHY: Primary | ICD-10-CM

## 2021-05-04 PROCEDURE — 99442 PR PHYS/QHP TELEPHONE EVALUATION 11-20 MIN: CPT | Performed by: INTERNAL MEDICINE

## 2021-05-04 NOTE — PROGRESS NOTES
Anni Elaine is a 61 y.o. male evaluated via telephone on 5/4/2021. Consent:  He and/or health care decision maker is aware that that he may receive a bill for this telephone service, depending on his insurance coverage, and has provided verbal consent to proceed: Yes      Documentation:  I communicated with the patient and/or health care decision maker about his recent tests. MRI brain negative. C spine shows severe neuroforminal stenosis C4-5 area. He is having left arm numbness, pain and dizziness. Advised to see NS. Also severe AI. Cardiology referral in place. Details of this discussion including any medical advice provided: advised to see specialists. Decrease sedatives  Follow up with Pain management        I affirm this is a Patient Initiated Episode with a Patient who has not had a related appointment within my department in the past 7 days or scheduled within the next 24 hours. Patient identification was verified at the start of the visit: Yes    Total Time: minutes: 11-20 minutes    The visit was conducted pursuant to the emergency declaration under the [facility], [address] [place] [address] authority and the [employer] General Act. Patient identification was verified, and a caregiver was present when appropriate. The patient was located in a state where the provider was credentialed to provide care.     Note: not billable if this call serves to triage the patient into an appointment for the relevant concern      Katy Cannon

## 2021-05-19 ENCOUNTER — HOSPITAL ENCOUNTER (OUTPATIENT)
Dept: GENERAL RADIOLOGY | Age: 66
Discharge: HOME | End: 2021-05-21
Payer: MEDICARE

## 2021-05-19 ENCOUNTER — HOSPITAL ENCOUNTER (OUTPATIENT)
Age: 66
Discharge: HOME | End: 2021-05-21
Payer: MEDICARE

## 2021-05-19 ENCOUNTER — OFFICE VISIT (OUTPATIENT)
Dept: NEUROSURGERY | Age: 66
End: 2021-05-19
Payer: MEDICARE

## 2021-05-19 VITALS
WEIGHT: 222 LBS | BODY MASS INDEX: 29.42 KG/M2 | HEART RATE: 69 BPM | HEIGHT: 73 IN | DIASTOLIC BLOOD PRESSURE: 68 MMHG | SYSTOLIC BLOOD PRESSURE: 185 MMHG | TEMPERATURE: 97.2 F

## 2021-05-19 DIAGNOSIS — M47.22 CERVICAL SPONDYLOSIS WITH RADICULOPATHY: Primary | ICD-10-CM

## 2021-05-19 DIAGNOSIS — M47.22 CERVICAL SPONDYLOSIS WITH RADICULOPATHY: ICD-10-CM

## 2021-05-19 DIAGNOSIS — R42 VERTIGO: ICD-10-CM

## 2021-05-19 PROCEDURE — 72050 X-RAY EXAM NECK SPINE 4/5VWS: CPT

## 2021-05-19 PROCEDURE — 99204 OFFICE O/P NEW MOD 45 MIN: CPT | Performed by: NURSE PRACTITIONER

## 2021-05-19 PROCEDURE — G8417 CALC BMI ABV UP PARAM F/U: HCPCS | Performed by: NURSE PRACTITIONER

## 2021-05-19 PROCEDURE — 3017F COLORECTAL CA SCREEN DOC REV: CPT | Performed by: NURSE PRACTITIONER

## 2021-05-19 PROCEDURE — 1036F TOBACCO NON-USER: CPT | Performed by: NURSE PRACTITIONER

## 2021-05-19 PROCEDURE — G8427 DOCREV CUR MEDS BY ELIG CLIN: HCPCS | Performed by: NURSE PRACTITIONER

## 2021-05-19 NOTE — PROGRESS NOTES
Christi Tomlinson  [facility] [address] [facility] [address]  Dept: [phone]    Patient:  Paulo Dunlap  YOB: 1955  Date: 5/19/21    The patient is a 61 y.o. male who presents today for consult of the following problems:     Chief Complaint   Patient presents with    New Patient    Neck Pain         HPI:     Paulo Dunlap is a 61 y.o. male on whom neurosurgical consultation was requested by Perez Escobar MD for management of neck pain and arm symptoms. Patient reports that he does have frequent issues with axial cervical pain, more on the right side than the left. Does also have chronic low back pain. Does follow with a pain specialist and has undergone several different interventions over the years. Unclear what types as records are not currently available. Most recently has had issues with numbness and tingling down his right arm along the lateral aspect stopping at the elbow level. Does report that he has issues with bilateral rotator cuffs, has declined any surgical exploration or interventions for his shoulders to date. Has also started to have intermittent issues with vertigo, this is his most bothersome symptom. Is undergoing martial arts training, eager to advance but is having occasional dizziness while doing some of the maneuvers. Did undergo recent MRI of brain and cervical spine for evaluation. MRI brain negative. MRI cervical spine with significant amount of artifact making evaluation of central stenosis very difficult.      MYELOPATHY    Frequently dropping things: No  Difficulty with buttoning clothes, using zipper, putting on watch OR jewelry: No  Changes in handwriting: No  Numbness or tingling: No    LIMITATIONS    Pain significantly limiting on a daily basis   Daily pharmacologic pain control include: Percocet, gabapentin, meloxicam, tizanidine  Neck : Arm pain: Pain mostly to neck    MANAGEMENT     Prior Surgery: No  Prior to 1yr ago: Physical therapy, pain management  In the last year:    Physical Therapy: No   Chiropractic Interventions: No   Injections: Yes  Improvement: Some improvement    Injections/response: Unclear what typeDr. Tamie Calles, awaiting records    History:     Past Medical History:   Diagnosis Date    Chronic back pain     Chronic kidney disease     Gout     Hyperlipidemia     Hypertension     Obesity 9/19/2014    Osteoarthritis     Restless leg syndrome     Shoulder pain     Shrimp allergy     allergic reaction to shrimp    Spinal stenosis, lumbar region, without neurogenic claudication 4/13/2016    Swelling of joint, wrist, right     Thoracic back pain     Vertigo     Wears glasses     READING    Wrist pain, right      Past Surgical History:   Procedure Laterality Date    COLONOSCOPY  11/2014    int hemorrhoids, repeat in 10 years    COLONOSCOPY  05/27/2020    COLONOSCOPY N/A 5/27/2020    COLONOSCOPY WITH BIOPSY performed by Jone Favre, MD at [facility] Bilateral 10/17/2016    NASAL ENDOSCOPY; RIGHT NASAL CAUTERY    NERVE BLOCK  11/02/2016    duramorph 1.5mg celestone 9mg    NERVE BLOCK  04/03/2017    duramorph 1.5mg & decadron 10mg    NERVE BLOCK  10/09/2017    duramorph epidural, decadron 10mg, morphine 1.5mg    UPPER GASTROINTESTINAL ENDOSCOPY  05/27/2020    UPPER GASTROINTESTINAL ENDOSCOPY N/A 5/27/2020    EGD BIOPSY performed by Jone Favre, MD at [facility]     Family History   Problem Relation Age of Onset    Heart Disease Brother 48    Other Father     Other Brother         HIV    Other Brother      Current Outpatient Medications on File Prior to Visit   Medication Sig Dispense Refill    gabapentin (NEURONTIN) 300 MG capsule TAKE 1 CAPSULE BY MOUTH 3 TIMES A DAY.  90 capsule 1    amLODIPine (NORVASC) 5 MG tablet TAKE 1 TABLET BY MOUTH ONE TIME A DAY 30 tablet 2    atorvastatin (LIPITOR) 80 MG tablet TAKE 1 TABLET BY MOUTH ONCE DAILY. 30 tablet 5    tiZANidine (ZANAFLEX) 4 MG tablet Take 1 tablet by mouth every 8 hours as needed (spasms) 30 tablet 5    meloxicam (MOBIC) 15 MG tablet TAKE 1 TABLET BY MOUTH ONE TIME A DAY 30 tablet 2    ferrous sulfate (FEROSUL) 325 (65 Fe) MG tablet TAKE 1 TABLET BY MOUTH daily 30 tablet 5    oxyCODONE-acetaminophen (PERCOCET)  MG per tablet Take 1 tablet by mouth 3 times daily.  allopurinol (ZYLOPRIM) 300 MG tablet Take 1 tablet by mouth daily 30 tablet 5    EPINEPHrine (EPIPEN 2-TANG) 0.3 MG/0.3ML SOAJ injection Inject 0.3 mLs into the skin once for 1 dose Use as directed for allergic reaction 0.3 mL 0    diphenhydrAMINE (BENADRYL) 25 MG capsule Take 1-2 tablets by mouth every 6 hours as needed for itching. 30 capsule 0    pantoprazole (PROTONIX) 40 MG tablet TAKE 1 TABLET BY MOUTH ONE TIME A DAY 30 tablet 2    magnesium oxide (MAG-OX) 400 MG tablet TAKE 1 TABLET BY MOUTH ONE TIME A DAY 30 tablet 2    diclofenac sodium (VOLTAREN) 1 % GEL Apply 2 g topically 2 times daily      Melatonin 10 MG CAPS Take by mouth      baclofen (LIORESAL) 10 MG tablet TAKE 1 TABLET BY MOUTH 2 TIMES A DAY 30 tablet 0     No current facility-administered medications on file prior to visit. Social History     Tobacco Use    Smoking status: Never Smoker    Smokeless tobacco: Never Used   Vaping Use    Vaping Use: Never used   Substance Use Topics    Alcohol use: No    Drug use: No       Allergies   Allergen Reactions    Codeine Other (See Comments)     Don;t agree with him    Shrimp (Diagnostic) Hives       Review of Systems  Constitutional: Negative for activity change and appetite change. HENT: Negative for ear pain and facial swelling. Eyes: Negative for discharge and itching. Respiratory: Negative for choking and chest tightness. Cardiovascular: Negative for chest pain and leg swelling. Gastrointestinal: Negative for nausea and abdominal pain.    Endocrine: Negative for cold intolerance and heat intolerance. Genitourinary: Negative for frequency and flank pain. Musculoskeletal: Negative for myalgias and joint swelling. Skin: Negative for rash and wound. Allergic/Immunologic: Negative for environmental allergies and food allergies. Hematological: Negative for adenopathy. Does not bruise/bleed easily. Psychiatric/Behavioral: Negative for self-injury. The patient is not nervous/anxious. Physical Exam:      BP (!) 185/68   Pulse 69   Temp 97.2 °F (36.2 °C) (Temporal)   Ht 6' 1\" (1.854 m)   Wt 222 lb (100.7 kg)   BMI 29.29 kg/m²   Estimated body mass index is 29.29 kg/m² as calculated from the following:    Height as of this encounter: 6' 1\" (1.854 m). Weight as of this encounter: 222 lb (100.7 kg). General:  Nila Paredes is a 61y.o. year old male who appears his stated age. HEENT: Normocephalic atraumatic. Neck supple. Chest: regular rate; pulses equal  Abdomen: Soft nontender nondistended.    Ext: DP and PT pulses 2+, good cap refill  Neuro    Mentation  Appropriate affect  Registration intact  Orientation intact  3 item recall intact  Judgement intact to situation    Cranial Nerves:   Pupils equal and reactive to light  Extraocular motion intact  Face and shrug symmetric  Tongue midline  No dysarthria  v1-3 sensation symmetric, masseter tone symmetric  Hearing symmetric and intact    Sensation: Intact  Ring finger split: Negative    Motor  L deltoid 5/5; R deltoid 5/5  L biceps 5/5; R biceps 5/5  L triceps 5/5; R triceps 5/5  L wrist extension 5/5; R wrist extension 5/5  L intrinsics 5/5; R intrinsics 5/5     L iliopsoas 5/5 , R iliopsoas 5/5  L quadriceps 5/5; R quadriceps 5/5  L Dorsiflexion 5/5; R dorsiflexion 5/5  L Plantarflexion 5/5; R plantarflexion 5/5  L EHL 5/5; R EHL 5/5    Reflexes  L Brachioradialis 2+/4; R brachioradialis 2+/4  L Biceps 2+/4; R Biceps 2+/4  L Triceps 2+/4; R Triceps 2+/4  L Patellar 2+/4: R Patellar 2+/4  L Achilles 2+/4; R seen.       SPINAL CORD: No abnormal cord signal is seen.       SOFT TISSUES: No paraspinal mass identified.       C2-C3: There is a disc bulge with buckling of the ligamentum flavum.  No   significant spinal canal stenosis.  Uncovertebral joint and facet arthrosis   contributes to moderate left neural foraminal narrowing.  Findings appear   slightly progressed.       C3-C4: There is a disc bulge with buckling of the ligamentum flavum likely   minimal spinal canal stenosis.  Uncovertebral joint and facet arthrosis   contributes to severe left neural foraminal narrowing.  No significant right   neural foraminal narrowing.       C4-C5: There is a disc bulge with buckling of the ligamentum flavum   contributing to minimal spinal canal stenosis.  Uncovertebral joint and facet   arthrosis contribute to severe left neural foraminal narrowing.  No   significant right neural foraminal narrowing.  Findings appear progressed.       C5-C6: There is a posterior disc osteophyte complex contribute to minimal   spinal canal stenosis.  Uncovertebral joint and facet arthrosis contribute to   moderate bilateral neural foraminal narrowing.  This is similar to the prior   exam.       C6-C7: There is a posterior disc osteophyte complex contributing to minimal   spinal canal stenosis.  Uncovertebral joint and facet arthrosis contribute to   moderate bilateral neural foraminal narrowing.  This appears similar to the   prior exam.       C7-T1: There is a posterior disc osteophyte complex.  No significant spinal   canal stenosis.  Uncovertebral joint and facet arthrosis contribute to severe   bilateral neural foraminal narrowing.  This is similar to the prior exam.         Assessment and Plan:      1. Cervical spondylosis with radiculopathy    2. Vertigo          Plan: Patient with axial cervical pain intermittently that has previously been responsive to pain management interventions.   Additionally with new numbness and tingling to right upper extremity as well as intermittent episodes of vertigo. MRI with diffuse, multilevel degenerative changes and varying degrees of foraminal stenosis. It is difficult to evaluate the extent of central stenosis due to artifact present on images. Does have multiple levels with listhesis present. Recommend obtaining upright flexion/extension imaging to evaluate for any instability. Also recommend obtaining CT myelogram for further evaluation of stenosis. Patient to return in approximately 4 weeks after completion of additional imaging for further evaluation and recommendations. Followup: Return in about 4 weeks (around 6/16/2021), or if symptoms worsen or fail to improve. Prescriptions Ordered:  No orders of the defined types were placed in this encounter. Orders Placed:  Orders Placed This Encounter   Procedures    XR CERVICAL SPINE (4-5 VIEWS)     Standing Status:   Future     Number of Occurrences:   1     Standing Expiration Date:   5/19/2022     Scheduling Instructions:      Standing AP and lateral views of neutral; flexion; and extension     Order Specific Question:   Reason for exam:     Answer:   evaluate alignment and for any instability    CT CERVICAL SPINE W CONTRAST     Standing Status:   Future     Standing Expiration Date:   5/19/2022     Scheduling Instructions:      Post-myelogram     Order Specific Question:   Reason for exam:     Answer:   evaluate for stenosis    IR MYELOGRAM CERVICAL     Standing Status:   Future     Standing Expiration Date:   5/19/2022     Order Specific Question:   Reason for exam:     Answer:   evaluate for central/foraminal stenosis        Electronically signed by LYNNE Goldman CNP on 5/19/2021 at 1:28 PM    Please note that this chart was generated using voice recognition Dragon dictation software. Although every effort was made to ensure the accuracy of this automated transcription, some errors in transcription may have occurred.

## 2021-05-30 DIAGNOSIS — K92.0 HEMATEMESIS WITH NAUSEA: ICD-10-CM

## 2021-06-01 RX ORDER — GABAPENTIN 300 MG/1
CAPSULE ORAL
Qty: 90 CAPSULE | Refills: 1 | Status: SHIPPED | OUTPATIENT
Start: 2021-06-01 | End: 2021-07-28

## 2021-06-01 RX ORDER — PANTOPRAZOLE SODIUM 40 MG/1
TABLET, DELAYED RELEASE ORAL
Qty: 30 TABLET | Refills: 2 | Status: ON HOLD | OUTPATIENT
Start: 2021-06-01 | End: 2021-08-29

## 2021-06-01 NOTE — TELEPHONE ENCOUNTER
Hyperlipidemia     Gout     CKD (chronic kidney disease) stage 3, GFR 30-59 ml/min     Wrist pain, right     Swelling of joint, wrist, right     Pain in joint, multiple sites     Obesity     Adjustment reaction     Vertigo     Pain in joint     Spinal stenosis, lumbar region, without neurogenic claudication     Facet arthropathy, lumbar     Chronic back pain     Acute bilateral low back pain with sciatica

## 2021-06-29 DIAGNOSIS — E79.0 HYPERURICEMIA: ICD-10-CM

## 2021-06-29 DIAGNOSIS — M25.50 PAIN IN JOINT, MULTIPLE SITES: Chronic | ICD-10-CM

## 2021-06-29 RX ORDER — MELOXICAM 15 MG/1
TABLET ORAL
Qty: 30 TABLET | Refills: 2 | Status: SHIPPED | OUTPATIENT
Start: 2021-06-29 | End: 2021-10-08 | Stop reason: SDUPTHER

## 2021-06-29 RX ORDER — ALLOPURINOL 300 MG/1
TABLET ORAL
Qty: 30 TABLET | Refills: 5 | Status: SHIPPED | OUTPATIENT
Start: 2021-06-29 | End: 2022-01-03

## 2021-06-29 NOTE — TELEPHONE ENCOUNTER
Request for mobic and zyloprim. Next Visit Date:  Future Appointments   Date Time Provider Jonathan Lundberg   7/6/2021  9:30 AM Ernestina Po, APRN - CNP [facility]   8/5/2021  8:30 AM Jocelyn Louise MD [facility] [address] Maintenance   Topic Date Due    COVID-19 Vaccine (1) Never done    Lipid screen  04/02/2020    Diabetes screen  11/21/2020    Shingles Vaccine (2 of 2) 03/31/2022 (Originally 1/13/2021)    DTaP/Tdap/Td vaccine (1 - Tdap) 12/14/2022 (Originally 9/14/2014)    Potassium monitoring  08/13/2021    Creatinine monitoring  08/13/2021    Colon cancer screen colonoscopy  05/27/2030    Flu vaccine  Completed    Hepatitis C screen  Completed    HIV screen  Completed    Hepatitis A vaccine  Aged Out    Hepatitis B vaccine  Aged Out    Hib vaccine  Aged Out    Meningococcal (ACWY) vaccine  Aged Out    Pneumococcal 0-64 years Vaccine  Aged Out       Hemoglobin A1C (%)   Date Value   11/21/2017 5.0   12/14/2016 5.1   01/17/2013 4.9             ( goal A1C is < 7)   Microalb/Crt.  Ratio (mcg/mg creat)   Date Value   01/22/2013 9     LDL Cholesterol (mg/dL)   Date Value   04/02/2019 184 (H)       (goal LDL is <100)   AST (U/L)   Date Value   08/13/2020 35     ALT (U/L)   Date Value   08/13/2020 29     BUN (mg/dL)   Date Value   08/13/2020 11     BP Readings from Last 3 Encounters:   05/19/21 (!) 185/68   04/01/21 (!) 156/84   08/13/20 139/65          (goal 120/80)    All Future Testing planned in [facility]  Lab Frequency Next Occurrence   Lipid Panel Once 08/14/2021   Hemoglobin A1C Once 06/29/2021   CBC With Auto Differential Once 08/14/2021   Comprehensive Metabolic Panel Once 70/88/6613   CBC Once 07/03/2021   Lipid Panel Once 07/03/2021   Ferritin Once 07/03/2021   Iron and TIBC Once 07/03/2021   Vitamin B12 & Folate Once 07/09/2021   CT CERVICAL SPINE W CONTRAST Once 09/17/2021   IR MYELOGRAM CERVICAL Once 09/17/2021   Nasal cannula oxygen PRN          Patient Active Problem List:     Hyperlipidemia     Gout     CKD (chronic kidney disease) stage 3, GFR 30-59 ml/min ([facility])     Wrist pain, right     Swelling of joint, wrist, right     Pain in joint, multiple sites     Obesity     Adjustment reaction     Vertigo     Pain in joint     Spinal stenosis, lumbar region, without neurogenic claudication     Facet arthropathy, lumbar     Chronic back pain     Acute bilateral low back pain with sciatica

## 2021-07-28 RX ORDER — GABAPENTIN 300 MG/1
CAPSULE ORAL
Qty: 90 CAPSULE | Refills: 1 | Status: SHIPPED | OUTPATIENT
Start: 2021-07-28 | End: 2021-10-08 | Stop reason: SDUPTHER

## 2021-07-28 NOTE — TELEPHONE ENCOUNTER
Request for Gabapentin. Next Visit Date:  Future Appointments   Date Time Provider Jonathan Lundberg   8/5/2021  8:30 AM Phoebe Maria MD [facility]   8/6/2021  9:00 AM LYNNE Jaime - CNP [facility] [address] Maintenance   Topic Date Due    COVID-19 Vaccine (1) Never done    Lipid screen  04/02/2020    Diabetes screen  11/21/2020    Shingles Vaccine (2 of 2) 03/31/2022 (Originally 1/13/2021)    DTaP/Tdap/Td vaccine (1 - Tdap) 12/14/2022 (Originally 9/14/2014)    Potassium monitoring  08/13/2021    Creatinine monitoring  08/13/2021    Flu vaccine (1) 09/01/2021    Colon cancer screen colonoscopy  05/27/2030    Hepatitis C screen  Completed    HIV screen  Completed    Hepatitis A vaccine  Aged Out    Hepatitis B vaccine  Aged Out    Hib vaccine  Aged Out    Meningococcal (ACWY) vaccine  Aged Out    Pneumococcal 0-64 years Vaccine  Aged Out       Hemoglobin A1C (%)   Date Value   11/21/2017 5.0   12/14/2016 5.1   01/17/2013 4.9             ( goal A1C is < 7)   Microalb/Crt.  Ratio (mcg/mg creat)   Date Value   01/22/2013 9     LDL Cholesterol (mg/dL)   Date Value   04/02/2019 184 (H)       (goal LDL is <100)   AST (U/L)   Date Value   08/13/2020 35     ALT (U/L)   Date Value   08/13/2020 29     BUN (mg/dL)   Date Value   08/13/2020 11     BP Readings from Last 3 Encounters:   05/19/21 (!) 185/68   04/01/21 (!) 156/84   08/13/20 139/65          (goal 120/80)    All Future Testing planned in [facility]  Lab Frequency Next Occurrence   Lipid Panel Once 08/14/2021   Hemoglobin A1C Once 07/28/2021   CBC With Auto Differential Once 08/14/2021   Comprehensive Metabolic Panel Once 00/91/9939   CBC Once 08/12/2021   Lipid Panel Once 08/12/2021   Ferritin Once 08/12/2021   Iron and TIBC Once 08/12/2021   Vitamin B12 & Folate Once 07/09/2021   CT CERVICAL SPINE W CONTRAST Once 09/17/2021   IR MYELOGRAM CERVICAL Once 09/17/2021   Nasal cannula oxygen PRN          Patient Active Problem List:     Hyperlipidemia     Gout     CKD (chronic kidney disease) stage 3, GFR 30-59 ml/min ([facility])     Wrist pain, right     Swelling of joint, wrist, right     Pain in joint, multiple sites     Obesity     Adjustment reaction     Vertigo     Pain in joint     Spinal stenosis, lumbar region, without neurogenic claudication     Facet arthropathy, lumbar     Chronic back pain     Acute bilateral low back pain with sciatica

## 2021-08-05 ENCOUNTER — VIRTUAL VISIT (OUTPATIENT)
Dept: INTERNAL MEDICINE | Age: 66
End: 2021-08-05
Payer: MEDICARE

## 2021-08-05 DIAGNOSIS — D64.9 NORMOCYTIC ANEMIA: ICD-10-CM

## 2021-08-05 DIAGNOSIS — I35.1 AORTIC VALVE INSUFFICIENCY, ETIOLOGY OF CARDIAC VALVE DISEASE UNSPECIFIED: ICD-10-CM

## 2021-08-05 DIAGNOSIS — M54.12 CERVICAL RADICULOPATHY: ICD-10-CM

## 2021-08-05 DIAGNOSIS — I10 UNCONTROLLED HYPERTENSION: Primary | ICD-10-CM

## 2021-08-05 DIAGNOSIS — N18.31 STAGE 3A CHRONIC KIDNEY DISEASE (HCC): ICD-10-CM

## 2021-08-05 DIAGNOSIS — M25.50 PAIN IN JOINT, MULTIPLE SITES: ICD-10-CM

## 2021-08-05 PROCEDURE — 99214 OFFICE O/P EST MOD 30 MIN: CPT | Performed by: INTERNAL MEDICINE

## 2021-08-05 PROCEDURE — G8427 DOCREV CUR MEDS BY ELIG CLIN: HCPCS | Performed by: INTERNAL MEDICINE

## 2021-08-05 PROCEDURE — 1036F TOBACCO NON-USER: CPT | Performed by: INTERNAL MEDICINE

## 2021-08-05 PROCEDURE — 3017F COLORECTAL CA SCREEN DOC REV: CPT | Performed by: INTERNAL MEDICINE

## 2021-08-05 PROCEDURE — G8417 CALC BMI ABV UP PARAM F/U: HCPCS | Performed by: INTERNAL MEDICINE

## 2021-08-05 ASSESSMENT — ENCOUNTER SYMPTOMS
CONSTIPATION: 0
SHORTNESS OF BREATH: 0
COUGH: 0
WHEEZING: 0
ABDOMINAL PAIN: 0
BACK PAIN: 1
BLOOD IN STOOL: 0

## 2021-08-05 NOTE — PROGRESS NOTES
2021    TELEHEALTH EVALUATION -- Audio/Visual (During ADVVA-27 public health emergency)    HPI:    Lola Porter (:  1955) has requested an audio/video evaluation for the following concern(s):  Patient initiated a video visit to discuss his concerns. Unfortunately patient has not followed up with his cardiologist.  He had an echo done. He missed appointment. We will have him call the cardiologist in follow-up. He denies any chest pain or shortness of breath today. No leg edema. He was also sent to neurosurgery because of increasing numbness and tingling and weakness in his arm and vertigo. He says symptoms have actually now improved. He had an MRI done and did go see the neurosurgeon. He was advised to do a myelogram which she missed and his follow-up appointment. He has another appointment in September and have advised him to schedule his tests and follow-up with specialist.  No other acute concerns. He has not done labs either in a year and he is advised to follow-up in all these tests and then come back and see me. Echo 2021  CONCLUSIONS     Summary  Normal left ventricle size and systolic function with an estimated EF  55-60%. No segmental wall motion abnormalities seen. Moderate left ventricular hypertrophy. Left atrial dilatation. Right atrial dilatation. Aortic valve was not well visualized. Mild aortic stenosis. Peak  instantaneous gradient 20 mmHg and mean gradient 9 mmHg. Moderate to Severe aortic insufficiency with an eccentric jet (pressure half  time ~ 200 msec). Consider ROSHAN if clinically warranted. Aortic root measures 3.6-3.9 cm. No significant pericardial effusion is seen. XR cervical spine   Impression   No evidence of pathologic motion.       Severe disc height loss redemonstrated at C5-6 and C6-7.       Atherosclerotic calcifications noted of the carotid arteries bilaterally.         MRI Cervical spine       Impression   1.  Degenerative changes contribute to minimal spinal canal stenosis at C3-C4   through C6-C7. 2. Multilevel neural foraminal narrowing as above. 3. Spondylolisthesis at C5-C6, C7-T1 and T1-T2.   4. Multilevel loss of disc space height with endplate irregularity as well as   Modic type degenerative endplate changes.           Review of Systems   Constitutional: Negative for fatigue and unexpected weight change. Respiratory: Negative for cough, shortness of breath and wheezing. Cardiovascular: Negative for chest pain, palpitations and leg swelling. Gastrointestinal: Negative for abdominal pain, blood in stool and constipation. Endocrine: Negative for polydipsia and polyuria. Musculoskeletal: Positive for arthralgias and back pain. Neurological: Positive for numbness. Negative for dizziness, syncope, weakness and light-headedness. Hematological: Negative for adenopathy. Does not bruise/bleed easily. Prior to Visit Medications    Medication Sig Taking? Authorizing Provider   gabapentin (NEURONTIN) 300 MG capsule TAKE 1 CAPSULE BY MOUTH 3 TIMES A DAY Yes Izzy Reynoso MD   allopurinol (ZYLOPRIM) 300 MG tablet TAKE 1 TABLET BY MOUTH ONCE DAILY. Yes Izzy Reynoso MD   meloxicam (MOBIC) 15 MG tablet TAKE 1 TABLET BY MOUTH ONCE DAILY. Yes Izzy Reynoso MD   pantoprazole (PROTONIX) 40 MG tablet TAKE 1 TABLET BY MOUTH ONE TIME A DAY Yes Izzy Reynoso MD   amLODIPine (NORVASC) 5 MG tablet TAKE 1 TABLET BY MOUTH ONE TIME A DAY Yes Izzy Reynoso MD   atorvastatin (LIPITOR) 80 MG tablet TAKE 1 TABLET BY MOUTH ONCE DAILY. Yes Izzy Reynoso MD   tiZANidine (ZANAFLEX) 4 MG tablet Take 1 tablet by mouth every 8 hours as needed (spasms) Yes Izzy Reynoso MD   ferrous sulfate (FEROSUL) 325 (65 Fe) MG tablet TAKE 1 TABLET BY MOUTH daily Yes Izzy Reynoso MD   oxyCODONE-acetaminophen (PERCOCET)  MG per tablet Take 1 tablet by mouth 3 times daily.  Yes Historical Provider, MD   EPINEPHrine (EPIPEN 2-TANG) 0.3 No Facial Asymmetry (Cranial nerve 7 motor function) (limited exam to video visit)          [] No gaze palsy        [] Abnormal-         Skin:        [] No significant exanthematous lesions or discoloration noted on facial skin         [] Abnormal-            Psychiatric:       [] Normal Affect [] No Hallucinations        [] Abnormal-     Other pertinent observable physical exam findings-   Patient had video turned off through the entire encounter and I could not do a physical exam virtually. ASSESSMENT/PLAN:  1. Uncontrolled hypertension  Same meds  Follow up for BP check     2. Aortic valve insufficiency, etiology of cardiac valve disease unspecified  Advised to follow up with cardiology     3. Cervical radiculopathy  CT myelogram pending  Follow up with NS     4. Pain in joint, multiple sites  Follows up with [facility] management     5. Stage 3a chronic kidney disease ([facility] [address]  Labs pending     6. Normocytic anemia  Labs pending       Return in about 3 months (around 11/5/2021). Sarah Shepherd, was evaluated through a synchronous (real-time) audio-video encounter. The patient (or guardian if applicable) is aware that this is a billable service. Verbal consent to proceed has been obtained within the past 12 months. The visit was conducted pursuant to the emergency declaration under the [facility], [address] [place] [address] authority and the [employer] General Act. Patient identification was verified, and a caregiver was present when appropriate. The patient was located in a state where the provider was credentialed to provide care. Total time spent on this encounter: 35 minutes    --Ron Ricardo MD on 8/5/2021 at 8:43 AM    An electronic signature was used to authenticate this note.

## 2021-08-05 NOTE — PATIENT INSTRUCTIONS
Return To Clinic 11/12/2021 . After Visit Summary  given and reviewed. It is very important for your care that you keep your appointment. If for some reason you are unable to keep your appointment it is equally important that you call our office at [phone] to cancel your appointment and reschedule.  Failure to do so may result in your termination from our practice.     -Bloodwork orders given to patient, they will have them done before their next visit.     -Mancel Elders

## 2021-08-28 PROCEDURE — 96361 HYDRATE IV INFUSION ADD-ON: CPT

## 2021-08-28 PROCEDURE — 96360 HYDRATION IV INFUSION INIT: CPT

## 2021-08-28 PROCEDURE — 99283 EMERGENCY DEPT VISIT LOW MDM: CPT

## 2021-08-29 ENCOUNTER — APPOINTMENT (OUTPATIENT)
Dept: GENERAL RADIOLOGY | Age: 66
End: 2021-08-29
Payer: MEDICARE

## 2021-08-29 ENCOUNTER — HOSPITAL ENCOUNTER (OUTPATIENT)
Age: 66
Setting detail: OBSERVATION
Discharge: HOME | End: 2021-08-29
Attending: EMERGENCY MEDICINE | Admitting: EMERGENCY MEDICINE
Payer: MEDICARE

## 2021-08-29 VITALS
HEIGHT: 72 IN | HEART RATE: 57 BPM | WEIGHT: 225 LBS | OXYGEN SATURATION: 96 % | DIASTOLIC BLOOD PRESSURE: 63 MMHG | TEMPERATURE: 97.5 F | BODY MASS INDEX: 30.48 KG/M2 | SYSTOLIC BLOOD PRESSURE: 172 MMHG | RESPIRATION RATE: 16 BRPM

## 2021-08-29 DIAGNOSIS — R74.8 ELEVATED CK: ICD-10-CM

## 2021-08-29 DIAGNOSIS — M79.10 MYALGIA: ICD-10-CM

## 2021-08-29 DIAGNOSIS — R77.8 ELEVATED TROPONIN: ICD-10-CM

## 2021-08-29 DIAGNOSIS — N17.9 ACUTE KIDNEY INJURY (HCC): ICD-10-CM

## 2021-08-29 DIAGNOSIS — E86.0 DEHYDRATION AFTER EXERTION: Primary | ICD-10-CM

## 2021-08-29 LAB
-: ABNORMAL
ABSOLUTE EOS #: 0.04 K/UL (ref 0–0.44)
ABSOLUTE IMMATURE GRANULOCYTE: 0.03 K/UL (ref 0–0.3)
ABSOLUTE LYMPH #: 1.19 K/UL (ref 1.1–3.7)
ABSOLUTE MONO #: 1.01 K/UL (ref 0.1–1.2)
AMORPHOUS: ABNORMAL
ANION GAP SERPL CALCULATED.3IONS-SCNC: 12 MMOL/L (ref 9–17)
ANION GAP SERPL CALCULATED.3IONS-SCNC: 14 MMOL/L (ref 9–17)
BACTERIA: ABNORMAL
BASOPHILS # BLD: 1 % (ref 0–2)
BASOPHILS ABSOLUTE: 0.05 K/UL (ref 0–0.2)
BILIRUBIN URINE: NEGATIVE
BUN BLDV-MCNC: 19 MG/DL (ref 8–23)
BUN BLDV-MCNC: 25 MG/DL (ref 8–23)
BUN/CREAT BLD: ABNORMAL (ref 9–20)
BUN/CREAT BLD: ABNORMAL (ref 9–20)
CALCIUM SERPL-MCNC: 8.5 MG/DL (ref 8.6–10.4)
CALCIUM SERPL-MCNC: 9.1 MG/DL (ref 8.6–10.4)
CASTS UA: ABNORMAL /LPF (ref 0–2)
CHLORIDE BLD-SCNC: 106 MMOL/L (ref 98–107)
CHLORIDE BLD-SCNC: 97 MMOL/L (ref 98–107)
CO2: 19 MMOL/L (ref 20–31)
CO2: 23 MMOL/L (ref 20–31)
COLOR: YELLOW
CREAT SERPL-MCNC: 1.21 MG/DL (ref 0.7–1.2)
CREAT SERPL-MCNC: 1.31 MG/DL (ref 0.7–1.2)
CRYSTALS, UA: ABNORMAL /HPF
DIFFERENTIAL TYPE: ABNORMAL
EOSINOPHILS RELATIVE PERCENT: 1 % (ref 1–4)
EPITHELIAL CELLS UA: ABNORMAL /HPF (ref 0–5)
GFR AFRICAN AMERICAN: >60 ML/MIN
GFR AFRICAN AMERICAN: >60 ML/MIN
GFR NON-AFRICAN AMERICAN: 55 ML/MIN
GFR NON-AFRICAN AMERICAN: >60 ML/MIN
GFR SERPL CREATININE-BSD FRML MDRD: ABNORMAL ML/MIN/{1.73_M2}
GLUCOSE BLD-MCNC: 104 MG/DL (ref 70–99)
GLUCOSE BLD-MCNC: 157 MG/DL (ref 70–99)
GLUCOSE URINE: NEGATIVE
HCT VFR BLD CALC: 29.7 % (ref 40.7–50.3)
HEMOGLOBIN: 9.8 G/DL (ref 13–17)
IMMATURE GRANULOCYTES: 0 %
KETONES, URINE: NEGATIVE
LEUKOCYTE ESTERASE, URINE: NEGATIVE
LYMPHOCYTES # BLD: 15 % (ref 24–43)
MAGNESIUM: 2.2 MG/DL (ref 1.6–2.6)
MCH RBC QN AUTO: 31.7 PG (ref 25.2–33.5)
MCHC RBC AUTO-ENTMCNC: 33 G/DL (ref 28.4–34.8)
MCV RBC AUTO: 96.1 FL (ref 82.6–102.9)
MONOCYTES # BLD: 13 % (ref 3–12)
MUCUS: ABNORMAL
MYOGLOBIN: 135 NG/ML (ref 28–72)
MYOGLOBIN: 534 NG/ML (ref 28–72)
NITRITE, URINE: NEGATIVE
NRBC AUTOMATED: 0 PER 100 WBC
OTHER OBSERVATIONS UA: ABNORMAL
PDW BLD-RTO: 12.8 % (ref 11.8–14.4)
PH UA: 6 (ref 5–8)
PHOSPHORUS: 3.2 MG/DL (ref 2.5–4.5)
PLATELET # BLD: 245 K/UL (ref 138–453)
PLATELET ESTIMATE: ABNORMAL
PMV BLD AUTO: 9.7 FL (ref 8.1–13.5)
POTASSIUM SERPL-SCNC: 3.8 MMOL/L (ref 3.7–5.3)
POTASSIUM SERPL-SCNC: 3.9 MMOL/L (ref 3.7–5.3)
PROTEIN UA: NEGATIVE
RBC # BLD: 3.09 M/UL (ref 4.21–5.77)
RBC # BLD: ABNORMAL 10*6/UL
RBC UA: ABNORMAL /HPF (ref 0–2)
RENAL EPITHELIAL, UA: ABNORMAL /HPF
SEG NEUTROPHILS: 70 % (ref 36–65)
SEGMENTED NEUTROPHILS ABSOLUTE COUNT: 5.4 K/UL (ref 1.5–8.1)
SODIUM BLD-SCNC: 132 MMOL/L (ref 135–144)
SODIUM BLD-SCNC: 139 MMOL/L (ref 135–144)
SPECIFIC GRAVITY UA: 1 (ref 1–1.03)
TOTAL CK: 693 U/L (ref 39–308)
TOTAL CK: 951 U/L (ref 39–308)
TRICHOMONAS: ABNORMAL
TROPONIN INTERP: NORMAL
TROPONIN INTERP: NORMAL
TROPONIN T: NORMAL NG/ML
TROPONIN T: NORMAL NG/ML
TROPONIN, HIGH SENSITIVITY: 19 NG/L (ref 0–22)
TROPONIN, HIGH SENSITIVITY: 22 NG/L (ref 0–22)
TURBIDITY: CLEAR
URINE HGB: NEGATIVE
UROBILINOGEN, URINE: NORMAL
WBC # BLD: 7.7 K/UL (ref 3.5–11.3)
WBC # BLD: ABNORMAL 10*3/UL
WBC UA: ABNORMAL /HPF (ref 0–5)
YEAST: ABNORMAL

## 2021-08-29 PROCEDURE — 73502 X-RAY EXAM HIP UNI 2-3 VIEWS: CPT

## 2021-08-29 PROCEDURE — 6370000000 HC RX 637 (ALT 250 FOR IP): Performed by: STUDENT IN AN ORGANIZED HEALTH CARE EDUCATION/TRAINING PROGRAM

## 2021-08-29 PROCEDURE — 96365 THER/PROPH/DIAG IV INF INIT: CPT

## 2021-08-29 PROCEDURE — 83874 ASSAY OF MYOGLOBIN: CPT

## 2021-08-29 PROCEDURE — G0378 HOSPITAL OBSERVATION PER HR: HCPCS

## 2021-08-29 PROCEDURE — 84484 ASSAY OF TROPONIN QUANT: CPT

## 2021-08-29 PROCEDURE — 80048 BASIC METABOLIC PNL TOTAL CA: CPT

## 2021-08-29 PROCEDURE — 2500000003 HC RX 250 WO HCPCS: Performed by: STUDENT IN AN ORGANIZED HEALTH CARE EDUCATION/TRAINING PROGRAM

## 2021-08-29 PROCEDURE — 81001 URINALYSIS AUTO W/SCOPE: CPT

## 2021-08-29 PROCEDURE — 83735 ASSAY OF MAGNESIUM: CPT

## 2021-08-29 PROCEDURE — 73562 X-RAY EXAM OF KNEE 3: CPT

## 2021-08-29 PROCEDURE — 84100 ASSAY OF PHOSPHORUS: CPT

## 2021-08-29 PROCEDURE — 2580000003 HC RX 258: Performed by: STUDENT IN AN ORGANIZED HEALTH CARE EDUCATION/TRAINING PROGRAM

## 2021-08-29 PROCEDURE — 36415 COLL VENOUS BLD VENIPUNCTURE: CPT

## 2021-08-29 PROCEDURE — 85025 COMPLETE CBC W/AUTO DIFF WBC: CPT

## 2021-08-29 PROCEDURE — 93005 ELECTROCARDIOGRAM TRACING: CPT | Performed by: STUDENT IN AN ORGANIZED HEALTH CARE EDUCATION/TRAINING PROGRAM

## 2021-08-29 PROCEDURE — 96361 HYDRATE IV INFUSION ADD-ON: CPT

## 2021-08-29 PROCEDURE — 82550 ASSAY OF CK (CPK): CPT

## 2021-08-29 RX ORDER — SODIUM CHLORIDE, SODIUM LACTATE, POTASSIUM CHLORIDE, CALCIUM CHLORIDE 600; 310; 30; 20 MG/100ML; MG/100ML; MG/100ML; MG/100ML
INJECTION, SOLUTION INTRAVENOUS CONTINUOUS
Status: DISCONTINUED | OUTPATIENT
Start: 2021-08-29 | End: 2021-08-29 | Stop reason: HOSPADM

## 2021-08-29 RX ORDER — FENTANYL CITRATE 50 UG/ML
25 INJECTION, SOLUTION INTRAMUSCULAR; INTRAVENOUS
Status: DISCONTINUED | OUTPATIENT
Start: 2021-08-29 | End: 2021-08-29 | Stop reason: HOSPADM

## 2021-08-29 RX ORDER — BACLOFEN 10 MG/1
10 TABLET ORAL 2 TIMES DAILY
Status: DISCONTINUED | OUTPATIENT
Start: 2021-08-29 | End: 2021-08-29 | Stop reason: HOSPADM

## 2021-08-29 RX ORDER — CALCIUM GLUCONATE 20 MG/ML
1000 INJECTION, SOLUTION INTRAVENOUS ONCE
Status: COMPLETED | OUTPATIENT
Start: 2021-08-29 | End: 2021-08-29

## 2021-08-29 RX ORDER — PANTOPRAZOLE SODIUM 40 MG/1
40 TABLET, DELAYED RELEASE ORAL
Status: DISCONTINUED | OUTPATIENT
Start: 2021-08-29 | End: 2021-08-29 | Stop reason: HOSPADM

## 2021-08-29 RX ORDER — ONDANSETRON 2 MG/ML
4 INJECTION INTRAMUSCULAR; INTRAVENOUS EVERY 6 HOURS PRN
Status: DISCONTINUED | OUTPATIENT
Start: 2021-08-29 | End: 2021-08-29 | Stop reason: HOSPADM

## 2021-08-29 RX ORDER — ACETAMINOPHEN 325 MG/1
650 TABLET ORAL EVERY 4 HOURS PRN
Status: DISCONTINUED | OUTPATIENT
Start: 2021-08-29 | End: 2021-08-29 | Stop reason: HOSPADM

## 2021-08-29 RX ORDER — 0.9 % SODIUM CHLORIDE 0.9 %
1000 INTRAVENOUS SOLUTION INTRAVENOUS ONCE
Status: COMPLETED | OUTPATIENT
Start: 2021-08-29 | End: 2021-08-29

## 2021-08-29 RX ORDER — SODIUM CHLORIDE 0.9 % (FLUSH) 0.9 %
5-40 SYRINGE (ML) INJECTION PRN
Status: DISCONTINUED | OUTPATIENT
Start: 2021-08-29 | End: 2021-08-29 | Stop reason: HOSPADM

## 2021-08-29 RX ORDER — AMLODIPINE BESYLATE 5 MG/1
5 TABLET ORAL DAILY
Status: DISCONTINUED | OUTPATIENT
Start: 2021-08-29 | End: 2021-08-29 | Stop reason: HOSPADM

## 2021-08-29 RX ORDER — ATORVASTATIN CALCIUM 40 MG/1
40 TABLET, FILM COATED ORAL DAILY
Status: DISCONTINUED | OUTPATIENT
Start: 2021-08-29 | End: 2021-08-29 | Stop reason: HOSPADM

## 2021-08-29 RX ORDER — FENTANYL CITRATE 50 UG/ML
50 INJECTION, SOLUTION INTRAMUSCULAR; INTRAVENOUS
Status: DISCONTINUED | OUTPATIENT
Start: 2021-08-29 | End: 2021-08-29 | Stop reason: HOSPADM

## 2021-08-29 RX ORDER — CYCLOBENZAPRINE HCL 10 MG
10 TABLET ORAL ONCE
Status: COMPLETED | OUTPATIENT
Start: 2021-08-29 | End: 2021-08-29

## 2021-08-29 RX ORDER — OXYCODONE HYDROCHLORIDE AND ACETAMINOPHEN 5; 325 MG/1; MG/1
1 TABLET ORAL EVERY 6 HOURS PRN
Status: DISCONTINUED | OUTPATIENT
Start: 2021-08-29 | End: 2021-08-29 | Stop reason: HOSPADM

## 2021-08-29 RX ORDER — ALLOPURINOL 100 MG/1
50 TABLET ORAL DAILY
Status: DISCONTINUED | OUTPATIENT
Start: 2021-08-29 | End: 2021-08-29 | Stop reason: HOSPADM

## 2021-08-29 RX ORDER — ONDANSETRON 4 MG/1
4 TABLET, ORALLY DISINTEGRATING ORAL EVERY 8 HOURS PRN
Status: DISCONTINUED | OUTPATIENT
Start: 2021-08-29 | End: 2021-08-29 | Stop reason: HOSPADM

## 2021-08-29 RX ORDER — SODIUM CHLORIDE, SODIUM LACTATE, POTASSIUM CHLORIDE, AND CALCIUM CHLORIDE .6; .31; .03; .02 G/100ML; G/100ML; G/100ML; G/100ML
1000 INJECTION, SOLUTION INTRAVENOUS ONCE
Status: COMPLETED | OUTPATIENT
Start: 2021-08-29 | End: 2021-08-29

## 2021-08-29 RX ORDER — SODIUM CHLORIDE 0.9 % (FLUSH) 0.9 %
5-40 SYRINGE (ML) INJECTION EVERY 12 HOURS SCHEDULED
Status: DISCONTINUED | OUTPATIENT
Start: 2021-08-29 | End: 2021-08-29 | Stop reason: HOSPADM

## 2021-08-29 RX ORDER — SODIUM CHLORIDE 9 MG/ML
25 INJECTION, SOLUTION INTRAVENOUS PRN
Status: DISCONTINUED | OUTPATIENT
Start: 2021-08-29 | End: 2021-08-29 | Stop reason: HOSPADM

## 2021-08-29 RX ORDER — OXYCODONE HYDROCHLORIDE AND ACETAMINOPHEN 5; 325 MG/1; MG/1
1 TABLET ORAL EVERY 6 HOURS PRN
Qty: 12 TABLET | Refills: 0 | Status: SHIPPED | OUTPATIENT
Start: 2021-08-29 | End: 2021-09-01

## 2021-08-29 RX ORDER — TIZANIDINE 4 MG/1
4 TABLET ORAL EVERY 8 HOURS PRN
Status: DISCONTINUED | OUTPATIENT
Start: 2021-08-29 | End: 2021-08-29 | Stop reason: HOSPADM

## 2021-08-29 RX ORDER — MELOXICAM 7.5 MG/1
7.5 TABLET ORAL DAILY
Status: DISCONTINUED | OUTPATIENT
Start: 2021-08-29 | End: 2021-08-29 | Stop reason: HOSPADM

## 2021-08-29 RX ADMIN — SODIUM CHLORIDE 1000 ML: 9 INJECTION, SOLUTION INTRAVENOUS at 00:50

## 2021-08-29 RX ADMIN — CALCIUM GLUCONATE 1000 MG: 20 INJECTION, SOLUTION INTRAVENOUS at 08:40

## 2021-08-29 RX ADMIN — SODIUM CHLORIDE, POTASSIUM CHLORIDE, SODIUM LACTATE AND CALCIUM CHLORIDE: 600; 310; 30; 20 INJECTION, SOLUTION INTRAVENOUS at 05:56

## 2021-08-29 RX ADMIN — PANTOPRAZOLE SODIUM 40 MG: 40 TABLET, DELAYED RELEASE ORAL at 08:40

## 2021-08-29 RX ADMIN — ALLOPURINOL 50 MG: 100 TABLET ORAL at 08:40

## 2021-08-29 RX ADMIN — SODIUM CHLORIDE, POTASSIUM CHLORIDE, SODIUM LACTATE AND CALCIUM CHLORIDE 1000 ML: 600; 310; 30; 20 INJECTION, SOLUTION INTRAVENOUS at 03:28

## 2021-08-29 RX ADMIN — OXYCODONE HYDROCHLORIDE AND ACETAMINOPHEN 1 TABLET: 5; 325 TABLET ORAL at 09:56

## 2021-08-29 RX ADMIN — BACLOFEN 10 MG: 10 TABLET ORAL at 08:40

## 2021-08-29 RX ADMIN — CYCLOBENZAPRINE 10 MG: 10 TABLET, FILM COATED ORAL at 03:28

## 2021-08-29 RX ADMIN — AMLODIPINE BESYLATE 5 MG: 5 TABLET ORAL at 08:40

## 2021-08-29 ASSESSMENT — ENCOUNTER SYMPTOMS
PHOTOPHOBIA: 0
COUGH: 0
SHORTNESS OF BREATH: 0
DIARRHEA: 0
CHEST TIGHTNESS: 0
NAUSEA: 0
ABDOMINAL PAIN: 0
VOMITING: 0
BACK PAIN: 1

## 2021-08-29 ASSESSMENT — PAIN SCALES - GENERAL
PAINLEVEL_OUTOF10: 8
PAINLEVEL_OUTOF10: 10

## 2021-08-29 ASSESSMENT — PAIN DESCRIPTION - LOCATION: LOCATION: GENERALIZED

## 2021-08-29 ASSESSMENT — PAIN DESCRIPTION - PAIN TYPE: TYPE: ACUTE PAIN

## 2021-08-29 NOTE — ED PROVIDER NOTES
[facility] ED  Emergency Department Encounter  EmergencyMedicine Resident     Pt Jackelin Gonzalez  MRN: [id number]  [place] 1955  Date of evaluation: 8/29/21  PCP:  Gamaliel Kennedy MD    This patient was evaluated in the Emergency Department for symptoms described in the history of present illness. The patient was evaluated in the context of the global COVID-19 pandemic, which necessitated consideration that the patient might be at risk for infection with the SARS-CoV-2 virus that causes COVID-19. Institutional protocols and algorithms that pertain to the evaluation of patients at risk for COVID-19 are in a state of rapid change based on information released by regulatory bodies including the CDC and federal and state organizations. These policies and algorithms were followed during the patient's care in the ED. CHIEF COMPLAINT       Chief Complaint   Patient presents with    Generalized Body Aches    Dehydration       HISTORY OF PRESENT ILLNESS  (Location/Symptom, Timing/Onset, Context/Setting, Quality, Duration, Modifying Factors, Severity.)      Jose Carlos Tinoco is a 61 y.o. male who presents with dehydration and diffuse muscle cramping after having completed a 12-hour karate competition earlier today. Patient states he has not had anything to drink since prior to the karate competition beginning, and is feeling extremely dehydrated. Patient having mild slurring of speech during initial exam.  Patient and his wife both state they normally manage similar symptoms after competitions at home, but today symptoms are slightly worse than usual.  Patient endorsing cramping to his bilateral lower extremities as well as bilateral upper extremities, as well as diffuse muscle weakness. States his pain is currently 10/10.   Patient also stating he is having back pain that is at baseline for him; he received a corticosteroid shot 2 days ago for chronic spinal stenosis without neurogenic claudication and chronic back pain. Medical history of spinal stenosis without neurogenic claudication, chronic back pain, chronic kidney disease, gout, hyperlipidemia, hypertension, osteoarthritis. Medications: Allopurinol, Mobic, Protonix, Norvasc, Lipitor, Zanaflex, ferrous sulfate, Percocet, magnesium oxide, melatonin, baclofen  Allergies: Codeine, shrimp    PAST MEDICAL / SURGICAL / SOCIAL / FAMILY HISTORY      has a past medical history of Chronic back pain, Chronic kidney disease, Gout, Hyperlipidemia, Hypertension, Obesity, Osteoarthritis, Restless leg syndrome, Shoulder pain, Shrimp allergy, Spinal stenosis, lumbar region, without neurogenic claudication, Swelling of joint, wrist, right, Thoracic back pain, Vertigo, Wears glasses, and Wrist pain, right.     has a past surgical history that includes nasal endoscopy (Bilateral, 10/17/2016); Nerve Block (11/02/2016); Nerve Block (04/03/2017); Colonoscopy (11/2014); Nerve Block (10/09/2017); Upper gastrointestinal endoscopy (05/27/2020); Colonoscopy (05/27/2020); Upper gastrointestinal endoscopy (N/A, 5/27/2020); and Colonoscopy (N/A, 5/27/2020).       Social History     Socioeconomic History    Marital status: Single     Spouse name: Not on file    Number of children: Not on file    Years of education: Not on file    Highest education level: Not on file   Occupational History    Not on file   Tobacco Use    Smoking status: Never Smoker    Smokeless tobacco: Never Used   Vaping Use    Vaping Use: Never used   Substance and Sexual Activity    Alcohol use: No    Drug use: No    Sexual activity: Not on file   Other Topics Concern    Not on file   Social History Narrative    Not on file     Social Determinants of Health     Financial Resource Strain: Low Risk     Difficulty of Paying Living Expenses: Not hard at all   Food Insecurity: No Food Insecurity    Worried About [address] [employer] in the Last Year: Never true    [address] in the Last Year: Never true   Transportation Needs: No Transportation Needs    Lack of Transportation (Medical): No    Lack of Transportation (Non-Medical): No   Physical Activity:     Days of Exercise per Week:     Minutes of Exercise per Session:    Stress:     Feeling of Stress :    Social Connections:     Frequency of Communication with Friends and Family:     Frequency of Social Gatherings with Friends and Family:     Attends Muslim Services:     Active Member of Clubs or Organizations:     Attends Club or Organization Meetings:     Marital Status:    Intimate Partner Violence:     Fear of Current or Ex-Partner:     Emotionally Abused:     Physically Abused:     Sexually Abused:        Family History   Problem Relation Age of Onset    Heart Disease Brother 48    Other Father     Other Brother         HIV    Other Brother        Allergies:  Codeine and Shrimp (diagnostic)    Home Medications:  Prior to Admission medications    Medication Sig Start Date End Date Taking? Authorizing Provider   allopurinol (ZYLOPRIM) 300 MG tablet TAKE 1 TABLET BY MOUTH ONCE DAILY. 6/29/21   Sherrie Crawley MD   meloxicam (MOBIC) 15 MG tablet TAKE 1 TABLET BY MOUTH ONCE DAILY. 6/29/21   Sherrie Crawley MD   pantoprazole (PROTONIX) 40 MG tablet TAKE 1 TABLET BY MOUTH ONE TIME A DAY 6/1/21   Sherrie Crawley MD   amLODIPine (NORVASC) 5 MG tablet TAKE 1 TABLET BY MOUTH ONE TIME A DAY 4/1/21   Sherrie Crawley MD   atorvastatin (LIPITOR) 80 MG tablet TAKE 1 TABLET BY MOUTH ONCE DAILY. 4/1/21   Sherrie Crawley MD   tiZANidine (ZANAFLEX) 4 MG tablet Take 1 tablet by mouth every 8 hours as needed (spasms) 3/30/21   Sherrie Crawley MD   ferrous sulfate (FEROSUL) 325 (65 Fe) MG tablet TAKE 1 TABLET BY MOUTH daily 3/30/21   Sherrie Crawley MD   oxyCODONE-acetaminophen (PERCOCET)  MG per tablet Take 1 tablet by mouth 3 times daily.  10/2/20   Historical Provider, MD   EPINEPHrine (EPIPEN 2-TANG) 0.3 MG/0.3ML SOAJ injection Inject 0.3 mLs into the skin once for 1 dose Use as directed for allergic reaction 10/12/20 4/1/22  Rolly White MD   diphenhydrAMINE (BENADRYL) 25 MG capsule Take 1-2 tablets by mouth every 6 hours as needed for itching. 10/12/20   Rolly White MD   magnesium oxide (MAG-OX) 400 MG tablet TAKE 1 TABLET BY MOUTH ONE TIME A DAY 7/30/20   Rolly White MD   diclofenac sodium (VOLTAREN) 1 % GEL Apply 2 g topically 2 times daily    Historical Provider, MD   Melatonin 10 MG CAPS Take by mouth    Historical Provider, MD   baclofen (LIORESAL) 10 MG tablet TAKE 1 TABLET BY MOUTH 2 TIMES A DAY 5/1/20   Rolly White MD       REVIEW OF SYSTEMS    (2-9 systems for level 4, 10 or more for level 5)      Review of Systems   Constitutional: Positive for fatigue. HENT: Negative. Eyes: Negative for photophobia and visual disturbance. Respiratory: Negative for cough, chest tightness and shortness of breath. Cardiovascular: Negative for chest pain, palpitations and leg swelling. Gastrointestinal: Negative for abdominal pain, diarrhea, nausea and vomiting. Genitourinary: Negative for decreased urine volume, difficulty urinating, dysuria, flank pain, hematuria and urgency. Musculoskeletal: Positive for arthralgias (Bilateral lower extremities, worse in right hip and right knee), back pain (Baseline back pain, received corticosteroid shot 3 days ago) and myalgias. Negative for gait problem. Skin: Positive for pallor. Negative for wound. Neurological: Positive for dizziness, speech difficulty (Mildly slurred speech per wife, which she states is normal for him after competitions), weakness (Patient endorses diffuse muscle weakness) and light-headedness. Negative for tremors, seizures, syncope, facial asymmetry, numbness and headaches.        PHYSICAL EXAM   (up to 7 for level 4, 8 or more for level 5)      INITIAL VITALS:   BP (!) 139/58   Pulse 73   Temp 98.1 °F (36.7 °C) (Oral)   Resp 16   Ht 6' (1.829 m)   Wt 225 lb (102.1 kg)   SpO2 98%   BMI 30.52 kg/m²     Physical Exam  Vitals reviewed. Constitutional:       General: He is not in acute distress. Appearance: He is not ill-appearing or diaphoretic. HENT:      Head: Normocephalic and atraumatic. Right Ear: Tympanic membrane normal.      Left Ear: Tympanic membrane normal.      Nose: Nose normal. No congestion or rhinorrhea. Mouth/Throat:      Mouth: Mucous membranes are dry. Comments: Very dry mucous membranes  Eyes:      Extraocular Movements: Extraocular movements intact. Pupils: Pupils are equal, round, and reactive to light. Cardiovascular:      Rate and Rhythm: Normal rate and regular rhythm. Pulses: Normal pulses. Heart sounds: Normal heart sounds. Pulmonary:      Effort: Pulmonary effort is normal. No respiratory distress. Breath sounds: Normal breath sounds. Abdominal:      General: Abdomen is flat. Palpations: Abdomen is soft. Tenderness: There is no abdominal tenderness. Musculoskeletal:         General: Tenderness (Mildly tender to palpation at right hip and right knee) present. No swelling or signs of injury. Cervical back: Neck supple. Right lower leg: No edema. Left lower leg: No edema. Comments: Strength 4 out of 5 in bilateral lower and upper extremities. Patient endorsing cramping in bilateral upper and lower extremities. Skin:     Capillary Refill: Capillary refill takes 2 to 3 seconds. Coloration: Skin is pale. Neurological:      Mental Status: He is oriented to person, place, and time. Mental status is at baseline. Sensory: No sensory deficit. Motor: Weakness present.       Coordination: Coordination normal.      Gait: Gait normal.      Deep Tendon Reflexes: Reflexes normal.         DIFFERENTIAL  DIAGNOSIS     PLAN (LABS / IMAGING / EKG):  Orders Placed This Encounter   Procedures    XR HIP RIGHT (2-3 VIEWS)    XR KNEE RIGHT (3 VIEWS)    CK    MYOGLOBIN, SERUM    Basic Metabolic Panel w/ Reflex to MG    Urinalysis with microscopic    CBC WITH AUTO DIFFERENTIAL    Troponin    Troponin    EKG 12 Lead    Insert peripheral IV       MEDICATIONS ORDERED:  Orders Placed This Encounter   Medications    0.9 % sodium chloride bolus     DDX: Rhabdomyolysis versus dehydration versus lactic acidosis versus electrolyte imbalance versus cardiac dysrhythmia versus cardiac ischemia secondary to lactic acidosis/exertion    DIAGNOSTIC RESULTS / EMERGENCY DEPARTMENT COURSE / MDM   LAB RESULTS:  Results for orders placed or performed during the hospital encounter of 08/29/21   CK   Result Value Ref Range    Total  (H) 39 - 308 U/L   MYOGLOBIN, SERUM   Result Value Ref Range    Myoglobin 534 (H) 28 - 72 ng/mL   Basic Metabolic Panel w/ Reflex to MG   Result Value Ref Range    Glucose 104 (H) 70 - 99 mg/dL    BUN 25 (H) 8 - 23 mg/dL    CREATININE 1.31 (H) 0.70 - 1.20 mg/dL    Bun/Cre Ratio NOT REPORTED 9 - 20    Calcium 8.5 (L) 8.6 - 10.4 mg/dL    Sodium 132 (L) 135 - 144 mmol/L    Potassium 3.9 3.7 - 5.3 mmol/L    Chloride 97 (L) 98 - 107 mmol/L    CO2 23 20 - 31 mmol/L    Anion Gap 12 9 - 17 mmol/L    GFR Non-African American 55 (L) >60 mL/min    GFR African American >60 >60 mL/min    GFR Comment          GFR Staging NOT REPORTED    CBC WITH AUTO DIFFERENTIAL   Result Value Ref Range    WBC 7.7 3.5 - 11.3 k/uL    RBC 3.09 (L) 4.21 - 5.77 m/uL    Hemoglobin 9.8 (L) 13.0 - 17.0 g/dL    Hematocrit 29.7 (L) 40.7 - 50.3 %    MCV 96.1 82.6 - 102.9 fL    MCH 31.7 25.2 - 33.5 pg    MCHC 33.0 28.4 - 34.8 g/dL    RDW 12.8 11.8 - 14.4 %    Platelets 621 185 - 237 k/uL    MPV 9.7 8.1 - 13.5 fL    NRBC Automated 0.0 0.0 per 100 WBC    Differential Type NOT REPORTED     Seg Neutrophils 70 (H) 36 - 65 %    Lymphocytes 15 (L) 24 - 43 %    Monocytes 13 (H) 3 - 12 %    Eosinophils % 1 1 - 4 %    Basophils 1 0 - 2 %    Immature Granulocytes 0 0 %    Segs Absolute 5. 40 1.50 - 8.10 k/uL    Absolute Lymph # 1.19 1.10 - 3.70 k/uL    Absolute Mono # 1.01 0.10 - 1.20 k/uL    Absolute Eos # 0.04 0.00 - 0.44 k/uL    Basophils Absolute 0.05 0.00 - 0.20 k/uL    Absolute Immature Granulocyte 0.03 0.00 - 0.30 k/uL    WBC Morphology NOT REPORTED     RBC Morphology NOT REPORTED     Platelet Estimate NOT REPORTED    Troponin   Result Value Ref Range    Troponin, High Sensitivity 22 0 - 22 ng/L    Troponin T NOT REPORTED <0.03 ng/mL    Troponin Interp NOT REPORTED    Troponin   Result Value Ref Range    Troponin, High Sensitivity 19 0 - 22 ng/L    Troponin T NOT REPORTED <0.03 ng/mL    Troponin Interp NOT REPORTED        IMPRESSION: Elevated myoglobin and CK, BUN/creatinine mildly elevated, consistent with clinically suspected exertional rhabdomyolysis. Electrolytes relatively within normal limits. Initial troponin mildly elevated. Second troponin downtrending. RADIOLOGY:  XR HIP RIGHT (2-3 VIEWS)    Result Date: 8/29/2021  EXAMINATION: TWO XRAY VIEWS OF THE RIGHT HIP; THREE XRAY VIEWS OF THE RIGHT KNEE 8/29/2021 1:12 am COMPARISON: Right knee radiograph 12/02/2013, right hip radiograph 06/06/2011 HISTORY: ORDERING SYSTEM PROVIDED HISTORY: right hip pain after 12 hour karate competition TECHNOLOGIST PROVIDED HISTORY: right hip pain after 12 hour karate competition Reason for Exam: 12 hours of karate, pt states \"over use\" Acuity: Acute Type of Exam: Initial; ORDERING SYSTEM PROVIDED HISTORY: right knee pain after 12 hour karate competition TECHNOLOGIST PROVIDED HISTORY: right knee pain after 12 hour karate competition Reason for Exam: 12 hours of karate, pt states \"over use\" Acuity: Acute Type of Exam: Initial FINDINGS: RIGHT HIP: No evident acute fracture. Joints maintain anatomic alignment. Incompletely evaluated lumbar spine degenerative changes. Increased moderate to severe degenerative changes of the hip with the femoral head abutting the acetabular roof.   Moderate to severe degenerative changes of the pubic symphysis with at least mild involvement of the sacroiliac joints. No obvious acute soft abnormality. RIGHT KNEE: No evident acute fracture. New irregularity along the femoral medial epicondyle, suggesting prior avulsion injury related to the medial collateral ligament. Joints maintain anatomic alignment. Mild tricompartmental degenerative changes. Unchanged bony fragment near the tibial tubercle, potentially a sequela of enthesopathy or remote Osgood-Schlatter disease. No evident suprapatellar effusion. No obvious acute soft abnormality. 1. No evident acute findings in the right hip or right knee. 2. Degenerative changes as above. XR KNEE RIGHT (3 VIEWS)    Result Date: 8/29/2021  EXAMINATION: TWO XRAY VIEWS OF THE RIGHT HIP; THREE XRAY VIEWS OF THE RIGHT KNEE 8/29/2021 1:12 am COMPARISON: Right knee radiograph 12/02/2013, right hip radiograph 06/06/2011 HISTORY: ORDERING SYSTEM PROVIDED HISTORY: right hip pain after 12 hour karate competition TECHNOLOGIST PROVIDED HISTORY: right hip pain after 12 hour karate competition Reason for Exam: 12 hours of karate, pt states \"over use\" Acuity: Acute Type of Exam: Initial; ORDERING SYSTEM PROVIDED HISTORY: right knee pain after 12 hour karate competition TECHNOLOGIST PROVIDED HISTORY: right knee pain after 12 hour karate competition Reason for Exam: 12 hours of karate, pt states \"over use\" Acuity: Acute Type of Exam: Initial FINDINGS: RIGHT HIP: No evident acute fracture. Joints maintain anatomic alignment. Incompletely evaluated lumbar spine degenerative changes. Increased moderate to severe degenerative changes of the hip with the femoral head abutting the acetabular roof. Moderate to severe degenerative changes of the pubic symphysis with at least mild involvement of the sacroiliac joints. No obvious acute soft abnormality. RIGHT KNEE: No evident acute fracture.   New irregularity along the femoral medial epicondyle, suggesting prior avulsion injury related to the medial collateral ligament. Joints maintain anatomic alignment. Mild tricompartmental degenerative changes. Unchanged bony fragment near the tibial tubercle, potentially a sequela of enthesopathy or remote Osgood-Schlatter disease. No evident suprapatellar effusion. No obvious acute soft abnormality. 1. No evident acute findings in the right hip or right knee. 2. Degenerative changes as above. EKG  Normal sinus, heart rate 69, no ischemia, left axis, QT corrected 430    All EKG's are interpreted by the Emergency Department Physician who either signs or Co-signs this chart in the absence of a cardiologist.    EMERGENCY DEPARTMENT COURSE:  ED Course as of Aug 29 0224   Rony Grijalva Aug 29, 2021   0047 Patient presents after having completed a 12-hour karate competition earlier today. Patient states he has not had anything to drink since prior to the karate competition beginning, and is feeling extremely dehydrated. Patient having mild slurring of speech, as well as significant tenderness to the right hip and knee. Patient and his wife both state they normally manage his symptoms after competitions at home, but this is slightly worse than usual.  Patient endorsing cramping to his bilateral lower extremities as well as bilateral upper extremities. On exam, mucous membranes dry, patient clinically dehydrated. PERRLA. Capillary refill 2 to 3 seconds. Patient strength 4 out of 5 in bilateral upper and lower extremities. Neurovascularly intact. Patient denying chest pain, shortness of breath, difficulty breathing, nausea, vomiting, diarrhea. EKG showing normal sinus, heart rate 69, no ischemia, left axis, QT corrected 430. Will start 1 L normal saline bolus, and collect CBC, troponin, BMP, CK, myoglobin, UA. [JG]   0127 Troponin 22.   Myoglobin elevated at 534, CK elevated at 950, BUN/creatinine elevated at 25/1.31, consistent with clinically suspected exertional rhabdomyolysis. Sodium 132, potassium 3.9, chloride 97, CO2 23, calcium 8.5, glucose 104. Anion gap 12.  1 L normal saline bolus running, patient resting comfortably in the bed, respirations even and unlabored. Patient states his cramping is resolving. [JG]   0148 X-ray right hip and knee showing new irregularity along the femoral medial epicondyle, suggesting prior avulsion injury related to the medial collateral ligament. Joints maintain anatomic alignment. Mild tricompartmental degenerative changes. Unchanged bony fragment near the tibial tubercle, potentially a sequela of  enthesopathy or remote Osgood-Schlatter disease. No evident suprapatellar effusion. No obvious acute soft abnormality. [JG]   0224 Repeat troponin 19. Patient signed out to Dr. Jon Arellano at this time, please refer to his note for continuation of ED course. [JG]      ED Course User Index  [JG] Marit Saint, MD       PROCEDURES:  None    CONSULTS:  None    CRITICAL CARE:  None    FINAL IMPRESSION      1. Dehydration after exertion          DISPOSITION / PLAN     DISPOSITION    Patient signed out to Dr. Jon Arellano at 2:15 AM. Please refer to his note for disposition. PATIENT REFERRED TO:  No follow-up provider specified.     DISCHARGE MEDICATIONS:  Current Discharge Medication List          Marit Saint, MD  Emergency Medicine Resident    (Please note that portions of thisnote were completed with a voice recognition program.  Efforts were made to edit the dictations but occasionally words are mis-transcribed.)       Marit Saint, MD  Resident  08/29/21 [id number]

## 2021-08-29 NOTE — CARE COORDINATION
Case Management Initial Discharge Plan  Sharmaine Min,             Met with:patient to discuss discharge plans. Information verified: address, contacts, phone number, , insurance Yes  Insurance Provider: [employer]     Emergency Contact/Next of Kin name & number: Sana Merritt, [address]  Who are involved in patient's support system? SO     PCP: Phoebe Maria MD  Date of last visit: 2 weeks ago       Discharge Planning    Living Arrangements:        Home has 2 stories  5 stairs to climb to get into front door, 1 flight stairs to climb to reach second floor  Location of bedroom/bathroom in home main level     Patient able to perform ADL's:Independent    Current Services (outpatient & in home) none  DME equipment: na   DME provider: na    Is patient receiving oral anticoagulation therapy? No    If indicated:   Physician managing anticoagulation treatment: na  Where does patient obtain lab work for ATC treatment? Na       Potential Assistance Needed:       Patient agreeable to home care: No  [place]omers of choice provided:  n/a    Prior SNF/Rehab Placement and Facility: none   Agreeable to SNF/Rehab: No  Somers of choice provided: n/a     Evaluation: no    Expected Discharge date:       Patient expects to be discharged to: If home: is the family and/or caregiver wiling & able to provide support at home? Yes   Who will be providing this support? SO    Follow Up Appointment: Best Day/ Time:      Transportation provider: CRISTOPHER   Transportation arrangements needed for discharge: No    Readmission Risk              Risk of Unplanned Readmission:  0             Does patient have a readmission risk score greater than 14?: No  If yes, follow-up appointment must be made within 7 days of discharge.      Goals of Care: Decreased pain and to get hydrated       Educated patient  on transitional options, provided freedom of choice and are agreeable with plan      Discharge Plan: Home independently           Electronically signed by Faye Hernadez RN on 8/29/21 at 10:18 AM EDT

## 2021-08-29 NOTE — ED NOTES
Pt ambulates to bathroom with steady gait.  Pt provided specimen cup     Joanne Paz RN  08/29/21 [id number]

## 2021-08-29 NOTE — H&P
[facility]  CDU / OBSERVATION eNCOUnter  Resident Note     Pt Name: Jeremiah Stephens  MRN: [id number]  [place] 1955  Date of evaluation: 8/29/21  Patient's PCP is :  Pro Gilmore MD    [address] [place] [address]       Chief Complaint   Patient presents with    Generalized Body Aches    Dehydration         HISTORY OF PRESENT ILLNESS     Jeremiah Stephens is a 61 y.o. male pmh gout, CKD, HLD and chronic back pain who presents with dehydration and diffuse muscle cramping after having completed a 12-hour karate competition earlier today. Patient states he has not had anything to drink since prior to the karate competition beginning, and is feeling extremely dehydrated. Patient having mild slurring of speech during initial exam.  Patient and his wife both state they normally manage similar symptoms after competitions at home, but today symptoms are slightly worse than usual.  Patient endorsing cramping to his bilateral lower extremities as well as bilateral upper extremities, as well as diffuse muscle weakness. States his pain is currently 10/10. Patient also stating he is having back pain that is at baseline for him; he received a corticosteroid shot 2 days ago for chronic spinal stenosis without neurogenic claudication and chronic back pain.     Location/Symptom: diffuse LE/UE  Timing/Onset: yesterday  Provocation: karate  Quality: crampy  Radiation: none  Severity: 10/10  Timing/Duration: continous  Modifying Factors: none    REVIEW OF SYSTEMS       General ROS - No fevers, No malaise   Ophthalmic ROS - No discharge, No changes in vision  ENT ROS -  No sore throat, No rhinorrhea,   Respiratory ROS - no shortness of breath, no cough, no  wheezing  Cardiovascular ROS - No chest pain, no dyspnea on exertion  Gastrointestinal ROS - No abdominal pain, no nausea or vomiting, no change in bowel habits, no black or bloody stools  Genito-Urinary ROS - No dysuria, trouble voiding, or hematuria  Musculoskeletal ROS - + myalgias, No arthalgias  Neurological ROS - No headache, no dizziness/lightheadedness, No focal weakness, no loss of sensation  Dermatological ROS - No lesions, No rash     (PQRS) Advance directives on face sheet per hospital policy. No change unless specifically mentioned in chart    PAST MEDICAL HISTORY    has a past medical history of Chronic back pain, Chronic kidney disease, Gout, Hyperlipidemia, Hypertension, Obesity, Osteoarthritis, Restless leg syndrome, Shoulder pain, Shrimp allergy, Spinal stenosis, lumbar region, without neurogenic claudication, Swelling of joint, wrist, right, Thoracic back pain, Vertigo, Wears glasses, and Wrist pain, right. I have reviewed the past medical history with the patient and it is pertinent to this complaint. SURGICAL HISTORY      has a past surgical history that includes nasal endoscopy (Bilateral, 10/17/2016); Nerve Block (11/02/2016); Nerve Block (04/03/2017); Colonoscopy (11/2014); Nerve Block (10/09/2017); Upper gastrointestinal endoscopy (05/27/2020); Colonoscopy (05/27/2020); Upper gastrointestinal endoscopy (N/A, 5/27/2020); and Colonoscopy (N/A, 5/27/2020). I have reviewed and agree with Surgical History entered and it is pertinent to this complaint.      CURRENT MEDICATIONS     allopurinol (ZYLOPRIM) tablet 50 mg, Daily  amLODIPine (NORVASC) tablet 5 mg, Daily  diclofenac sodium (VOLTAREN) 1 % gel 2 g, BID  pantoprazole (PROTONIX) tablet 40 mg, QAM AC  magnesium oxide (MAG-OX) tablet 400 mg, Daily  meloxicam (MOBIC) tablet 7.5 mg, Daily  atorvastatin (LIPITOR) tablet 40 mg, Daily  baclofen (LIORESAL) tablet 10 mg, BID  tiZANidine (ZANAFLEX) tablet 4 mg, Q8H PRN  sodium chloride flush 0.9 % injection 5-40 mL, 2 times per day  sodium chloride flush 0.9 % injection 5-40 mL, PRN  0.9 % sodium chloride infusion, PRN  enoxaparin (LOVENOX) injection 40 mg, Daily  acetaminophen (TYLENOL) tablet 650 mg, Q4H PRN  ondansetron (ZOFRAN-ODT) disintegrating tablet 4 mg, Q8H PRN   Or  ondansetron (ZOFRAN) injection 4 mg, Q6H PRN  fentaNYL (SUBLIMAZE) injection 25 mcg, Q1H PRN   Or  fentaNYL (SUBLIMAZE) injection 50 mcg, Q1H PRN  lactated ringers infusion, Continuous        All medication charted and reviewed. ALLERGIES     is allergic to codeine and shrimp (diagnostic). FAMILY HISTORY     He indicated that his mother is alive. He indicated that his father is . He indicated that both of his sisters are alive. He indicated that all of his three brothers are . family history includes Heart Disease (age of onset: 48) in his brother; Other in his brother, brother, and father. The patient denies any pertinent family history. I have reviewed and agree with the family history entered. I have reviewed the Family History and it is not significant to the case    SOCIAL HISTORY      reports that he has never smoked. He has never used smokeless tobacco. He reports that he does not drink alcohol and does not use drugs. I have reviewed and agree with all Social.  There are no concerns for substance abuse/use. PHYSICAL EXAM     INITIAL VITALS:  height is 6' (1.829 m) and weight is 225 lb (102.1 kg). His oral temperature is 97.5 °F (36.4 °C). His blood pressure is 159/59 (abnormal) and his pulse is 59. His respiration is 18 and oxygen saturation is 98%.       CONSTITUTIONAL: AOx4, no apparent distress, appears stated age    HEAD: normocephalic, atraumatic   EYES: PERRLA, EOMI    ENT: moist mucous membranes, uvula midline   NECK: supple, symmetric   BACK: symmetric   LUNGS: clear to auscultation bilaterally   CARDIOVASCULAR: regular rate and rhythm, no murmurs, rubs or gallops   ABDOMEN: soft, non-tender, non-distended with normal active bowel sounds   NEUROLOGIC:  MAEx4, no focal sensory or motor deficits   MUSCULOSKELETAL: no clubbing, cyanosis or edema   SKIN: no rash or wounds       DIFFERENTIAL DIAGNOSIS/MDM: Rhabdomyolysis - CK/myoglobin      DIAGNOSTIC RESULTS     EKG: All EKG's are interpreted by the Observation Physician who either signs or Co-signs this chart in the absence of a cardiologist.    EKG Interpretation    No EKG indicated    Ute Olivas DO        RADIOLOGY:   I directly visualized the following  images and reviewed the radiologist interpretations:    XR HIP RIGHT (2-3 VIEWS)    Result Date: 8/29/2021  EXAMINATION: TWO XRAY VIEWS OF THE RIGHT HIP; THREE XRAY VIEWS OF THE RIGHT KNEE 8/29/2021 1:12 am COMPARISON: Right knee radiograph 12/02/2013, right hip radiograph 06/06/2011 HISTORY: ORDERING SYSTEM PROVIDED HISTORY: right hip pain after 12 hour karate competition TECHNOLOGIST PROVIDED HISTORY: right hip pain after 12 hour karate competition Reason for Exam: 12 hours of karate, pt states \"over use\" Acuity: Acute Type of Exam: Initial; ORDERING SYSTEM PROVIDED HISTORY: right knee pain after 12 hour karate competition TECHNOLOGIST PROVIDED HISTORY: right knee pain after 12 hour karate competition Reason for Exam: 12 hours of karate, pt states \"over use\" Acuity: Acute Type of Exam: Initial FINDINGS: RIGHT HIP: No evident acute fracture. Joints maintain anatomic alignment. Incompletely evaluated lumbar spine degenerative changes. Increased moderate to severe degenerative changes of the hip with the femoral head abutting the acetabular roof. Moderate to severe degenerative changes of the pubic symphysis with at least mild involvement of the sacroiliac joints. No obvious acute soft abnormality. RIGHT KNEE: No evident acute fracture. New irregularity along the femoral medial epicondyle, suggesting prior avulsion injury related to the medial collateral ligament. Joints maintain anatomic alignment. Mild tricompartmental degenerative changes. Unchanged bony fragment near the tibial tubercle, potentially a sequela of enthesopathy or remote Osgood-Schlatter disease.   No evident suprapatellar effusion. No obvious acute soft abnormality. 1. No evident acute findings in the right hip or right knee. 2. Degenerative changes as above. XR KNEE RIGHT (3 VIEWS)    Result Date: 8/29/2021  EXAMINATION: TWO XRAY VIEWS OF THE RIGHT HIP; THREE XRAY VIEWS OF THE RIGHT KNEE 8/29/2021 1:12 am COMPARISON: Right knee radiograph 12/02/2013, right hip radiograph 06/06/2011 HISTORY: ORDERING SYSTEM PROVIDED HISTORY: right hip pain after 12 hour karate competition TECHNOLOGIST PROVIDED HISTORY: right hip pain after 12 hour karate competition Reason for Exam: 12 hours of karate, pt states \"over use\" Acuity: Acute Type of Exam: Initial; ORDERING SYSTEM PROVIDED HISTORY: right knee pain after 12 hour karate competition TECHNOLOGIST PROVIDED HISTORY: right knee pain after 12 hour karate competition Reason for Exam: 12 hours of karate, pt states \"over use\" Acuity: Acute Type of Exam: Initial FINDINGS: RIGHT HIP: No evident acute fracture. Joints maintain anatomic alignment. Incompletely evaluated lumbar spine degenerative changes. Increased moderate to severe degenerative changes of the hip with the femoral head abutting the acetabular roof. Moderate to severe degenerative changes of the pubic symphysis with at least mild involvement of the sacroiliac joints. No obvious acute soft abnormality. RIGHT KNEE: No evident acute fracture. New irregularity along the femoral medial epicondyle, suggesting prior avulsion injury related to the medial collateral ligament. Joints maintain anatomic alignment. Mild tricompartmental degenerative changes. Unchanged bony fragment near the tibial tubercle, potentially a sequela of enthesopathy or remote Osgood-Schlatter disease. No evident suprapatellar effusion. No obvious acute soft abnormality. 1. No evident acute findings in the right hip or right knee. 2. Degenerative changes as above. LABS:  I have reviewed and interpreted all available lab results.   Labs Reviewed CK - Abnormal; Notable for the following components:       Result Value    Total  (*)     All other components within normal limits   MYOGLOBIN, SERUM - Abnormal; Notable for the following components:    Myoglobin 534 (*)     All other components within normal limits   BASIC METABOLIC PANEL W/ REFLEX TO MG FOR LOW K - Abnormal; Notable for the following components:    Glucose 104 (*)     BUN 25 (*)     CREATININE 1.31 (*)     Calcium 8.5 (*)     Sodium 132 (*)     Chloride 97 (*)     GFR Non- 55 (*)     All other components within normal limits   URINALYSIS WITH MICROSCOPIC - Abnormal; Notable for the following components:    Specific Gravity, UA 1.003 (*)     All other components within normal limits   CBC WITH AUTO DIFFERENTIAL - Abnormal; Notable for the following components:    RBC 3.09 (*)     Hemoglobin 9.8 (*)     Hematocrit 29.7 (*)     Seg Neutrophils 70 (*)     Lymphocytes 15 (*)     Monocytes 13 (*)     All other components within normal limits   TROPONIN   TROPONIN       SCREENING TOOLS:    HEART Risk Score for Chest Pain Patients   History and Physical Exam Suspicion Level  (Nausea, Vomiting, Diaphoresis, Radiation, Exertion)   Slightly Suspicious (0 pts)   Moderately Suspicious (1 pt)   Highly Suspicious (2 pts)   EKG Interpretation   Normal (0 pts)   Non-Specific Repolarization Disturbance (1 pt)   Significant ST-Depression (2 pts)   Age of Patient (in years)   = 39 (0 pts)   46-64 (1 pt)   = 65 (2 pts)   Risk Factors   No Risk Factors (0 pts)   1-2 Risk Factors (1 pt)   = 3 Risk Factors (2 pts)   Risk Factors Include:   Hypercholesterolemia   Hypertension   Diabetes Mellitus   Cigarette smoking   Positive family history   Obesity   CAD   (SLE, CKDz, HIV, Cocaine abuse)   Troponin Levels   = Normal Limit (0 pts)   1-3 Times Normal Limit (1 pt)   > 3 Times Normal Limit (2 pts)  TOTAL:    Percent Risk for Major Adverse Cardiac Event (MACE)  0-3 pts indicates low risk for MACE 2.5% (DISCHARGE)   4-7 pts indicates moderate risk for MACE  20.3% (OBS)  8-10 pts indicates high risk for MACE  72.7% (EARLY INVASIVE TX)    CDU IMPRESSION / PLAN     Cornelius Argueta is a 61 y.o. male pmh gout, CKD, HLD and chronic back pain who presents with dehydration and diffuse muscle cramping after having completed a 12-hour karate competition earlier today. Rhabdomyolysis  -Elevated CK/Myoglobin. Trending down  -Electrolytes WNL  -Fluids, analgesia, and PRN electrolyte replacement    SANJU  -Secondary to rhabdomyolysis  -Creatinine improved with fluid resucitiation  -Good UOP normal color      · Continue home medications and pain control  · Monitor vitals, labs, and imaging  · DISPO: pending consults and clinical improvement    CONSULTS:    None    PROCEDURES:  Not indicated       PATIENT REFERRED TO:    MD Annalee Alexander [facility] [address] [phone]    Call today  for followup and reevaluation in 1-2 days    [facility]  [address] [place] [address]  [phone]  Go to   If symptoms worsen, As needed      --  Lamberto Coronel,    Emergency Medicine Resident     This dictation was generated by voice recognition computer software. Although all attempts are made to edit the dictation for accuracy, there may be errors in the transcription that are not intended.

## 2021-08-29 NOTE — ED TRIAGE NOTES
Pt c/o generalized body & muscle cramping, states that he has been training for karate black belt, pt states that he normally manages symptoms at home but Alec Cranker s is extreme, pt states that he did have chest pain 2 days ago, denies shortness of breath, triage & ekg completed

## 2021-08-29 NOTE — ED PROVIDER NOTES
[address] [facility]  Emergency Department  Emergency Medicine Resident Sign-out     Care of Joselito Mcpherson was assumed from Dr. Ghada Javier and is being seen for Generalized Body Aches and Dehydration   . The patient's initial evaluation and plan have been discussed with the prior provider who initially evaluated the patient. EMERGENCY DEPARTMENT COURSE / MEDICAL DECISION MAKING:       MEDICATIONS GIVEN:  Orders Placed This Encounter   Medications    0.9 % sodium chloride bolus       LABS / RADIOLOGY:     Labs Reviewed   CK - Abnormal; Notable for the following components:       Result Value    Total  (*)     All other components within normal limits   MYOGLOBIN, SERUM - Abnormal; Notable for the following components:    Myoglobin 534 (*)     All other components within normal limits   BASIC METABOLIC PANEL W/ REFLEX TO MG FOR LOW K - Abnormal; Notable for the following components:    Glucose 104 (*)     BUN 25 (*)     CREATININE 1.31 (*)     Calcium 8.5 (*)     Sodium 132 (*)     Chloride 97 (*)     GFR Non- 55 (*)     All other components within normal limits   CBC WITH AUTO DIFFERENTIAL - Abnormal; Notable for the following components:    RBC 3.09 (*)     Hemoglobin 9.8 (*)     Hematocrit 29.7 (*)     Seg Neutrophils 70 (*)     Lymphocytes 15 (*)     Monocytes 13 (*)     All other components within normal limits   TROPONIN   URINALYSIS WITH MICROSCOPIC   TROPONIN       XR HIP RIGHT (2-3 VIEWS)   Final Result   1. No evident acute findings in the right hip or right knee. 2. Degenerative changes as above. XR KNEE RIGHT (3 VIEWS)   Final Result   1. No evident acute findings in the right hip or right knee. 2. Degenerative changes as above. RECENT VITALS:     Temp: 98.1 °F (36.7 °C),  Pulse: 73, Resp: 16, BP: (!) 139/58, SpO2: 98 %    This patient is a 61 y.o. Male with myalgia after exercise.  Mild rhabdo, Pending repeat troponin    OUTSTANDING TASKS / RECOMMENDATIONS:      1. Trop then reeval  ED Course as of Aug 29 0521   Sandra Hopkins Aug 29, 2021   4314 Patient presents after having completed a 12-hour karate competition earlier today. Patient states he has not had anything to drink since prior to the karate competition beginning, and is feeling extremely dehydrated. Patient having mild slurring of speech, as well as significant tenderness to the right hip and knee. Patient and his wife both state they normally manage his symptoms after competitions at home, but this is slightly worse than usual.  Patient endorsing cramping to his bilateral lower extremities as well as bilateral upper extremities. On exam, mucous membranes dry, patient clinically dehydrated. PERRLA. Capillary refill 2 to 3 seconds. Patient strength 4 out of 5 in bilateral upper and lower extremities. Neurovascularly intact. Patient denying chest pain, shortness of breath, difficulty breathing, nausea, vomiting, diarrhea. EKG showing normal sinus, heart rate 69, no ischemia, left axis, QT corrected 430. Will start 1 L normal saline bolus, and collect CBC, troponin, BMP, CK, myoglobin, UA. [JG]   0127 Troponin 22. Myoglobin elevated at 534, CK elevated at 950, BUN/creatinine elevated at 25/1.31, consistent with clinically suspected exertional rhabdomyolysis. Sodium 132, potassium 3.9, chloride 97, CO2 23, calcium 8.5, glucose 104. Anion gap 12.  1 L normal saline bolus running, patient resting comfortably in the bed, respirations even and unlabored. Patient states his cramping is resolving. [JG]   0148 X-ray right hip and knee showing new irregularity along the femoral medial epicondyle, suggesting prior avulsion injury related to the medial collateral ligament. Joints maintain anatomic alignment. Mild tricompartmental degenerative changes. Unchanged bony fragment near the tibial tubercle, potentially a sequela of  enthesopathy or remote Osgood-Schlatter disease.   No evident suprapatellar effusion. No obvious acute soft abnormality. [JG]   0224 Repeat troponin 19. Patient signed out to Dr. Coretta Bruner at this time, please refer to his note for continuation of ED course. [JG]   0302 Not feeling well enough to go home will admit to obs    [BG]      ED Course User Index  [BG] Linus Rodriguez DO  [JG] Mary Zamorano MD         Pt reevaluated, still uncomfortable not feeling well enough to go home. Will admit to obs and provide additional fluilds     FINAL IMPRESSION:     1. Dehydration after exertion        DISPOSITION:       DISPOSITION:  []  Discharge   []  Transfer -    [x]  Admission -  obs []  Against Medical Advice   []  Eloped   FOLLOW-UP: No follow-up provider specified.    DISCHARGE MEDICATIONS: Current Discharge Medication List             Linus Rodriguez DO  Emergency Medicine Resident  [facility]       Linus Rodriguez, [address] [facility]  Resident  08/29/21 [address], DO  Resident  08/29/21 [address], DO  Resident  08/29/21 [address], DO  Resident  08/29/21 [id number]

## 2021-08-29 NOTE — DISCHARGE SUMMARY
CDU Discharge Summary        Patient:  Vidhya Moser  YOB: 1955    MRN: [id number]   Acct: [de-identified]    Primary Care Physician: Gregorio Landis MD    Admit date:  2021 12:16 AM  Discharge date: 21    Discharge Diagnoses:     Acute rhabdomyolysis and SANJU due to over exertion  Improved with fluids, rest, and analgesia    Follow-up:  Call today/tomorrow for a follow up appointment with Gregorio Landis MD , or return to the Emergency Room with worsening symptoms    Stressed to patient the importance of following up with primary care doctor for further workup/management of symptoms. Pt verbalizes understanding and agrees with plan. Discharge Medications:  Changes to medications        [facility] Medication Instructions [facility]    Printed on:21 [id number]   Medication Information                      allopurinol (ZYLOPRIM) 300 MG tablet  TAKE 1 TABLET BY MOUTH ONCE DAILY. amLODIPine (NORVASC) 5 MG tablet  TAKE 1 TABLET BY MOUTH ONE TIME A DAY             baclofen (LIORESAL) 10 MG tablet  TAKE 1 TABLET BY MOUTH 2 TIMES A DAY             diclofenac sodium (VOLTAREN) 1 % GEL  Apply 2 g topically 2 times daily             diphenhydrAMINE (BENADRYL) 25 MG capsule  Take 1-2 tablets by mouth every 6 hours as needed for itching. EPINEPHrine (EPIPEN 2-TANG) 0.3 MG/0.3ML SOAJ injection  Inject 0.3 mLs into the skin once for 1 dose Use as directed for allergic reaction             ferrous sulfate (FEROSUL) 325 (65 Fe) MG tablet  TAKE 1 TABLET BY MOUTH daily             magnesium oxide (MAG-OX) 400 MG tablet  TAKE 1 TABLET BY MOUTH ONE TIME A DAY             Melatonin 10 MG CAPS  Take by mouth             meloxicam (MOBIC) 15 MG tablet  TAKE 1 TABLET BY MOUTH ONCE DAILY. oxyCODONE-acetaminophen (PERCOCET)  MG per tablet  Take 1 tablet by mouth 3 times daily.              oxyCODONE-acetaminophen (PERCOCET) 5-325 MG per tablet  Take 1 REPORTED None    Amorphous, UA NOT REPORTED None    Other Observations UA NOT REPORTED NOT REQ.     Yeast, UA NOT REPORTED None   CBC WITH AUTO DIFFERENTIAL   Result Value Ref Range    WBC 7.7 3.5 - 11.3 k/uL    RBC 3.09 (L) 4.21 - 5.77 m/uL    Hemoglobin 9.8 (L) 13.0 - 17.0 g/dL    Hematocrit 29.7 (L) 40.7 - 50.3 %    MCV 96.1 82.6 - 102.9 fL    MCH 31.7 25.2 - 33.5 pg    MCHC 33.0 28.4 - 34.8 g/dL    RDW 12.8 11.8 - 14.4 %    Platelets 102 584 - 547 k/uL    MPV 9.7 8.1 - 13.5 fL    NRBC Automated 0.0 0.0 per 100 WBC    Differential Type NOT REPORTED     Seg Neutrophils 70 (H) 36 - 65 %    Lymphocytes 15 (L) 24 - 43 %    Monocytes 13 (H) 3 - 12 %    Eosinophils % 1 1 - 4 %    Basophils 1 0 - 2 %    Immature Granulocytes 0 0 %    Segs Absolute 5.40 1.50 - 8.10 k/uL    Absolute Lymph # 1.19 1.10 - 3.70 k/uL    Absolute Mono # 1.01 0.10 - 1.20 k/uL    Absolute Eos # 0.04 0.00 - 0.44 k/uL    Basophils Absolute 0.05 0.00 - 0.20 k/uL    Absolute Immature Granulocyte 0.03 0.00 - 0.30 k/uL    WBC Morphology NOT REPORTED     RBC Morphology NOT REPORTED     Platelet Estimate NOT REPORTED    Troponin   Result Value Ref Range    Troponin, High Sensitivity 22 0 - 22 ng/L    Troponin T NOT REPORTED <0.03 ng/mL    Troponin Interp NOT REPORTED    Troponin   Result Value Ref Range    Troponin, High Sensitivity 19 0 - 22 ng/L    Troponin T NOT REPORTED <0.03 ng/mL    Troponin Interp NOT REPORTED    PHOSPHORUS   Result Value Ref Range    Phosphorus 3.2 2.5 - 4.5 mg/dL   MAGNESIUM   Result Value Ref Range    Magnesium 2.2 1.6 - 2.6 mg/dL   CK   Result Value Ref Range    Total  (H) 39 - 308 U/L   Myoglobin, Serum   Result Value Ref Range    Myoglobin 135 (H) 28 - 72 ng/mL   Basic Metabolic Panel   Result Value Ref Range    Glucose 157 (H) 70 - 99 mg/dL    BUN 19 8 - 23 mg/dL    CREATININE 1.21 (H) 0.70 - 1.20 mg/dL    Bun/Cre Ratio NOT REPORTED 9 - 20    Calcium 9.1 8.6 - 10.4 mg/dL    Sodium 139 135 - 144 mmol/L    Potassium 3.8 3.7 - 5.3 mmol/L    Chloride 106 98 - 107 mmol/L    CO2 19 (L) 20 - 31 mmol/L    Anion Gap 14 9 - 17 mmol/L    GFR Non-African American >60 >60 mL/min    GFR African American >60 >60 mL/min    GFR Comment          GFR Staging NOT REPORTED      XR HIP RIGHT (2-3 VIEWS)    Result Date: 8/29/2021  EXAMINATION: TWO XRAY VIEWS OF THE RIGHT HIP; THREE XRAY VIEWS OF THE RIGHT KNEE 8/29/2021 1:12 am COMPARISON: Right knee radiograph 12/02/2013, right hip radiograph 06/06/2011 HISTORY: ORDERING SYSTEM PROVIDED HISTORY: right hip pain after 12 hour karate competition TECHNOLOGIST PROVIDED HISTORY: right hip pain after 12 hour karate competition Reason for Exam: 12 hours of karate, pt states \"over use\" Acuity: Acute Type of Exam: Initial; ORDERING SYSTEM PROVIDED HISTORY: right knee pain after 12 hour karate competition TECHNOLOGIST PROVIDED HISTORY: right knee pain after 12 hour karate competition Reason for Exam: 12 hours of karate, pt states \"over use\" Acuity: Acute Type of Exam: Initial FINDINGS: RIGHT HIP: No evident acute fracture. Joints maintain anatomic alignment. Incompletely evaluated lumbar spine degenerative changes. Increased moderate to severe degenerative changes of the hip with the femoral head abutting the acetabular roof. Moderate to severe degenerative changes of the pubic symphysis with at least mild involvement of the sacroiliac joints. No obvious acute soft abnormality. RIGHT KNEE: No evident acute fracture. New irregularity along the femoral medial epicondyle, suggesting prior avulsion injury related to the medial collateral ligament. Joints maintain anatomic alignment. Mild tricompartmental degenerative changes. Unchanged bony fragment near the tibial tubercle, potentially a sequela of enthesopathy or remote Osgood-Schlatter disease. No evident suprapatellar effusion. No obvious acute soft abnormality. 1. No evident acute findings in the right hip or right knee.  2. Degenerative changes as above.     XR KNEE RIGHT (3 VIEWS)    Result Date: 8/29/2021  EXAMINATION: TWO XRAY VIEWS OF THE RIGHT HIP; THREE XRAY VIEWS OF THE RIGHT KNEE 8/29/2021 1:12 am COMPARISON: Right knee radiograph 12/02/2013, right hip radiograph 06/06/2011 HISTORY: ORDERING SYSTEM PROVIDED HISTORY: right hip pain after 12 hour karate competition TECHNOLOGIST PROVIDED HISTORY: right hip pain after 12 hour karate competition Reason for Exam: 12 hours of karate, pt states \"over use\" Acuity: Acute Type of Exam: Initial; ORDERING SYSTEM PROVIDED HISTORY: right knee pain after 12 hour karate competition TECHNOLOGIST PROVIDED HISTORY: right knee pain after 12 hour karate competition Reason for Exam: 12 hours of karate, pt states \"over use\" Acuity: Acute Type of Exam: Initial FINDINGS: RIGHT HIP: No evident acute fracture. Joints maintain anatomic alignment. Incompletely evaluated lumbar spine degenerative changes. Increased moderate to severe degenerative changes of the hip with the femoral head abutting the acetabular roof. Moderate to severe degenerative changes of the pubic symphysis with at least mild involvement of the sacroiliac joints. No obvious acute soft abnormality. RIGHT KNEE: No evident acute fracture. New irregularity along the femoral medial epicondyle, suggesting prior avulsion injury related to the medial collateral ligament. Joints maintain anatomic alignment. Mild tricompartmental degenerative changes. Unchanged bony fragment near the tibial tubercle, potentially a sequela of enthesopathy or remote Osgood-Schlatter disease. No evident suprapatellar effusion. No obvious acute soft abnormality. 1. No evident acute findings in the right hip or right knee. 2. Degenerative changes as above.            Physical Exam:    General appearance - NAD, AOx 3   Lungs -CTAB, no R/R/R  Heart - RRR, no M/R/G  Abdomen - Soft, NT/ND  Neurological:  MAEx4, No focal motor deficit, sensory loss  Extremities - Cap refil <2 sec in all ext., no edema  Skin -warm, dry      Hospital Course:  Clinical course has improved, labs and imaging reviewed. Stephan Palumbo originally presented to the hospital on 8/29/2021 12:16 AM. with rhabdomyolysis and rubi. At that time it was determined that He required further observation and treatment. He was admitted and labs and imaging were followed daily. Imaging results as above. He is medically stable to be discharged. Disposition: Home    Patient stated that they will not drive themselves home from the hospital if they have gotten pain killers/ narcotics earlier that day and that they will arrange for transportation on their own or work with the  for a ride. Patient counseled NOT to drive while under the influence of narcotics/ pain killers. Condition: Good    Patient stable and ready for discharge home. I have discussed plan of care with patient and they are in understanding. They were instructed to read discharge paperwork. All of their questions and concerns were addressed. Time Spent: 1 day      --  Marie Lagos DO  Emergency Medicine Resident Physician    This dictation was generated by voice recognition computer software. Although all attempts are made to edit the dictation for accuracy, there may be errors in the transcription that are not intended.

## 2021-08-29 NOTE — ED NOTES
The following labs labeled with pt sticker and tubed:     [] Lavender   [] on ice   [] Blue   [] Green/yellow  [] Green/black [] on ice  [] Pink  [] Red  [] Yellow       [] COVID-19 swab    [] Rapid     [x] Urine Sample  [] Pelvic Cultures    [] Blood Cultures            Dakota Serna RN  08/29/21 4624

## 2021-08-29 NOTE — PROGRESS NOTES
[address]  CDU / OBSERVATION ENCOUNTER  ATTENDING NOTE       I performed a history and physical examination of the patient and discussed management with the resident. I reviewed the residents note and agree with the documented findings and plan of care. Any areas of disagreement are noted on the chart. I was personally present for the key portions of any procedures. I have documented in the chart those procedures where I was not present during the key portions. I have reviewed the nurses notes. I agree with the chief complaint, past medical history, past surgical history, allergies, medications, social and family history as documented unless otherwise noted below. The Family history, social history, and ROS are effectively unchanged since admission unless noted elsewhere in the chart. The patient is a 61year old male with history of chronic back pain, spinal stenosis, CKD, gout, HLD, HTN, OA, RLS, neurolgenic claudication, and vertigo who presented to the ER with dehydration, myalgias, and right knee/hip pain after a 12 hour karate competition. UA was unremarkable. Creatinine elevated to 1.31, CK elevated at 951 and myoglobin elevated at 534 consistent with rhabdo. Labs are otherwise grossly unremarkable. XR of the right hip and knee shoe no new abnormality but there is a irregularity at the femoral medial epicondyle suggestive of a prior injury. The patient has been hydrated overnight and he overall feels improved. Repeat CK, myoglobin, and BMP is pending. If labs are improving plan for discharge. Never smoker. I have reviewed the patient's chart and found the most recent A1c is 5.0. This indicates reasonable control.  Will continue to follow-up with PCP      Cedric Pineda DO  Attending Emergency Physician

## 2021-08-29 NOTE — ED NOTES
The following labs labeled with pt sticker and tubed:     [] Lavender   [] on ice   [] Blue   [x] Green/yellow  [] Green/black [] on ice  [] Pink  [] Red  [] Yellow       [] COVID-19 swab    [] Rapid     [] Urine Sample  [] Pelvic Cultures    [] Blood Cultures            Zuleika Lea RN  08/29/21 [id number]

## 2021-08-29 NOTE — ED PROVIDER NOTES
minutes. This excludes any time for separately reportable procedures.        Anamaria 24, DO  08/29/21 1110 Rafa Ibanez, DO  08/29/21 1415

## 2021-08-29 NOTE — PLAN OF CARE
Problem: PAIN  Goal: Patient's pain/discomfort is manageable  8/29/2021 1053 by Isabelle Cleaning RN  Outcome: Ongoing  8/29/2021 0650 by Anson Uribe RN  Outcome: Ongoing

## 2021-08-30 LAB
EKG ATRIAL RATE: 69 BPM
EKG P AXIS: 13 DEGREES
EKG P-R INTERVAL: 208 MS
EKG Q-T INTERVAL: 402 MS
EKG QRS DURATION: 90 MS
EKG QTC CALCULATION (BAZETT): 430 MS
EKG R AXIS: 4 DEGREES
EKG T AXIS: 21 DEGREES
EKG VENTRICULAR RATE: 69 BPM

## 2021-08-30 PROCEDURE — 93010 ELECTROCARDIOGRAM REPORT: CPT | Performed by: INTERNAL MEDICINE

## 2021-09-08 ENCOUNTER — HOSPITAL ENCOUNTER (OUTPATIENT)
Age: 66
Setting detail: OBSERVATION
Discharge: LEFT BEFORE DISCHARGE | End: 2021-09-09
Attending: EMERGENCY MEDICINE | Admitting: EMERGENCY MEDICINE
Payer: MEDICARE

## 2021-09-08 DIAGNOSIS — E87.20 METABOLIC ACIDOSIS: ICD-10-CM

## 2021-09-08 DIAGNOSIS — R25.2 MUSCLE CRAMPING: Primary | ICD-10-CM

## 2021-09-08 DIAGNOSIS — M62.82 NON-TRAUMATIC RHABDOMYOLYSIS: ICD-10-CM

## 2021-09-08 DIAGNOSIS — E87.1 HYPONATREMIA: ICD-10-CM

## 2021-09-08 DIAGNOSIS — N17.9 AKI (ACUTE KIDNEY INJURY) (HCC): ICD-10-CM

## 2021-09-08 LAB
ANION GAP SERPL CALCULATED.3IONS-SCNC: 15 MMOL/L (ref 9–17)
BUN BLDV-MCNC: 25 MG/DL (ref 8–23)
BUN/CREAT BLD: ABNORMAL (ref 9–20)
CALCIUM SERPL-MCNC: 8 MG/DL (ref 8.6–10.4)
CHLORIDE BLD-SCNC: 98 MMOL/L (ref 98–107)
CO2: 17 MMOL/L (ref 20–31)
CREAT SERPL-MCNC: 2.08 MG/DL (ref 0.7–1.2)
GFR AFRICAN AMERICAN: 39 ML/MIN
GFR NON-AFRICAN AMERICAN: 32 ML/MIN
GFR SERPL CREATININE-BSD FRML MDRD: ABNORMAL ML/MIN/{1.73_M2}
GFR SERPL CREATININE-BSD FRML MDRD: ABNORMAL ML/MIN/{1.73_M2}
GLUCOSE BLD-MCNC: 101 MG/DL (ref 70–99)
GLUCOSE BLD-MCNC: 103 MG/DL
GLUCOSE BLD-MCNC: 103 MG/DL (ref 75–110)
MYOGLOBIN: 792 NG/ML (ref 28–72)
POTASSIUM SERPL-SCNC: 3.9 MMOL/L (ref 3.7–5.3)
SODIUM BLD-SCNC: 130 MMOL/L (ref 135–144)
TOTAL CK: 800 U/L (ref 39–308)

## 2021-09-08 PROCEDURE — 82947 ASSAY GLUCOSE BLOOD QUANT: CPT

## 2021-09-08 PROCEDURE — 85025 COMPLETE CBC W/AUTO DIFF WBC: CPT

## 2021-09-08 PROCEDURE — 82550 ASSAY OF CK (CPK): CPT

## 2021-09-08 PROCEDURE — 80048 BASIC METABOLIC PNL TOTAL CA: CPT

## 2021-09-08 PROCEDURE — 99283 EMERGENCY DEPT VISIT LOW MDM: CPT

## 2021-09-08 PROCEDURE — 83874 ASSAY OF MYOGLOBIN: CPT

## 2021-09-08 PROCEDURE — 96372 THER/PROPH/DIAG INJ SC/IM: CPT

## 2021-09-08 PROCEDURE — 96361 HYDRATE IV INFUSION ADD-ON: CPT

## 2021-09-08 PROCEDURE — 96360 HYDRATION IV INFUSION INIT: CPT

## 2021-09-08 PROCEDURE — 93005 ELECTROCARDIOGRAM TRACING: CPT | Performed by: STUDENT IN AN ORGANIZED HEALTH CARE EDUCATION/TRAINING PROGRAM

## 2021-09-08 PROCEDURE — 2580000003 HC RX 258: Performed by: STUDENT IN AN ORGANIZED HEALTH CARE EDUCATION/TRAINING PROGRAM

## 2021-09-08 RX ORDER — 0.9 % SODIUM CHLORIDE 0.9 %
1000 INTRAVENOUS SOLUTION INTRAVENOUS ONCE
Status: COMPLETED | OUTPATIENT
Start: 2021-09-08 | End: 2021-09-09

## 2021-09-08 RX ADMIN — SODIUM CHLORIDE 1000 ML: 9 INJECTION, SOLUTION INTRAVENOUS at 23:03

## 2021-09-08 ASSESSMENT — ENCOUNTER SYMPTOMS
COUGH: 0
SHORTNESS OF BREATH: 0
ABDOMINAL PAIN: 0
BACK PAIN: 0
SORE THROAT: 0
VOMITING: 0

## 2021-09-08 NOTE — Clinical Note
Patient Class: Observation [104]   REQUIRED: Diagnosis: Rhabdomyolysis [728.88. ICD-9-CM]   Estimated Length of Stay: Estimated stay of less than 2 midnights   Admitting Provider: Bharath Shaw [[id number]]   Preferred Department: ETU   Telemetry/Cardiac Monitoring Required?: No

## 2021-09-09 VITALS
RESPIRATION RATE: 18 BRPM | BODY MASS INDEX: 30.48 KG/M2 | OXYGEN SATURATION: 99 % | WEIGHT: 225 LBS | HEART RATE: 78 BPM | SYSTOLIC BLOOD PRESSURE: 116 MMHG | HEIGHT: 72 IN | DIASTOLIC BLOOD PRESSURE: 58 MMHG | TEMPERATURE: 97 F

## 2021-09-09 PROBLEM — M62.82 RHABDOMYOLYSIS: Status: ACTIVE | Noted: 2021-09-09

## 2021-09-09 LAB
ABSOLUTE EOS #: 0.07 K/UL (ref 0–0.4)
ABSOLUTE IMMATURE GRANULOCYTE: 0.07 K/UL (ref 0–0.3)
ABSOLUTE LYMPH #: 1.05 K/UL (ref 1–4.8)
ABSOLUTE MONO #: 0.49 K/UL (ref 0.1–0.8)
BASOPHILS # BLD: 1 % (ref 0–2)
BASOPHILS ABSOLUTE: 0.07 K/UL (ref 0–0.2)
DIFFERENTIAL TYPE: ABNORMAL
EKG ATRIAL RATE: 66 BPM
EKG P AXIS: 6 DEGREES
EKG P-R INTERVAL: 230 MS
EKG Q-T INTERVAL: 398 MS
EKG QRS DURATION: 90 MS
EKG QTC CALCULATION (BAZETT): 417 MS
EKG R AXIS: 10 DEGREES
EKG T AXIS: 16 DEGREES
EKG VENTRICULAR RATE: 66 BPM
EOSINOPHILS RELATIVE PERCENT: 1 % (ref 1–4)
HCT VFR BLD CALC: 29.1 % (ref 40.7–50.3)
HEMOGLOBIN: 9.5 G/DL (ref 13–17)
IMMATURE GRANULOCYTES: 1 %
LYMPHOCYTES # BLD: 15 % (ref 24–44)
MCH RBC QN AUTO: 31.7 PG (ref 25.2–33.5)
MCHC RBC AUTO-ENTMCNC: 32.6 G/DL (ref 28.4–34.8)
MCV RBC AUTO: 97 FL (ref 82.6–102.9)
MONOCYTES # BLD: 7 % (ref 1–7)
MORPHOLOGY: NORMAL
NRBC AUTOMATED: 0 PER 100 WBC
PDW BLD-RTO: 12.9 % (ref 11.8–14.4)
PLATELET # BLD: 281 K/UL (ref 138–453)
PLATELET ESTIMATE: ABNORMAL
PMV BLD AUTO: 9.8 FL (ref 8.1–13.5)
RBC # BLD: 3 M/UL (ref 4.21–5.77)
RBC # BLD: ABNORMAL 10*6/UL
SARS-COV-2, RAPID: NOT DETECTED
SEG NEUTROPHILS: 75 % (ref 36–66)
SEGMENTED NEUTROPHILS ABSOLUTE COUNT: 5.25 K/UL (ref 1.8–7.7)
SPECIMEN DESCRIPTION: NORMAL
WBC # BLD: 7 K/UL (ref 3.5–11.3)
WBC # BLD: ABNORMAL 10*3/UL

## 2021-09-09 PROCEDURE — 2580000003 HC RX 258: Performed by: STUDENT IN AN ORGANIZED HEALTH CARE EDUCATION/TRAINING PROGRAM

## 2021-09-09 PROCEDURE — 6360000002 HC RX W HCPCS: Performed by: STUDENT IN AN ORGANIZED HEALTH CARE EDUCATION/TRAINING PROGRAM

## 2021-09-09 PROCEDURE — G0378 HOSPITAL OBSERVATION PER HR: HCPCS

## 2021-09-09 PROCEDURE — 6370000000 HC RX 637 (ALT 250 FOR IP): Performed by: STUDENT IN AN ORGANIZED HEALTH CARE EDUCATION/TRAINING PROGRAM

## 2021-09-09 PROCEDURE — 87635 SARS-COV-2 COVID-19 AMP PRB: CPT

## 2021-09-09 RX ORDER — TIZANIDINE 4 MG/1
4 TABLET ORAL EVERY 8 HOURS PRN
Status: DISCONTINUED | OUTPATIENT
Start: 2021-09-09 | End: 2021-09-09 | Stop reason: HOSPADM

## 2021-09-09 RX ORDER — 0.9 % SODIUM CHLORIDE 0.9 %
1000 INTRAVENOUS SOLUTION INTRAVENOUS ONCE
Status: COMPLETED | OUTPATIENT
Start: 2021-09-09 | End: 2021-09-09

## 2021-09-09 RX ORDER — SODIUM CHLORIDE 0.9 % (FLUSH) 0.9 %
5-40 SYRINGE (ML) INJECTION EVERY 12 HOURS SCHEDULED
Status: DISCONTINUED | OUTPATIENT
Start: 2021-09-09 | End: 2021-09-09 | Stop reason: HOSPADM

## 2021-09-09 RX ORDER — AMLODIPINE BESYLATE 10 MG/1
5 TABLET ORAL DAILY
Status: DISCONTINUED | OUTPATIENT
Start: 2021-09-09 | End: 2021-09-09 | Stop reason: HOSPADM

## 2021-09-09 RX ORDER — SODIUM CHLORIDE 9 MG/ML
25 INJECTION, SOLUTION INTRAVENOUS PRN
Status: DISCONTINUED | OUTPATIENT
Start: 2021-09-09 | End: 2021-09-09 | Stop reason: HOSPADM

## 2021-09-09 RX ORDER — ONDANSETRON 4 MG/1
4 TABLET, ORALLY DISINTEGRATING ORAL EVERY 8 HOURS PRN
Status: DISCONTINUED | OUTPATIENT
Start: 2021-09-09 | End: 2021-09-09 | Stop reason: HOSPADM

## 2021-09-09 RX ORDER — HEPARIN SODIUM 5000 [USP'U]/ML
5000 INJECTION, SOLUTION INTRAVENOUS; SUBCUTANEOUS EVERY 8 HOURS SCHEDULED
Status: DISCONTINUED | OUTPATIENT
Start: 2021-09-09 | End: 2021-09-09 | Stop reason: HOSPADM

## 2021-09-09 RX ORDER — ONDANSETRON 2 MG/ML
4 INJECTION INTRAMUSCULAR; INTRAVENOUS EVERY 6 HOURS PRN
Status: DISCONTINUED | OUTPATIENT
Start: 2021-09-09 | End: 2021-09-09 | Stop reason: HOSPADM

## 2021-09-09 RX ORDER — ACETAMINOPHEN 325 MG/1
650 TABLET ORAL EVERY 4 HOURS PRN
Status: DISCONTINUED | OUTPATIENT
Start: 2021-09-09 | End: 2021-09-09 | Stop reason: HOSPADM

## 2021-09-09 RX ORDER — MELATONIN 10 MG
10 CAPSULE ORAL NIGHTLY
Status: DISCONTINUED | OUTPATIENT
Start: 2021-09-09 | End: 2021-09-09

## 2021-09-09 RX ORDER — PANTOPRAZOLE SODIUM 40 MG/1
40 TABLET, DELAYED RELEASE ORAL
Status: DISCONTINUED | OUTPATIENT
Start: 2021-09-09 | End: 2021-09-09 | Stop reason: HOSPADM

## 2021-09-09 RX ORDER — SODIUM CHLORIDE 0.9 % (FLUSH) 0.9 %
5-40 SYRINGE (ML) INJECTION PRN
Status: DISCONTINUED | OUTPATIENT
Start: 2021-09-09 | End: 2021-09-09 | Stop reason: HOSPADM

## 2021-09-09 RX ORDER — ALLOPURINOL 300 MG/1
300 TABLET ORAL DAILY
Status: DISCONTINUED | OUTPATIENT
Start: 2021-09-09 | End: 2021-09-09 | Stop reason: HOSPADM

## 2021-09-09 RX ADMIN — SODIUM CHLORIDE: 9 INJECTION, SOLUTION INTRAVENOUS at 01:46

## 2021-09-09 RX ADMIN — HEPARIN SODIUM 5000 UNITS: 5000 INJECTION INTRAVENOUS; SUBCUTANEOUS at 06:04

## 2021-09-09 RX ADMIN — Medication 10 MG: at 02:12

## 2021-09-09 RX ADMIN — SODIUM CHLORIDE 1000 ML: 9 INJECTION, SOLUTION INTRAVENOUS at 00:46

## 2021-09-09 RX ADMIN — TIZANIDINE 4 MG: 4 TABLET ORAL at 04:10

## 2021-09-09 NOTE — H&P
[facility] [facility]  CDU / OBSERVATION eNCOUnter  Resident Note     Pt Name: Thalia Joseph  MRN: [id number]  [place] 1955  Date of evaluation: 9/9/21  Patient's PCP is :  Racheal Olivares MD    CHIEF COMPLAINT       Chief Complaint   Patient presents with    Fatigue         HISTORY OF PRESENT ILLNESS    Thalia Joseph is a 61 y.o. male who presents with ongoing muscle cramps that have worsened and to over entire body, worse in bilateral legs. He has had a recent mission to the hospital for rhabdo and SANJU. 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 Also endorsing decreased urination however he says this is normal for him. He is denying any chest pain, shortness of breath, lightheadedness fevers or chills, able to tolerate p.o., denying any vomiting or diarrhea. Location/Symptom: Generalized body cramping or so and bilateral legs  Timing/Onset: 3 days intermittent cramping  Provocation: None  Quality: Cramping  Radiation: Radiating  Severity: 10/10  Timing/Duration: Intermittent  Modifying Factors: Walking alleviates cramps    REVIEW OF SYSTEMS       General ROS - No fevers, No malaise   Ophthalmic ROS - No discharge, No changes in vision  ENT ROS -  No sore throat, No rhinorrhea,   Respiratory ROS - no shortness of breath, no cough, no  wheezing  Cardiovascular ROS - No chest pain, no dyspnea on exertion  Gastrointestinal ROS - No abdominal pain, no nausea or vomiting, no change in bowel habits, no black or bloody stools  Genito-Urinary ROS - No dysuria, trouble voiding, or hematuria  Musculoskeletal ROS -myalgias/muscle cramping, bilateral legs, No arthalgias  Neurological ROS - No headache, no dizziness/lightheadedness, No focal weakness, no loss of sensation  Dermatological ROS - No lesions, No rash     (PQRS) Advance directives on face sheet per hospital policy.  No change unless specifically mentioned in chart    [address]    has a past medical history of Chronic back pain, Chronic kidney disease, Gout, Hyperlipidemia, Hypertension, Obesity, Osteoarthritis, Restless leg syndrome, Shoulder pain, Shrimp allergy, Spinal stenosis, lumbar region, without neurogenic claudication, Swelling of joint, wrist, right, Thoracic back pain, Vertigo, Wears glasses, and Wrist pain, right. I have reviewed the past medical history with the patient and it is pertinent to this complaint. SURGICAL HISTORY      has a past surgical history that includes nasal endoscopy (Bilateral, 10/17/2016); Nerve Block (2016); Nerve Block (2017); Colonoscopy (2014); Nerve Block (10/09/2017); Upper gastrointestinal endoscopy (2020); Colonoscopy (2020); Upper gastrointestinal endoscopy (N/A, 2020); and Colonoscopy (N/A, 2020). I have reviewed and agree with Surgical History entered and it is pertinent to this complaint. CURRENT MEDICATIONS     sodium chloride flush 0.9 % injection 5-40 mL, 2 times per day  sodium chloride flush 0.9 % injection 5-40 mL, PRN  0.9 % sodium chloride infusion, PRN  acetaminophen (TYLENOL) tablet 650 mg, Q4H PRN  ondansetron (ZOFRAN-ODT) disintegrating tablet 4 mg, Q8H PRN   Or  ondansetron (ZOFRAN) injection 4 mg, Q6H PRN  allopurinol (ZYLOPRIM) tablet 300 mg, Daily  amLODIPine (NORVASC) tablet 5 mg, Daily  pantoprazole (PROTONIX) tablet 40 mg, QAM AC  tiZANidine (ZANAFLEX) tablet 4 mg, Q8H PRN  sodium chloride 0.9 % 1,000 mL infusion, Continuous  melatonin tablet 10 mg, Nightly  heparin (porcine) injection 5,000 Units, 3 times per day        All medication charted and reviewed. ALLERGIES     is allergic to codeine and shrimp (diagnostic). FAMILY HISTORY     He indicated that his mother is alive. He indicated that his father is . He indicated that both of his sisters are alive.  He indicated that all of his three brothers are . family history includes Heart Disease (age of onset: 48) in his brother; Other in his brother, brother, and father. The patient denies any pertinent family history. I have reviewed and agree with the family history entered. I have reviewed the Family History and it is not significant to the case    SOCIAL HISTORY      reports that he has never smoked. He has never used smokeless tobacco. He reports that he does not drink alcohol and does not use drugs. I have reviewed and agree with all Social.  There are no concerns for substance abuse/use. PHYSICAL EXAM     INITIAL VITALS:  height is 6' (1.829 m) and weight is 225 lb (102.1 kg). His oral temperature is 97 °F (36.1 °C). His blood pressure is 116/58 (abnormal) and his pulse is 78. His respiration is 18 and oxygen saturation is 99%.       CONSTITUTIONAL: AOx4, no apparent distress, appears stated age    HEAD: normocephalic, atraumatic   EYES: PERRLA, EOMI    ENT: moist mucous membranes, uvula midline   NECK: supple, symmetric   BACK: symmetric   LUNGS: clear to auscultation bilaterally   CARDIOVASCULAR: regular rate and rhythm, no murmurs, rubs or gallops   ABDOMEN: soft, non-tender, non-distended with normal active bowel sounds   NEUROLOGIC:  MAEx4, no focal sensory or motor deficits   MUSCULOSKELETAL: no clubbing, cyanosis or edema   SKIN: no rash or wounds       DIFFERENTIAL DIAGNOSIS/MDM:       Weakness:  DDX: CVA, MS, Guillain [place], Transverse myelitis, Myasthenia gravis, cardiac, anemia, electrolytes, infection, change in medications, hypothyroid, rheumatalgic, depression, dehydration          Dehydration:  DDX: evaluate for dehydration prerenal (inadequate renal perfusion) d/t volume depletion, redistribution of existing volume, inadequate CO or meds (ACEI, NSAIDs)      Leg pain:  DDX: Trauma, knee fracture, ACL tear, collaterals, meniscus, ankle injury, DVT, abscess/ cellulitis, septic joint, gout, arthritis, patellar dislocation          DIAGNOSTIC RESULTS       RADIOLOGY:   I directly visualized the following  images and reviewed the radiologist interpretations:    No results found. LABS:  I have reviewed and interpreted all available lab results. Labs Reviewed   CBC WITH AUTO DIFFERENTIAL - Abnormal; Notable for the following components:       Result Value    RBC 3.00 (*)     Hemoglobin 9.5 (*)     Hematocrit 29.1 (*)     Immature Granulocytes 1 (*)     Seg Neutrophils 75 (*)     Lymphocytes 15 (*)     All other components within normal limits   BASIC METABOLIC PANEL - Abnormal; Notable for the following components:    Glucose 101 (*)     BUN 25 (*)     CREATININE 2.08 (*)     Calcium 8.0 (*)     Sodium 130 (*)     CO2 17 (*)     GFR Non- 32 (*)     GFR  39 (*)     All other components within normal limits   CK - Abnormal; Notable for the following components:     Total  (*)     All other components within normal limits   MYOGLOBIN, SERUM - Abnormal; Notable for the following components:    Myoglobin 792 (*)     All other components within normal limits   POCT GLUCOSE - Normal   COVID-19, RAPID   POC GLUCOSE FINGERSTICK       SCREENING TOOLS:    HEART Risk Score for Chest Pain Patients   History and Physical Exam Suspicion Level  (Nausea, Vomiting, Diaphoresis, Radiation, Exertion)   Slightly Suspicious (0 pts)   Moderately Suspicious (1 pt)   Highly Suspicious (2 pts)   EKG Interpretation   Normal (0 pts)   Non-Specific Repolarization Disturbance (1 pt)   Significant ST-Depression (2 pts)   Age of Patient (in years)   = 39 (0 pts)   46-64 (1 pt)   = 65 (2 pts)   Risk Factors   No Risk Factors (0 pts)   1-2 Risk Factors (1 pt)   = 3 Risk Factors (2 pts)   Risk Factors Include:   Hypercholesterolemia   Hypertension   Diabetes Mellitus   Cigarette smoking   Positive family history   Obesity   CAD   (SLE, CKDz, HIV, Cocaine abuse)   Troponin Levels   = Normal Limit (0 pts)   1-3 Times Normal Limit (1 pt)   > 3 Times Normal Limit (2 pts)  TOTAL:    Percent Risk for Major Adverse Cardiac Event (MACE)  0-3 pts indicates low risk for MACE   2.5% (DISCHARGE)   4-7 pts indicates moderate risk for MACE  20.3% (OBS)  8-10 pts indicates high risk for MACE  72.7% (EARLY INVASIVE TX)    CDU IMPRESSION / PLAN      Jaylan Garcia is a 61 y.o. male who presents with dehydration, bilateral leg cramping, generalized body cramps. Likely due to dehydration as patient states this is happened before, did he has limited fluid intake while at work out in the hot sun. Today he is signing AMA stating that overnight fluids has helped a little bit but he wants to get back to work and says that he can improve his symptoms by drinking Gatorade and Pedialyte. Discussed with patients the risks of signing AMA while still having some cramping symptoms discussed his lab results stating that he it is evident that he was dehydrated and requires more fluid electrolyte replacement. He states that he is able to drink fluids without any nausea or vomiting, not endorsing any syncopal symptoms, no dizziness or lightheadedness. Patient understands the risks, he is fully alert and oriented and able to make sound decisions. Will DC IV and sent home AMA. · Patient signed AMA prior to hobs attending seeing patient. CONSULTS:    None    PROCEDURES:  Not indicated       PATIENT REFERRED TO:    MD Annalee Posada [facility] [address]  [phone]    Schedule an appointment as soon as possible for a visit   For follow up    [facility]  [address]  Go to   As needed      --  Jaylin Jackson MD   Emergency Medicine Resident     This dictation was generated by voice recognition computer software. Although all attempts are made to edit the dictation for accuracy, there may be errors in the transcription that are not intended.

## 2021-09-09 NOTE — ED NOTES
Pt presents to the ED with C/O fatigue. Pt stated he was recently was admitted for rhabdomyolysis this past week. Patient explained he has not ate food today.  Pt stated he did not drink fluids today while working, but drank a couple Gatorade before coming in to the hospital.      Bal Kraus RN  09/08/21 [address], RN  09/08/21 7590

## 2021-09-09 NOTE — ED PROVIDER NOTES
[facility] ED  Emergency Department Encounter  EmergencyMedicine Resident     Pt Laura Song  MRN: [id number]  [place] 1955  Date of evaluation: 9/8/21  PCP:  Ariela Walton MD    This patient was evaluated in the Emergency Department for symptoms described in the history of present illness. The patient was evaluated in the context of the global COVID-19 pandemic, which necessitated consideration that the patient might be at risk for infection with the SARS-CoV-2 virus that causes COVID-19. Institutional protocols and algorithms that pertain to the evaluation of patients at risk for COVID-19 are in a state of rapid change based on information released by regulatory bodies including the CDC and federal and state organizations. These policies and algorithms were followed during the patient's care in the ED. CHIEF COMPLAINT       Chief Complaint   Patient presents with    Fatigue       HISTORY OF PRESENT ILLNESS  (Location/Symptom, Timing/Onset, Context/Setting, Quality, Duration, Modifying Factors, Severity.)      Lola Porter is a 61 y.o. male who presents with ongoing muscle cramps that have worsened all over his body, worse in bilateral legs, with a recent admission to the hospital for rhabdomyolysis and SANJU. Patient states that he has been out in the sun all day, concern for dehydration, states that he has not had time to take in adequate fluids. States that he has decreased urination, however has been known to go multiple hours without urinating. Denies chest pain, shortness of breath, lightheadedness, fevers or chills at this time. Denies vomiting, diarrhea. Patient states that he is fatigued now due to night medication that was given to him earlier, however prior to medication he was not more fatigued than usual.  Does have a history of restless leg syndrome.     PAST MEDICAL / SURGICAL / SOCIAL / FAMILY HISTORY      has a past medical history of Chronic back pain, Chronic kidney disease, Gout, Hyperlipidemia, Hypertension, Obesity, Osteoarthritis, Restless leg syndrome, Shoulder pain, Shrimp allergy, Spinal stenosis, lumbar region, without neurogenic claudication, Swelling of joint, wrist, right, Thoracic back pain, Vertigo, Wears glasses, and Wrist pain, right.     has a past surgical history that includes nasal endoscopy (Bilateral, 10/17/2016); Nerve Block (11/02/2016); Nerve Block (04/03/2017); Colonoscopy (11/2014); Nerve Block (10/09/2017); Upper gastrointestinal endoscopy (05/27/2020); Colonoscopy (05/27/2020); Upper gastrointestinal endoscopy (N/A, 5/27/2020); and Colonoscopy (N/A, 5/27/2020). Social History     Socioeconomic History    Marital status: Single     Spouse name: Not on file    Number of children: Not on file    Years of education: Not on file    Highest education level: Not on file   Occupational History    Not on file   Tobacco Use    Smoking status: Never Smoker    Smokeless tobacco: Never Used   Vaping Use    Vaping Use: Never used   Substance and Sexual Activity    Alcohol use: No    Drug use: No    Sexual activity: Not on file   Other Topics Concern    Not on file   Social History Narrative    Not on file     Social Determinants of Health     Financial Resource Strain: Low Risk     Difficulty of Paying Living Expenses: Not hard at all   Food Insecurity: No Food Insecurity    Worried About [facility] in the Last Year: Never true    Mohsen of Food in the Last Year: Never true   Transportation Needs: No Transportation Needs    Lack of Transportation (Medical): No    Lack of Transportation (Non-Medical):  No   Physical Activity:     Days of Exercise per Week:     Minutes of Exercise per Session:    Stress:     Feeling of Stress :    Social Connections:     Frequency of Communication with Friends and Family:     Frequency of Social Gatherings with Friends and Family:     Attends Caodaism Services:     Active Member of Clubs or Organizations:     Attends Club or Organization Meetings:     Marital Status:    Intimate Partner Violence:     Fear of Current or Ex-Partner:     Emotionally Abused:     Physically Abused:     Sexually Abused:        Family History   Problem Relation Age of Onset    Heart Disease Brother 48    Other Father     Other Brother         HIV    Other Brother        Allergies:  Codeine and Shrimp (diagnostic)    Home Medications:  Prior to Admission medications    Medication Sig Start Date End Date Taking? Authorizing Provider   pantoprazole (PROTONIX) 40 MG tablet TAKE 1 TABLET BY MOUTH ONE TIME A DAY 8/31/21   Izzy Reynoso MD   allopurinol (ZYLOPRIM) 300 MG tablet TAKE 1 TABLET BY MOUTH ONCE DAILY. 6/29/21   Izzy Reynoso MD   meloxicam (MOBIC) 15 MG tablet TAKE 1 TABLET BY MOUTH ONCE DAILY. 6/29/21   Izzy Reynoso MD   amLODIPine (NORVASC) 5 MG tablet TAKE 1 TABLET BY MOUTH ONE TIME A DAY 4/1/21   Izzy Reynoso MD   tiZANidine (ZANAFLEX) 4 MG tablet Take 1 tablet by mouth every 8 hours as needed (spasms) 3/30/21   Izzy Reynoso MD   ferrous sulfate (FEROSUL) 325 (65 Fe) MG tablet TAKE 1 TABLET BY MOUTH daily 3/30/21   Izzy Reynoso MD   oxyCODONE-acetaminophen (PERCOCET)  MG per tablet Take 1 tablet by mouth 3 times daily. 10/2/20   Historical Provider, MD   EPINEPHrine (EPIPEN 2-TANG) 0.3 MG/0.3ML SOAJ injection Inject 0.3 mLs into the skin once for 1 dose Use as directed for allergic reaction 10/12/20 4/1/22  Izzy Reynoso MD   diphenhydrAMINE (BENADRYL) 25 MG capsule Take 1-2 tablets by mouth every 6 hours as needed for itching.  10/12/20   Izzy Reynoso MD   magnesium oxide (MAG-OX) 400 MG tablet TAKE 1 TABLET BY MOUTH ONE TIME A DAY 7/30/20   Izzy Reynoso MD   diclofenac sodium (VOLTAREN) 1 % GEL Apply 2 g topically 2 times daily    Historical Provider, MD   Melatonin 10 MG CAPS Take by mouth    Historical Provider, MD   baclofen (LIORESAL) 10 MG tablet TAKE 1 TABLET BY MOUTH 2 TIMES A DAY 5/1/20   Gamaliel Kennedy MD       REVIEW OF SYSTEMS    (2-9 systems for level 4, 10 or more for level 5)      Review of Systems   Constitutional: Negative for chills and fever. HENT: Negative for sore throat. Eyes: Negative for visual disturbance. Respiratory: Negative for cough and shortness of breath. Cardiovascular: Negative for chest pain. Gastrointestinal: Negative for abdominal pain and vomiting. Endocrine: Negative for polyuria. Genitourinary: Negative for dysuria and hematuria. Musculoskeletal: Negative for back pain. Skin: Negative for pallor. Neurological: Negative for light-headedness and headaches. Psychiatric/Behavioral: Negative for confusion. PHYSICAL EXAM   (up to 7 for level 4, 8 or more for level 5)      INITIAL VITALS:   BP (!) 116/58   Pulse 78   Temp 97 °F (36.1 °C) (Oral)   Resp 18   SpO2 99%     Physical Exam  Constitutional:       General: He is not in acute distress. Appearance: Normal appearance. He is normal weight. HENT:      Head: Normocephalic. Right Ear: Tympanic membrane and ear canal normal.      Left Ear: Tympanic membrane normal.      Nose: Nose normal.      Mouth/Throat:      Mouth: Mucous membranes are dry. Pharynx: Oropharynx is clear. Eyes:      Extraocular Movements: Extraocular movements intact. Conjunctiva/sclera: Conjunctivae normal.      Pupils: Pupils are equal, round, and reactive to light. Cardiovascular:      Rate and Rhythm: Normal rate and regular rhythm. Pulses: Normal pulses. Heart sounds: Normal heart sounds. Pulmonary:      Effort: Pulmonary effort is normal.      Breath sounds: Normal breath sounds. Abdominal:      Palpations: Abdomen is soft. Tenderness: There is no abdominal tenderness. There is no right CVA tenderness or left CVA tenderness. Musculoskeletal:         General: No tenderness.       Cervical back: Normal range of motion and neck supple. Right lower leg: No edema. Left lower leg: No edema. Comments: Patient is grossly nontender to palpation, states that cramps are intermittent  Neurovascularly intact  Able to ambulate with normal gait   Skin:     General: Skin is warm. Capillary Refill: Capillary refill takes less than 2 seconds. Neurological:      General: No focal deficit present. Mental Status: He is alert and oriented to person, place, and time. Mental status is at baseline. Psychiatric:         Mood and Affect: Mood normal.       DIFFERENTIAL  DIAGNOSIS     PLAN (LABS / IMAGING / EKG):  Orders Placed This Encounter   Procedures    COVID-19, Rapid    CBC WITH AUTO DIFFERENTIAL    BASIC METABOLIC PANEL    CK    MYOGLOBIN, SERUM    ADULT DIET;  Regular    Vital signs per unit routine    Notify physician    Notify physician    Up as tolerated    Full Code    POCT Glucose    POC Glucose Fingerstick    EKG 12 Lead    PATIENT STATUS (FROM ED OR OR/PROCEDURAL) Observation       MEDICATIONS ORDERED:  Orders Placed This Encounter   Medications    0.9 % sodium chloride bolus    0.9 % sodium chloride bolus    sodium chloride flush 0.9 % injection 5-40 mL    sodium chloride flush 0.9 % injection 5-40 mL    0.9 % sodium chloride infusion    enoxaparin (LOVENOX) injection 40 mg    acetaminophen (TYLENOL) tablet 650 mg    OR Linked Order Group     ondansetron (ZOFRAN-ODT) disintegrating tablet 4 mg     ondansetron (ZOFRAN) injection 4 mg     DDX: Rhabdomyolysis, dehydration, electrolyte imbalances/abnormalities, hypoglycemia    DIAGNOSTIC RESULTS / EMERGENCY DEPARTMENT COURSE / MDM   LAB RESULTS:  Results for orders placed or performed during the hospital encounter of 09/08/21   CBC WITH AUTO DIFFERENTIAL   Result Value Ref Range    WBC 7.0 3.5 - 11.3 k/uL    RBC 3.00 (L) 4.21 - 5.77 m/uL    Hemoglobin 9.5 (L) 13.0 - 17.0 g/dL    Hematocrit 29.1 (L) 40.7 - 50.3 %    MCV 97.0 82.6 - 102.9 fL    MCH 31.7 25.2 - 33.5 pg    MCHC 32.6 28.4 - 34.8 g/dL    RDW 12.9 11.8 - 14.4 %    Platelets 887 648 - 623 k/uL    MPV 9.8 8.1 - 13.5 fL    NRBC Automated 0.0 0.0 per 100 WBC    Differential Type NOT REPORTED     Seg Neutrophils PENDING %    Lymphocytes PENDING %    Monocytes PENDING %    Eosinophils % PENDING %    Basophils PENDING %    Immature Granulocytes PENDING 0 %    Segs Absolute PENDING k/uL    Absolute Lymph # PENDING k/uL    Absolute Mono # PENDING k/uL    Absolute Eos # PENDING k/uL    Basophils Absolute PENDING 0.0 - 0.2 k/uL    Absolute Immature Granulocyte PENDING 0.00 - 0.30 k/uL    WBC Morphology NOT REPORTED     RBC Morphology NOT REPORTED     Platelet Estimate NOT REPORTED    BASIC METABOLIC PANEL   Result Value Ref Range    Glucose 101 (H) 70 - 99 mg/dL    BUN 25 (H) 8 - 23 mg/dL    CREATININE 2.08 (H) 0.70 - 1.20 mg/dL    Bun/Cre Ratio NOT REPORTED 9 - 20    Calcium 8.0 (L) 8.6 - 10.4 mg/dL    Sodium 130 (L) 135 - 144 mmol/L    Potassium 3.9 3.7 - 5.3 mmol/L    Chloride 98 98 - 107 mmol/L    CO2 17 (L) 20 - 31 mmol/L    Anion Gap 15 9 - 17 mmol/L    GFR Non-African American 32 (L) >60 mL/min    GFR  39 (L) >60 mL/min    GFR Comment          GFR Staging NOT REPORTED    CK   Result Value Ref Range    Total  (H) 39 - 308 U/L   MYOGLOBIN, SERUM   Result Value Ref Range    Myoglobin 792 (H) 28 - 72 ng/mL   POCT Glucose   Result Value Ref Range    Glucose 103 mg/dL   POC Glucose Fingerstick   Result Value Ref Range    POC Glucose 103 75 - 110 mg/dL       IMPRESSION: 72-year-old gentleman presents to the emergency department with multiple days history of intermittent cramping of whole body, recently admitted to the observation unit for rhabdomyolysis and fluids. States that cramps are worse today. Physical exam grossly unremarkable, patient does have dry mucous membranes, no tenderness to palpation. Vital signs within normal limits. IV fluids initiated.   Labs showing worsening CK, myoglobin and SANJU. Discussed with patient with regards to admission for IV fluids, patient agreeable at this time. Admitted to observation unit for IV fluids. EMERGENCY DEPARTMENT COURSE:  ED Course as of Sep 09 0014   Wed Sep 08, 2021   2351 Total CK(!): 800 [EM]   2351 Myoglobin(!): 792 [EM]   2351 Creatinine(!): 2.08 [EM]      ED Course User Index  [EM] Bernadette Duarte MD        PROCEDURES:  None    CONSULTS:  None    FINAL IMPRESSION      1.  Muscle cramping          DISPOSITION / PLAN     DISPOSITION        PATIENT REFERRED TO:  MD Annalee Mensah [facility] [address] [facility] [address] [facility] [address] [phone]    Schedule an appointment as soon as possible for a visit   For follow up    [facility] ED  [address] [facility] [address]  [phone]  Go to   As needed      DISCHARGE MEDICATIONS:  New Prescriptions    No medications on file       Bernadette Duarte MD  Emergency Medicine Resident    (Please note that portions of thisnote were completed with a voice recognition program.  Efforts were made to edit the dictations but occasionally words are mis-transcribed.)       Bernadette Duarte MD  Resident  09/09/21 0083

## 2021-09-09 NOTE — ED NOTES
Report received from Neha Salinas, Venkatesh S [address] [place] [address], RN  09/09/21 6817

## 2021-09-09 NOTE — ED NOTES
Pt calling out requesting writer REINIER to bedside. Pt informed writer that he is going to leave, stated \"You don't have my medicine here and I want to leave\". Writer informed pt of the medication that was ordered, pt stated \"Yeah, you don't have them all\". Pt stating that he is calling his girlfriend to come and get him. Writer Franco Served admitting service regarding this.      Marium Ruvalcaba RN  09/09/21 9249

## 2021-09-09 NOTE — ED NOTES
Pt resting comfortably in no acute distress. Respirations even and unlabored. Call light remains within reach. No needs at this time.        Genevieve Cordero RN  09/09/21 3816

## 2021-09-09 NOTE — DISCHARGE SUMMARY
CDU Discharge Summary        Patient:  Kiko Mcelroy  YOB: 1955    MRN: [id number]   Acct: [de-identified]    Primary Care Physician: Rolly White MD    Admit date:  9/8/2021 10:23 PM  Discharge date: 9/9/2021  8:25 AM     Discharge Diagnoses:     Acute  rhabdomyolysis due to dehydration  Improved with IV fluids    Patient signed out [address]    Follow-up:  Call today/tomorrow for a follow up appointment with Rolly White MD , or return to the Emergency Room with worsening symptoms    Stressed to patient the importance of following up with primary care doctor for further workup/management of symptoms. Pt verbalizes understanding and agrees with plan. Discharge Medications:  Changes to medications          [facility] Medication Instructions F:[id number]    Printed on:09/09/21 1720   Medication Information                      allopurinol (ZYLOPRIM) 300 MG tablet  TAKE 1 TABLET BY MOUTH ONCE DAILY. amLODIPine (NORVASC) 5 MG tablet  TAKE 1 TABLET BY MOUTH ONE TIME A DAY             baclofen (LIORESAL) 10 MG tablet  TAKE 1 TABLET BY MOUTH 2 TIMES A DAY             diclofenac sodium (VOLTAREN) 1 % GEL  Apply 2 g topically 2 times daily             diphenhydrAMINE (BENADRYL) 25 MG capsule  Take 1-2 tablets by mouth every 6 hours as needed for itching. EPINEPHrine (EPIPEN 2-TANG) 0.3 MG/0.3ML SOAJ injection  Inject 0.3 mLs into the skin once for 1 dose Use as directed for allergic reaction             ferrous sulfate (FEROSUL) 325 (65 Fe) MG tablet  TAKE 1 TABLET BY MOUTH daily             magnesium oxide (MAG-OX) 400 MG tablet  TAKE 1 TABLET BY MOUTH ONE TIME A DAY             Melatonin 10 MG CAPS  Take by mouth             meloxicam (MOBIC) 15 MG tablet  TAKE 1 TABLET BY MOUTH ONCE DAILY. oxyCODONE-acetaminophen (PERCOCET)  MG per tablet  Take 1 tablet by mouth 3 times daily.              pantoprazole (PROTONIX) 40 MG tablet  TAKE 1 TABLET BY MOUTH ONE TIME A DAY             tiZANidine (ZANAFLEX) 4 MG tablet  Take 1 tablet by mouth every 8 hours as needed (spasms)                 Diet:  No diet orders on file , Advance as tolerated     Activity:  As tolerated    Consultants: None    Procedures:  Not indicated     Diagnostic Test:   Results for orders placed or performed during the hospital encounter of 09/08/21   COVID-19, Rapid    Specimen: Nasopharyngeal Swab   Result Value Ref Range    Specimen Description . NASOPHARYNGEAL SWAB     SARS-CoV-2, Rapid Not Detected Not Detected   CBC WITH AUTO DIFFERENTIAL   Result Value Ref Range    WBC 7.0 3.5 - 11.3 k/uL    RBC 3.00 (L) 4.21 - 5.77 m/uL    Hemoglobin 9.5 (L) 13.0 - 17.0 g/dL    Hematocrit 29.1 (L) 40.7 - 50.3 %    MCV 97.0 82.6 - 102.9 fL    MCH 31.7 25.2 - 33.5 pg    MCHC 32.6 28.4 - 34.8 g/dL    RDW 12.9 11.8 - 14.4 %    Platelets 211 959 - 848 k/uL    MPV 9.8 8.1 - 13.5 fL    NRBC Automated 0.0 0.0 per 100 WBC    Differential Type NOT REPORTED     WBC Morphology NOT REPORTED     RBC Morphology NOT REPORTED     Platelet Estimate NOT REPORTED     Immature Granulocytes 1 (H) 0 %    Seg Neutrophils 75 (H) 36 - 66 %    Lymphocytes 15 (L) 24 - 44 %    Monocytes 7 1 - 7 %    Eosinophils % 1 1 - 4 %    Basophils 1 0 - 2 %    Absolute Immature Granulocyte 0.07 0.00 - 0.30 k/uL    Segs Absolute 5.25 1.8 - 7.7 k/uL    Absolute Lymph # 1.05 1.0 - 4.8 k/uL    Absolute Mono # 0.49 0.1 - 0.8 k/uL    Absolute Eos # 0.07 0.0 - 0.4 k/uL    Basophils Absolute 0.07 0.0 - 0.2 k/uL    Morphology Normal    BASIC METABOLIC PANEL   Result Value Ref Range    Glucose 101 (H) 70 - 99 mg/dL    BUN 25 (H) 8 - 23 mg/dL    CREATININE 2.08 (H) 0.70 - 1.20 mg/dL    Bun/Cre Ratio NOT REPORTED 9 - 20    Calcium 8.0 (L) 8.6 - 10.4 mg/dL    Sodium 130 (L) 135 - 144 mmol/L    Potassium 3.9 3.7 - 5.3 mmol/L    Chloride 98 98 - 107 mmol/L    CO2 17 (L) 20 - 31 mmol/L    Anion Gap 15 9 - 17 mmol/L    GFR Non-African American 32 (L) >60 mL/min    GFR  39 (L) >60 mL/min    GFR Comment          GFR Staging NOT REPORTED    CK   Result Value Ref Range    Total  (H) 39 - 308 U/L   MYOGLOBIN, SERUM   Result Value Ref Range    Myoglobin 792 (H) 28 - 72 ng/mL   POCT Glucose   Result Value Ref Range    Glucose 103 mg/dL   POC Glucose Fingerstick   Result Value Ref Range    POC Glucose 103 75 - 110 mg/dL   EKG 12 Lead   Result Value Ref Range    Ventricular Rate 66 BPM    Atrial Rate 66 BPM    P-R Interval 230 ms    QRS Duration 90 ms    Q-T Interval 398 ms    QTc Calculation (Bazett) 417 ms    P Axis 6 degrees    R Axis 10 degrees    T Axis 16 degrees     XR HIP RIGHT (2-3 VIEWS)    Result Date: 8/29/2021  EXAMINATION: TWO XRAY VIEWS OF THE RIGHT HIP; THREE XRAY VIEWS OF THE RIGHT KNEE 8/29/2021 1:12 am COMPARISON: Right knee radiograph 12/02/2013, right hip radiograph 06/06/2011 HISTORY: ORDERING SYSTEM PROVIDED HISTORY: right hip pain after 12 hour karate competition TECHNOLOGIST PROVIDED HISTORY: right hip pain after 12 hour karate competition Reason for Exam: 12 hours of karate, pt states \"over use\" Acuity: Acute Type of Exam: Initial; ORDERING SYSTEM PROVIDED HISTORY: right knee pain after 12 hour karate competition TECHNOLOGIST PROVIDED HISTORY: right knee pain after 12 hour karate competition Reason for Exam: 12 hours of karate, pt states \"over use\" Acuity: Acute Type of Exam: Initial FINDINGS: RIGHT HIP: No evident acute fracture. Joints maintain anatomic alignment. Incompletely evaluated lumbar spine degenerative changes. Increased moderate to severe degenerative changes of the hip with the femoral head abutting the acetabular roof. Moderate to severe degenerative changes of the pubic symphysis with at least mild involvement of the sacroiliac joints. No obvious acute soft abnormality. RIGHT KNEE: No evident acute fracture.   New irregularity along the femoral medial epicondyle, suggesting prior avulsion injury related to the medial collateral ligament. Joints maintain anatomic alignment. Mild tricompartmental degenerative changes. Unchanged bony fragment near the tibial tubercle, potentially a sequela of enthesopathy or remote Osgood-Schlatter disease. No evident suprapatellar effusion. No obvious acute soft abnormality. 1. No evident acute findings in the right hip or right knee. 2. Degenerative changes as above. XR KNEE RIGHT (3 VIEWS)    Result Date: 8/29/2021  EXAMINATION: TWO XRAY VIEWS OF THE RIGHT HIP; THREE XRAY VIEWS OF THE RIGHT KNEE 8/29/2021 1:12 am COMPARISON: Right knee radiograph 12/02/2013, right hip radiograph 06/06/2011 HISTORY: ORDERING SYSTEM PROVIDED HISTORY: right hip pain after 12 hour karate competition TECHNOLOGIST PROVIDED HISTORY: right hip pain after 12 hour karate competition Reason for Exam: 12 hours of karate, pt states \"over use\" Acuity: Acute Type of Exam: Initial; ORDERING SYSTEM PROVIDED HISTORY: right knee pain after 12 hour karate competition TECHNOLOGIST PROVIDED HISTORY: right knee pain after 12 hour karate competition Reason for Exam: 12 hours of karate, pt states \"over use\" Acuity: Acute Type of Exam: Initial FINDINGS: RIGHT HIP: No evident acute fracture. Joints maintain anatomic alignment. Incompletely evaluated lumbar spine degenerative changes. Increased moderate to severe degenerative changes of the hip with the femoral head abutting the acetabular roof. Moderate to severe degenerative changes of the pubic symphysis with at least mild involvement of the sacroiliac joints. No obvious acute soft abnormality. RIGHT KNEE: No evident acute fracture. New irregularity along the femoral medial epicondyle, suggesting prior avulsion injury related to the medial collateral ligament. Joints maintain anatomic alignment. Mild tricompartmental degenerative changes.   Unchanged bony fragment near the tibial tubercle, potentially a sequela of enthesopathy or remote Osgood-Schlatter disease. No evident suprapatellar effusion. No obvious acute soft abnormality. 1. No evident acute findings in the right hip or right knee. 2. Degenerative changes as above. Physical Exam:    General appearance - NAD, AOx 3   Lungs -CTAB, no R/R/R  Heart - RRR, no M/R/G  Abdomen - Soft, NT/ND  Neurological:  MAEx4, No focal motor deficit, sensory loss  Extremities - Cap refil <2 sec in all ext., no edema  Skin -warm, dry      Hospital Course:  Clinical course has improved, labs and imaging reviewed. Sukhjinder Olivares originally presented to the hospital on 9/8/2021 10:23 PM. with muscle cramping. At that time it was determined that He required further observation and IV fluids. He was admitted and labs and imaging were followed daily. Imaging results as above. Patient signed [address] Disposition: Patient left AMA    Patient stated that they will not drive themselves home from the hospital if they have gotten pain killers/ narcotics earlier that day and that they will arrange for transportation on their own or work with the  for a ride. Patient counseled NOT to drive while under the influence of narcotics/ pain killers. Condition: Good    Patient left AMA, without receiving further IV fluid therapy. Patient advised of the risks of not receiving appropriate fluid replacement for his rhabdomyolysis, including ongoing dehydration, specifically affecting kidneys. Patient understands the risks and states that he will go home and drink Pedialyte and Gatorade. Time Spent: 0 day      --  Ayan Taylor MD  Emergency Medicine Resident Physician    This dictation was generated by voice recognition computer software. Although all attempts are made to edit the dictation for accuracy, there may be errors in the transcription that are not intended.

## 2021-09-09 NOTE — ED NOTES
Pt resting comfortably in no acute distress. Respirations even and unlabored. Call light remains within reach. No needs at this time. Water provided.       Marguerite Trujillo RN  09/09/21 0509

## 2021-09-09 NOTE — ED PROVIDER NOTES
Faculty Sign-Out Attestation  Handoff taken on the following patient from prior Attending Physician: Tess Alonso    I was available and discussed any additional care issues that arose and coordinated the management plans with the resident(s) caring for the patient during my duty period. Any areas of disagreement with residents documentation of care or procedures are noted on the chart. I was personally present for the key portions of any/all procedures during my duty period. I have documented in the chart those procedures where I was not present during the key portions.     Fatigue, rubi, > fluids, needing admission,     Olga Godwin DO  Attending Physician     Olga Godwin,   09/09/21 0010  Admitted       Olga Godwin,   09/09/21 0500

## 2021-09-09 NOTE — ED NOTES
Pt resting comfortably in no acute distress. Respirations even and unlabored. Call light remains within reach. No needs at this time.        Martha Torrez RN  09/09/21 1757

## 2021-09-09 NOTE — ED PROVIDER NOTES
2 hours afterwards. Today he was so fatigued and achy was able unable to even dress himself. He has a history of mild rhabdomyolysis on prior visits. No statin use. No drug use. No recent illness. No Covid symptoms but is not Covid vaccinated. He goes to pain clinic for chronic back pain. He has a history of DJD. On exam he appears sleepy and is falling asleep during conversation, vital signs are normal.  Skin is warm and dry. Normal motor strength. Joints are full range movement. Lungs are clear. Card exam is benign. Impression is dehydration, mild rhabdomyolysis, rule out electrolyte abnormality. Plan is laboratory studies, IV fluids, Tylenol, anticipate discharge. Feliberto Runner.  Juan Nagy MD, [facility]  Attending Emergency  Physician               Cl Polanco MD  09/08/21 4088

## 2021-09-09 NOTE — ED NOTES
Pt resting comfortably in no acute distress. Respirations even and unlabored. Call light remains within reach. No needs at this time.        Jeannette Rodriguez RN  09/09/21 9142

## 2021-09-10 ENCOUNTER — CARE COORDINATION (OUTPATIENT)
Dept: CASE MANAGEMENT | Age: 66
End: 2021-09-10

## 2021-09-10 NOTE — CARE COORDINATION
[facility] Transitions Initial Follow Up Call    Call within 2 business days of discharge: Yes    Patient: Zahra Carcamo Patient : 1955   MRN: [id number]  Reason for Admission: Muscle cramping  Discharge Date: 21 RARS: No data recorded    Last Discharge [facility]       Complaint Diagnosis Description Type Department Provider    21 Fatigue Muscle cramping . .. ED (AMA) [facility] Fe Taylor MD; [address] .. Spoke with: Patient    Facility: [facility]    Non-face-to-face services provided:  Obtained and reviewed discharge summary and/or continuity of care documents     Patient states he is still having muscle cramping, achiness in joints. He has been drinking Pedialyte since coming home and is back at work. He was found to be dehydrated, he works outside in the heat and had not been drinking water. He states he has been drinking since coming home. I offered to schedule with PCP and he states he will do this, has to correlate with work schedule. He left AMA because he said they did not have all his medications that he usually takes. Fluids encouraged as well as follow up with his PCP, offered assistance but he declined. Transitions of Care Initial Call    Was this an external facility discharge? No     Challenges to be reviewed by the provider   Additional needs identified to be addressed with provider: No  none             Method of communication with provider : none      Advance Care Planning:   Does patient have an Advance Directive: patient declined education. Was this a readmission? No  Patient stated reason for admission: muscle aches, weakness    Patients top risk factors for readmission: medical condition-Dehydration    Care Transition Nurse (CTN) contacted the patient by telephone to perform post hospital discharge assessment. Verified name and  with patient as identifiers. Provided introduction to self, and explanation of the CTN role.      CTN reviewed they filled?: Yes  Have you been contacted by a [address]?: No  Have you scheduled your follow up appointment?: No  Were you discharged with any Home Care or Post Acute Services: No  Do you feel like you have everything you need to keep you well at home?: Yes  Care Transitions Interventions         Follow Up  Future Appointments   Date Time Provider Jonathan Lundberg   9/15/2021  9:30 AM LYNNE Bwoers - CNP [facility]   11/12/2021  8:45 AM Gregorio Landis MD [facility] DARSHANA Felix RN

## 2021-09-13 NOTE — PROGRESS NOTES
[facility]  CDU / OBSERVATION eNCOUnter  Attending NOte           This patient left against medical advice prior to my evaluation. I did not see this patient.      Nieves Dejesus MD  Attending Emergency  Physician

## 2021-09-15 ENCOUNTER — CARE COORDINATION (OUTPATIENT)
Dept: CASE MANAGEMENT | Age: 66
End: 2021-09-15

## 2021-09-15 NOTE — CARE COORDINATION
condition-SANJU  Interventions to address risk factors: Education of patient/family/caregiver/guardian to support self-management-discussed contacting PCP for follow up, getting repeat labs      Non-[facility] follow up appointment(s): n/a    CTN provided contact information for future needs. Plan for follow-up call in 7-10 days based on severity of symptoms and risk factors. Plan for next call: Routine follow up call            Care Transitions Subsequent and Final Call    Schedule Follow Up Appointment with PCP: Declined  Subsequent and Final Calls  Do you have any ongoing symptoms?: Yes  Onset of Patient-reported symptoms: In the past 7 days  Patient-reported symptoms: Other  Have your medications changed?: No  Do you have any questions related to your medications?: No  Do you currently have any active services?: No  Do you have any needs or concerns that I can assist you with?: No  Identified Barriers: Lack of Education, Lack of Motivation  Care Transitions Interventions  Other Interventions:            Follow Up  Future Appointments   Date Time Provider Jonathan Lundberg   11/12/2021  8:45 AM James Mayfield MD [facility] IM Lynn Grullon RN

## 2021-09-22 ENCOUNTER — CARE COORDINATION (OUTPATIENT)
Dept: CASE MANAGEMENT | Age: 66
End: 2021-09-22

## 2021-09-22 NOTE — CARE COORDINATION
[facility] Transitions Follow Up Call    2021    Patient: Stephan Palumbo  Patient : 1955   MRN: [id number]  Reason for Admission: Muscle cramping  Discharge Date: 21 RARS: No data recorded       Spoke with: Stephan Palumbo      Was able to contact Kalyani Daniel for transitional outreach. He stated that he continues with cramping. He said that he is drinking Pedialyte, and Gatorade. Attempted to instruct pt to follow up with PCP or if he had a nephrologist for his CKD and he said no. He stated that he did not know he had any problems with his kidneys. He stated that he had to do some work and to call him back in 10-15 minutes. Re attempted to contact Alta Rae, but no answer and voice mail left. Will attempt to contact pt tomorrow. Care Transitions Follow Up Call          Needs to be reviewed by the provider    Additional needs identified to be addressed with provider: No  none                 Method of communication with provider : none        Care Transition Nurse (CTN) contacted the patient by telephone to follow up after admission on . Verified name and  with patient as identifiers.     Addressed changes since last contact: none  Discussed follow-up appointments. If no appointment was previously scheduled, appointment scheduling offered: Yes. Is follow up appointment scheduled within 7 days of discharge? No.     Advance Care Planning:   Does patient have an Advance Directive: patient declined education.      CTN reviewed discharge instructions, medical action plan and red flags with patient and discussed any barriers to care and/or understanding of plan of care after discharge.  Discussed appropriate site of care based on symptoms and resources available to patient including: PCP and Specialist. The patient agrees to contact the PCP office for questions related to their healthcare.      Patients top risk factors for readmission: medical condition-SANJU  Interventions to address risk factors: Education of patient/family/caregiver/guardian to support self-management-discussed contacting PCP for follow up, getting repeat labs        Non-[facility] follow up appointment(s): n/a     CTN provided contact information for future needs. Plan for follow-up call in 7-10 days based on severity of symptoms and risk factors. Plan for next call: Routine follow up call    Care Transitions Subsequent and Final Call    Subsequent and Final Calls  Do you have any ongoing symptoms?: Yes  Onset of Patient-reported symptoms: In the past 7 days  Patient-reported symptoms: Other  Have your medications changed?: No  Do you have any questions related to your medications?: No  Do you currently have any active services?: No  Do you have any needs or concerns that I can assist you with?: No  Care Transitions Interventions  Other Interventions:            Follow Up  Future Appointments   Date Time Provider Jonathan Lundberg   11/12/2021  8:45 AM James Mayfield MD [facility] DARSHANA Lea RN

## 2021-09-23 ENCOUNTER — CARE COORDINATION (OUTPATIENT)
Dept: CASE MANAGEMENT | Age: 66
End: 2021-09-23

## 2021-09-23 DIAGNOSIS — M54.6 CHRONIC BILATERAL THORACIC BACK PAIN: ICD-10-CM

## 2021-09-23 DIAGNOSIS — G89.29 CHRONIC BILATERAL THORACIC BACK PAIN: ICD-10-CM

## 2021-09-23 DIAGNOSIS — M25.50 PAIN IN JOINT, MULTIPLE SITES: Chronic | ICD-10-CM

## 2021-09-23 NOTE — CARE COORDINATION
[facility] Transitions Follow Up Call    2021    Patient: Stephan Palumbo  Patient : 1955   MRN: [id number]  Reason for Admission: Muscle cramping  Discharge Date: 21 RARS: No data recorded       Spoke with: Patient    Spoke with patient about his health, had lengthy conversation with him. Patient discussed his pain, his health and social situation. Tried to explain to patient that some of his lifestyle may be affecting his health. He is 61, he works a physically demanding job, he is a  and is sparring weekly at [employer] We discussed the possibility of muscle breakdown leading to kidney damage. He sees pain management and has continual cramping of various muscles and is in chronic pain. He sees pain management already. He is agreeable to get his blood work done tomorrow and expressed if he calls back in the afternoon we can discuss results to see if his kidney function is getting any worse. He has not had follow up with his PCP since discharge (left AMA). Expressed that writer would assist in any way to help with his health care needs. Care Transitions Follow Up Call    Needs to be reviewed by the provider   Additional needs identified to be addressed with provider: No  none             Method of communication with provider : none      Care Transition Nurse (CTN) contacted the patient by telephone to follow up after admission on . Verified name and  with patient as identifiers. Addressed changes since last contact: none  Discussed follow-up appointments. If no appointment was previously scheduled, appointment scheduling offered: Yes. Is follow up appointment scheduled within 7 days of discharge? No.    Advance Care Planning:   Does patient have an Advance Directive: patient declined education.      CTN reviewed discharge instructions, medical action plan and red flags with patient and discussed any barriers to care and/or understanding of plan of care after discharge. Discussed appropriate site of care based on symptoms and resources available to patient including: PCP and Specialist. The patient agrees to contact the PCP office for questions related to their healthcare. Patients top risk factors for readmission: medical condition-SANJU  Interventions to address risk factors: Obtained and reviewed discharge summary and/or continuity of care documents      Non-[facility] follow up appointment(s): n/a    CTN provided contact information for future needs. Plan for follow-up call in 7-10 days based on severity of symptoms and risk factors. Plan for next call: Routine follow up, check labs            Care Transitions Subsequent and Final Call    Schedule Follow Up Appointment with PCP: Completed  Subsequent and Final Calls  Do you have any ongoing symptoms?: Yes  Onset of Patient-reported symptoms: In the past 7 days  Patient-reported symptoms: Pain  Have your medications changed?: No  Do you have any questions related to your medications?: No  Do you currently have any active services?: No  Do you have any needs or concerns that I can assist you with?: No  Identified Barriers: Lack of Education  Care Transitions Interventions  Other Interventions:            Follow Up  Future Appointments   Date Time Provider Jonathan Lundberg   11/12/2021  8:45 AM Sony Pop MD [facility] DARSHANA Saxena RN

## 2021-09-24 ENCOUNTER — TELEPHONE (OUTPATIENT)
Dept: INTERNAL MEDICINE | Age: 66
End: 2021-09-24

## 2021-09-24 DIAGNOSIS — M54.6 CHRONIC BILATERAL THORACIC BACK PAIN: Primary | ICD-10-CM

## 2021-09-24 DIAGNOSIS — G89.29 CHRONIC BILATERAL THORACIC BACK PAIN: Primary | ICD-10-CM

## 2021-09-24 RX ORDER — TIZANIDINE 4 MG/1
TABLET ORAL
Qty: 30 TABLET | Refills: 5 | Status: SHIPPED | OUTPATIENT
Start: 2021-09-24 | End: 2022-03-29

## 2021-09-24 NOTE — TELEPHONE ENCOUNTER
Refill request for tiZANidine (ZANAFLEX) 4 MG tablet. If appropriate please send medication(s) to patients pharmacy. Next appt: 11/12/2021      Health Maintenance   Topic Date Due    COVID-19 Vaccine (1) Never done    Diabetes screen  11/21/2020    Flu vaccine (1) 09/01/2021    Shingles Vaccine (2 of 2) 03/31/2022 (Originally 1/13/2021)    DTaP/Tdap/Td vaccine (1 - Tdap) 12/14/2022 (Originally 9/14/2014)    Potassium monitoring  09/08/2022    Creatinine monitoring  09/08/2022    Lipid screen  04/02/2024    Colon cancer screen colonoscopy  05/27/2030    Hepatitis C screen  Completed    HIV screen  Completed    Hepatitis A vaccine  Aged Out    Hepatitis B vaccine  Aged Out    Hib vaccine  Aged Out    Meningococcal (ACWY) vaccine  Aged Out    Pneumococcal 0-64 years Vaccine  Aged Out       Hemoglobin A1C (%)   Date Value   11/21/2017 5.0   12/14/2016 5.1   01/17/2013 4.9             ( goal A1C is < 7)   Microalb/Crt.  Ratio (mcg/mg creat)   Date Value   01/22/2013 9     LDL Cholesterol (mg/dL)   Date Value   04/02/2019 184 (H)       (goal LDL is <100)   AST (U/L)   Date Value   08/13/2020 35     ALT (U/L)   Date Value   08/13/2020 29     BUN (mg/dL)   Date Value   09/08/2021 25 (H)     BP Readings from Last 3 Encounters:   09/08/21 (!) 116/58   08/29/21 (!) 172/63   05/19/21 (!) 185/68          (goal 120/80)          Patient Active Problem List:     Hyperlipidemia     Gout     CKD (chronic kidney disease) stage 3, GFR 30-59 ml/min ([facility])     Wrist pain, right     Swelling of joint, wrist, right     Pain in joint, multiple sites     Obesity     Adjustment reaction     Vertigo     Pain in joint     Spinal stenosis, lumbar region, without neurogenic claudication     Facet arthropathy, lumbar     Chronic back pain     Acute bilateral low back pain with sciatica     Rhabdomyolysis

## 2021-09-24 NOTE — TELEPHONE ENCOUNTER
Request for medication refill, gabapentin      Next Visit Date:21  Future Appointments   Date Time Provider Jonathan Manei   2021  8:45 AM Eduar Garrett MD [facility]   Topic Date Due    COVID-19 Vaccine (1) Never done    Diabetes screen  2020    Flu vaccine (1) 2021    Shingles Vaccine (2 of 2) 2022 (Originally 2021)    DTaP/Tdap/Td vaccine (1 - Tdap) 2022 (Originally 2014)    Potassium monitoring  2022    Creatinine monitoring  2022    Lipid screen  2024    Colon cancer screen colonoscopy  2030    Hepatitis C screen  Completed    HIV screen  Completed    Hepatitis A vaccine  Aged Out    Hepatitis B vaccine  Aged Out    Hib vaccine  Aged Out    Meningococcal (ACWY) vaccine  Aged Out    Pneumococcal 0-64 years Vaccine  Aged Out       Hemoglobin A1C (%)   Date Value   2017 5.0   2016 5.1   2013 4.9             ( goal A1C is < 7)   Microalb/Crt.  Ratio (mcg/mg creat)   Date Value   2013 9     LDL Cholesterol (mg/dL)   Date Value   2019 184 (H)       (goal LDL is <100)   AST (U/L)   Date Value   2020 35     ALT (U/L)   Date Value   2020 29     BUN (mg/dL)   Date Value   2021 25 (H)     BP Readings from Last 3 Encounters:   21 (!) 116/58   21 (!) 172/63   21 (!) 185/68          (goal 120/80)    All Future Testing planned in [facility]  Lab Frequency Next Occurrence   Hemoglobin A1C Once 10/12/2021   Comprehensive Metabolic Panel Once    CBC Once 2021   Lipid Panel Once 2021   Ferritin Once 2021   Iron and TIBC Once 2021   Vitamin B12 & Folate Once 2021   CT CERVICAL SPINE W CONTRAST Once 2021   IR MYELOGRAM CERVICAL Once 2021   Nasal cannula oxygen PRN          Patient Active Problem List:     Hyperlipidemia     Gout     CKD (chronic kidney disease) stage 3, GFR 30-59 ml/min (formerly [facility]) Wrist pain, right     Swelling of joint, wrist, right     Pain in joint, multiple sites     Obesity     Adjustment reaction     Vertigo     Pain in joint     Spinal stenosis, lumbar region, without neurogenic claudication     Facet arthropathy, lumbar     Chronic back pain     Acute bilateral low back pain with sciatica     Rhabdomyolysis

## 2021-09-30 ENCOUNTER — CARE COORDINATION (OUTPATIENT)
Dept: CASE MANAGEMENT | Age: 66
End: 2021-09-30

## 2021-09-30 NOTE — CARE COORDINATION
[facility] Transitions Follow Up Call    2021    Patient: Kiko Mcelroy  Patient : 1955   MRN: [id number]  Reason for Admission: Dehydration after exertion  Discharge Date: 21 RARS: No data recorded       Spoke with: Patient    Spoke with patient who said he still has the same ailments, back pain, muscle cramping and foot pain. He sees pain management for his pain issues. He left the hospital AMA, has not followed up with his PCP and has not gotten any of his blood work done. 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 He has been advised that he needs to rest his muscles and this could be causing some muscle breakdown which can lead to Rhabdo. He keeps saying he will not be slowing down. He was advised to f/u with PCP and get labs done. Episode resolved. Care Transitions Follow Up Call    Needs to be reviewed by the provider   Additional needs identified to be addressed with provider: No  none             Method of communication with provider : none      Care Transition Nurse (CTN) contacted the patient by telephone to follow up after admission on . Verified name and  with patient as identifiers. Addressed changes since last contact: none  Discussed follow-up appointments. If no appointment was previously scheduled, appointment scheduling offered: yes  Is follow up appointment scheduled within 7 days of discharge? No.    Advance Care Planning:   Does patient have an Advance Directive: patient declined education. CTN reviewed discharge instructions, medical action plan and red flags with patient and discussed any barriers to care and/or understanding of plan of care after discharge. Discussed appropriate site of care based on symptoms and resources available to patient including: PCP. The patient agrees to contact the PCP office for questions related to their healthcare.      Patients top risk factors for readmission: medical condition-dehydration  Interventions to address risk factors: Education of patient/family/caregiver/guardian to support self-management-discussed importance of follow up,especially on kidney function      Non-[facility] follow up appointment(s): n/a    CTN provided contact information for future needs. No further follow-up call indicated based on severity of symptoms and risk factors. Plan for next call: Patient is not wanting to follow up with PCP, has not gotten labs, not receptive to education            Care Transitions Subsequent and Final Call    Schedule Follow Up Appointment with PCP: Completed  Subsequent and Final Calls  Do you have any ongoing symptoms?: Yes  Onset of Patient-reported symptoms: In the past 7 days  Patient-reported symptoms: Pain  Have your medications changed?: No  Do you have any questions related to your medications?: No  Do you currently have any active services?: No  Do you have any needs or concerns that I can assist you with?: No  Identified Barriers: Lack of Education  Care Transitions Interventions  Other Interventions:            Follow Up  Future Appointments   Date Time Provider Jonathan Lundberg   11/12/2021  8:45 AM Izzy Reynoso MD [facility] DARSHANA Jerome RN

## 2021-10-08 DIAGNOSIS — D50.9 IRON DEFICIENCY ANEMIA, UNSPECIFIED IRON DEFICIENCY ANEMIA TYPE: ICD-10-CM

## 2021-10-08 DIAGNOSIS — I10 UNCONTROLLED HYPERTENSION: ICD-10-CM

## 2021-10-08 DIAGNOSIS — M25.50 PAIN IN JOINT, MULTIPLE SITES: Chronic | ICD-10-CM

## 2021-10-08 RX ORDER — AMLODIPINE BESYLATE 5 MG/1
TABLET ORAL
Qty: 30 TABLET | Refills: 2 | Status: SHIPPED | OUTPATIENT
Start: 2021-10-08 | End: 2021-12-30 | Stop reason: SDUPTHER

## 2021-10-08 RX ORDER — FERROUS SULFATE 325(65) MG
TABLET ORAL
Qty: 30 TABLET | Refills: 5 | Status: SHIPPED | OUTPATIENT
Start: 2021-10-08 | End: 2022-03-29

## 2021-10-08 RX ORDER — MELOXICAM 15 MG/1
TABLET ORAL
Qty: 30 TABLET | Refills: 2 | Status: SHIPPED | OUTPATIENT
Start: 2021-10-08 | End: 2021-11-16 | Stop reason: ALTCHOICE

## 2021-10-08 RX ORDER — GABAPENTIN 300 MG/1
CAPSULE ORAL
Qty: 90 CAPSULE | Refills: 1 | Status: SHIPPED | OUTPATIENT
Start: 2021-10-08 | End: 2021-11-30

## 2021-10-08 NOTE — TELEPHONE ENCOUNTER
Request for medication refill, ferrous sulfate, amlodipine, meloxicam.      Next Visit Date:11/12/21  Future Appointments   Date Time Provider Jonathan Manei   11/12/2021  8:45 AM Padmini Hayden MD [facility] IM [address] Maintenance   Topic Date Due    COVID-19 Vaccine (1) Never done    Diabetes screen  11/21/2020    Flu vaccine (1) 09/01/2021    Shingles Vaccine (2 of 2) 03/31/2022 (Originally 1/13/2021)    DTaP/Tdap/Td vaccine (1 - Tdap) 12/14/2022 (Originally 9/14/2014)    Potassium monitoring  09/08/2022    Creatinine monitoring  09/08/2022    Lipid screen  04/02/2024    Colon cancer screen colonoscopy  05/27/2030    Hepatitis C screen  Completed    HIV screen  Completed    Hepatitis A vaccine  Aged Out    Hepatitis B vaccine  Aged Out    Hib vaccine  Aged Out    Meningococcal (ACWY) vaccine  Aged Out    Pneumococcal 0-64 years Vaccine  Aged Out       Hemoglobin A1C (%)   Date Value   11/21/2017 5.0   12/14/2016 5.1   01/17/2013 4.9             ( goal A1C is < 7)   Microalb/Crt.  Ratio (mcg/mg creat)   Date Value   01/22/2013 9     LDL Cholesterol (mg/dL)   Date Value   04/02/2019 184 (H)       (goal LDL is <100)   AST (U/L)   Date Value   08/13/2020 35     ALT (U/L)   Date Value   08/13/2020 29     BUN (mg/dL)   Date Value   09/08/2021 25 (H)     BP Readings from Last 3 Encounters:   09/08/21 (!) 116/58   08/29/21 (!) 172/63   05/19/21 (!) 185/68          (goal 120/80)    All Future Testing planned in [facility]  Lab Frequency Next Occurrence   Hemoglobin A1C Once 10/12/2021   Comprehensive Metabolic Panel Once 32/24/0905   CBC Once 11/20/2021   Lipid Panel Once 11/20/2021   Ferritin Once 11/20/2021   Iron and TIBC Once 11/20/2021   Vitamin B12 & Folate Once 07/09/2021   CT CERVICAL SPINE W CONTRAST Once 04/04/2022   IR MYELOGRAM CERVICAL Once 04/04/2022   Nasal cannula oxygen PRN          Patient Active Problem List:     Hyperlipidemia     Gout     CKD (chronic kidney disease) stage 3, GFR 30-59 ml/min ([facility])     Wrist pain, right     Swelling of joint, wrist, right     Pain in joint, multiple sites     Obesity     Adjustment reaction     Vertigo     Pain in joint     Spinal stenosis, lumbar region, without neurogenic claudication     Facet arthropathy, lumbar     Chronic back pain     Acute bilateral low back pain with sciatica     Rhabdomyolysis

## 2021-11-16 ENCOUNTER — TELEPHONE (OUTPATIENT)
Dept: GASTROENTEROLOGY | Age: 66
End: 2021-11-16

## 2021-11-16 ENCOUNTER — OFFICE VISIT (OUTPATIENT)
Dept: INTERNAL MEDICINE | Age: 66
End: 2021-11-16
Payer: MEDICARE

## 2021-11-16 VITALS
WEIGHT: 219 LBS | DIASTOLIC BLOOD PRESSURE: 61 MMHG | BODY MASS INDEX: 29.7 KG/M2 | HEART RATE: 69 BPM | SYSTOLIC BLOOD PRESSURE: 148 MMHG

## 2021-11-16 DIAGNOSIS — K92.0 HEMATEMESIS WITHOUT NAUSEA: ICD-10-CM

## 2021-11-16 DIAGNOSIS — I35.1 AORTIC VALVE INSUFFICIENCY, ETIOLOGY OF CARDIAC VALVE DISEASE UNSPECIFIED: ICD-10-CM

## 2021-11-16 DIAGNOSIS — R73.9 HYPERGLYCEMIA: ICD-10-CM

## 2021-11-16 DIAGNOSIS — N18.31 STAGE 3A CHRONIC KIDNEY DISEASE (HCC): ICD-10-CM

## 2021-11-16 DIAGNOSIS — E78.00 PURE HYPERCHOLESTEROLEMIA: ICD-10-CM

## 2021-11-16 DIAGNOSIS — I10 UNCONTROLLED HYPERTENSION: Primary | ICD-10-CM

## 2021-11-16 DIAGNOSIS — Z23 NEEDS FLU SHOT: ICD-10-CM

## 2021-11-16 DIAGNOSIS — D64.9 NORMOCYTIC ANEMIA: ICD-10-CM

## 2021-11-16 DIAGNOSIS — E79.0 HYPERURICEMIA: ICD-10-CM

## 2021-11-16 PROCEDURE — 3017F COLORECTAL CA SCREEN DOC REV: CPT | Performed by: INTERNAL MEDICINE

## 2021-11-16 PROCEDURE — 90686 IIV4 VACC NO PRSV 0.5 ML IM: CPT | Performed by: INTERNAL MEDICINE

## 2021-11-16 PROCEDURE — 1036F TOBACCO NON-USER: CPT | Performed by: INTERNAL MEDICINE

## 2021-11-16 PROCEDURE — G8417 CALC BMI ABV UP PARAM F/U: HCPCS | Performed by: INTERNAL MEDICINE

## 2021-11-16 PROCEDURE — 99214 OFFICE O/P EST MOD 30 MIN: CPT | Performed by: INTERNAL MEDICINE

## 2021-11-16 PROCEDURE — G8482 FLU IMMUNIZE ORDER/ADMIN: HCPCS | Performed by: INTERNAL MEDICINE

## 2021-11-16 PROCEDURE — 99211 OFF/OP EST MAY X REQ PHY/QHP: CPT | Performed by: INTERNAL MEDICINE

## 2021-11-16 PROCEDURE — G8427 DOCREV CUR MEDS BY ELIG CLIN: HCPCS | Performed by: INTERNAL MEDICINE

## 2021-11-16 RX ORDER — PANTOPRAZOLE SODIUM 40 MG/1
TABLET, DELAYED RELEASE ORAL
Qty: 30 TABLET | Refills: 2 | Status: SHIPPED | OUTPATIENT
Start: 2021-11-16 | End: 2022-03-01

## 2021-11-16 RX ORDER — KETOROLAC TROMETHAMINE 10 MG/1
10 TABLET, FILM COATED ORAL DAILY
COMMUNITY
Start: 2021-08-26 | End: 2022-07-28 | Stop reason: ALTCHOICE

## 2021-11-16 ASSESSMENT — ENCOUNTER SYMPTOMS
NAUSEA: 1
BACK PAIN: 1
COUGH: 0
WHEEZING: 0
PHOTOPHOBIA: 0
SHORTNESS OF BREATH: 0
ABDOMINAL PAIN: 0

## 2021-11-16 NOTE — TELEPHONE ENCOUNTER
Established patient Dr. Lucia Stout (last VV 04/28/20), D64.9 (ICD-10-CM) - Normocytic anemia  K92.0 (ICD-10-CM) - Hematemesis without nausea, [place] [employer]

## 2021-11-16 NOTE — PATIENT INSTRUCTIONS
Return To Clinic 11/30/2021 . After Visit Summary  given and reviewed. It is very important for your care that you keep your appointment. If for some reason you are unable to keep your appointment it is equally important that you call our office at [phone] to cancel your appointment and reschedule. Failure to do so may result in your termination from our practice. -NV scheduled for 11/30/2021 for BP Check    -Bloodwork orders given to patient, they will have them done before their next visit.     -Please call [facility] to clifford fernandes appt. [phone]    -B. Michael Rodríguez

## 2021-11-16 NOTE — PROGRESS NOTES
[facility]    Progress Note    Date of patient's visit: 11/16/2021    Patient's Name:  Monserrat Kwon    YOB: 1955            Patient Care Team:  Keshav Schulte MD as PCP - Quoc Rubin MD as PCP - [address] [place] [address] Dr YiBanner Provider  Keshav Schulte MD as Referring Physician (Internal Medicine)  Elsie Snellen, MD as Consulting Physician (Gastroenterology)    REASON FOR VISIT: Routine outpatient follow     Chief Complaint   Patient presents with   [facility] Hypertension    Health Maintenance     flu given, A1C pended. HISTORY OF PRESENT ILLNESS:    History was obtained from the patient. Monserrat Kwon is a 61 y.o. is here for follow-up. He was seen in the ER twice and was under observation for rhabdomyolysis and severe dehydration causing SANJU. He got some hydration and went home. He says he was training for a  and was doing severe exercises for his  training. Currently continue complains of pain in the right gluteal area radiating down to his right leg. He is going to pain management in 2 weeks. He has seen neurosurgeon. He has been noted to have a heart murmur for a few years. He has had several echoes which have shown aortic insufficiency. He was referred to cardiology last time as well. I have no notes from and and I am not sure he has been seen. Patient says he induces vomiting several times a week when he does not feel good. He says when he does not feel good he sticks his fingers down his throat to induce vomiting and sometimes he notices some blood in it. He has a history of anemia. Will refer him to GI for endoscopy as needed. He had an EGD and colonoscopy done last year. He is on a PPI. Advised to avoid NSAIDs. He has not been receiving his statins for a few months now. Because he had some rhabdomyolysis I will check his labs before we restart his medications.     Cardiology office was called and he (Diagnostic) Hives       MEDICATIONS:      Current Outpatient Medications on File Prior to Visit   Medication Sig Dispense Refill    ketorolac (TORADOL) 10 MG tablet Take 10 mg by mouth daily       amLODIPine (NORVASC) 5 MG tablet TAKE 1 TABLET BY MOUTH ONE TIME A DAY 30 tablet 2    ferrous sulfate (FEROSUL) 325 (65 Fe) MG tablet TAKE 1 TABLET BY MOUTH daily 30 tablet 5    meloxicam (MOBIC) 15 MG tablet TAKE 1 TABLET BY MOUTH ONCE DAILY. 30 tablet 2    gabapentin (NEURONTIN) 300 MG capsule TAKE 1 CAPSULE BY MOUTH 3 TIMES A DAY 90 capsule 1    tiZANidine (ZANAFLEX) 4 MG tablet TAKE ONE TABLET BY MOUTH EVERY 8 HOURS AS NEEDED FOR SPASMS 30 tablet 5    pantoprazole (PROTONIX) 40 MG tablet TAKE 1 TABLET BY MOUTH ONE TIME A DAY 30 tablet 2    allopurinol (ZYLOPRIM) 300 MG tablet TAKE 1 TABLET BY MOUTH ONCE DAILY. 30 tablet 5    oxyCODONE-acetaminophen (PERCOCET)  MG per tablet Take 1 tablet by mouth 3 times daily.  EPINEPHrine (EPIPEN 2-TANG) 0.3 MG/0.3ML SOAJ injection Inject 0.3 mLs into the skin once for 1 dose Use as directed for allergic reaction 0.3 mL 0    diphenhydrAMINE (BENADRYL) 25 MG capsule Take 1-2 tablets by mouth every 6 hours as needed for itching. 30 capsule 0    magnesium oxide (MAG-OX) 400 MG tablet TAKE 1 TABLET BY MOUTH ONE TIME A DAY 30 tablet 2    diclofenac sodium (VOLTAREN) 1 % GEL Apply 2 g topically 2 times daily      Melatonin 10 MG CAPS Take by mouth      baclofen (LIORESAL) 10 MG tablet TAKE 1 TABLET BY MOUTH 2 TIMES A DAY 30 tablet 0     No current facility-administered medications on file prior to visit. HISTORY    Reviewed and no change from previous record. Eb Myers  reports that he has never smoked.  He has never used smokeless tobacco.    FAMILY HISTORY:    Reviewed and No change from previous visit    HEALTH MAINTENANCE DUE:      Health Maintenance Due   Topic Date Due    COVID-19 Vaccine (1) Never done    Diabetes screen  11/21/2020    Flu vaccine (1) 09/01/2021       REVIEW OF SYSTEMS:    12 point review of symptoms completed and found to be normal except noted in the HPI    Review of Systems   Constitutional: Negative for fatigue and unexpected weight change. Eyes: Negative for photophobia and visual disturbance. Respiratory: Negative for cough, shortness of breath and wheezing. Cardiovascular: Negative for chest pain, palpitations and leg swelling. Gastrointestinal: Positive for nausea. Negative for abdominal pain. Induced vomiting and occasional blood per patient in vomitus for years   Endocrine: Negative for polydipsia and polyuria. Genitourinary: Negative for dysuria and hematuria. Musculoskeletal: Positive for arthralgias, back pain and neck pain. Negative for myalgias. Neurological: Negative for dizziness, syncope, weakness and headaches. Hematological: Negative for adenopathy. Does not bruise/bleed easily. PHYSICAL EXAM:     Vitals:    11/16/21 0838 11/16/21 0845   BP: (!) 159/70 (!) 148/61   Site: Left Upper Arm    Position: Sitting    Cuff Size: Large Adult    Pulse: 65 69   Weight: 219 lb (99.3 kg)      Body mass index is 29.7 kg/m². BP Readings from Last 3 Encounters:   11/16/21 (!) 148/61   09/08/21 (!) 116/58   08/29/21 (!) 172/63        Wt Readings from Last 3 Encounters:   11/16/21 219 lb (99.3 kg)   09/09/21 225 lb (102.1 kg)   08/28/21 225 lb (102.1 kg)       Physical Exam  Vitals and nursing note reviewed. Constitutional:       Appearance: Normal appearance. HENT:      Head: Normocephalic and atraumatic. Eyes:      General: No scleral icterus. Extraocular Movements: Extraocular movements intact. Conjunctiva/sclera: Conjunctivae normal.      Pupils: Pupils are equal, round, and reactive to light. Cardiovascular:      Rate and Rhythm: Normal rate and regular rhythm. Heart sounds: Murmur heard.        Pulmonary:      Effort: Pulmonary effort is normal.      Breath sounds: Normal breath sounds. No wheezing. Musculoskeletal:      Cervical back: Normal range of motion and neck supple. Right lower leg: No edema. Left lower leg: No edema. Neurological:      General: No focal deficit present. Mental Status: He is alert and oriented to person, place, and time. LABORATORY FINDINGS:    CBC:  Lab Results   Component Value Date    WBC 7.0 09/08/2021    HGB 9.5 09/08/2021     09/08/2021     09/10/2011     BMP:    Lab Results   Component Value Date     09/08/2021    K 3.9 09/08/2021    CL 98 09/08/2021    CO2 17 09/08/2021    BUN 25 09/08/2021    CREATININE 2.08 09/08/2021    GLUCOSE 103 09/08/2021    GLUCOSE 108 09/10/2011     HEMOGLOBIN A1C:   Lab Results   Component Value Date    LABA1C 5.0 11/21/2017     MICROALBUMIN URINE:   Lab Results   Component Value Date    MICROALBUR <6 01/22/2013     FASTING LIPID PANEL:  Lab Results   Component Value Date    CHOL 257 (H) 04/02/2019    HDL 49 04/02/2019    TRIG 120 04/02/2019     Lab Results   Component Value Date    LDLCHOLESTEROL 184 (H) 04/02/2019       LIVER PROFILE:  Lab Results   Component Value Date    ALT 29 08/13/2020    AST 35 08/13/2020    PROT 6.8 08/13/2020    PROT 7.1 01/17/2013    BILITOT 0.79 08/13/2020    BILIDIR 0.20 08/13/2020    LABALBU 4.0 08/13/2020    LABALBU 4.4 09/10/2011      THYROID FUNCTION:   Lab Results   Component Value Date    TSH 0.68 06/11/2020      URINEANALYSIS: No results found for: LABURIN  ASSESSMENT AND PLAN:    1. Uncontrolled hypertension  Improved  Same meds  Recheck in 2 weeks    - Basic Metabolic Panel; Future    2. Stage 3a chronic kidney disease ([facility]  Avoid NSAID's  Monitor  Refer to nephrology if worsening  Recent several episodes of SANJU due to dehydration    - Basic Metabolic Panel; Future    3. Pure hypercholesterolemia  Not on statin since august    - Lipid Panel; Future  - CK; Future    4. Hyperuricemia  On allopurinol      5.  Hyperglycemia    - Hemoglobin A1C; Future    6. Normocytic anemia    - CBC; Future  - Iron and TIBC; Future  - Ferritin; Future  - Nirmal Hobson MD, Gastroenterology, [facility]    7. Needs flu shot    - INFLUENZA, QUADV, 3 YRS AND OLDER, IM PF, PREFILL SYR OR SDV, 0.5ML (AFLURIA QUADV, PF)  - CT ADMIN INFLUENZA VIRUS VAC    8. Aortic valve insufficiency, etiology of cardiac valve disease unspecified  Follow up with cardiology    9. Hematemesis without nausea  Continue PPI  Avoid inducing emesis    - Nirmal Hobson MD, Gastroenterology, [facility]    - pantoprazole (PROTONIX) 40 MG tablet; TAKE 1 TABLET BY MOUTH ONE TIME A DAY  Dispense: 30 tablet; Refill: 2          FOLLOW UP AND INSTRUCTIONS:   Return in about 4 months (around 3/16/2022). 1. Hazel Ion received counseling on the following healthy behaviors: nutrition, exercise and medication adherence    2. Reviewed prior labs and health maintenance. 3. Discussed use, benefit, and side effects of prescribed medications. Barriers to medication compliance addressed. All patient questions answered. Pt voiced understanding.      4. Patient given educational materials - see patient instructions    Merry Sargent  Attending Physician, [facility] [place] [facility]   Faculty, Internal Medicine Residency Program  [address] [facility]  11/16/2021, 8:41 AM

## 2021-11-16 NOTE — PROGRESS NOTES
Vaccine Information Sheet, \"Influenza - Inactivated\"  given to Veena Zaldivar, or parent/legal guardian of  Veena Zaldivar and verbalized understanding. Patient responses:    Is the person being vaccinated sick today? No    Does the person to be vaccinated have an allergy to a component of the vaccine? No    Has the person to be vaccinated ever had a reaction to the flu vaccine in the past?  No    Have you ever had Guillian Bastrop Syndrome? No    Flu vaccine given per order. Please see immunization tab.

## 2021-12-15 ENCOUNTER — APPOINTMENT (OUTPATIENT)
Dept: GENERAL RADIOLOGY | Age: 66
End: 2021-12-15
Payer: MEDICARE

## 2021-12-15 ENCOUNTER — HOSPITAL ENCOUNTER (EMERGENCY)
Age: 66
Discharge: HOME | End: 2021-12-15
Attending: EMERGENCY MEDICINE
Payer: MEDICARE

## 2021-12-15 VITALS
RESPIRATION RATE: 16 BRPM | TEMPERATURE: 98.1 F | BODY MASS INDEX: 29.12 KG/M2 | HEART RATE: 61 BPM | HEIGHT: 72 IN | SYSTOLIC BLOOD PRESSURE: 151 MMHG | DIASTOLIC BLOOD PRESSURE: 63 MMHG | OXYGEN SATURATION: 97 % | WEIGHT: 215 LBS

## 2021-12-15 DIAGNOSIS — S81.812A LACERATION OF LEFT LOWER EXTREMITY, INITIAL ENCOUNTER: Primary | ICD-10-CM

## 2021-12-15 PROCEDURE — 73590 X-RAY EXAM OF LOWER LEG: CPT

## 2021-12-15 PROCEDURE — 6360000002 HC RX W HCPCS: Performed by: STUDENT IN AN ORGANIZED HEALTH CARE EDUCATION/TRAINING PROGRAM

## 2021-12-15 PROCEDURE — 99283 EMERGENCY DEPT VISIT LOW MDM: CPT

## 2021-12-15 PROCEDURE — 2500000003 HC RX 250 WO HCPCS: Performed by: STUDENT IN AN ORGANIZED HEALTH CARE EDUCATION/TRAINING PROGRAM

## 2021-12-15 PROCEDURE — 90471 IMMUNIZATION ADMIN: CPT | Performed by: STUDENT IN AN ORGANIZED HEALTH CARE EDUCATION/TRAINING PROGRAM

## 2021-12-15 PROCEDURE — 12001 RPR S/N/AX/GEN/TRNK 2.5CM/<: CPT

## 2021-12-15 PROCEDURE — 90715 TDAP VACCINE 7 YRS/> IM: CPT | Performed by: STUDENT IN AN ORGANIZED HEALTH CARE EDUCATION/TRAINING PROGRAM

## 2021-12-15 PROCEDURE — 6370000000 HC RX 637 (ALT 250 FOR IP): Performed by: STUDENT IN AN ORGANIZED HEALTH CARE EDUCATION/TRAINING PROGRAM

## 2021-12-15 RX ORDER — CEPHALEXIN 500 MG/1
500 CAPSULE ORAL ONCE
Status: COMPLETED | OUTPATIENT
Start: 2021-12-15 | End: 2021-12-15

## 2021-12-15 RX ORDER — LIDOCAINE HYDROCHLORIDE AND EPINEPHRINE BITARTRATE 20; .01 MG/ML; MG/ML
20 INJECTION, SOLUTION SUBCUTANEOUS ONCE
Status: COMPLETED | OUTPATIENT
Start: 2021-12-15 | End: 2021-12-15

## 2021-12-15 RX ORDER — CEPHALEXIN 500 MG/1
500 CAPSULE ORAL 4 TIMES DAILY
Qty: 28 CAPSULE | Refills: 0 | Status: SHIPPED | OUTPATIENT
Start: 2021-12-15 | End: 2021-12-20

## 2021-12-15 RX ADMIN — CEPHALEXIN 500 MG: 500 CAPSULE ORAL at 21:39

## 2021-12-15 RX ADMIN — TETANUS TOXOID, REDUCED DIPHTHERIA TOXOID AND ACELLULAR PERTUSSIS VACCINE, ADSORBED 0.5 ML: 5; 2.5; 8; 8; 2.5 SUSPENSION INTRAMUSCULAR at 20:27

## 2021-12-15 RX ADMIN — LIDOCAINE HYDROCHLORIDE AND EPINEPHRINE 20 ML: 20; 10 INJECTION, SOLUTION INFILTRATION; PERINEURAL at 20:40

## 2021-12-15 ASSESSMENT — ENCOUNTER SYMPTOMS
CONSTIPATION: 0
SORE THROAT: 0
ABDOMINAL PAIN: 0
DIARRHEA: 0
COUGH: 0
SHORTNESS OF BREATH: 0
VOMITING: 0
NAUSEA: 0
PHOTOPHOBIA: 0

## 2021-12-15 ASSESSMENT — PAIN SCALES - GENERAL: PAINLEVEL_OUTOF10: 0

## 2021-12-16 NOTE — ED PROVIDER NOTES
[facility] ED  Emergency Department  Emergency Medicine Resident Sign-out     Care of Jules Gorman was assumed from Dr. Sd Kim and is being seen for Laceration   . The patient's initial evaluation and plan have been discussed with the prior provider who initially evaluated the patient. EMERGENCY DEPARTMENT COURSE / MEDICAL DECISION MAKING:       MEDICATIONS GIVEN:  Orders Placed This Encounter   Medications    Tetanus-Diphth-Acell Pertussis (BOOSTRIX) injection 0.5 mL    lidocaine-EPINEPHrine 2 percent-[id number] injection 20 mL    cephALEXin (KEFLEX) capsule 500 mg     Order Specific Question:   Antimicrobial Indications     Answer: Other     Order Specific Question:   Other Abx Indication     Answer:   wound infection ppx    cephALEXin (KEFLEX) 500 MG capsule     Sig: Take 1 capsule by mouth 4 times daily for 5 days     Dispense:  28 capsule     Refill:  0       LABS / RADIOLOGY:     Labs Reviewed - No data to display    XR TIBIA FIBULA LEFT (2 VIEWS)   Final Result   No fracture or osseous erosion. RECENT VITALS:     Temp: 98.1 °F (36.7 °C),  Pulse: 65, Resp: 16, BP: (!) 151/63, SpO2: 97 %    This patient is a 61 y.o. Male with leg laceration. Earlier today patient was using a chainsaw when he cut his leg. He was able to walk on it and had no numbness, tingling, or uncontrolled bleeding. He notes that this happened 2 weeks ago as well and did not go to the ER for evaluation. Patient's Tdap was updated. Patient was given prophylactic Keflex due to suturing into an old wound and contaminate her of the chainsaw. X-ray shows no foreign bodies or fracture. Plan for suture repair and discharge with follow-up in 10 days for suture removal.    OUTSTANDING TASKS / RECOMMENDATIONS:      1. Lac Repair       FINAL IMPRESSION:     1.  Laceration of left lower extremity, initial encounter        DISPOSITION:       DISPOSITION:  [x]  Discharge   []  Transfer -    []  Admission -     []  Against Medical Advice   []  Eloped   FOLLOW-UP: [facility] ED  [address] [facility]  [phone]  Go to   If symptoms worsen    MD Annalee [facility] [address] [address]  [phone]    Schedule an appointment as soon as possible for a visit in 10 days  For wound re-check, For suture removal     DISCHARGE MEDICATIONS: New Prescriptions    CEPHALEXIN (KEFLEX) 500 MG CAPSULE    Take 1 capsule by mouth 4 times daily for 5 days          Noemi Fernandez MD  Emergency Medicine Resident  [facility]        Noemi Fernandez MD  Resident  12/17/21 3060

## 2021-12-16 NOTE — ED NOTES
Discharge instruction was given to the PT. Follow up care and S&S of infection were talked about.       Lynn Beasley RN  12/15/21 0731

## 2021-12-16 NOTE — ED PROVIDER NOTES
[facility]  ED  Emergency Department Encounter  EmergencyMedicine Resident     Pt Debora Montano  MRN: [id number]  [place] 1955  Date of evaluation: 12/15/21  PCP:  Xavier Zaragoza MD    [address] [place] [address]       Chief Complaint   Patient presents with    Laceration       HISTORY OF PRESENT ILLNESS  (Location/Symptom, Timing/Onset, Context/Setting, Quality, Duration, Modifying Factors, Severity.)      Anais Nuñez is a 61 y.o. male who presents with leg laceration. Patient notes approximately 4 PM this evening he had a laceration to his leg from his chainsaw. 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 He notes that the bleeding was controlled on its own and he did wash it out. Notes that he had a similar cut there 2 weeks ago but did not seek help or intervention at that time. He notes he is able to walk after the accident. He is denying any confusion chills, chest pain, shortness breath, lightheadedness, abdominal pain, nausea/vomiting, nausea tingling, or weakness. He does not know when last had a Tdap. PAST MEDICAL / SURGICAL / SOCIAL / FAMILY HISTORY      has a past medical history of Chronic back pain, Chronic kidney disease, Gout, Hyperlipidemia, Hypertension, Obesity, Osteoarthritis, Restless leg syndrome, Shoulder pain, Shrimp allergy, Spinal stenosis, lumbar region, without neurogenic claudication, Swelling of joint, wrist, right, Thoracic back pain, Vertigo, Wears glasses, and Wrist pain, right.       has a past surgical history that includes nasal endoscopy (Bilateral, 10/17/2016); Nerve Block (11/02/2016); Nerve Block (04/03/2017); Colonoscopy (11/2014); Nerve Block (10/09/2017); Upper gastrointestinal endoscopy (05/27/2020); Colonoscopy (05/27/2020); Upper gastrointestinal endoscopy (N/A, 5/27/2020); and Colonoscopy (N/A, 5/27/2020).       Social History     Socioeconomic History    Marital status: Single     Spouse name: Not on file    Number of children: Not on file    Years of education: Not on file    Highest education level: Not on file   Occupational History    Not on file   Tobacco Use    Smoking status: Never Smoker    Smokeless tobacco: Never Used   Vaping Use    Vaping Use: Never used   Substance and Sexual Activity    Alcohol use: No    Drug use: No    Sexual activity: Not on file   Other Topics Concern    Not on file   Social History Narrative    Not on file     Social Determinants of Health     Financial Resource Strain: Low Risk     Difficulty of Paying Living Expenses: Not hard at all   Food Insecurity: No Food Insecurity    Worried About [address] [place] [address] in the Last Year: Never true    Mohsen of Food in the Last Year: Never true   Transportation Needs: No Transportation Needs    Lack of Transportation (Medical): No    Lack of Transportation (Non-Medical):  No   Physical Activity:     Days of Exercise per Week: Not on file    Minutes of Exercise per Session: Not on file   Stress:     Feeling of Stress : Not on file   Social Connections:     Frequency of Communication with Friends and Family: Not on file    Frequency of Social Gatherings with Friends and Family: Not on file    Attends Spiritism Services: Not on file    Active Member of [address] [place] [address] or Organizations: Not on file    Attends Club or Organization Meetings: Not on file    Marital Status: Not on file   Intimate Partner Violence:     Fear of Current or Ex-Partner: Not on file    Emotionally Abused: Not on file    Physically Abused: Not on file    Sexually Abused: Not on file   Housing Stability:     Unable to Pay for Housing in the Last Year: Not on file    Number of [place] in the Last Year: Not on file    Unstable Housing in the Last Year: Not on file       Family History   Problem Relation Age of Onset    Heart Disease Brother 48    Other Father     Other Brother         HIV    Other Brother        Allergies: Codeine and Shrimp (diagnostic)    Home Medications:  Prior to Admission medications    Medication Sig Start Date End Date Taking? Authorizing Provider   cephALEXin (KEFLEX) 500 MG capsule Take 1 capsule by mouth 4 times daily for 5 days 12/15/21 12/20/21 Yes Vero Banks,    gabapentin (NEURONTIN) 300 MG capsule TAKE 1 CAPSULE BY MOUTH 3 TIMES A DAY. 12/7/21 1/7/22  Xavier Zaragoza MD   ketorolac (TORADOL) 10 MG tablet Take 10 mg by mouth daily  8/26/21   Historical Provider, MD   pantoprazole (PROTONIX) 40 MG tablet TAKE 1 TABLET BY MOUTH ONE TIME A DAY 11/16/21   Xavier Zaragoza MD   amLODIPine (NORVASC) 5 MG tablet TAKE 1 TABLET BY MOUTH ONE TIME A DAY 10/8/21   Xavier Zaragoza MD   ferrous sulfate (FEROSUL) 325 (65 Fe) MG tablet TAKE 1 TABLET BY MOUTH daily 10/8/21   Xavier Zaragoza MD   tiZANidine (ZANAFLEX) 4 MG tablet TAKE ONE TABLET BY MOUTH EVERY 8 HOURS AS NEEDED FOR SPASMS 9/24/21   Sonali Garcia MD   allopurinol (ZYLOPRIM) 300 MG tablet TAKE 1 TABLET BY MOUTH ONCE DAILY. 6/29/21   Xavier Zaragoza MD   oxyCODONE-acetaminophen (PERCOCET)  MG per tablet Take 1 tablet by mouth 3 times daily. 10/2/20   Historical Provider, MD   EPINEPHrine (EPIPEN 2-TANG) 0.3 MG/0.3ML SOAJ injection Inject 0.3 mLs into the skin once for 1 dose Use as directed for allergic reaction 10/12/20 4/1/22  Xavier Zaragoza MD   diphenhydrAMINE (BENADRYL) 25 MG capsule Take 1-2 tablets by mouth every 6 hours as needed for itching. 10/12/20   Xavier Zaragoza MD   magnesium oxide (MAG-OX) 400 MG tablet TAKE 1 TABLET BY MOUTH ONE TIME A DAY 7/30/20   Xavier Zaragoza MD   diclofenac sodium (VOLTAREN) 1 % GEL Apply 2 g topically 2 times daily    Historical Provider, MD   Melatonin 10 MG CAPS Take by mouth    Historical Provider, MD       REVIEW OF SYSTEMS    (2-9 systems for level 4, 10 or more for level 5)      Review of Systems   Constitutional: Negative for chills, diaphoresis, fatigue and fever.    HENT: Negative for congestion and sore throat. Eyes: Negative for photophobia and visual disturbance. Respiratory: Negative for cough and shortness of breath. Cardiovascular: Negative for chest pain and palpitations. Gastrointestinal: Negative for abdominal pain, constipation, diarrhea, nausea and vomiting. Genitourinary: Negative for dysuria and hematuria. Musculoskeletal: Negative for arthralgias and myalgias. Skin: Positive for wound. Negative for rash. Neurological: Negative for weakness and numbness. PHYSICAL EXAM   (up to 7 for level 4, 8 or more for level 5)      INITIAL VITALS:   BP (!) 151/63   Pulse 65   Temp 98.1 °F (36.7 °C) (Oral)   Resp 16   Ht 6' (1.829 m)   Wt 215 lb (97.5 kg)   SpO2 97%   BMI 29.16 kg/m²     Physical Exam  Vitals and nursing note reviewed. Constitutional:       General: He is not in acute distress. Appearance: Normal appearance. He is normal weight. He is not toxic-appearing or diaphoretic. HENT:      Head: Normocephalic and atraumatic. Right Ear: External ear normal.      Left Ear: External ear normal.      Nose: Nose normal.      Mouth/Throat:      Mouth: Mucous membranes are moist.      Pharynx: Oropharynx is clear. Eyes:      Conjunctiva/sclera: Conjunctivae normal.      Pupils: Pupils are equal, round, and reactive to light. Cardiovascular:      Rate and Rhythm: Normal rate and regular rhythm. Pulses: Normal pulses. Pulmonary:      Effort: Pulmonary effort is normal.      Breath sounds: Normal breath sounds. No stridor. Abdominal:      General: Abdomen is flat. There is no distension. Palpations: Abdomen is soft. Tenderness: There is no abdominal tenderness. There is no guarding or rebound. Musculoskeletal:         General: Tenderness and signs of injury present. No swelling. Normal range of motion. Cervical back: Normal range of motion and neck supple. No rigidity.       Comments: Full A/P ROM bilateral lower for DC. RADIOLOGY:  XR TIBIA FIBULA LEFT (2 VIEWS)    Result Date: 12/15/2021  EXAMINATION: 2 XRAY VIEWS OF THE LEFT TIBIA AND FIBULA 12/15/2021 7:44 pm COMPARISON: None. HISTORY: ORDERING SYSTEM PROVIDED HISTORY: chainsaw tibia TECHNOLOGIST PROVIDED HISTORY: chainsaw tibia FINDINGS: No fracture or osseous erosion was noted. The bony mineralization was normal.     No fracture or osseous erosion. EKG  None    All EKG's are interpreted by the Emergency Department Physician who either signs or Co-signs this chart in the absence of a cardiologist.    EMERGENCY DEPARTMENT COURSE:  Pt signed out to Dr. Alvarado Basket:  See Dr. Andrey Enamorado note. CONSULTS:  None    CRITICAL CARE:  Please see attending note    FINAL IMPRESSION      1.  Laceration of left lower extremity, initial encounter          DISPOSITION / [address] Discharge - Pending Orders Complete 12/15/2021 09:23:26 PM      PATIENT REFERRED TO:  [facility] ED  [address] [place] [address]  [phone]  Go to   If symptoms worsen    MD Annalee [facility] [address] [facility] [address] [place] [address]  [phone]    Schedule an appointment as soon as possible for a visit in 10 days  For wound re-check, For suture removal      DISCHARGE MEDICATIONS:  New Prescriptions    CEPHALEXIN (KEFLEX) 500 MG CAPSULE    Take 1 capsule by mouth 4 times daily for 5 days       Radha Garcia DO  Emergency Medicine Resident    (Please note that portions of thisnote were completed with a voice recognition program.  Efforts were made to edit the dictations but occasionally words are mis-transcribed.)        Radha Garcia DO  Resident  12/15/21 2125

## 2021-12-16 NOTE — ED PROVIDER NOTES
[address] [facility]  Faculty Attestation    I performed a history and physical examination of the patient and discussed management with the resident. I reviewed the residents note and agree with the documented findings and plan of care. Any areas of disagreement are noted on the chart. I was personally present for the key portions of any procedures. I have documented in the chart those procedures where I was not present during the key portions. I have reviewed the emergency nurses triage note. I agree with the chief complaint, past medical history, past surgical history, allergies, medications, social, and family history as documented unless otherwise noted below.        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 with a chainsaw  Patient had a similar injury about 2 weeks ago that he did not seek evaluation for  Left tetanus is unknown  Denies distal paresthesias  Denies other injury  No recent illness, chest pain, or acute pulmonary concerns  Review of systems otherwise negative  He has no additional complaints at this time    On examination he appears in no acute distress  Heart is regular in rate and rhythm  Lungs clear to auscultation  Patient has a several centimeter curvilinear laceration to the anterior mid left tibia  The wound becomes more superficial as it proceeds both distally and laterally  The distal aspect appears to be an epidermal avulsion  No active bleeding  Normal peripheral pulse, perfusion, and sensation  Normal range of motion of the ankle and toes    We will update tetanus, plan for imaging, and repair the wound    X-ray reviewed    Laceration repaired under my direct supervision by Dr. Wiley Anderson  The distal and lateral aspect will have to heal by secondary intention given the epidermal loss and inability to anchor sutures    Will discharge with prophylactic antibiotics      Rommel Florence DO  Attending Emergency Physician        [place]rena,   12/15/21 2084

## 2021-12-28 DIAGNOSIS — I10 UNCONTROLLED HYPERTENSION: ICD-10-CM

## 2021-12-28 DIAGNOSIS — E79.0 HYPERURICEMIA: ICD-10-CM

## 2021-12-30 ENCOUNTER — OFFICE VISIT (OUTPATIENT)
Dept: INTERNAL MEDICINE | Age: 66
End: 2021-12-30
Payer: MEDICARE

## 2021-12-30 ENCOUNTER — TELEPHONE (OUTPATIENT)
Dept: INTERNAL MEDICINE CLINIC | Age: 66
End: 2021-12-30

## 2021-12-30 VITALS
HEART RATE: 114 BPM | SYSTOLIC BLOOD PRESSURE: 181 MMHG | WEIGHT: 227 LBS | HEIGHT: 72 IN | TEMPERATURE: 97 F | BODY MASS INDEX: 30.75 KG/M2 | DIASTOLIC BLOOD PRESSURE: 68 MMHG

## 2021-12-30 DIAGNOSIS — S81.812D LACERATION OF LEFT LOWER EXTREMITY, SUBSEQUENT ENCOUNTER: ICD-10-CM

## 2021-12-30 DIAGNOSIS — Z48.02 VISIT FOR SUTURE REMOVAL: ICD-10-CM

## 2021-12-30 DIAGNOSIS — L20.82 FLEXURAL ECZEMA: Primary | ICD-10-CM

## 2021-12-30 DIAGNOSIS — I10 UNCONTROLLED HYPERTENSION: ICD-10-CM

## 2021-12-30 PROCEDURE — 1036F TOBACCO NON-USER: CPT | Performed by: STUDENT IN AN ORGANIZED HEALTH CARE EDUCATION/TRAINING PROGRAM

## 2021-12-30 PROCEDURE — G8417 CALC BMI ABV UP PARAM F/U: HCPCS | Performed by: STUDENT IN AN ORGANIZED HEALTH CARE EDUCATION/TRAINING PROGRAM

## 2021-12-30 PROCEDURE — 99211 OFF/OP EST MAY X REQ PHY/QHP: CPT | Performed by: INTERNAL MEDICINE

## 2021-12-30 PROCEDURE — G8482 FLU IMMUNIZE ORDER/ADMIN: HCPCS | Performed by: STUDENT IN AN ORGANIZED HEALTH CARE EDUCATION/TRAINING PROGRAM

## 2021-12-30 PROCEDURE — G8427 DOCREV CUR MEDS BY ELIG CLIN: HCPCS | Performed by: STUDENT IN AN ORGANIZED HEALTH CARE EDUCATION/TRAINING PROGRAM

## 2021-12-30 PROCEDURE — 3017F COLORECTAL CA SCREEN DOC REV: CPT | Performed by: STUDENT IN AN ORGANIZED HEALTH CARE EDUCATION/TRAINING PROGRAM

## 2021-12-30 PROCEDURE — 99213 OFFICE O/P EST LOW 20 MIN: CPT | Performed by: STUDENT IN AN ORGANIZED HEALTH CARE EDUCATION/TRAINING PROGRAM

## 2021-12-30 RX ORDER — AMLODIPINE BESYLATE 10 MG/1
TABLET ORAL
Qty: 30 TABLET | Refills: 2 | Status: SHIPPED | OUTPATIENT
Start: 2021-12-30 | End: 2022-03-25 | Stop reason: SDUPTHER

## 2021-12-30 ASSESSMENT — ENCOUNTER SYMPTOMS
ABDOMINAL DISTENTION: 0
BLOOD IN STOOL: 0
BACK PAIN: 1
COUGH: 0
CONSTIPATION: 0
WHEEZING: 0
SINUS PAIN: 0
DIARRHEA: 0
FACIAL SWELLING: 0
APNEA: 0
EYE REDNESS: 0
CHOKING: 0
EYE DISCHARGE: 0
ABDOMINAL PAIN: 0
ANAL BLEEDING: 0
CHEST TIGHTNESS: 0
NAUSEA: 0
EYE PAIN: 0
RHINORRHEA: 0
PHOTOPHOBIA: 0
SHORTNESS OF BREATH: 0
EYE ITCHING: 0
VOMITING: 0
STRIDOR: 0

## 2021-12-30 NOTE — PATIENT INSTRUCTIONS
Medications e-scribe to pharmacy of pt's choice. Return To Clinic 3/17/2022 at 8:45 AM. Please bring all of your medications to this upcoming appointment. After Visit Summary  given and reviewed. It is very important for your care that you keep your appointment. If for some reason you are unable to keep your appointment it is equally important that you call our office at [phone] to cancel your appointment and reschedule. Failure to do so may result in your termination from our practice.     SL

## 2021-12-30 NOTE — PROGRESS NOTES
[facility] [id number] [facility] [address]  Dept: [phone]  Dept Fax: [phone]    Office Progress/Follow Up Note  Date of patient's visit: 12/30/2021  Patient's Name:  Pat Coombs YOB: 1955            Patient Care Team:  Nicki Mcleod MD as PCP - Arash Isabel MD as PCP - [facility]  Nicki Mcleod MD as Referring Physician (Internal Medicine)  Brenden River MD as Consulting Physician (Gastroenterology)    REASON FOR VISIT: Same day outpatient visit    HISTORY OF PRESENT ILLNESS:      Chief Complaint   Patient presents with   [facility] ED Follow-up     [facility] 12/25    Health Maintenance       History was obtained from the patient. Pat Coombs is a 61 y.o. is here for a same-day visit for removal of sutures. Patient suffered a laceration over left lower leg shin from a tree falling on a chainsaw on 12/16/2021. He went to the ED and the laceration was sutured with 5 sutures. Denies any worsening of pain, purulent discharge, change in color diarrhea. On examination, wound appears healthy, noninfected. Patient is also complaining of rash on both lower legs and left popliteal fossa, likely dry skin eczema. Essential hypertension: Usually uncontrolled. Usually runs in the 150s. Today blood pressure initially was in 180s, repeat was in 160s. Currently on Norvasc 5 mg daily.       Patient Active Problem List   Diagnosis    Hyperlipidemia    Gout    CKD (chronic kidney disease) stage 3, GFR 30-59 ml/min ([facility])    Wrist pain, right    Swelling of joint, wrist, right    Pain in joint, multiple sites    Obesity    Adjustment reaction    Vertigo    Pain in joint    Spinal stenosis, lumbar region, without neurogenic claudication    Facet arthropathy, lumbar    Chronic back pain    Acute bilateral low back pain with sciatica    Rhabdomyolysis         Health Maintenance Due   Topic Date Due    COVID-19 Vaccine (1) Never done    Diabetes screen  11/21/2020         Allergies   Allergen Reactions    Codeine Other (See Comments)     Don;t agree with him    Shrimp (Diagnostic) Hives         MEDICATIONS:      Current Outpatient Medications   Medication Sig Dispense Refill    gabapentin (NEURONTIN) 300 MG capsule TAKE 1 CAPSULE BY MOUTH 3 TIMES A DAY. 90 capsule 1    pantoprazole (PROTONIX) 40 MG tablet TAKE 1 TABLET BY MOUTH ONE TIME A DAY 30 tablet 2    amLODIPine (NORVASC) 5 MG tablet TAKE 1 TABLET BY MOUTH ONE TIME A DAY 30 tablet 2    ferrous sulfate (FEROSUL) 325 (65 Fe) MG tablet TAKE 1 TABLET BY MOUTH daily 30 tablet 5    tiZANidine (ZANAFLEX) 4 MG tablet TAKE ONE TABLET BY MOUTH EVERY 8 HOURS AS NEEDED FOR SPASMS 30 tablet 5    allopurinol (ZYLOPRIM) 300 MG tablet TAKE 1 TABLET BY MOUTH ONCE DAILY. 30 tablet 5    oxyCODONE-acetaminophen (PERCOCET)  MG per tablet Take 1 tablet by mouth 3 times daily.  EPINEPHrine (EPIPEN 2-TANG) 0.3 MG/0.3ML SOAJ injection Inject 0.3 mLs into the skin once for 1 dose Use as directed for allergic reaction 0.3 mL 0    diphenhydrAMINE (BENADRYL) 25 MG capsule Take 1-2 tablets by mouth every 6 hours as needed for itching. 30 capsule 0    magnesium oxide (MAG-OX) 400 MG tablet TAKE 1 TABLET BY MOUTH ONE TIME A DAY 30 tablet 2    diclofenac sodium (VOLTAREN) 1 % GEL Apply 2 g topically 2 times daily      ketorolac (TORADOL) 10 MG tablet Take 10 mg by mouth daily  (Patient not taking: Reported on 12/30/2021)      Melatonin 10 MG CAPS Take by mouth (Patient not taking: Reported on 12/30/2021)       No current facility-administered medications for this visit. SOCIAL HISTORY    Reviewed and no change from previous record. Kraig Cockayne  reports that he has never smoked. He has never used smokeless tobacco.    FAMILY HISTORY:    Reviewed and No change from previous visit    REVIEW OF SYSTEMS:    12 point ROS Negative except as mentioned above.     Review of Systems   Constitutional: Negative for activity change, appetite change, chills, diaphoresis, fatigue and fever. HENT: Negative for congestion, dental problem, ear discharge, ear pain, facial swelling, hearing loss, mouth sores, nosebleeds, postnasal drip, rhinorrhea and sinus pain. Eyes: Negative for photophobia, pain, discharge, redness, itching and visual disturbance. Respiratory: Negative for apnea, cough, choking, chest tightness, shortness of breath, wheezing and stridor. Cardiovascular: Negative for chest pain, palpitations and leg swelling. Gastrointestinal: Negative for abdominal distention, abdominal pain, anal bleeding, blood in stool, constipation, diarrhea, nausea and vomiting. Endocrine: Negative for cold intolerance, heat intolerance, polydipsia, polyphagia and polyuria. Genitourinary: Negative for difficulty urinating, dysuria, flank pain, frequency and hematuria. Musculoskeletal: Positive for arthralgias, back pain, joint swelling and myalgias. Negative for neck pain and neck stiffness. Skin:        6 x 2 cm healing lacerated wound present over l;eft lower leg shin. Appears healthily healing, no signs of infection. 5 sutures present. Writer removed sutures in exam room. Patient tolerated procedure well. Rash on both lower legs and left popliteal fossa, likely dry skin eczema. Neurological: Negative for dizziness, tremors, seizures, syncope, facial asymmetry, speech difficulty, weakness, light-headedness, numbness and headaches. Psychiatric/Behavioral: Negative for agitation, behavioral problems, confusion, decreased concentration, dysphoric mood and hallucinations. The patient is not nervous/anxious and is not hyperactive.         PHYSICAL EXAM:      Vitals:    12/30/21 1445   BP: (!) 181/68   Site: Right Upper Arm   Position: Sitting   Cuff Size: Medium Adult   Pulse: 114   Temp: 97 °F (36.1 °C)   TempSrc: Temporal   Weight: 227 lb (103 kg)   Height: 6' (1.829 m)     BP Readings from Last 3 Encounters:   12/30/21 (!) 181/68   12/15/21 (!) 151/63   11/16/21 (!) 148/61      General appearance - alert, well appearing, and in no distress  Mental status - alert, oriented to person, place, and time  Mouth - mucous membranes moist, pharynx normal without lesions  Neck - supple, no significant adenopathy  Chest - clear to auscultation, no wheezes, rales or rhonchi, symmetric air entry  Heart - normal rate, regular rhythm, normal S1, S2, no murmurs, rubs, clicks or gallops  Abdomen - soft, nontender, nondistended, no masses or organomegaly  Neurological - alert, oriented, normal speech, no focal findings or movement disorder noted  Extremities - peripheral pulses normal, no pedal edema, no clubbing or cyanosis  Skin - normal coloration and turgor, no rashes, no suspicious skin lesions noted    LABORATORY FINDINGS:    CBC:  Lab Results   Component Value Date    WBC 7.0 09/08/2021    HGB 9.5 09/08/2021     09/08/2021     09/10/2011       BMP:    Lab Results   Component Value Date     09/08/2021    K 3.9 09/08/2021    CL 98 09/08/2021    CO2 17 09/08/2021    BUN 25 09/08/2021    CREATININE 2.08 09/08/2021    GLUCOSE 103 09/08/2021    GLUCOSE 108 09/10/2011       HEMOGLOBIN A1C:   Lab Results   Component Value Date    LABA1C 5.0 11/21/2017       FASTING LIPID PANEL:  Lab Results   Component Value Date    CHOL 257 (H) 04/02/2019    HDL 49 04/02/2019    TRIG 120 04/02/2019       ASSESSMENT AND PLAN:      1. Flexural eczema  - triamcinolone (KENALOG) 0.1 % ointment; Apply topically 2 times daily for 7 days  Dispense: 30 g; Refill: 1    2. Laceration of left lower extremity, subsequent encounter  - Sutures removed under sterile precautions. - Wound healing well.  -Suggested keeping wound dry and topical neosporin. 3. Uncontrolled hypertension  -Increase Norvasc to 10 mg daily.   - amLODIPine (NORVASC) 10 MG tablet; TAKE 1 TABLET BY MOUTH ONE TIME A DAY  Dispense: 30 tablet; Refill: 2    4. Visit for suture removal        FOLLOW UP AND INSTRUCTIONS:   · No follow-ups on file. · Martina Blanco received counseling on the following healthy behaviors: nutrition, exercise, medication adherence, tobacco cessation and decrease in alcohol consumption    · Discussed use, benefit, and side effects of prescribed medications. Barriers to medication compliance addressed. All patient questions answered. Pt voiced understanding. · Patient given educational materials - see patient instructions        Muriel Buitrago MD  Internal Medicine Resident, PGY-3  [facility];  [facility], [facility]  12/30/2021, 3:15 PM

## 2021-12-30 NOTE — PROGRESS NOTES
Attending Physician Statement  I have discussed the care of Will Garrett, including pertinent history and exam findings,  with the resident. I have reviewed the key elements of all parts of the encounter with the resident. I agree with the assessment, plan and orders as documented by the resident.   (GE Modifier)

## 2021-12-30 NOTE — TELEPHONE ENCOUNTER
Received a health link message for patient's allopurinol refill. Called the pharmacy and ordered one month supply.      Zahra Sorenson MD  INTERNAL MEDICINE RESIDENT, PGY2  [address]   12/30/2021

## 2022-01-03 RX ORDER — ALLOPURINOL 300 MG/1
TABLET ORAL
Qty: 30 TABLET | Refills: 5 | Status: SHIPPED | OUTPATIENT
Start: 2022-01-03 | End: 2022-07-28 | Stop reason: SDUPTHER

## 2022-01-03 RX ORDER — AMLODIPINE BESYLATE 5 MG/1
TABLET ORAL
Qty: 30 TABLET | Refills: 2 | OUTPATIENT
Start: 2022-01-03

## 2022-01-03 NOTE — TELEPHONE ENCOUNTER
Request for Allopurinol and Amlodipine (refilled 12/30/2021). Next Visit Date:  Future Appointments   Date Time Provider Jonathan Tamika   3/17/2022  8:45 AM Paz Miller MD [address] [facility]   Topic Date Due    COVID-19 Vaccine (1) Never done    Diabetes screen  11/21/2020    Shingles Vaccine (2 of 2) 03/31/2022 (Originally 1/13/2021)    Depression Screen  04/01/2022    Potassium monitoring  09/08/2022    Creatinine monitoring  09/08/2022    Lipid screen  04/02/2024    Colon cancer screen colonoscopy  05/27/2030    DTaP/Tdap/Td vaccine (2 - Td or Tdap) 12/15/2031    Flu vaccine  Completed    Hepatitis C screen  Completed    HIV screen  Completed    Hepatitis A vaccine  Aged Out    Hepatitis B vaccine  Aged Out    Hib vaccine  Aged Out    Meningococcal (ACWY) vaccine  Aged Out    Pneumococcal 0-64 years Vaccine  Aged Out       Hemoglobin A1C (%)   Date Value   11/21/2017 5.0   12/14/2016 5.1   01/17/2013 4.9             ( goal A1C is < 7)   Microalb/Crt.  Ratio (mcg/mg creat)   Date Value   01/22/2013 9     LDL Cholesterol (mg/dL)   Date Value   04/02/2019 184 (H)       (goal LDL is <100)   AST (U/L)   Date Value   08/13/2020 35     ALT (U/L)   Date Value   08/13/2020 29     BUN (mg/dL)   Date Value   09/08/2021 25 (H)     BP Readings from Last 3 Encounters:   12/30/21 (!) 181/68   12/15/21 (!) 151/63   11/16/21 (!) 148/61          (goal 120/80)    All Future Testing planned in [facility]  Lab Frequency Next Occurrence   Vitamin B12 & Folate Once 07/09/2021   CT CERVICAL SPINE W CONTRAST Once 04/04/2022   IR MYELOGRAM CERVICAL Once 04/04/2022   Hemoglobin A1C Once 54/55/9986   Basic Metabolic Panel Once 58/58/6273   CBC Once 02/17/2022   Iron and TIBC Once 02/17/2022   Ferritin Once 02/17/2022   Lipid Panel Once 02/17/2022   CK Once 02/24/2022   Nasal cannula oxygen PRN          Patient Active Problem List:     Hyperlipidemia     Gout     CKD (chronic kidney disease) stage 3, GFR 30-59 ml/min ([facility])     Wrist pain, right     Swelling of joint, wrist, right     Pain in joint, multiple sites     Obesity     Adjustment reaction     Vertigo     Pain in joint     Spinal stenosis, lumbar region, without neurogenic claudication     Facet arthropathy, lumbar     Chronic back pain     Acute bilateral low back pain with sciatica     Rhabdomyolysis

## 2022-01-25 ENCOUNTER — HOSPITAL ENCOUNTER (OUTPATIENT)
Age: 67
Discharge: HOME | End: 2022-01-27
Payer: MEDICARE

## 2022-01-25 ENCOUNTER — HOSPITAL ENCOUNTER (OUTPATIENT)
Dept: GENERAL RADIOLOGY | Age: 67
Discharge: HOME | End: 2022-01-27
Payer: MEDICARE

## 2022-01-25 DIAGNOSIS — M51.26 RUPTURED LUMBAR DISC: ICD-10-CM

## 2022-01-25 DIAGNOSIS — M48.061 SPINAL STENOSIS OF LUMBAR REGION, UNSPECIFIED WHETHER NEUROGENIC CLAUDICATION PRESENT: ICD-10-CM

## 2022-01-25 PROCEDURE — 73502 X-RAY EXAM HIP UNI 2-3 VIEWS: CPT

## 2022-01-31 DIAGNOSIS — M54.6 CHRONIC BILATERAL THORACIC BACK PAIN: ICD-10-CM

## 2022-01-31 DIAGNOSIS — G89.29 CHRONIC BILATERAL THORACIC BACK PAIN: ICD-10-CM

## 2022-01-31 NOTE — TELEPHONE ENCOUNTER
E-scribe request for Gabapentin and Meloxicam . Please review and e-scribe if applicable. Next Visit Date:  Future Appointments   Date Time Provider Jonathan Manei   3/17/2022  8:45 AM Dana Rico MD [address] [facility]   Topic Date Due    COVID-19 Vaccine (1) Never done    Diabetes screen  11/21/2020    Shingles Vaccine (2 of 2) 03/31/2022 (Originally 1/13/2021)    Depression Screen  04/01/2022    Potassium monitoring  09/08/2022    Creatinine monitoring  09/08/2022    Lipid screen  04/02/2024    Colon cancer screen colonoscopy  05/27/2030    DTaP/Tdap/Td vaccine (2 - Td or Tdap) 12/15/2031    Flu vaccine  Completed    Hepatitis C screen  Completed    HIV screen  Completed    Hepatitis A vaccine  Aged Out    Hepatitis B vaccine  Aged Out    Hib vaccine  Aged Out    Meningococcal (ACWY) vaccine  Aged Out    Pneumococcal 0-64 years Vaccine  Aged Out               (applicable per patient's age: Cancer Screenings, Depression Screening, Fall Risk Screening, Immunizations)    Hemoglobin A1C (%)   Date Value   11/21/2017 5.0   12/14/2016 5.1   01/17/2013 4.9     Microalb/Crt.  Ratio (mcg/mg creat)   Date Value   01/22/2013 9     LDL Cholesterol (mg/dL)   Date Value   04/02/2019 184 (H)     AST (U/L)   Date Value   08/13/2020 35     ALT (U/L)   Date Value   08/13/2020 29     BUN (mg/dL)   Date Value   09/08/2021 25 (H)      (goal A1C is < 7)   (goal LDL is <100) need 30-50% reduction from baseline     BP Readings from Last 3 Encounters:   12/30/21 (!) 181/68   12/15/21 (!) 151/63   11/16/21 (!) 148/61    (goal /80)      All Future Testing planned in [facility]:  Lab Frequency Next Occurrence   Vitamin B12 & Folate Once 07/09/2021   CT CERVICAL SPINE W CONTRAST Once 04/04/2022   IR MYELOGRAM CERVICAL Once 04/04/2022   Hemoglobin A1C Once 21/90/6894   Basic Metabolic Panel Once 49/19/1631   CBC Once 02/17/2022   Iron and TIBC Once 02/17/2022   Ferritin Once 02/17/2022   Lipid Panel Once 02/17/2022   CK Once 02/24/2022   Nasal cannula oxygen PRN             Patient Active Problem List:     Hyperlipidemia     Gout     CKD (chronic kidney disease) stage 3, GFR 30-59 ml/min ([facility])     Wrist pain, right     Swelling of joint, wrist, right     Pain in joint, multiple sites     Obesity     Adjustment reaction     Vertigo     Pain in joint     Spinal stenosis, lumbar region, without neurogenic claudication     Facet arthropathy, lumbar     Chronic back pain     Acute bilateral low back pain with sciatica     Rhabdomyolysis

## 2022-02-01 RX ORDER — GABAPENTIN 300 MG/1
CAPSULE ORAL
Qty: 90 CAPSULE | Refills: 1 | Status: SHIPPED | OUTPATIENT
Start: 2022-02-01 | End: 2022-03-29

## 2022-02-01 RX ORDER — MELOXICAM 15 MG/1
TABLET ORAL
Qty: 30 TABLET | Refills: 2 | Status: SHIPPED | OUTPATIENT
Start: 2022-02-01 | End: 2022-04-20

## 2022-02-25 ENCOUNTER — TELEPHONE (OUTPATIENT)
Dept: INTERNAL MEDICINE | Age: 67
End: 2022-02-25

## 2022-02-25 NOTE — LETTER
[address]  [address] [address]  Phone: [phone]  Fax: [phone]    Tushar Cheung MD        February 25, 2022    [facility] [address] [id number]      Dear Monse Cordova: This letter is a reminder that you may have diagnostic testing that has not been completed. It is important to your well-being that these test(s) are performed. Please find the outstanding test(s) attached. If you could please have these completed before your next appointment. You can have the test completed at any [facility] facility or Lab. Please see the order for scheduling instructions. Any testing that needs completed other than blood work or xray's please call [phone] to schedule an appointment. Otherwise can be done at any [facility]. Please call our office at Dept: [phone] for additional information on the outstanding tests or let us know if they have been completed so we may update your chart. If you have any questions or concerns, please don't hesitate to call.     Sincerely,        Tushar Cheung MD

## 2022-02-28 DIAGNOSIS — K92.0 HEMATEMESIS WITHOUT NAUSEA: ICD-10-CM

## 2022-02-28 NOTE — TELEPHONE ENCOUNTER
Request for Pantoprazole. Next Visit Date:  Future Appointments   Date Time Provider Jonathan Lundberg   3/17/2022  8:45 AM Kaya Tyler MD [address] [facility]   Topic Date Due    COVID-19 Vaccine (1) Never done    Diabetes screen  11/21/2020    Shingles Vaccine (2 of 2) 03/31/2022 (Originally 1/13/2021)    Depression Screen  04/01/2022    Potassium monitoring  09/08/2022    Creatinine monitoring  09/08/2022    Lipid screen  04/02/2024    Colorectal Cancer Screen  05/27/2030    DTaP/Tdap/Td vaccine (2 - Td or Tdap) 12/15/2031    Flu vaccine  Completed    Hepatitis C screen  Completed    HIV screen  Completed    Hepatitis A vaccine  Aged Out    Hepatitis B vaccine  Aged Out    Hib vaccine  Aged Out    Meningococcal (ACWY) vaccine  Aged Out    Pneumococcal 0-64 years Vaccine  Aged Out       Hemoglobin A1C (%)   Date Value   11/21/2017 5.0   12/14/2016 5.1   01/17/2013 4.9             ( goal A1C is < 7)   Microalb/Crt.  Ratio (mcg/mg creat)   Date Value   01/22/2013 9     LDL Cholesterol (mg/dL)   Date Value   04/02/2019 184 (H)       (goal LDL is <100)   AST (U/L)   Date Value   08/13/2020 35     ALT (U/L)   Date Value   08/13/2020 29     BUN (mg/dL)   Date Value   09/08/2021 25 (H)     BP Readings from Last 3 Encounters:   12/30/21 (!) 181/68   12/15/21 (!) 151/63   11/16/21 (!) 148/61          (goal 120/80)    All Future Testing planned in [facility]  Lab Frequency Next Occurrence   Vitamin B12 & Folate Once 07/09/2021   CT CERVICAL SPINE W CONTRAST Once 04/04/2022   IR MYELOGRAM CERVICAL Once 04/04/2022   Hemoglobin A1C Once 37/24/7570   Basic Metabolic Panel Once 28/33/4417   CBC Once 05/25/2022   Iron and TIBC Once 05/25/2022   Ferritin Once 05/25/2022   Lipid Panel Once 05/25/2022   CK Once 02/24/2022   Nasal cannula oxygen PRN          Patient Active Problem List:     Hyperlipidemia     Gout     CKD (chronic kidney disease) stage 3, GFR 30-59 ml/min ([facility])     Wrist pain, right     Swelling of joint, wrist, right     Pain in joint, multiple sites     Obesity     Adjustment reaction     Vertigo     Pain in joint     Spinal stenosis, lumbar region, without neurogenic claudication     Facet arthropathy, lumbar     Chronic back pain     Acute bilateral low back pain with sciatica     Rhabdomyolysis

## 2022-03-01 RX ORDER — PANTOPRAZOLE SODIUM 40 MG/1
TABLET, DELAYED RELEASE ORAL
Qty: 30 TABLET | Refills: 2 | Status: SHIPPED | OUTPATIENT
Start: 2022-03-01 | End: 2022-06-01

## 2022-03-25 ENCOUNTER — HOSPITAL ENCOUNTER (OUTPATIENT)
Age: 67
Setting detail: SPECIMEN
Discharge: HOME | End: 2022-03-25

## 2022-03-25 ENCOUNTER — OFFICE VISIT (OUTPATIENT)
Dept: INTERNAL MEDICINE | Age: 67
End: 2022-03-25
Payer: MEDICARE

## 2022-03-25 VITALS
DIASTOLIC BLOOD PRESSURE: 64 MMHG | HEART RATE: 107 BPM | SYSTOLIC BLOOD PRESSURE: 134 MMHG | WEIGHT: 232 LBS | BODY MASS INDEX: 31.46 KG/M2

## 2022-03-25 DIAGNOSIS — D64.9 NORMOCYTIC ANEMIA: ICD-10-CM

## 2022-03-25 DIAGNOSIS — Z13.1 DIABETES MELLITUS SCREENING: ICD-10-CM

## 2022-03-25 DIAGNOSIS — N18.31 STAGE 3A CHRONIC KIDNEY DISEASE (HCC): ICD-10-CM

## 2022-03-25 DIAGNOSIS — E78.00 PURE HYPERCHOLESTEROLEMIA: ICD-10-CM

## 2022-03-25 DIAGNOSIS — M16.11 OSTEOARTHRITIS OF RIGHT HIP, UNSPECIFIED OSTEOARTHRITIS TYPE: ICD-10-CM

## 2022-03-25 DIAGNOSIS — I10 PRIMARY HYPERTENSION: Primary | ICD-10-CM

## 2022-03-25 DIAGNOSIS — I10 UNCONTROLLED HYPERTENSION: ICD-10-CM

## 2022-03-25 LAB
ANION GAP SERPL CALCULATED.3IONS-SCNC: 14 MMOL/L (ref 9–17)
BUN BLDV-MCNC: 13 MG/DL (ref 8–23)
CALCIUM SERPL-MCNC: 9.2 MG/DL (ref 8.6–10.4)
CHLORIDE BLD-SCNC: 102 MMOL/L (ref 98–107)
CO2: 21 MMOL/L (ref 20–31)
CREAT SERPL-MCNC: 1.2 MG/DL (ref 0.7–1.2)
FERRITIN: 387 UG/L (ref 30–400)
GFR AFRICAN AMERICAN: >60 ML/MIN
GFR NON-AFRICAN AMERICAN: >60 ML/MIN
GFR SERPL CREATININE-BSD FRML MDRD: NORMAL ML/MIN/{1.73_M2}
GLUCOSE BLD-MCNC: 87 MG/DL (ref 70–99)
HBA1C MFR BLD: 4.7 %
HCT VFR BLD CALC: 37.2 % (ref 40.7–50.3)
HEMOGLOBIN: 11.8 G/DL (ref 13–17)
IRON SATURATION: 50 % (ref 20–55)
IRON: 122 UG/DL (ref 59–158)
MCH RBC QN AUTO: 31.1 PG (ref 25.2–33.5)
MCHC RBC AUTO-ENTMCNC: 31.7 G/DL (ref 28.4–34.8)
MCV RBC AUTO: 98.2 FL (ref 82.6–102.9)
NRBC AUTOMATED: 0 PER 100 WBC
PDW BLD-RTO: 12.6 % (ref 11.8–14.4)
PLATELET # BLD: 375 K/UL (ref 138–453)
PMV BLD AUTO: 10.6 FL (ref 8.1–13.5)
POTASSIUM SERPL-SCNC: 4.6 MMOL/L (ref 3.7–5.3)
RBC # BLD: 3.79 M/UL (ref 4.21–5.77)
SODIUM BLD-SCNC: 137 MMOL/L (ref 135–144)
TOTAL CK: 153 U/L (ref 39–308)
TOTAL IRON BINDING CAPACITY: 244 UG/DL (ref 250–450)
UNSATURATED IRON BINDING CAPACITY: 122 UG/DL (ref 112–347)
WBC # BLD: 4.2 K/UL (ref 3.5–11.3)

## 2022-03-25 PROCEDURE — 1036F TOBACCO NON-USER: CPT | Performed by: INTERNAL MEDICINE

## 2022-03-25 PROCEDURE — G8417 CALC BMI ABV UP PARAM F/U: HCPCS | Performed by: INTERNAL MEDICINE

## 2022-03-25 PROCEDURE — G8482 FLU IMMUNIZE ORDER/ADMIN: HCPCS | Performed by: INTERNAL MEDICINE

## 2022-03-25 PROCEDURE — G8427 DOCREV CUR MEDS BY ELIG CLIN: HCPCS | Performed by: INTERNAL MEDICINE

## 2022-03-25 PROCEDURE — 83036 HEMOGLOBIN GLYCOSYLATED A1C: CPT | Performed by: INTERNAL MEDICINE

## 2022-03-25 PROCEDURE — 3017F COLORECTAL CA SCREEN DOC REV: CPT | Performed by: INTERNAL MEDICINE

## 2022-03-25 PROCEDURE — 99214 OFFICE O/P EST MOD 30 MIN: CPT | Performed by: INTERNAL MEDICINE

## 2022-03-25 PROCEDURE — 99211 OFF/OP EST MAY X REQ PHY/QHP: CPT | Performed by: INTERNAL MEDICINE

## 2022-03-25 RX ORDER — AMLODIPINE BESYLATE 10 MG/1
TABLET ORAL
Qty: 30 TABLET | Refills: 5 | Status: SHIPPED | OUTPATIENT
Start: 2022-03-25 | End: 2022-07-28 | Stop reason: SINTOL

## 2022-03-25 RX ORDER — BACLOFEN 10 MG/1
10 TABLET ORAL 2 TIMES DAILY PRN
Status: ON HOLD | COMMUNITY
Start: 2022-02-28 | End: 2022-09-27

## 2022-03-25 ASSESSMENT — ENCOUNTER SYMPTOMS
SHORTNESS OF BREATH: 0
COUGH: 0
PHOTOPHOBIA: 0
CONSTIPATION: 0
ABDOMINAL PAIN: 0
WHEEZING: 0
BACK PAIN: 1

## 2022-03-25 ASSESSMENT — PATIENT HEALTH QUESTIONNAIRE - PHQ9
1. LITTLE INTEREST OR PLEASURE IN DOING THINGS: 0
4. FEELING TIRED OR HAVING LITTLE ENERGY: 1
10. IF YOU CHECKED OFF ANY PROBLEMS, HOW DIFFICULT HAVE THESE PROBLEMS MADE IT FOR YOU TO DO YOUR WORK, TAKE CARE OF THINGS AT HOME, OR GET ALONG WITH OTHER PEOPLE: 0
2. FEELING DOWN, DEPRESSED OR HOPELESS: 0
3. TROUBLE FALLING OR STAYING ASLEEP: 1
9. THOUGHTS THAT YOU WOULD BE BETTER OFF DEAD, OR OF HURTING YOURSELF: 0
5. POOR APPETITE OR OVEREATING: 0
SUM OF ALL RESPONSES TO PHQ QUESTIONS 1-9: 2
7. TROUBLE CONCENTRATING ON THINGS, SUCH AS READING THE NEWSPAPER OR WATCHING TELEVISION: 0
SUM OF ALL RESPONSES TO PHQ QUESTIONS 1-9: 2
SUM OF ALL RESPONSES TO PHQ9 QUESTIONS 1 & 2: 0
6. FEELING BAD ABOUT YOURSELF - OR THAT YOU ARE A FAILURE OR HAVE LET YOURSELF OR YOUR FAMILY DOWN: 0
8. MOVING OR SPEAKING SO SLOWLY THAT OTHER PEOPLE COULD HAVE NOTICED. OR THE OPPOSITE, BEING SO FIGETY OR RESTLESS THAT YOU HAVE BEEN MOVING AROUND A LOT MORE THAN USUAL: 0

## 2022-03-25 NOTE — PATIENT INSTRUCTIONS
-Pt due for 4 month f/u in July-- pt to call in June to set up an appt--reminder in [facility] to contact patient as well--AVS given to patient    -Bloodwork orders given to patient, they will have them done before their next visit.     -Ciara Murphy

## 2022-03-25 NOTE — PROGRESS NOTES
[facility]    Progress Note    Date of patient's visit: 3/25/2022    Patient's Name:  Lisa Guzman    YOB: 1955            Patient Care Team:  Hilary Harrison MD as PCP - Jann Smith MD as PCP - [facility] Delmis Hanson Provider  Hilary Harrison MD as Referring Physician (Internal Medicine)  Luis Saldivar MD as Consulting Physician (Gastroenterology)    REASON FOR VISIT: Routine outpatient follow     Chief Complaint   Patient presents with    Hypertension    Health Maintenance     depression screen completed, A1C done          HISTORY OF PRESENT ILLNESS:    History was obtained from the patient. Lisa Guzman is a 61 y.o. is here for follow-up. He has been having increasing pain in his hip. He goes to Dr. Varsha Tyson who is his pain management physician. He does not want surgery. He has been told he needs repeat hip replacement. He says he is looking for conservative measures and is planning to get injection in his right hip. He is also getting a Jacuzzi set up in his home because he felt better in the hot tub pool. He is continuing on Percocets. He has arthritis in several joints. He has hypertension which is better controlled today. He has CKD with SANJU last year which  Has not been followed up as he has not done any of the labs that have been ordered before. No chest pain or shortness of breath. No leg edema. He is mostly had questions about his hip arthritis and treatments and wanted to look at his x-rays.   Almost 40 minutes spent with patient going over his x-rays and labs and test  He still has not followed up with GI or cardiology    Results for POC orders placed in visit on 03/25/22   POCT glycosylated hemoglobin (Hb A1C)   Result Value Ref Range    Hemoglobin A1C 4.7 %             R.Hip 2022     FINDINGS:   Two views of the right hip reveal severe degenerate changes seen within the   right hip with joint space narrowing and sclerosis of the articular surfaces. There is osteophyte formation seen of the femoral head.           Impression   Stable severe degenerative changes seen within the right hip with no acute   bony abnormality.            FINDINGS:   Two views of the right hip reveal severe degenerate changes seen within the   right hip with joint space narrowing and sclerosis of the articular surfaces. There is osteophyte formation seen of the femoral head.           Impression   Stable severe degenerative changes seen within the right hip with no acute   bony abnormality.         R.hip/Knee xray 2021  FINDINGS:   RIGHT HIP: No evident acute fracture.  Joints maintain anatomic alignment. Incompletely evaluated lumbar spine degenerative changes.  Increased moderate   to severe degenerative changes of the hip with the femoral head abutting the   acetabular roof.  Moderate to severe degenerative changes of the pubic   symphysis with at least mild involvement of the sacroiliac joints.  No   obvious acute soft abnormality.       RIGHT KNEE: No evident acute fracture.  New irregularity along the femoral   medial epicondyle, suggesting prior avulsion injury related to the medial   collateral ligament.  Joints maintain anatomic alignment.  Mild   tricompartmental degenerative changes.  Unchanged bony fragment near the   tibial tubercle, potentially a sequela of enthesopathy or remote   Osgood-Schlatter disease.  No evident suprapatellar effusion.  No obvious   acute soft abnormality.           Impression   1. No evident acute findings in the right hip or right knee. 2. Degenerative changes as above. Echo 2021  CONCLUSIONS     Summary  Normal left ventricle size and systolic function with an estimated EF  55-60%. No segmental wall motion abnormalities seen. Moderate left ventricular hypertrophy. Left atrial dilatation. Right atrial dilatation. Aortic valve was not well visualized. Mild aortic stenosis.  Peak  instantaneous gradient 20 mmHg and mean gradient 9 mmHg. Moderate to Severe aortic insufficiency with an eccentric jet (pressure half  time ~ 200 msec). Consider ROSHAN if clinically warranted. Aortic root measures 3.6-3.9 cm. No significant pericardial effusion is seen.       Past Medical History:   Diagnosis Date    Chronic back pain     Chronic kidney disease     Gout     Hyperlipidemia     Hypertension     Obesity 9/19/2014    Osteoarthritis     Restless leg syndrome     Shoulder pain     Shrimp allergy     allergic reaction to shrimp    Spinal stenosis, lumbar region, without neurogenic claudication 4/13/2016    Swelling of joint, wrist, right     Thoracic back pain     Vertigo     Wears glasses     READING    Wrist pain, right        Past Surgical History:   Procedure Laterality Date    COLONOSCOPY  11/2014    int hemorrhoids, repeat in 10 years    COLONOSCOPY  05/27/2020    COLONOSCOPY N/A 5/27/2020    COLONOSCOPY WITH BIOPSY performed by Estefani Styles MD at [address] [facility] 10/17/2016    NASAL ENDOSCOPY; RIGHT NASAL CAUTERY    NERVE BLOCK  11/02/2016    duramorph 1.5mg celestone 9mg    NERVE BLOCK  04/03/2017    duramorph 1.5mg & decadron 10mg    NERVE BLOCK  10/09/2017    duramorph epidural, decadron 10mg, morphine 1.5mg    UPPER GASTROINTESTINAL ENDOSCOPY  05/27/2020    UPPER GASTROINTESTINAL ENDOSCOPY N/A 5/27/2020    EGD BIOPSY performed by Estefani Styles MD at [facility]         ALLERGIES      Allergies   Allergen Reactions    Codeine Other (See Comments)     Don;t agree with him    Shrimp (Diagnostic) Hives       MEDICATIONS:      Current Outpatient Medications on File Prior to Visit   Medication Sig Dispense Refill    baclofen (LIORESAL) 10 MG tablet Take 10 mg by mouth 2 times daily as needed      pantoprazole (PROTONIX) 40 MG tablet TAKE 1 TABLET BY MOUTH ONCE DAILY.  30 tablet 2    gabapentin (NEURONTIN) 300 MG capsule TAKE 1 CAPSULE BY MOUTH 3 TIMES A DAY. 90 capsule 1    meloxicam (MOBIC) 15 MG tablet TAKE 1 TABLET BY MOUTH ONCE DAILY. 30 tablet 2    allopurinol (ZYLOPRIM) 300 MG tablet TAKE 1 TABLET BY MOUTH ONCE DAILY. 30 tablet 5    amLODIPine (NORVASC) 10 MG tablet TAKE 1 TABLET BY MOUTH ONE TIME A DAY 30 tablet 2    ketorolac (TORADOL) 10 MG tablet Take 10 mg by mouth daily       ferrous sulfate (FEROSUL) 325 (65 Fe) MG tablet TAKE 1 TABLET BY MOUTH daily 30 tablet 5    tiZANidine (ZANAFLEX) 4 MG tablet TAKE ONE TABLET BY MOUTH EVERY 8 HOURS AS NEEDED FOR SPASMS 30 tablet 5    oxyCODONE-acetaminophen (PERCOCET)  MG per tablet Take 1 tablet by mouth 3 times daily.  EPINEPHrine (EPIPEN 2-TANG) 0.3 MG/0.3ML SOAJ injection Inject 0.3 mLs into the skin once for 1 dose Use as directed for allergic reaction 0.3 mL 0    diphenhydrAMINE (BENADRYL) 25 MG capsule Take 1-2 tablets by mouth every 6 hours as needed for itching. 30 capsule 0    magnesium oxide (MAG-OX) 400 MG tablet TAKE 1 TABLET BY MOUTH ONE TIME A DAY 30 tablet 2    diclofenac sodium (VOLTAREN) 1 % GEL Apply 2 g topically 2 times daily      Melatonin 10 MG CAPS Take by mouth        No current facility-administered medications on file prior to visit. HISTORY    Reviewed and no change from previous record. Vale Cedeño  reports that he has never smoked. He has never used smokeless tobacco.    FAMILY HISTORY:    Reviewed and No change from previous visit    HEALTH MAINTENANCE DUE:      Health Maintenance Due   Topic Date Due    COVID-19 Vaccine (1) Never done    Depression Screen  04/01/2022       REVIEW OF SYSTEMS:    12 point review of symptoms completed and found to be normal except noted in the HPI    Review of Systems   Constitutional: Negative for fatigue and unexpected weight change. Eyes: Negative for photophobia and visual disturbance. Respiratory: Negative for cough, shortness of breath and wheezing.     Cardiovascular: Negative for chest pain, palpitations and leg swelling. Gastrointestinal: Negative for abdominal pain and constipation. Endocrine: Negative for polydipsia and polyuria. Genitourinary: Negative for dysuria and frequency. Musculoskeletal: Positive for arthralgias, back pain and neck pain. Negative for myalgias. Neurological: Negative for dizziness, weakness and headaches. Hematological: Negative for adenopathy. Does not bruise/bleed easily. Psychiatric/Behavioral: Negative for dysphoric mood. The patient is nervous/anxious. PHYSICAL EXAM:     Vitals:    03/25/22 0955   BP: 134/64   Site: Left Upper Arm   Position: Sitting   Cuff Size: Large Adult   Pulse: 107   Weight: 232 lb (105.2 kg)     Body mass index is 31.46 kg/m². BP Readings from Last 3 Encounters:   03/25/22 134/64   12/30/21 (!) 181/68   12/15/21 (!) 151/63        Wt Readings from Last 3 Encounters:   03/25/22 232 lb (105.2 kg)   12/30/21 227 lb (103 kg)   12/15/21 215 lb (97.5 kg)       Physical Exam  Vitals and nursing note reviewed. Constitutional:       Appearance: Normal appearance. HENT:      Head: Normocephalic and atraumatic. Eyes:      Extraocular Movements: Extraocular movements intact. Conjunctiva/sclera: Conjunctivae normal.      Pupils: Pupils are equal, round, and reactive to light. Cardiovascular:      Rate and Rhythm: Normal rate and regular rhythm. Pulmonary:      Effort: Pulmonary effort is normal.      Breath sounds: Normal breath sounds. No wheezing. Musculoskeletal:      Cervical back: Normal range of motion and neck supple. Right lower leg: No edema. Left lower leg: No edema. Skin:     General: Skin is warm and dry. Neurological:      General: No focal deficit present. Mental Status: He is alert and oriented to person, place, and time.              LABORATORY FINDINGS:    CBC:  Lab Results   Component Value Date    WBC 7.0 09/08/2021    HGB 9.5 09/08/2021     09/08/2021     09/10/2011     BMP:    Lab Results Component Value Date     09/08/2021    K 3.9 09/08/2021    CL 98 09/08/2021    CO2 17 09/08/2021    BUN 25 09/08/2021    CREATININE 2.08 09/08/2021    GLUCOSE 103 09/08/2021    GLUCOSE 108 09/10/2011     HEMOGLOBIN A1C:   Lab Results   Component Value Date    LABA1C 4.7 03/25/2022     MICROALBUMIN URINE:   Lab Results   Component Value Date    MICROALBUR <6 01/22/2013     FASTING LIPID PANEL:  Lab Results   Component Value Date    CHOL 257 (H) 04/02/2019    HDL 49 04/02/2019    TRIG 120 04/02/2019     Lab Results   Component Value Date    LDLCHOLESTEROL 184 (H) 04/02/2019       LIVER PROFILE:  Lab Results   Component Value Date    ALT 29 08/13/2020    AST 35 08/13/2020    PROT 6.8 08/13/2020    PROT 7.1 01/17/2013    BILITOT 0.79 08/13/2020    BILIDIR 0.20 08/13/2020    LABALBU 4.0 08/13/2020    LABALBU 4.4 09/10/2011      THYROID FUNCTION:   Lab Results   Component Value Date    TSH 0.68 06/11/2020      URINEANALYSIS: No results found for: LABURIN  ASSESSMENT AND PLAN:    1. Primary hypertension  labd pending    - amLODIPine (NORVASC) 10 MG tablet; TAKE 1 TABLET BY MOUTH ONE TIME A DAY  Dispense: 30 tablet; Refill: 5    2. Pure hypercholesterolemia  Statin  Labs pending    3. Stage 3a chronic kidney disease (HCC)  BMP and urine pending     4. Normocytic anemia  GI referral placed last visit  Advised follow up    5. Osteoarthritis of right hip, unspecified osteoarthritis type  Patient prefers conservative treatment     6. Diabetes mellitus screening    - POCT glycosylated hemoglobin (Hb A1C)          FOLLOW UP AND INSTRUCTIONS:   Return in about 4 months (around 7/25/2022). 1. Herson Lee received counseling on the following healthy behaviors: nutrition, exercise and medication adherence    2. Reviewed prior labs and health maintenance. 3. Discussed use, benefit, and side effects of prescribed medications. Barriers to medication compliance addressed. All patient questions answered.   Pt voiced understanding.        Racheal Hernandez  Attending Physician, [address] [place] [address], Internal Medicine Residency Program  [address] [place] [address]  3/25/2022, 10:13 AM

## 2022-03-28 DIAGNOSIS — M54.6 CHRONIC BILATERAL THORACIC BACK PAIN: ICD-10-CM

## 2022-03-28 DIAGNOSIS — G89.29 CHRONIC BILATERAL THORACIC BACK PAIN: ICD-10-CM

## 2022-03-28 DIAGNOSIS — M25.50 PAIN IN JOINT, MULTIPLE SITES: Chronic | ICD-10-CM

## 2022-03-28 DIAGNOSIS — D50.9 IRON DEFICIENCY ANEMIA, UNSPECIFIED IRON DEFICIENCY ANEMIA TYPE: ICD-10-CM

## 2022-03-28 NOTE — TELEPHONE ENCOUNTER
Request for Gabapentin,Ferrous Sulfate. Next Visit Date:  No future appointments. Health Maintenance   Topic Date Due    COVID-19 Vaccine (1) Never done    Shingles Vaccine (2 of 2) 03/31/2022 (Originally 1/13/2021)    Depression Screen  03/25/2023    Potassium monitoring  03/25/2023    Creatinine monitoring  03/25/2023    Lipid screen  04/02/2024    Colorectal Cancer Screen  05/27/2030    DTaP/Tdap/Td vaccine (2 - Td or Tdap) 12/15/2031    Flu vaccine  Completed    Hepatitis C screen  Completed    HIV screen  Completed    Hepatitis A vaccine  Aged Out    Hepatitis B vaccine  Aged Out    Hib vaccine  Aged Out    Meningococcal (ACWY) vaccine  Aged Out    Pneumococcal 0-64 years Vaccine  Aged Out       Hemoglobin A1C (%)   Date Value   03/25/2022 4.7   11/21/2017 5.0   12/14/2016 5.1             ( goal A1C is < 7)   Microalb/Crt.  Ratio (mcg/mg creat)   Date Value   01/22/2013 9     LDL Cholesterol (mg/dL)   Date Value   04/02/2019 184 (H)       (goal LDL is <100)   AST (U/L)   Date Value   08/13/2020 35     ALT (U/L)   Date Value   08/13/2020 29     BUN (mg/dL)   Date Value   03/25/2022 13     BP Readings from Last 3 Encounters:   03/25/22 134/64   12/30/21 (!) 181/68   12/15/21 (!) 151/63          (goal 120/80)    All Future Testing planned in [facility]  Lab Frequency Next Occurrence   Vitamin B12 & Folate Once 07/09/2021   CT CERVICAL SPINE W CONTRAST Once 04/04/2022   IR MYELOGRAM CERVICAL Once 04/04/2022   Lipid Panel Once 05/25/2022   Nasal cannula oxygen PRN          Patient Active Problem List:     Hyperlipidemia     Gout     CKD (chronic kidney disease) stage 3, GFR 30-59 ml/min ([facility])     Wrist pain, right     Swelling of joint, wrist, right     Pain in joint, multiple sites     Obesity     Adjustment reaction     Vertigo     Pain in joint     Spinal stenosis, lumbar region, without neurogenic claudication     Facet arthropathy, lumbar     Chronic back pain     Acute bilateral low back pain with sciatica     Rhabdomyolysis

## 2022-03-28 NOTE — TELEPHONE ENCOUNTER
E-scribe request for Zanaflex . Please review and e-scribe if applicable. Next Visit Date:  No future appointments. Health Maintenance   Topic Date Due    COVID-19 Vaccine (1) Never done    Shingles Vaccine (2 of 2) 03/31/2022 (Originally 1/13/2021)    Depression Screen  03/25/2023    Potassium monitoring  03/25/2023    Creatinine monitoring  03/25/2023    Lipid screen  04/02/2024    Colorectal Cancer Screen  05/27/2030    DTaP/Tdap/Td vaccine (2 - Td or Tdap) 12/15/2031    Flu vaccine  Completed    Hepatitis C screen  Completed    HIV screen  Completed    Hepatitis A vaccine  Aged Out    Hepatitis B vaccine  Aged Out    Hib vaccine  Aged Out    Meningococcal (ACWY) vaccine  Aged Out    Pneumococcal 0-64 years Vaccine  Aged Out               (applicable per patient's age: Cancer Screenings, Depression Screening, Fall Risk Screening, Immunizations)    Hemoglobin A1C (%)   Date Value   03/25/2022 4.7   11/21/2017 5.0   12/14/2016 5.1     Microalb/Crt.  Ratio (mcg/mg creat)   Date Value   01/22/2013 9     LDL Cholesterol (mg/dL)   Date Value   04/02/2019 184 (H)     AST (U/L)   Date Value   08/13/2020 35     ALT (U/L)   Date Value   08/13/2020 29     BUN (mg/dL)   Date Value   03/25/2022 13      (goal A1C is < 7)   (goal LDL is <100) need 30-50% reduction from baseline     BP Readings from Last 3 Encounters:   03/25/22 134/64   12/30/21 (!) 181/68   12/15/21 (!) 151/63    (goal /80)      All Future Testing planned in [facility]:  Lab Frequency Next Occurrence   Vitamin B12 & Folate Once 07/09/2021   CT CERVICAL SPINE W CONTRAST Once 04/04/2022   IR MYELOGRAM CERVICAL Once 04/04/2022   Lipid Panel Once 05/25/2022   Nasal cannula oxygen PRN             Patient Active Problem List:     Hyperlipidemia     Gout     CKD (chronic kidney disease) stage 3, GFR 30-59 ml/min ([facility])     Wrist pain, right     Swelling of joint, wrist, right     Pain in joint, multiple sites     Obesity     Adjustment reaction     Vertigo Pain in joint     Spinal stenosis, lumbar region, without neurogenic claudication     Facet arthropathy, lumbar     Chronic back pain     Acute bilateral low back pain with sciatica     Rhabdomyolysis

## 2022-03-29 RX ORDER — TIZANIDINE 4 MG/1
TABLET ORAL
Qty: 30 TABLET | Refills: 5 | Status: SHIPPED | OUTPATIENT
Start: 2022-03-29 | End: 2022-09-28

## 2022-03-29 RX ORDER — FERROUS SULFATE 325(65) MG
TABLET ORAL
Qty: 30 TABLET | Refills: 5 | Status: SHIPPED | OUTPATIENT
Start: 2022-03-29 | End: 2022-09-28

## 2022-03-29 RX ORDER — GABAPENTIN 300 MG/1
CAPSULE ORAL
Qty: 90 CAPSULE | Refills: 1 | Status: SHIPPED | OUTPATIENT
Start: 2022-03-29 | End: 2022-06-01

## 2022-04-14 ENCOUNTER — HOSPITAL ENCOUNTER (EMERGENCY)
Age: 67
Discharge: HOME | End: 2022-04-14
Attending: EMERGENCY MEDICINE
Payer: MEDICARE

## 2022-04-14 ENCOUNTER — APPOINTMENT (OUTPATIENT)
Dept: CT IMAGING | Age: 67
End: 2022-04-14
Payer: MEDICARE

## 2022-04-14 VITALS
OXYGEN SATURATION: 96 % | SYSTOLIC BLOOD PRESSURE: 147 MMHG | DIASTOLIC BLOOD PRESSURE: 69 MMHG | RESPIRATION RATE: 16 BRPM | TEMPERATURE: 97.2 F | HEART RATE: 68 BPM

## 2022-04-14 DIAGNOSIS — S06.0X1A CONCUSSION WITH LOSS OF CONSCIOUSNESS OF 30 MINUTES OR LESS, INITIAL ENCOUNTER: Primary | ICD-10-CM

## 2022-04-14 DIAGNOSIS — S01.01XA LACERATION OF SCALP, INITIAL ENCOUNTER: ICD-10-CM

## 2022-04-14 PROCEDURE — 99283 EMERGENCY DEPT VISIT LOW MDM: CPT

## 2022-04-14 PROCEDURE — 70450 CT HEAD/BRAIN W/O DYE: CPT

## 2022-04-14 PROCEDURE — 12002 RPR S/N/AX/GEN/TRNK2.6-7.5CM: CPT

## 2022-04-14 PROCEDURE — 72125 CT NECK SPINE W/O DYE: CPT

## 2022-04-14 ASSESSMENT — PAIN - FUNCTIONAL ASSESSMENT: PAIN_FUNCTIONAL_ASSESSMENT: 0-10

## 2022-04-14 ASSESSMENT — PAIN DESCRIPTION - LOCATION: LOCATION: HEAD

## 2022-04-14 ASSESSMENT — PAIN DESCRIPTION - FREQUENCY: FREQUENCY: CONTINUOUS

## 2022-04-14 ASSESSMENT — PAIN SCALES - GENERAL: PAINLEVEL_OUTOF10: 8

## 2022-04-14 ASSESSMENT — PAIN DESCRIPTION - DESCRIPTORS: DESCRIPTORS: ACHING

## 2022-04-14 ASSESSMENT — ENCOUNTER SYMPTOMS
SHORTNESS OF BREATH: 0
WHEEZING: 0

## 2022-04-14 NOTE — ED NOTES
Pt presented to ED with complaints of lac to head following fallen limb hitting him. Pt denies blood thinners. Pt alert and oriented x 4.      Marilyn Alfaro RN  04/14/22 1429

## 2022-04-14 NOTE — ED PROVIDER NOTES
[facility]     Emergency Department     Faculty Attestation    I performed a history and physical examination of the patient and discussed management with the resident. I have reviewed and agree with the residents findings including all diagnostic interpretations, and treatment plans as written at the time of my review. Any areas of disagreement are noted on the chart. I was personally present for the key portions of any procedures. I have documented in the chart those procedures where I was not present during the key portions. For Physician Assistant/ Nurse Practitioner cases/documentation I have personally evaluated this patient and have completed at least one if not all key elements of the E/M (history, physical exam, and MDM). Additional findings are as noted. This patient was evaluated in the Emergency Department for symptoms described in the history of present illness. The patient was evaluated in the context of the global COVID-19 pandemic, which necessitated consideration that the patient might be at risk for infection with the SARS-CoV-2 virus that causes COVID-19. Institutional protocols and algorithms that pertain to the evaluation of patients at risk for COVID-19 are in a state of rapid change based on information released by regulatory bodies including the CDC and federal and state organizations. These policies and algorithms were followed during the patient's care in the ED. Primary Care Physician: Ron Ricardo MD    History: This is a 61 y.o. male who presents to the Emergency Department with complaint of head trauma. The patient was hit in his head with a large log. There was a loss of consciousness as the patient does not recall what happened but said he ended up on top of some other logs with people standing over him. The patient is up-to-date on his immunizations. The patient denies taking any anticoagulation.     Physical: oral temperature is 97.2 °F (36.2 °C). His blood pressure is 147/69 (abnormal) and his pulse is 68. His respiration is 16 and oxygen saturation is 96%. Patient has an irregular laceration of the scalp that is approximately 5cm in length. He has small arterial bleed noted as well. Impression: Scalp laceration    Plan: Clean wound, laceration repair, CT scan    MIPS 12    The patient has one or more of the following conditions that are excluded from the measure (select all that apply) :  Patient has a ventricular shunt: No  Patient has a brain tumor: No  Patient has multi-system trauma: No  Patient is pregnant: No  Patient is taking an antiplatelet medication (excluding aspirin): No  Patient is 72years old or older: No  CT scan ordered for reasons other than trauma: No  A head CT was ordered, but not by an emergency care provider: No    Patient is 25 or older, presenting with minor blunt head trauma. Head CT (including cosigned orders) was ordered by an emergency care clinician for trauma because (select one or more): [Satisfies MIPS]    Patients GCS was less than 15: No  Focal neurological deficit: No  Severe Headache: No  Vomiting: No  Physical signs of a basilar skull fracture: No  Coagulopathy: No  Thrombocytopenia: No  Patient suspected of taking an anticoagulant medication: No  Severe or Dangerous mechanism of injury (Select one or more):    MVA with patient ejection, death of another passenger, rollover, speed > 40mph, airbag deployment,  or passenger on ATV or motorcycle: No   Pedestrian or bicyclist without helmet: struck by motorized vehicle, in bicycle crash: No  Fall > 3 feet or 5 stairs: No  Head struck by high-impact object (hammer, baseball, baseball bat, heavy object such as falling brick): Yes         Patient had loss of consciousness OR posttraumatic amnesia AND:   Headache: No  Patient is 61years old or older: Yes  Drug or Alcohol intoxication: No  Short-term memory deficits: No  Evidence of trauma above the clavicles (any visible or detected trauma to the head or neck, including lacerations, abrasions, bruising, swelling or fracture): Yes  Post-traumatic seizure: No      (Please note that portions of this note were completed with a voice recognition program.  Efforts were made to edit the dictations but occasionally words are mis-transcribed.)    Merary Marsh.  Aaron Patel MD, [facility]  Attending Emergency Medicine Physician        Roney Mcdonnell MD  04/14/22 [id number]

## 2022-04-14 NOTE — ED PROVIDER NOTES
[facility] ED  Emergency Department Encounter  EmergencyMedicine Resident     Pt Santino Maldonado  MRN: [id number]  [place] 1955  Date of evaluation: 4/14/22  PCP:  Mc Purcell MD    [address] [place] [address]       Chief Complaint   Patient presents with   [facility] Laceration     pt stated he was told he had LOC but stated 'i dont feel like I did', denies blood thinners, lacertaion on head that is bleeding, pt stated he fell from a tree and a log hit him on the head       HISTORY OF PRESENT ILLNESS  (Location/Symptom, Timing/Onset, Context/Setting, Quality, Duration, Modifying Factors, Severity.)      Jaylan Garcia is a 61 y.o. male who presents with scalp laceration. Patient reports that he was struck in the top of the head by a limb that fell off a tree earlier today. Patient unaware if he lost consciousness but reports some headache at this time as well as leading wound on the scalp. Denies any other injury. Denies any anticoagulant use. PAST MEDICAL / SURGICAL / SOCIAL / FAMILY HISTORY      has a past medical history of Chronic back pain, Chronic kidney disease, Gout, Hyperlipidemia, Hypertension, Obesity, Osteoarthritis, Restless leg syndrome, Shoulder pain, Shrimp allergy, Spinal stenosis, lumbar region, without neurogenic claudication, Swelling of joint, wrist, right, Thoracic back pain, Vertigo, Wears glasses, and Wrist pain, right.       has a past surgical history that includes nasal endoscopy (Bilateral, 10/17/2016); Nerve Block (11/02/2016); Nerve Block (04/03/2017); Colonoscopy (11/2014); Nerve Block (10/09/2017); Upper gastrointestinal endoscopy (05/27/2020); Colonoscopy (05/27/2020); Upper gastrointestinal endoscopy (N/A, 5/27/2020); and Colonoscopy (N/A, 5/27/2020).       Social History     Socioeconomic History    Marital status: Single     Spouse name: Not on file    Number of children: Not on file    Years of education: Not on file    Highest education level: Not on file   Occupational History    Not on file   Tobacco Use    Smoking status: Never Smoker    Smokeless tobacco: Never Used   Vaping Use    Vaping Use: Never used   Substance and Sexual Activity    Alcohol use: No    Drug use: No    Sexual activity: Not on file   Other Topics Concern    Not on file   Social History Narrative    Not on file     Social Determinants of Health     Financial Resource Strain:     Difficulty of Paying Living Expenses: Not on file   Food Insecurity:     Worried About Running Out of Food in the Last Year: Not on file    [place] of Food in the Last Year: Not on file   Transportation Needs:     Lack of Transportation (Medical): Not on file    Lack of Transportation (Non-Medical): Not on file   Physical Activity:     Days of Exercise per Week: Not on file    Minutes of Exercise per Session: Not on file   Stress:     Feeling of Stress : Not on file   Social Connections:     Frequency of Communication with Friends and Family: Not on file    Frequency of Social Gatherings with Friends and Family: Not on file    Attends Faith Services: Not on file    Active Member of [address] [place] [address] or Organizations: Not on file    Attends Club or Organization Meetings: Not on file    Marital Status: Not on file   Intimate Partner Violence:     Fear of Current or Ex-Partner: Not on file    Emotionally Abused: Not on file    Physically Abused: Not on file    Sexually Abused: Not on file   Housing Stability:     Unable to Pay for Housing in the Last Year: Not on file    Number of [place] in the Last Year: Not on file    Unstable Housing in the Last Year: Not on file       Family History   Problem Relation Age of Onset    Heart Disease Brother 48    Other Father     Other Brother         HIV    Other Brother        Allergies:  Codeine and Shrimp (diagnostic)    Home Medications:  Prior to Admission medications    Medication Sig Start Date End Date Taking?  Authorizing Provider gabapentin (NEURONTIN) 300 MG capsule TAKE 1 CAPSULE BY MOUTH 3 TIMES A DAY. 3/29/22 4/29/22  Margo Blank MD   ferrous sulfate (FEROSUL) 325 (65 Fe) MG tablet TAKE 1 TABLET BY MOUTH ONCE DAILY. 3/29/22   Margo Blank MD   tiZANidine (ZANAFLEX) 4 MG tablet TAKE 1 TABLET BY MOUTH EVERY 8 HOURS AS NEEDED FOR SPASMS. 3/29/22   Margo Blank MD   baclofen (LIORESAL) 10 MG tablet Take 10 mg by mouth 2 times daily as needed 2/28/22   Historical Provider, MD   amLODIPine (NORVASC) 10 MG tablet TAKE 1 TABLET BY MOUTH ONE TIME A DAY 3/25/22   Margo Blank MD   pantoprazole (PROTONIX) 40 MG tablet TAKE 1 TABLET BY MOUTH ONCE DAILY. 3/1/22   Margo Blank MD   meloxicam (MOBIC) 15 MG tablet TAKE 1 TABLET BY MOUTH ONCE DAILY. 2/1/22   Margo Blank MD   allopurinol (ZYLOPRIM) 300 MG tablet TAKE 1 TABLET BY MOUTH ONCE DAILY. 1/3/22   Margo Blank MD   ketorolac (TORADOL) 10 MG tablet Take 10 mg by mouth daily  8/26/21   Historical Provider, MD   oxyCODONE-acetaminophen (PERCOCET)  MG per tablet Take 1 tablet by mouth 3 times daily. 10/2/20   Historical Provider, MD   diphenhydrAMINE (BENADRYL) 25 MG capsule Take 1-2 tablets by mouth every 6 hours as needed for itching. 10/12/20   Margo Blank MD   magnesium oxide (MAG-OX) 400 MG tablet TAKE 1 TABLET BY MOUTH ONE TIME A DAY 7/30/20   Margo Blank MD   diclofenac sodium (VOLTAREN) 1 % GEL Apply 2 g topically 2 times daily    Historical Provider, MD   Melatonin 10 MG CAPS Take by mouth     Historical Provider, MD       REVIEW OF SYSTEMS    (2-9 systems for level 4, 10 or more for level 5)      Review of Systems   Constitutional: Negative for fatigue. Eyes: Negative for visual disturbance. Respiratory: Negative for shortness of breath and wheezing. Genitourinary: Negative for dysuria and flank pain. Musculoskeletal: Positive for neck pain. Skin: Negative for rash and wound. Neurological: Positive for syncope and headaches. Negative for seizures and weakness. Psychiatric/Behavioral: Negative for confusion. PHYSICAL EXAM   (up to 7 for level 4, 8 or more for level 5)      INITIAL VITALS:   BP (!) 147/69   Pulse 68   Temp 97.2 °F (36.2 °C) (Oral)   Resp 16   SpO2 96%     Physical Exam  Constitutional:       General: He is not in acute distress. Appearance: He is not toxic-appearing. HENT:      Head:      Comments: Laceration on right parietal region of scalp with brisk arterial bleeding  Eyes:      Extraocular Movements: Extraocular movements intact. Pupils: Pupils are equal, round, and reactive to light. Cardiovascular:      Rate and Rhythm: Normal rate. Heart sounds: No murmur heard. No friction rub. No gallop. Pulmonary:      Breath sounds: No wheezing, rhonchi or rales. Abdominal:      Tenderness: There is no abdominal tenderness. There is no guarding. Musculoskeletal:      Right lower leg: No edema. Left lower leg: No edema. Skin:     Findings: No bruising or rash. Neurological:      Mental Status: He is alert. Sensory: No sensory deficit. Motor: No weakness. DIFFERENTIAL  DIAGNOSIS     PLAN (LABS / IMAGING / EKG):  Orders Placed This Encounter   Procedures    CT HEAD WO CONTRAST    CT CERVICAL SPINE WO CONTRAST       MEDICATIONS ORDERED:  No orders of the defined types were placed in this encounter. DDX: Scalp laceration, skull fracture, subdural hematoma, epidural hematoma    DIAGNOSTIC RESULTS / EMERGENCY DEPARTMENT COURSE / MDM   LAB RESULTS:  No results found for this visit on 04/14/22. IMPRESSION/ ED Course: 82-year-old male arriving with laceration of scalp from falling tree branch which struck him on top of the head. Unknown loss consciousness. Patient did have some amnesia to events immediately after the injury. On initial evaluation patient was noted to have brisk arterial bleeding from laceration on scalp.   Wound was repaired with local anesthesia with 1% lidocaine with epinephrine and staples. Hemostasis was obtained with staples. Patient was awake, alert and oriented but did appear to have some confusion about his current condition. Decision made to perform CT head as well as cervical spine as patient has some complaints of neck tenderness as well. CT head and C-spine unremarkable. Patient was reevaluated, had no acute complaints at this time and stated he wished to be discharged home. Wound was still hemostatic. Patient was given wound care instructions, follow-up instructions for close head injury as well as ED return precautions. He verbalized understanding of and agreement with the discharge plan.     MIPS 415     A head CT was ordered, but not by an emergency care provider: No    A head CT was ordered by an emergency care provider, and some of the indications for ordering the head CT included  Measure Exclusions:  Patient has a ventricular shunt: No  Patient has a brain tumor: No  Patient has multi-system trauma: No  Patient is pregnant: No  Patient is taking an antiplatelet medication (excluding aspirin): No  Patient is 72years old or older: No    Signs and Symptoms:  Patients GCS was less than 15: No  Focal neurological deficit: No  Severe Headache: No  Vomiting: No  Physical signs of a basilar skull fracture: No  Coagulopathy: No  Thrombocytopenia: No  Patient suspected of taking an anticoagulant medication: No  Dangerous mechanism of injury: No      Patient had loss of consciousness OR posttraumatic amnesia AND:   Headache: Yes  Patient is 61years old or older: Yes  Drug or Alcohol intoxication: No  Short-term memory deficits: Yes  Evidence of trauma above the clavicles (any visible or detected trauma to the head or neck, including lacerations, abrasions, bruising, swelling or fracture): Yes  Post-traumatic seizure: No          RADIOLOGY:  CT HEAD WO CONTRAST    Result Date: 4/14/2022  EXAMINATION: CT OF THE HEAD WITHOUT CONTRAST  4/14/2022 2:32 pm TECHNIQUE: CT of the head was performed without the administration of intravenous contrast. Dose modulation, iterative reconstruction, and/or weight based adjustment of the mA/kV was utilized to reduce the radiation dose to as low as reasonably achievable. COMPARISON: None. HISTORY: ORDERING SYSTEM PROVIDED HISTORY: head injury, confusion, unknown LOC TECHNOLOGIST PROVIDED HISTORY: head injury, confusion, unknown LOC Decision Support Exception - unselect if not a suspected or confirmed emergency medical condition->Emergency Medical Condition (MA) Reason for Exam: HEAD INJURY FINDINGS: BRAIN/VENTRICLES: There is no acute intracranial hemorrhage, mass effect or midline shift. No abnormal extra-axial fluid collection. The gray-white differentiation is maintained without evidence of an acute infarct. There is no evidence of hydrocephalus. ORBITS: The visualized portion of the orbits demonstrate no acute abnormality. SINUSES: The visualized paranasal sinuses and mastoid air cells demonstrate no acute abnormality. SOFT TISSUES/SKULL:  No acute abnormality of the visualized skull or soft tissues. No acute intracranial abnormality. CT CERVICAL SPINE WO CONTRAST    Result Date: 4/14/2022  EXAMINATION: CT OF THE CERVICAL SPINE WITHOUT CONTRAST 4/14/2022 2:32 pm TECHNIQUE: CT of the cervical spine was performed without the administration of intravenous contrast. Multiplanar reformatted images are provided for review. Dose modulation, iterative reconstruction, and/or weight based adjustment of the mA/kV was utilized to reduce the radiation dose to as low as reasonably achievable. COMPARISON: None.  HISTORY: ORDERING SYSTEM PROVIDED HISTORY: Head injury, midline tenderness TECHNOLOGIST PROVIDED HISTORY: Head injury, midline tenderness Decision Support Exception - unselect if not a suspected or confirmed emergency medical condition->Emergency Medical Condition (MA) Reason for Exam: HEAD INJURY TREE FINDINGS: BONES/ALIGNMENT: There is no acute fracture or traumatic malalignment. DEGENERATIVE CHANGES: There are degenerative disc changes noted at C3-C4, C5-C6 and C6-C7 discs, with disc space narrowing and osteophytes. Mild spondylolisthesis is noted at C3-C4 and C4-C5. There is osteoarthritis of facet joints noted bilaterally. SOFT TISSUES: There is no prevertebral soft tissue swelling. No acute abnormality of the cervical spine. CONSULTS:  None    CRITICAL CARE:  Please see attending note    FINAL IMPRESSION      1. Concussion with loss of consciousness of 30 minutes or less, initial encounter    2.  Laceration of scalp, initial encounter          DISPOSITION / PLAN     DISPOSITION Decision To Discharge 04/14/2022 02:56:54 PM      PATIENT REFERRED TO:  MD Annalee Lopez [facility] [place] [facility]. [facility] [address]  [phone]    Schedule an appointment as soon as possible for a visit in 3 days  For re-evaluation      DISCHARGE MEDICATIONS:  Discharge Medication List as of 4/14/2022  3:06 PM          Naresh Alegria DO  Emergency Medicine Resident    (Please note that portions of thisnote were completed with a voice recognition program.  Efforts were made to edit the dictations but occasionally words are mis-transcribed.)        Naresh Alegria DO  Resident  04/14/22 3606

## 2022-04-18 NOTE — TELEPHONE ENCOUNTER
Request for Meloxicam.      Next Visit Date:  No future appointments. Health Maintenance   Topic Date Due    COVID-19 Vaccine (1) Never done    Shingles Vaccine (2 of 2) 01/13/2021    Depression Screen  03/25/2023    Potassium monitoring  03/25/2023    Creatinine monitoring  03/25/2023    Lipid screen  04/02/2024    Colorectal Cancer Screen  05/27/2030    DTaP/Tdap/Td vaccine (2 - Td or Tdap) 12/15/2031    Flu vaccine  Completed    Hepatitis C screen  Completed    HIV screen  Completed    Hepatitis A vaccine  Aged Out    Hepatitis B vaccine  Aged Out    Hib vaccine  Aged Out    Meningococcal (ACWY) vaccine  Aged Out    Pneumococcal 0-64 years Vaccine  Aged Out       Hemoglobin A1C (%)   Date Value   03/25/2022 4.7   11/21/2017 5.0   12/14/2016 5.1             ( goal A1C is < 7)   Microalb/Crt.  Ratio (mcg/mg creat)   Date Value   01/22/2013 9     LDL Cholesterol (mg/dL)   Date Value   04/02/2019 184 (H)       (goal LDL is <100)   AST (U/L)   Date Value   08/13/2020 35     ALT (U/L)   Date Value   08/13/2020 29     BUN (mg/dL)   Date Value   03/25/2022 13     BP Readings from Last 3 Encounters:   04/14/22 (!) 147/69   03/25/22 134/64   12/30/21 (!) 181/68          (goal 120/80)    All Future Testing planned in [facility]  Lab Frequency Next Occurrence   CT CERVICAL SPINE W CONTRAST Once 04/04/2022   IR MYELOGRAM CERVICAL Once 04/04/2022   Lipid Panel Once 05/25/2022   Nasal cannula oxygen PRN          Patient Active Problem List:     Hyperlipidemia     Gout     CKD (chronic kidney disease) stage 3, GFR 30-59 ml/min ([facility])     Wrist pain, right     Swelling of joint, wrist, right     Pain in joint, multiple sites     Obesity     Adjustment reaction     Vertigo     Pain in joint     Spinal stenosis, lumbar region, without neurogenic claudication     Facet arthropathy, lumbar     Chronic back pain     Acute bilateral low back pain with sciatica     Rhabdomyolysis

## 2022-04-20 RX ORDER — MELOXICAM 15 MG/1
TABLET ORAL
Qty: 30 TABLET | Refills: 2 | Status: SHIPPED | OUTPATIENT
Start: 2022-04-20 | End: 2022-07-28 | Stop reason: SDUPTHER

## 2022-04-28 ENCOUNTER — HOSPITAL ENCOUNTER (EMERGENCY)
Age: 67
Discharge: HOME | End: 2022-04-28
Attending: EMERGENCY MEDICINE
Payer: MEDICARE

## 2022-04-28 VITALS
OXYGEN SATURATION: 96 % | SYSTOLIC BLOOD PRESSURE: 158 MMHG | HEIGHT: 72 IN | HEART RATE: 71 BPM | TEMPERATURE: 97.3 F | BODY MASS INDEX: 31.46 KG/M2 | DIASTOLIC BLOOD PRESSURE: 66 MMHG | RESPIRATION RATE: 16 BRPM

## 2022-04-28 DIAGNOSIS — Z48.02 ENCOUNTER FOR STAPLE REMOVAL: Primary | ICD-10-CM

## 2022-04-28 PROCEDURE — 99282 EMERGENCY DEPT VISIT SF MDM: CPT

## 2022-04-28 ASSESSMENT — PAIN - FUNCTIONAL ASSESSMENT: PAIN_FUNCTIONAL_ASSESSMENT: NONE - DENIES PAIN

## 2022-04-28 NOTE — ED PROVIDER NOTES
[address] [facility]     Emergency Department     Faculty Attestation        I performed a history and physical examination of the patient and discussed management with the resident. I reviewed the residents note and agree with the documented findings and plan of care. Any areas of disagreement are noted on the chart. I was personally present for the key portions of any procedures. I have documented in the chart those procedures where I was not present during the key portions. I have reviewed the emergency nurses triage note. I agree with the chief complaint, past medical history, past surgical history, allergies, medications, social and family history as documented unless otherwise noted below. For mid-level providers such as nurse practitioners as well as physicians assistants:    I have personally seen and evaluated the patient. I find the patient's history and physical exam are consistent with NP/PA documentation. I agree with the care provided, treatment rendered, disposition, & follow-up plan. Additional findings are as noted. Vital Signs: BP (!) 158/66   Pulse 71   Temp 97.3 °F (36.3 °C) (Oral)   Resp 16   Ht 6' (1.829 m)   SpO2 96%   BMI 31.46 kg/m²   PCP:  Claudia Palomino MD    Pertinent Comments:      All staples here removed without incident      Critical Care  None          Shabbir Nieto MD    Attending Emergency Medicine Physician              Ana Gamino MD  04/28/22 0162

## 2022-04-29 ENCOUNTER — TELEPHONE (OUTPATIENT)
Dept: GASTROENTEROLOGY | Age: 67
End: 2022-04-29

## 2022-04-29 NOTE — TELEPHONE ENCOUNTER
Established patient Dr. Pastor Dill (last VV 04/28/20), D64.9 (ICD-10-CM) - Normocytic anemia  K92.0 (ICD-10-CM) - Hematemesis without nausea, [employer]     1st attempt - LM w/woman for pt to return call to scheduled appt  2nd attempt - mailing letter

## 2022-05-28 DIAGNOSIS — M54.6 CHRONIC BILATERAL THORACIC BACK PAIN: ICD-10-CM

## 2022-05-28 DIAGNOSIS — K92.0 HEMATEMESIS WITHOUT NAUSEA: ICD-10-CM

## 2022-05-28 DIAGNOSIS — G89.29 CHRONIC BILATERAL THORACIC BACK PAIN: ICD-10-CM

## 2022-06-01 RX ORDER — PANTOPRAZOLE SODIUM 40 MG/1
TABLET, DELAYED RELEASE ORAL
Qty: 30 TABLET | Refills: 2 | Status: SHIPPED | OUTPATIENT
Start: 2022-06-01 | End: 2022-07-28 | Stop reason: SDUPTHER

## 2022-06-01 RX ORDER — GABAPENTIN 300 MG/1
CAPSULE ORAL
Qty: 90 CAPSULE | Refills: 1 | Status: SHIPPED | OUTPATIENT
Start: 2022-06-01 | End: 2022-07-28 | Stop reason: SDUPTHER

## 2022-06-01 NOTE — TELEPHONE ENCOUNTER
Request for gabapentin and protonix . Next Visit Date:  Future Appointments   Date Time Provider Jonathan Lundberg   6/15/2022  1:30 PM MD Kirit Norris Maintenance   Topic Date Due    Annual Wellness Visit (AWV)  Never done    COVID-19 Vaccine (1) Never done    Prostate Specific Antigen (PSA) Screening or Monitoring  02/26/2014    Shingles vaccine (2 of 2) 01/13/2021    Pneumococcal 65+ years Vaccine (1 - PCV) Never done    Depression Screen  03/25/2023    Lipids  04/02/2024    Colorectal Cancer Screen  05/27/2030    DTaP/Tdap/Td vaccine (2 - Td or Tdap) 12/15/2031    Flu vaccine  Completed    Hepatitis C screen  Completed    HIV screen  Completed    Hepatitis A vaccine  Aged Out    Hepatitis B vaccine  Aged Out    Hib vaccine  Aged Out    Meningococcal (ACWY) vaccine  Aged Out       Hemoglobin A1C (%)   Date Value   03/25/2022 4.7   11/21/2017 5.0   12/14/2016 5.1             ( goal A1C is < 7)   Microalb/Crt.  Ratio (mcg/mg creat)   Date Value   01/22/2013 9     LDL Cholesterol (mg/dL)   Date Value   04/02/2019 184 (H)       (goal LDL is <100)   AST (U/L)   Date Value   08/13/2020 35     ALT (U/L)   Date Value   08/13/2020 29     BUN (mg/dL)   Date Value   03/25/2022 13     BP Readings from Last 3 Encounters:   04/28/22 (!) 158/66   04/14/22 (!) 147/69   03/25/22 134/64          (goal 120/80)    All Future Testing planned in [facility]  Lab Frequency Next Occurrence   Lipid Panel Once 05/25/2022   Nasal cannula oxygen PRN          Patient Active Problem List:     Hyperlipidemia     Gout     CKD (chronic kidney disease) stage 3, GFR 30-59 ml/min ([facility])     Wrist pain, right     Swelling of joint, wrist, right     Pain in joint, multiple sites     Obesity     Adjustment reaction     Vertigo     Pain in joint     Spinal stenosis, lumbar region, without neurogenic claudication     Facet arthropathy, lumbar     Chronic back pain     Acute bilateral low back pain with sciatica     Rhabdomyolysis

## 2022-07-21 ENCOUNTER — TELEPHONE (OUTPATIENT)
Dept: INTERNAL MEDICINE | Age: 67
End: 2022-07-21

## 2022-07-21 NOTE — LETTER
[facility] [address]  [address] [address]  Phone: [phone]  Fax: [phone]    Dylan Kothari MD        July 21, 2022     [facility] [address] [id number]      Dear Birgit Suggs: You recently missed a schedule clinic appointment to see Dylan Kothari MD on 7/21/2022. This is the second scheduled clinic appointment that you have missed at our office. In the future, we insist that you call us at least 24 hours in advance at [phone], when you know you will be unable to keep the appointment. This will allow us to use the time for others who need to be seen. To provide Quality Care and timely appointments to all our patients, you may be dismissed from the practice if you do not show for (3) scheduled appointments within a 12-month period. We would like to continue treating you healthcare needs. Please call the office to reschedule your appointment.       Sincerely,        STEF Dupont

## 2022-07-28 ENCOUNTER — OFFICE VISIT (OUTPATIENT)
Dept: INTERNAL MEDICINE | Age: 67
End: 2022-07-28
Payer: MEDICARE

## 2022-07-28 ENCOUNTER — HOSPITAL ENCOUNTER (OUTPATIENT)
Age: 67
Setting detail: SPECIMEN
Discharge: HOME | End: 2022-07-28

## 2022-07-28 VITALS
BODY MASS INDEX: 30.92 KG/M2 | SYSTOLIC BLOOD PRESSURE: 138 MMHG | WEIGHT: 228 LBS | HEART RATE: 99 BPM | TEMPERATURE: 98.1 F | DIASTOLIC BLOOD PRESSURE: 69 MMHG

## 2022-07-28 DIAGNOSIS — K92.0 HEMATEMESIS WITHOUT NAUSEA: ICD-10-CM

## 2022-07-28 DIAGNOSIS — E78.00 PURE HYPERCHOLESTEROLEMIA: ICD-10-CM

## 2022-07-28 DIAGNOSIS — I35.1 AORTIC VALVE INSUFFICIENCY, ETIOLOGY OF CARDIAC VALVE DISEASE UNSPECIFIED: ICD-10-CM

## 2022-07-28 DIAGNOSIS — Z12.5 ENCOUNTER FOR PROSTATE CANCER SCREENING: ICD-10-CM

## 2022-07-28 DIAGNOSIS — N18.31 STAGE 3A CHRONIC KIDNEY DISEASE (HCC): ICD-10-CM

## 2022-07-28 DIAGNOSIS — R60.0 BILATERAL LEG EDEMA: ICD-10-CM

## 2022-07-28 DIAGNOSIS — E79.0 HYPERURICEMIA: ICD-10-CM

## 2022-07-28 DIAGNOSIS — G89.29 CHRONIC BILATERAL THORACIC BACK PAIN: ICD-10-CM

## 2022-07-28 DIAGNOSIS — M54.6 CHRONIC BILATERAL THORACIC BACK PAIN: ICD-10-CM

## 2022-07-28 DIAGNOSIS — M25.50 PAIN IN JOINT, MULTIPLE SITES: Chronic | ICD-10-CM

## 2022-07-28 DIAGNOSIS — I10 PRIMARY HYPERTENSION: Primary | ICD-10-CM

## 2022-07-28 LAB
CHOLESTEROL/HDL RATIO: 4.4
CHOLESTEROL: 225 MG/DL
CREATININE URINE: 140.4 MG/DL (ref 39–259)
HCT VFR BLD CALC: 36.1 % (ref 40.7–50.3)
HDLC SERPL-MCNC: 51 MG/DL
HEMOGLOBIN: 12 G/DL (ref 13–17)
LDL CHOLESTEROL: 103 MG/DL (ref 0–130)
MCH RBC QN AUTO: 32.4 PG (ref 25.2–33.5)
MCHC RBC AUTO-ENTMCNC: 33.2 G/DL (ref 28.4–34.8)
MCV RBC AUTO: 97.6 FL (ref 82.6–102.9)
NRBC AUTOMATED: 0 PER 100 WBC
PDW BLD-RTO: 12.9 % (ref 11.8–14.4)
PLATELET # BLD: 419 K/UL (ref 138–453)
PMV BLD AUTO: 10 FL (ref 8.1–13.5)
PROSTATE SPECIFIC ANTIGEN: 0.8 NG/ML
RBC # BLD: 3.7 M/UL (ref 4.21–5.77)
TOTAL PROTEIN, URINE: 11 MG/DL
TRIGL SERPL-MCNC: 357 MG/DL
URINE TOTAL PROTEIN CREATININE RATIO: 0.08 (ref 0–0.2)
WBC # BLD: 5 K/UL (ref 3.5–11.3)

## 2022-07-28 PROCEDURE — 1123F ACP DISCUSS/DSCN MKR DOCD: CPT | Performed by: INTERNAL MEDICINE

## 2022-07-28 PROCEDURE — 99214 OFFICE O/P EST MOD 30 MIN: CPT | Performed by: INTERNAL MEDICINE

## 2022-07-28 PROCEDURE — 99211 OFF/OP EST MAY X REQ PHY/QHP: CPT | Performed by: INTERNAL MEDICINE

## 2022-07-28 RX ORDER — PANTOPRAZOLE SODIUM 40 MG/1
TABLET, DELAYED RELEASE ORAL
Qty: 30 TABLET | Refills: 3 | Status: SHIPPED | OUTPATIENT
Start: 2022-07-28

## 2022-07-28 RX ORDER — MELOXICAM 15 MG/1
TABLET ORAL
Qty: 30 TABLET | Refills: 2 | OUTPATIENT
Start: 2022-07-28

## 2022-07-28 RX ORDER — GABAPENTIN 300 MG/1
300 CAPSULE ORAL 3 TIMES DAILY
Qty: 90 CAPSULE | Refills: 2 | Status: SHIPPED | OUTPATIENT
Start: 2022-07-28 | End: 2022-10-26

## 2022-07-28 RX ORDER — ALLOPURINOL 300 MG/1
300 TABLET ORAL DAILY
Qty: 30 TABLET | Refills: 5 | Status: SHIPPED | OUTPATIENT
Start: 2022-07-28

## 2022-07-28 RX ORDER — ALLOPURINOL 300 MG/1
TABLET ORAL
Qty: 30 TABLET | Refills: 5 | OUTPATIENT
Start: 2022-07-28

## 2022-07-28 RX ORDER — MAGNESIUM OXIDE 400 MG/1
400 TABLET ORAL DAILY
Qty: 30 TABLET | Refills: 5 | Status: SHIPPED | OUTPATIENT
Start: 2022-07-28 | End: 2022-11-04 | Stop reason: SDUPTHER

## 2022-07-28 RX ORDER — MELOXICAM 15 MG/1
15 TABLET ORAL DAILY
Qty: 30 TABLET | Refills: 5 | Status: SHIPPED | OUTPATIENT
Start: 2022-07-28 | End: 2022-07-28

## 2022-07-28 RX ORDER — GABAPENTIN 300 MG/1
CAPSULE ORAL
Qty: 90 CAPSULE | Refills: 1 | OUTPATIENT
Start: 2022-07-28

## 2022-07-28 SDOH — ECONOMIC STABILITY: FOOD INSECURITY: WITHIN THE PAST 12 MONTHS, YOU WORRIED THAT YOUR FOOD WOULD RUN OUT BEFORE YOU GOT MONEY TO BUY MORE.: NEVER TRUE

## 2022-07-28 SDOH — ECONOMIC STABILITY: FOOD INSECURITY: WITHIN THE PAST 12 MONTHS, THE FOOD YOU BOUGHT JUST DIDN'T LAST AND YOU DIDN'T HAVE MONEY TO GET MORE.: NEVER TRUE

## 2022-07-28 ASSESSMENT — SOCIAL DETERMINANTS OF HEALTH (SDOH): HOW HARD IS IT FOR YOU TO PAY FOR THE VERY BASICS LIKE FOOD, HOUSING, MEDICAL CARE, AND HEATING?: NOT HARD AT ALL

## 2022-07-28 ASSESSMENT — ENCOUNTER SYMPTOMS
COUGH: 0
PHOTOPHOBIA: 0
ABDOMINAL PAIN: 0
WHEEZING: 0
BACK PAIN: 1

## 2022-07-28 NOTE — PROGRESS NOTES
[facility]    Progress Note    Date of patient's visit: 7/28/2022    Patient's Name:  Fatimah Colorado    YOB: 1955            Patient Care Team:  Steph Torres MD as PCP - Diana Jones MD as PCP - [facility]  Steph Torres MD as Referring Physician (Internal Medicine)  Shira Box MD as Consulting Physician (Gastroenterology)    REASON FOR VISIT: Routine outpatient follow     Chief Complaint   Patient presents with    Hypertension    Health Maintenance     PSA pended, will schedule AWV, vaccine declined     Joint Swelling     Pt c/o having bilateral ankle swelling, states that as soon as he starts to walk then will both swell up          HISTORY OF PRESENT ILLNESS:    History was obtained from the patient. Fatimah Colorado is a 74 y.o. is here for follow-up. He is complaining of bilateral leg lower extremity edema. Is about 1+. He says it starts after he gets up in the morning and starts ambulating. This is the first time he has had leg edema. Patient is on amlodipine and gabapentin which can cause edema but on further questioning patient says he does not take amlodipine. He says he picks up the medications but takes it maybe once every 3 weeks. Blood pressure looks controlled today so we will stop amlodipine and monitor it closely. Advised low-salt diet. Advised also to stop meloxicam and other NSAIDs due to edema and also his renal function and history of hematemesis. No chest pain or shortness of breath. He had echo done which had shown aortic insufficiency which was pretty severe. He has been given referrals to cardiology twice but he does not know that he showed up. He also continues to have some hematemesis occasionally. He has had some iron deficiency anemia. He is taking iron and his hemoglobin and iron has improved. He was referred to GI but missed his appointment.   He did not respond to the calls or letters. Advised him that we will give him numbers to GI and cardiology    He is on pantoprazole. He denies abdominal pain. He has been taking meloxicam because of pain along with Percocet and gabapentin and tizanidine for spasms. He did mention today he is also been taking Aleve over-the-counter. I had already sent refills on meloxicam.  Advised patient he cannot takes NSAIDs. He has a history of hematemesis and history of esophagitis in the past.  Advised him to stop taking NSAIDs till he sees GI. Echo 2021  CONCLUSIONS     Summary  Normal left ventricle size and systolic function with an estimated EF  55-60%. No segmental wall motion abnormalities seen. Moderate left ventricular hypertrophy. Left atrial dilatation. Right atrial dilatation. Aortic valve was not well visualized. Mild aortic stenosis. Peak  instantaneous gradient 20 mmHg and mean gradient 9 mmHg. Moderate to Severe aortic insufficiency with an eccentric jet (pressure half  time ~ 200 msec). Consider ROSHAN if clinically warranted. Aortic root measures 3.6-3.9 cm. No significant pericardial effusion is seen. Colonoscopy 2020    PREOPERATIVE DIAGNOSIS: iron deficiency anemia. PROCEDURES:  Transanal Colonoscopy with biopsy. POSTPROCEDURE DIAGNOSIS:  Mild patchy erythema  Grade II internal hemorrhoids      EGD 2020  PREOPERATIVE DIAGNOSIS: Fe def anemia. PROCEDURES:  1) Transoral Upper Endoscopy, biopsy.      POSTOPERATIVE DIAGNOSIS:   Moderate esophagitis (?Pill induced)         Past Medical History:   Diagnosis Date    Chronic back pain     Chronic kidney disease     Gout     Hyperlipidemia     Hypertension     Obesity 9/19/2014    Osteoarthritis     Restless leg syndrome     Shoulder pain     Shrimp allergy     allergic reaction to shrimp    Spinal stenosis, lumbar region, without neurogenic claudication 4/13/2016    Swelling of joint, wrist, right     Thoracic back pain     Vertigo     Wears glasses READING    Wrist pain, right        Past Surgical History:   Procedure Laterality Date    COLONOSCOPY  11/2014    int hemorrhoids, repeat in 10 years    COLONOSCOPY  05/27/2020    COLONOSCOPY N/A 5/27/2020    COLONOSCOPY WITH BIOPSY performed by Balta Sky MD at [facility]    NASAL ENDOSCOPY Bilateral 10/17/2016    NASAL ENDOSCOPY; RIGHT NASAL CAUTERY    NERVE BLOCK  11/02/2016    duramorph 1.5mg celestone 9mg    NERVE BLOCK  04/03/2017    duramorph 1.5mg & decadron 10mg    NERVE BLOCK  10/09/2017    duramorph epidural, decadron 10mg, morphine 1.5mg    UPPER GASTROINTESTINAL ENDOSCOPY  05/27/2020    UPPER GASTROINTESTINAL ENDOSCOPY N/A 5/27/2020    EGD BIOPSY performed by Balta Sky MD at [facility]         ALLERGIES      Allergies   Allergen Reactions    Codeine Other (See Comments)     Don;t agree with him    Shrimp (Diagnostic) Hives       MEDICATIONS:      Current Outpatient Medications on File Prior to Visit   Medication Sig Dispense Refill    pantoprazole (PROTONIX) 40 MG tablet TAKE 1 TABLET BY MOUTH ONCE DAILY. 30 tablet 2    gabapentin (NEURONTIN) 300 MG capsule TAKE 1 CAPSULE BY MOUTH 3 TIMES A DAY. 90 capsule 1    meloxicam (MOBIC) 15 MG tablet TAKE 1 TABLET BY MOUTH ONCE DAILY. 30 tablet 2    ferrous sulfate (FEROSUL) 325 (65 Fe) MG tablet TAKE 1 TABLET BY MOUTH ONCE DAILY. 30 tablet 5    tiZANidine (ZANAFLEX) 4 MG tablet TAKE 1 TABLET BY MOUTH EVERY 8 HOURS AS NEEDED FOR SPASMS. 30 tablet 5    baclofen (LIORESAL) 10 MG tablet Take 10 mg by mouth 2 times daily as needed      amLODIPine (NORVASC) 10 MG tablet TAKE 1 TABLET BY MOUTH ONE TIME A DAY 30 tablet 5    allopurinol (ZYLOPRIM) 300 MG tablet TAKE 1 TABLET BY MOUTH ONCE DAILY. 30 tablet 5    oxyCODONE-acetaminophen (PERCOCET)  MG per tablet Take 1 tablet by mouth 3 times daily. diphenhydrAMINE (BENADRYL) 25 MG capsule Take 1-2 tablets by mouth every 6 hours as needed for itching.  30 capsule 0    magnesium oxide (MAG-OX) 400 MG tablet TAKE 1 TABLET BY MOUTH ONE TIME A DAY 30 tablet 2    diclofenac sodium (VOLTAREN) 1 % GEL Apply 2 g topically 2 times daily      Melatonin 10 MG CAPS Take by mouth        No current facility-administered medications on file prior to visit. HISTORY    Reviewed and no change from previous record. Consuelo Hutchinson  reports that he has never smoked. He has never used smokeless tobacco.    FAMILY HISTORY:    Reviewed and No change from previous visit    HEALTH MAINTENANCE DUE:      Health Maintenance Due   Topic Date Due    Annual Wellness Visit (AWV)  Never done    COVID-19 Vaccine (1) Never done    Prostate Specific Antigen (PSA) Screening or Monitoring  02/26/2014    Pneumococcal 65+ years Vaccine (1 - PCV) Never done       REVIEW OF SYSTEMS:    12 point review of symptoms completed and found to be normal except noted in the HPI    Review of Systems   Constitutional:  Negative for fatigue and unexpected weight change. Eyes:  Negative for photophobia and visual disturbance. Respiratory:  Negative for cough and wheezing. Cardiovascular:  Positive for leg swelling. Negative for chest pain and palpitations. Gastrointestinal:  Negative for abdominal pain. Occasional hematemesis     Endocrine: Negative for polydipsia and polyuria. Genitourinary:  Negative for dysuria and hematuria. Musculoskeletal:  Positive for arthralgias and back pain. Neurological:  Negative for dizziness, weakness, numbness and headaches. Hematological:  Negative for adenopathy. Does not bruise/bleed easily. PHYSICAL EXAM:     Vitals:    07/28/22 1332   BP: 138/69   Site: Left Upper Arm   Position: Sitting   Cuff Size: Large Adult   Pulse: 99   Temp: 98.1 °F (36.7 °C)   TempSrc: Infrared   Weight: 228 lb (103.4 kg)     Body mass index is 30.92 kg/m².     BP Readings from Last 3 Encounters:   07/28/22 138/69   04/28/22 (!) 158/66   04/14/22 (!) 147/69        Wt Readings from Last 3 Encounters:   07/28/22 228 lb (103.4 kg)   03/25/22 232 lb (105.2 kg)   12/30/21 227 lb (103 kg)       Physical Exam  Vitals and nursing note reviewed. Constitutional:       General: He is not in acute distress. Appearance: Normal appearance. He is not ill-appearing. HENT:      Head: Normocephalic and atraumatic. Eyes:      Extraocular Movements: Extraocular movements intact. Conjunctiva/sclera: Conjunctivae normal.      Pupils: Pupils are equal, round, and reactive to light. Cardiovascular:      Rate and Rhythm: Normal rate and regular rhythm. Heart sounds: Murmur heard. Pulmonary:      Effort: Pulmonary effort is normal.      Breath sounds: Normal breath sounds. No wheezing or rales. Musculoskeletal:      Right lower leg: Edema (1+ b/l leg edema mostly around ankles) present. Left lower leg: Edema present. Neurological:      General: No focal deficit present. Mental Status: He is alert and oriented to person, place, and time.            LABORATORY FINDINGS:    CBC:  Lab Results   Component Value Date/Time    WBC 4.2 03/25/2022 10:42 AM    HGB 11.8 03/25/2022 10:42 AM     03/25/2022 10:42 AM     09/10/2011 12:27 PM     BMP:    Lab Results   Component Value Date/Time     03/25/2022 10:42 AM    K 4.6 03/25/2022 10:42 AM     03/25/2022 10:42 AM    CO2 21 03/25/2022 10:42 AM    BUN 13 03/25/2022 10:42 AM    CREATININE 1.20 03/25/2022 10:42 AM    GLUCOSE 87 03/25/2022 10:42 AM    GLUCOSE 108 09/10/2011 12:27 PM     HEMOGLOBIN A1C:   Lab Results   Component Value Date/Time    LABA1C 4.7 03/25/2022 10:02 AM     MICROALBUMIN URINE:   Lab Results   Component Value Date/Time    MICROALBUR <6 01/22/2013 09:53 PM     FASTING LIPID PANEL:  Lab Results   Component Value Date    CHOL 257 (H) 04/02/2019    HDL 49 04/02/2019    TRIG 120 04/02/2019     Lab Results   Component Value Date    LDLCHOLESTEROL 184 (H) 04/02/2019       LIVER PROFILE:  Lab Results   Component Value Date/Time    ALT 29 08/13/2020 01:30 AM    AST 35 08/13/2020 01:30 AM    PROT 6.8 08/13/2020 01:30 AM    PROT 7.1 01/17/2013 11:16 AM    BILITOT 0.79 08/13/2020 01:30 AM    BILIDIR 0.20 08/13/2020 01:30 AM    LABALBU 4.0 08/13/2020 01:30 AM    LABALBU 4.4 09/10/2011 12:27 PM      THYROID FUNCTION:   Lab Results   Component Value Date/Time    TSH 0.68 06/11/2020 02:03 PM      URINEANALYSIS: No results found for: LABURIN  ASSESSMENT AND PLAN:    1. Primary hypertension  Not taking Amlodipine    - Protein / Creatinine Ratio, Urine; Future  - Basic Metabolic Panel; Future    2. Bilateral leg edema  See above  Not taking Amlodipine  Check echo and urine  Low salt diet    - ECHO 2D WO Color Doppler Complete; Future    3. Aortic valve insufficiency, etiology of cardiac valve disease unspecified  As above    - ECHO 2D WO Color Doppler Complete; Future    4. Stage 3a chronic kidney disease ([facility] [address]    - Basic Metabolic Panel; Future  - CBC; Future    5. Hematemesis without nausea  Follow up with GI    - pantoprazole (PROTONIX) 40 MG tablet; 1 tablet daily  Dispense: 30 tablet; Refill: 3    6. Hyperuricemia  - allopurinol (ZYLOPRIM) 300 MG tablet; Take 1 tablet by mouth in the morning. Dispense: 30 tablet; Refill: 5    7. Pure hypercholesterolemia  Labs    8. Chronic bilateral thoracic back pain  - gabapentin (NEURONTIN) 300 MG capsule; Take 1 capsule by mouth in the morning and 1 capsule at noon and 1 capsule before bedtime. Do all this for 30 days. Dispense: 90 capsule; Refill: 2    9. Pain in joint, multiple sites  Follow up with Pain management    10. Encounter for prostate cancer screening    - PSA Screening; Future          FOLLOW UP AND INSTRUCTIONS:   Return in about 3 months (around 10/28/2022). Jyothi Johnson received counseling on the following healthy behaviors: nutrition, exercise, and medication adherence    Reviewed prior labs and health maintenance. Discussed use, benefit, and side effects of prescribed medications.   Barriers to medication compliance addressed. All patient questions answered. Pt voiced understanding.      Patient given educational materials - see patient instructions    Marely Gonzalez  Attending Physician, [address] [place] [address], Internal Medicine Residency Program  [address] [place] [address]  7/28/2022, 1:47 PM

## 2022-07-29 RX ORDER — MELOXICAM 15 MG/1
TABLET ORAL
Qty: 30 TABLET | Refills: 2 | OUTPATIENT
Start: 2022-07-29

## 2022-08-01 ENCOUNTER — TELEPHONE (OUTPATIENT)
Dept: INTERNAL MEDICINE | Age: 67
End: 2022-08-01

## 2022-08-01 NOTE — TELEPHONE ENCOUNTER
He has been taking Aleve otc. Also on meloxicam. He has been having hematemesis. It could be related to chronic NSAID use. He has h/o esophagitis. He needs to see GI and get EGD done. He should avoid NSAID's for now till seen by GI and scope done. Continue protonix for stomach protection. He could end up with ulcers and GI bleed if exposed to daily NSAID's .

## 2022-08-01 NOTE — TELEPHONE ENCOUNTER
PC from patient - patient requesting refill on Meloxicam . Per OV on 7/28/2022 medication was discontinued but patient states that it is a vital part of his medications and needs refilled. Please advise. Health Maintenance   Topic Date Due    Annual Wellness Visit (AWV)  Never done    COVID-19 Vaccine (1) Never done    Pneumococcal 65+ years Vaccine (1 - PCV) Never done    Shingles vaccine (2 of 2) 01/20/2023 (Originally 1/13/2021)    Flu vaccine (1) 09/01/2022    Depression Screen  03/25/2023    Prostate Specific Antigen (PSA) Screening or Monitoring  07/28/2023    Lipids  07/28/2027    Colorectal Cancer Screen  05/27/2030    DTaP/Tdap/Td vaccine (2 - Td or Tdap) 12/15/2031    Hepatitis C screen  Completed    HIV screen  Completed    Hepatitis A vaccine  Aged Out    Hepatitis B vaccine  Aged Out    Hib vaccine  Aged Out    Meningococcal (ACWY) vaccine  Aged Out             (applicable per patient's age: Cancer Screenings, Depression Screening, Fall Risk Screening, Immunizations)    Hemoglobin A1C (%)   Date Value   03/25/2022 4.7   11/21/2017 5.0   12/14/2016 5.1     Microalb/Crt.  Ratio (mcg/mg creat)   Date Value   01/22/2013 9     LDL Cholesterol (mg/dL)   Date Value   07/28/2022 103     AST (U/L)   Date Value   08/13/2020 35     ALT (U/L)   Date Value   08/13/2020 29     BUN (mg/dL)   Date Value   03/25/2022 13      (goal A1C is < 7)   (goal LDL is <100) need 30-50% reduction from baseline     BP Readings from Last 3 Encounters:   07/28/22 138/69   04/28/22 (!) 158/66   04/14/22 (!) 147/69    (goal /80)      All Future Testing planned in [facility]:  Lab Frequency Next Occurrence   Protein / Creatinine Ratio, Urine Once 37/17/7955   Basic Metabolic Panel Once 73/82/8045   ECHO 2D WO Color Doppler Complete Once 07/28/2022   Nasal cannula oxygen PRN        Next Visit Date:  Future Appointments   Date Time Provider Jonathan Lundberg   8/18/2022  9:00 AM [facility]  [facility]   8/22/2022 7:30 AM Marlon Lucas, DO [address] [id number]            Patient Active Problem List:     Hyperlipidemia     Gout     CKD (chronic kidney disease) stage 3, GFR 30-59 ml/min ([facility])     Wrist pain, right     Swelling of joint, wrist, right     Pain in joint, multiple sites     Obesity     Adjustment reaction     Vertigo     Pain in joint     Spinal stenosis, lumbar region, without neurogenic claudication     Facet arthropathy, lumbar     Chronic back pain     Acute bilateral low back pain with sciatica     Rhabdomyolysis

## 2022-08-18 ENCOUNTER — HOSPITAL ENCOUNTER (OUTPATIENT)
Dept: NON INVASIVE DIAGNOSTICS | Age: 67
Discharge: HOME | End: 2022-08-18
Payer: MEDICARE

## 2022-08-18 DIAGNOSIS — I35.1 AORTIC VALVE INSUFFICIENCY, ETIOLOGY OF CARDIAC VALVE DISEASE UNSPECIFIED: ICD-10-CM

## 2022-08-18 DIAGNOSIS — R60.0 BILATERAL LEG EDEMA: ICD-10-CM

## 2022-08-18 LAB
LV EF: 58 %
LVEF MODALITY: NORMAL

## 2022-08-18 PROCEDURE — 93306 TTE W/DOPPLER COMPLETE: CPT

## 2022-08-19 DIAGNOSIS — I35.1 AORTIC VALVE INSUFFICIENCY, ETIOLOGY OF CARDIAC VALVE DISEASE UNSPECIFIED: Primary | ICD-10-CM

## 2022-08-19 DIAGNOSIS — I38 DIASTOLIC CHF DUE TO VALVULAR DISEASE (HCC): ICD-10-CM

## 2022-08-19 DIAGNOSIS — I50.30 DIASTOLIC CHF DUE TO VALVULAR DISEASE (HCC): ICD-10-CM

## 2022-08-26 DIAGNOSIS — M25.551 RIGHT HIP PAIN: Primary | ICD-10-CM

## 2022-08-26 RX ORDER — MELOXICAM 15 MG/1
TABLET ORAL
Qty: 30 TABLET | Refills: 2 | OUTPATIENT
Start: 2022-08-26

## 2022-08-29 ENCOUNTER — OFFICE VISIT (OUTPATIENT)
Dept: ORTHOPEDIC SURGERY | Age: 67
End: 2022-08-29
Payer: MEDICARE

## 2022-08-29 VITALS — WEIGHT: 225 LBS | HEIGHT: 72 IN | BODY MASS INDEX: 30.48 KG/M2

## 2022-08-29 DIAGNOSIS — M87.051 AVASCULAR NECROSIS OF BONE OF RIGHT HIP (HCC): ICD-10-CM

## 2022-08-29 DIAGNOSIS — M16.11 PRIMARY OSTEOARTHRITIS OF RIGHT HIP: Primary | ICD-10-CM

## 2022-08-29 PROCEDURE — 99214 OFFICE O/P EST MOD 30 MIN: CPT | Performed by: ORTHOPAEDIC SURGERY

## 2022-08-29 PROCEDURE — 1123F ACP DISCUSS/DSCN MKR DOCD: CPT | Performed by: ORTHOPAEDIC SURGERY

## 2022-08-30 ASSESSMENT — ENCOUNTER SYMPTOMS
SHORTNESS OF BREATH: 0
ROS SKIN COMMENTS: NEGATIVE FOR RASH
ABDOMINAL PAIN: 0
EYE DISCHARGE: 0

## 2022-08-30 NOTE — PROGRESS NOTES
[address] AND SPORTS MEDICINE  Formerly [facility] Gibran Lowry  [address] [place] [address]  Dept: [phone]    Ambulatory Orthopedic New Patient Visit      CHIEF COMPLAINT:    Chief Complaint   Patient presents with    Hip Pain     R hip pain, patient last seen 3 years ago       HISTORY OF PRESENT ILLNESS:      The patient is a 74 y.o. male who is being seen  for consultation and evaluation of right hip pain. The patient notes chronic right hip pain progressively worsening over the last few years. Especially since December the patient has noted increased pain. 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 He notes pain when doing martial arts and working out. The pain has not gotten better with a home exercise program and modifying his activities. He also notes having a history of a right hip injection without significant improvement and has been using inversion table therapy without improvement. His hip pain is greatly affecting his usual activities of daily and he would like to consider further treatment options. .      Past Medical History:    Past Medical History:   Diagnosis Date    Chronic back pain     Chronic kidney disease     Gout     Hyperlipidemia     Hypertension     Obesity 9/19/2014    Osteoarthritis     Restless leg syndrome     Shoulder pain     Shrimp allergy     allergic reaction to shrimp    Spinal stenosis, lumbar region, without neurogenic claudication 4/13/2016    Swelling of joint, wrist, right     Thoracic back pain     Vertigo     Wears glasses     READING    Wrist pain, right        Past Surgical History:    Past Surgical History:   Procedure Laterality Date    COLONOSCOPY  11/2014    int hemorrhoids, repeat in 10 years    COLONOSCOPY  05/27/2020    COLONOSCOPY N/A 5/27/2020    COLONOSCOPY WITH BIOPSY performed by Joanne Rodrigues MD at [address] [facility] Bilateral 10/17/2016 NASAL ENDOSCOPY; RIGHT NASAL CAUTERY    NERVE BLOCK  11/02/2016    duramorph 1.5mg celestone 9mg    NERVE BLOCK  04/03/2017    duramorph 1.5mg & decadron 10mg    NERVE BLOCK  10/09/2017    duramorph epidural, decadron 10mg, morphine 1.5mg    UPPER GASTROINTESTINAL ENDOSCOPY  05/27/2020    UPPER GASTROINTESTINAL ENDOSCOPY N/A 5/27/2020    EGD BIOPSY performed by Saran Gomez MD at [facility]       Current Medications:   Current Outpatient Medications   Medication Sig Dispense Refill    allopurinol (ZYLOPRIM) 300 MG tablet Take 1 tablet by mouth in the morning. 30 tablet 5    gabapentin (NEURONTIN) 300 MG capsule Take 1 capsule by mouth in the morning and 1 capsule at noon and 1 capsule before bedtime. Do all this for 30 days. 90 capsule 2    magnesium oxide (MAG-OX) 400 MG tablet Take 1 tablet by mouth in the morning. 30 tablet 5    pantoprazole (PROTONIX) 40 MG tablet 1 tablet daily 30 tablet 3    ferrous sulfate (FEROSUL) 325 (65 Fe) MG tablet TAKE 1 TABLET BY MOUTH ONCE DAILY. 30 tablet 5    tiZANidine (ZANAFLEX) 4 MG tablet TAKE 1 TABLET BY MOUTH EVERY 8 HOURS AS NEEDED FOR SPASMS. 30 tablet 5    baclofen (LIORESAL) 10 MG tablet Take 10 mg by mouth 2 times daily as needed      oxyCODONE-acetaminophen (PERCOCET)  MG per tablet Take 1 tablet by mouth 3 times daily. diphenhydrAMINE (BENADRYL) 25 MG capsule Take 1-2 tablets by mouth every 6 hours as needed for itching. 30 capsule 0    diclofenac sodium (VOLTAREN) 1 % GEL Apply 2 g topically 2 times daily      Melatonin 10 MG CAPS Take by mouth        No current facility-administered medications for this visit.        Allergies:    Codeine and Shrimp (diagnostic)    Social History:   Social History     Socioeconomic History    Marital status: Single     Spouse name: Not on file    Number of children: Not on file    Years of education: Not on file    Highest education level: Not on file   Occupational History    Not on file   Tobacco Use    Smoking status: Never    Smokeless tobacco: Never   Vaping Use    Vaping Use: Never used   Substance and Sexual Activity    Alcohol use: No    Drug use: No    Sexual activity: Not on file   Other Topics Concern    Not on file   Social History Narrative    Not on file     Social Determinants of Health     Financial Resource Strain: Low Risk     Difficulty of Paying Living Expenses: Not hard at all   Food Insecurity: No Food Insecurity    Worried About Running Out of Food in the Last Year: Never true    Ran Out of Food in the Last Year: Never true   Transportation Needs: Not on file   Physical Activity: Not on file   Stress: Not on file   Social Connections: Not on file   Intimate Partner Violence: Not on file   Housing Stability: Not on file       Family History:  Family History   Problem Relation Age of Onset    Heart Disease Brother 48    Other Father     Other Brother         HIV    Other Brother          REVIEW OF SYSTEMS:  Review of Systems   Constitutional:  Positive for activity change. Negative for fever. HENT:  Negative for dental problem. Eyes:  Negative for discharge. Respiratory:  Negative for shortness of breath. Cardiovascular:  Negative for chest pain. Gastrointestinal:  Negative for abdominal pain. Genitourinary: Negative. Musculoskeletal:  Positive for arthralgias. Skin:         Negative for rash   Neurological:  Positive for weakness. Psychiatric/Behavioral:  Negative for confusion. I have reviewed the CC, HPI, ROS, PMH, FHX, Social History. I agree with the documentation provided by other staff, residents and havereviewed their documentation prior to providing my signature indicating agreement. PHYSICAL EXAM:  Ht 6' (1.829 m)   Wt 225 lb (102.1 kg)   BMI 30.52 kg/m²  Body mass index is 30.52 kg/m². Physical Exam  Gen: alert and oriented  Psych:  Appropriate affect; Appropriate knowledge base; Appropriate mood; No hallucinations;    Head: normocephalic atraumatic   Chest: symmetric chest excursion  Pelvis: stable  Ortho Exam  Extremity:Patient ambulates with mild shortening weightbearing phase and antalgic gait to the Right lower extremity. There is no erythema, warmth, skin lesions, signs of infection. Positive hip logroll and Stinchfield test is noted. Patient has full range of motion of the Right knee and ankle. Motor, sensory, and vascular examination to the Right lower extremity is intact without focal deficits. Patient has full range of motion of the lumbosacral spine. Radiology:     XR HIP 2-3 VW W PELVIS RIGHT    Result Date: 8/30/2022  History:   Right hip pain Findings:   Low AP pelvis,frog leg lateral xrays Right hip xrays done in the office today shows severe joint space narrowing, osteophytosis, joint line sclerosis to the Right hip. No evidence of fracture, subluxation, dislocation, radioopaque foreign body/tumor is noted. Some collapse of the superior lateral femoral head consistent with AVN is appreciated. Left Impression:   severe degenerative changes Right hip as described above. ASSESSMENT:     1. Primary osteoarthritis of right hip    2. Avascular necrosis of bone of right hip (HCC)           PLAN:       We discussed operative and nonoperative treatment for the patient's significant right hip pain and significant arthritic findings on x-ray. His symptoms of failed to improve despite conservative therapy to include activity modification, home exercise program that is been very structured, and intra-articular corticosteroid injection. He has difficulty with usual activities of daily living because of his hip pain he would like to proceed with total hip arthroplasty. All details of the procedure, as well as risks, benefits and alternatives, including the option of non operative versus operative treatment were discussed.   The patient understands that risks of the surgery include but are not limited to: bleeding, malunion/nonunion, loss of fixation, loss of reduction, hardware failure, angular or rotational deformity, length discrepancy, limp, transfusion, skin blistering or breakdown, progressive post traumatic degenerative joint disease, possible need for further surgery, bone grafting, infection, nerve injury, paralysis, numbness, blood vessel injury, excessive scaring, wound complication or breakdown, failure of symptoms to improve or actual deterioration in condition, significant acute and/or chronic pain, possible need for amputation, permanent loss of motion, and permanent loss of function. As well as the general complications of anesthesia, which include but are not limited to: myocardial infarction and/or heart attack, stroke, multi organ system failure or even possible death, prolonged hospital stay, blood clots, pulmonary embolism, abnormal reaction to medication, visual and neurological disturbances, constipation, ischemic bowel, bowel obstruction, bowel perforation, ileus and mental status changes. No guarantees were made. Return for Two weeks post op. No orders of the defined types were placed in this encounter. No orders of the defined types were placed in this encounter. This note is created with the assistance of a speech recognition program.  While intending to generate a document that actually reflects the content of the visit, the document can still have some errors including those of syntax and sound a like substitutions which may escape proof reading.   In such instances, actual meaning can be extrapolated by contextual diversion      Electronically signed by Yoanna Morejon DO, Channie Reamer on 8/30/2022 at 3:25 PM

## 2022-08-31 ENCOUNTER — TELEPHONE (OUTPATIENT)
Dept: ORTHOPEDIC SURGERY | Age: 67
End: 2022-08-31

## 2022-08-31 NOTE — TELEPHONE ENCOUNTER
Patient's wife called in stating her  is planning on an upcoming total hip replacement. He is currently at the dentist and she knew there was some form that had to be filled out by the dentist.    She could not give me the name of the dentist but did give me a fax number of [phone] to send the form for completion. This message will be relayed to Dr Rola Nuñez surgery scheduler.

## 2022-09-06 ENCOUNTER — TELEPHONE (OUTPATIENT)
Dept: ORTHOPEDIC SURGERY | Age: 67
End: 2022-09-06

## 2022-09-06 DIAGNOSIS — Z01.818 PRE-OP TESTING: Primary | ICD-10-CM

## 2022-09-15 ENCOUNTER — HOSPITAL ENCOUNTER (OUTPATIENT)
Dept: PREADMISSION TESTING | Age: 67
Discharge: HOME | End: 2022-09-19
Payer: MEDICARE

## 2022-09-15 ENCOUNTER — HOSPITAL ENCOUNTER (OUTPATIENT)
Dept: GENERAL RADIOLOGY | Age: 67
Discharge: HOME | End: 2022-09-17
Payer: MEDICARE

## 2022-09-15 VITALS
DIASTOLIC BLOOD PRESSURE: 49 MMHG | WEIGHT: 214 LBS | OXYGEN SATURATION: 98 % | HEART RATE: 67 BPM | RESPIRATION RATE: 16 BRPM | BODY MASS INDEX: 28.99 KG/M2 | TEMPERATURE: 97.5 F | HEIGHT: 72 IN | SYSTOLIC BLOOD PRESSURE: 151 MMHG

## 2022-09-15 DIAGNOSIS — Z01.818 PRE-OP TESTING: ICD-10-CM

## 2022-09-15 LAB
ABO/RH: NORMAL
ALBUMIN SERPL-MCNC: 4.4 G/DL (ref 3.5–5.2)
ALP BLD-CCNC: 117 U/L (ref 40–129)
ALT SERPL-CCNC: 28 U/L (ref 5–41)
ANION GAP SERPL CALCULATED.3IONS-SCNC: 11 MMOL/L (ref 9–17)
ANTIBODY SCREEN: NEGATIVE
ARM BAND NUMBER: NORMAL
AST SERPL-CCNC: 21 U/L
BILIRUB SERPL-MCNC: 0.7 MG/DL (ref 0.3–1.2)
BILIRUBIN URINE: NEGATIVE
BUN BLDV-MCNC: 12 MG/DL (ref 8–23)
BUN/CREAT BLD: 13 (ref 9–20)
CALCIUM SERPL-MCNC: 9.7 MG/DL (ref 8.6–10.4)
CHLORIDE BLD-SCNC: 104 MMOL/L (ref 98–107)
CO2: 26 MMOL/L (ref 20–31)
COLOR: YELLOW
CREAT SERPL-MCNC: 0.92 MG/DL (ref 0.7–1.2)
EKG ATRIAL RATE: 61 BPM
EKG P AXIS: 10 DEGREES
EKG P-R INTERVAL: 202 MS
EKG Q-T INTERVAL: 404 MS
EKG QRS DURATION: 88 MS
EKG QTC CALCULATION (BAZETT): 406 MS
EKG R AXIS: -4 DEGREES
EKG T AXIS: 26 DEGREES
EKG VENTRICULAR RATE: 61 BPM
EPITHELIAL CELLS UA: ABNORMAL /HPF (ref 0–5)
ESTIMATED AVERAGE GLUCOSE: 85 MG/DL
EXPIRATION DATE: NORMAL
GFR AFRICAN AMERICAN: >60 ML/MIN
GFR NON-AFRICAN AMERICAN: >60 ML/MIN
GFR SERPL CREATININE-BSD FRML MDRD: NORMAL ML/MIN/{1.73_M2}
GLUCOSE BLD-MCNC: 87 MG/DL (ref 70–99)
GLUCOSE URINE: NEGATIVE
HBA1C MFR BLD: 4.6 % (ref 4–6)
HCT VFR BLD CALC: 39.1 % (ref 40.7–50.3)
HEMOGLOBIN: 12.6 G/DL (ref 13–17)
KETONES, URINE: NEGATIVE
LEUKOCYTE ESTERASE, URINE: ABNORMAL
MCH RBC QN AUTO: 31.1 PG (ref 25.2–33.5)
MCHC RBC AUTO-ENTMCNC: 32.2 G/DL (ref 28.4–34.8)
MCV RBC AUTO: 96.5 FL (ref 82.6–102.9)
MUCUS: ABNORMAL
NITRITE, URINE: NEGATIVE
NRBC AUTOMATED: 0 PER 100 WBC
PDW BLD-RTO: 12.5 % (ref 11.8–14.4)
PH UA: 6 (ref 5–8)
PLATELET # BLD: 320 K/UL (ref 138–453)
PMV BLD AUTO: 9.4 FL (ref 8.1–13.5)
POTASSIUM SERPL-SCNC: 4.4 MMOL/L (ref 3.7–5.3)
PROTEIN UA: NEGATIVE
RBC # BLD: 4.05 M/UL (ref 4.21–5.77)
RBC UA: ABNORMAL /HPF (ref 0–2)
SODIUM BLD-SCNC: 141 MMOL/L (ref 135–144)
SPECIFIC GRAVITY UA: 1.02 (ref 1–1.03)
TOTAL PROTEIN: 7.7 G/DL (ref 6.4–8.3)
TURBIDITY: ABNORMAL
URINE HGB: NEGATIVE
UROBILINOGEN, URINE: NORMAL
WBC # BLD: 4.4 K/UL (ref 3.5–11.3)
WBC UA: ABNORMAL /HPF (ref 0–5)

## 2022-09-15 PROCEDURE — 80053 COMPREHEN METABOLIC PANEL: CPT

## 2022-09-15 PROCEDURE — 81001 URINALYSIS AUTO W/SCOPE: CPT

## 2022-09-15 PROCEDURE — 36415 COLL VENOUS BLD VENIPUNCTURE: CPT

## 2022-09-15 PROCEDURE — 86900 BLOOD TYPING SEROLOGIC ABO: CPT

## 2022-09-15 PROCEDURE — 87641 MR-STAPH DNA AMP PROBE: CPT

## 2022-09-15 PROCEDURE — 86901 BLOOD TYPING SEROLOGIC RH(D): CPT

## 2022-09-15 PROCEDURE — 85027 COMPLETE CBC AUTOMATED: CPT

## 2022-09-15 PROCEDURE — 86850 RBC ANTIBODY SCREEN: CPT

## 2022-09-15 PROCEDURE — 87086 URINE CULTURE/COLONY COUNT: CPT

## 2022-09-15 PROCEDURE — 83036 HEMOGLOBIN GLYCOSYLATED A1C: CPT

## 2022-09-15 PROCEDURE — 71046 X-RAY EXAM CHEST 2 VIEWS: CPT

## 2022-09-15 PROCEDURE — 93005 ELECTROCARDIOGRAM TRACING: CPT

## 2022-09-15 RX ORDER — CELECOXIB 200 MG/1
200 CAPSULE ORAL ONCE
Status: CANCELLED | OUTPATIENT
Start: 2022-09-27

## 2022-09-15 RX ORDER — AMLODIPINE BESYLATE 10 MG/1
10 TABLET ORAL DAILY
COMMUNITY

## 2022-09-15 RX ORDER — GABAPENTIN 300 MG/1
300 CAPSULE ORAL ONCE
Status: CANCELLED | OUTPATIENT
Start: 2022-09-27

## 2022-09-15 RX ORDER — KETOROLAC TROMETHAMINE 10 MG/1
10 TABLET, FILM COATED ORAL EVERY 6 HOURS PRN
COMMUNITY
Start: 2022-08-09

## 2022-09-15 ASSESSMENT — PROMIS GLOBAL HEALTH SCALE
HOW IS THE PROMIS V1.1 BEING ADMINISTERED?: 0
IN GENERAL, PLEASE RATE HOW WELL YOU CARRY OUT YOUR USUAL SOCIAL ACTIVITIES (INCLUDES ACTIVITIES AT HOME, AT WORK, AND IN YOUR COMMUNITY, AND RESPONSIBILITIES AS A PARENT, CHILD, SPOUSE, EMPLOYEE, FRIEND, ETC) [ON A SCALE OF 1 (POOR) TO 5 (EXCELLENT)]?: 3
IN GENERAL, HOW WOULD YOU RATE YOUR MENTAL HEALTH, INCLUDING YOUR MOOD AND YOUR ABILITY TO THINK [ON A SCALE OF 1 (POOR) TO 5 (EXCELLENT)]?: 3
SUM OF RESPONSES TO QUESTIONS 3, 6, 7, & 8: 17
WHO IS THE PERSON COMPLETING THE PROMIS V1.1 SURVEY?: 0
SUM OF RESPONSES TO QUESTIONS 2, 4, 5, & 10: 13
IN GENERAL, HOW WOULD YOU RATE YOUR SATISFACTION WITH YOUR SOCIAL ACTIVITIES AND RELATIONSHIPS [ON A SCALE OF 1 (POOR) TO 5 (EXCELLENT)]?: 3
IN GENERAL, HOW WOULD YOU RATE YOUR PHYSICAL HEALTH [ON A SCALE OF 1 (POOR) TO 5 (EXCELLENT)]?: 4
IN THE PAST 7 DAYS, HOW WOULD YOU RATE YOUR PAIN ON AVERAGE [ON A SCALE FROM 0 (NO PAIN) TO 10 (WORST IMAGINABLE PAIN)]?: 8
TO WHAT EXTENT ARE YOU ABLE TO CARRY OUT YOUR EVERYDAY PHYSICAL ACTIVITIES SUCH AS WALKING, CLIMBING STAIRS, CARRYING GROCERIES, OR MOVING A CHAIR [ON A SCALE OF 1 (NOT AT ALL) TO 5 (COMPLETELY)]?: 2
IN GENERAL, WOULD YOU SAY YOUR HEALTH IS...[ON A SCALE OF 1 (POOR) TO 5 (EXCELLENT)]: 4
IN THE PAST 7 DAYS, HOW WOULD YOU RATE YOUR FATIGUE ON AVERAGE [ON A SCALE FROM 1 (NONE) TO 5 (VERY SEVERE)]?: 3
IN THE PAST 7 DAYS, HOW OFTEN HAVE YOU BEEN BOTHERED BY EMOTIONAL PROBLEMS, SUCH AS FEELING ANXIOUS, DEPRESSED, OR IRRITABLE [ON A SCALE FROM 1 (NEVER) TO 5 (ALWAYS)]?: 3
IN GENERAL, WOULD YOU SAY YOUR QUALITY OF LIFE IS...[ON A SCALE OF 1 (POOR) TO 5 (EXCELLENT)]: 4

## 2022-09-15 ASSESSMENT — HOOS JR
GOING UP OR DOWN STAIRS: 3
HOOS JR TOTAL INTERVAL SCORE: 46.652
SITTING: 2
BENDING TO THE FLOOR TO PICK UP OBJECT: 2
HOOS JR RAW SCORE: 14
RISING FROM SITTING: 2
WALKING ON UNEVEN SURFACE: 3
LYING IN BED (TURNING OVER, MAINTAINING HIP POSITION): 2
HOOS JR RAW SCORE: 14

## 2022-09-15 ASSESSMENT — PAIN DESCRIPTION - LOCATION: LOCATION: HIP

## 2022-09-15 ASSESSMENT — PAIN DESCRIPTION - FREQUENCY: FREQUENCY: INTERMITTENT

## 2022-09-15 ASSESSMENT — PAIN SCALES - GENERAL: PAINLEVEL_OUTOF10: 7

## 2022-09-15 ASSESSMENT — PAIN DESCRIPTION - DESCRIPTORS: DESCRIPTORS: ACHING;THROBBING

## 2022-09-15 ASSESSMENT — PAIN DESCRIPTION - PAIN TYPE: TYPE: CHRONIC PAIN

## 2022-09-15 ASSESSMENT — PAIN DESCRIPTION - ORIENTATION: ORIENTATION: RIGHT

## 2022-09-15 NOTE — PRE-PROCEDURE INSTRUCTIONS
ARRIVE  [place] [facility] Tuesday, September 27 at when Dr. Eyal Ordoñez office calls and tells you to come AM    Once you enter the hospital lobby, take the elevators to the second floor. Check-In is at the surgery registration desk. Continue to take your home medications as you normally do up to and including the night before surgery with the exception of any blood thinning medications. Please stop any blood thinning medications as directed by your surgeon or prescribing physician. Failure to stop certain medications may interfere with your scheduled surgery. These may include:  Aspirin, Warfarin (Coumadin), Clopidogrel (Plavix), Ibuprofen (Motrin, Advil), Naproxen (Aleve), Meloxicam (Mobic), Celecoxib (Celebrex), Eliquis, Pradaxa, Xarelto, Effient, Fish Oil, Herbal supplements. Please take the following medication(s) the day of surgery with a small sip of water:  Percocet, gabapentin, amlodipine      PREPARING FOR YOUR SURGERY:     Before surgery, you can play an important role in your own health. Because skin is not sterile, we need to be sure that your skin is as free of germs as possible before surgery by carefully washing before surgery. Preparing or prepping skin before surgery can reduce the risk of a surgical site infection.   Do not shave the area of your body where your surgery will be performed unless you received specific permission from your physician. You will need to shower at home the night before surgery and the morning of surgery with a special soap called chlorhexidine gluconate (CHG*). *Not to be used by people allergic to Chlorhexidine Gluconate (CHG). Following these instructions will help you be sure that your skin is clean before surgery. Instructions on cleaning your skin before surgery: The night before your surgery: You will need to shower with warm water (not hot) and the CHG soap. Use a clean wash cloth and a clean towel.   Have clean clothes available to put on after the shower. First wash your hair with regular shampoo. Rinse your hair and body thoroughly to remove the shampoo. Wash your face and genital area (private parts) with your regular soap or water only. Thoroughly rinse your body with warm water from the neck down. Turn water off to prevent rinsing the soap off too soon. With a clean wet washcloth and half of the CHG soap in the bottle, lather your entire body from the neck down. Do not use CHG soap near your eyes or ears to avoid injury to those areas. Wash thoroughly, paying special attention to the area where your surgery will be performed. Wash your body gently for five (5) minutes. Avoid scrubbing your skin too hard. Turn the water back on and rinse your body thoroughly. Pat yourself dry with a clean, soft towel. Do not apply lotion, cream or powder. Dress with clean freshly washed clothes. The morning of surgery:    Repeat shower following steps above - using remaining half of CHG soap in bottle. Patient Instructions: If you are having any type of anesthesia you are to have nothing to eat or drink after midnight the night before your surgery. This includes gum, hard candy, mints, water or smoking or chewing tobacco.  The only exception to this is a small sip of water to take with any morning dose of heart, blood pressure, or seizure medications. No alcoholic beverages for 24 hours prior to surgery. Brush your teeth but do not swallow water. Bring your eye glasses and case with you. No contacts are to be worn the day of surgery. You also may bring your hearing aids. Most surgical procedures involving anesthesia will require that you remove your dentures prior to surgery. Do not wear any jewelry or body piercings day of surgery. Also, NO lotion, perfume or deodorant to be used the day of surgery.   No nail polish on the operative extremity (arm/leg surgeries)    If you are staying overnight with us, please bring a small bag of necessary personal items. Please wear loose, comfortable clothing. If you are potentially going to have a cast or brace bring clothing that will fit over them. In case of illness - If you have cold or flu like symptoms (high fever, runny nose, sore throat, cough, etc.) rash, nausea, vomiting, loose stools, and/or recent contact with someone who has a contagious disease (chicken pox, measles, etc.) Please call your doctor before coming to the hospital.         Day of Surgery/Procedure:    As a patient at [place] [address] you can expect quality medical and nursing care that is centered on your individual needs. Our goal is to make your surgical experience as comfortable as possible    . Transportation After Your Surgery/Procedure: You will need a friend or family member to drive you home after your procedure. Your  must be 25years of age or older and able to sign off on your discharge instructions. A taxi cab or any other form of public transportation is not acceptable. Your friend or family member must stay at the hospital throughout your procedure. Someone must remain with you for the first 24 hours after your surgery if you receive anesthesia or medication. If you do not have someone to stay with you, your procedure may be cancelled.       If you have any other questions regarding your procedure or the day of surgery, please call [phone]      _________________________  ____________________________  Signature (Patient)              Signature (Provider)               Date

## 2022-09-16 LAB
MRSA, DNA, NASAL: NEGATIVE
SPECIMEN DESCRIPTION: NORMAL

## 2022-09-16 NOTE — FLOWSHEET NOTE
09/16/22 1426   Anesthesia PAT Clearance   Anesthesia Review Status Anes has reviewed patient for surgery  (Dr. Rupali Estevez reviewed 2D echo and history and wants cardiac clearance)

## 2022-09-17 LAB
CULTURE: NORMAL
SPECIMEN DESCRIPTION: NORMAL

## 2022-09-19 ENCOUNTER — TELEPHONE (OUTPATIENT)
Dept: INTERNAL MEDICINE | Age: 67
End: 2022-09-19

## 2022-09-19 NOTE — TELEPHONE ENCOUNTER
Nothing I can do. He needs cardiology clearance before I can see him or clear him for hip replacement. He needs to call cardiology office and see if he can be moved up.  Thanks

## 2022-09-19 NOTE — TELEPHONE ENCOUNTER
Patient states the he is scheduled for a total hip replacement on 9/27 but can't get in to the cardiologist till 10/17 as a new patient. Writer stated the office could call and see if he could get in sooner but patient is insisting on speaking with Dr Giorgio Plaza .

## 2022-09-20 ENCOUNTER — TELEPHONE (OUTPATIENT)
Dept: ORTHOPEDIC SURGERY | Age: 67
End: 2022-09-20

## 2022-09-20 DIAGNOSIS — M16.11 PRIMARY OSTEOARTHRITIS OF RIGHT HIP: Primary | ICD-10-CM

## 2022-09-20 NOTE — TELEPHONE ENCOUNTER
The patient called to check if a  clearance from  his Cardiologists was received .  He asked for a return phone call

## 2022-09-23 ENCOUNTER — TELEPHONE (OUTPATIENT)
Dept: INTERNAL MEDICINE | Age: 67
End: 2022-09-23

## 2022-09-23 NOTE — TELEPHONE ENCOUNTER
Phone call from [facility] surgery scheduling asking if the patient is cleared for surgery. Patient seen cardiologist on 9/20/22 and is cleared for surgery with them. Clearance is scanned into patient's media. Patient is scheduled for surgery on 9/27/22.

## 2022-09-26 ENCOUNTER — ANESTHESIA EVENT (OUTPATIENT)
Dept: OPERATING ROOM | Age: 67
End: 2022-09-26
Payer: MEDICARE

## 2022-09-27 ENCOUNTER — HOSPITAL ENCOUNTER (OUTPATIENT)
Age: 67
Discharge: HOME | End: 2022-09-27
Attending: ORTHOPAEDIC SURGERY | Admitting: ORTHOPAEDIC SURGERY
Payer: MEDICARE

## 2022-09-27 ENCOUNTER — APPOINTMENT (OUTPATIENT)
Dept: GENERAL RADIOLOGY | Age: 67
End: 2022-09-27
Attending: ORTHOPAEDIC SURGERY
Payer: MEDICARE

## 2022-09-27 ENCOUNTER — ANESTHESIA (OUTPATIENT)
Dept: OPERATING ROOM | Age: 67
End: 2022-09-27
Payer: MEDICARE

## 2022-09-27 VITALS
WEIGHT: 214 LBS | RESPIRATION RATE: 17 BRPM | OXYGEN SATURATION: 99 % | HEART RATE: 80 BPM | DIASTOLIC BLOOD PRESSURE: 64 MMHG | SYSTOLIC BLOOD PRESSURE: 167 MMHG | HEIGHT: 72 IN | TEMPERATURE: 97.5 F | BODY MASS INDEX: 28.99 KG/M2

## 2022-09-27 DIAGNOSIS — G89.18 POST-OP PAIN: Primary | ICD-10-CM

## 2022-09-27 PROBLEM — Z96.641 S/P TOTAL RIGHT HIP ARTHROPLASTY: Status: ACTIVE | Noted: 2022-09-27

## 2022-09-27 PROCEDURE — 2709999900 HC NON-CHARGEABLE SUPPLY: Performed by: ORTHOPAEDIC SURGERY

## 2022-09-27 PROCEDURE — 6360000002 HC RX W HCPCS: Performed by: NURSE ANESTHETIST, CERTIFIED REGISTERED

## 2022-09-27 PROCEDURE — 2580000003 HC RX 258: Performed by: STUDENT IN AN ORGANIZED HEALTH CARE EDUCATION/TRAINING PROGRAM

## 2022-09-27 PROCEDURE — 6360000002 HC RX W HCPCS: Performed by: ANESTHESIOLOGY

## 2022-09-27 PROCEDURE — 7100000001 HC PACU RECOVERY - ADDTL 15 MIN: Performed by: ORTHOPAEDIC SURGERY

## 2022-09-27 PROCEDURE — 6370000000 HC RX 637 (ALT 250 FOR IP): Performed by: STUDENT IN AN ORGANIZED HEALTH CARE EDUCATION/TRAINING PROGRAM

## 2022-09-27 PROCEDURE — 3209999900 FLUORO FOR SURGICAL PROCEDURES

## 2022-09-27 PROCEDURE — 2720000010 HC SURG SUPPLY STERILE: Performed by: ORTHOPAEDIC SURGERY

## 2022-09-27 PROCEDURE — 73502 X-RAY EXAM HIP UNI 2-3 VIEWS: CPT

## 2022-09-27 PROCEDURE — 2500000003 HC RX 250 WO HCPCS: Performed by: ANESTHESIOLOGY

## 2022-09-27 PROCEDURE — C1776 JOINT DEVICE (IMPLANTABLE): HCPCS | Performed by: ORTHOPAEDIC SURGERY

## 2022-09-27 PROCEDURE — 7100000000 HC PACU RECOVERY - FIRST 15 MIN: Performed by: ORTHOPAEDIC SURGERY

## 2022-09-27 PROCEDURE — 2500000003 HC RX 250 WO HCPCS: Performed by: STUDENT IN AN ORGANIZED HEALTH CARE EDUCATION/TRAINING PROGRAM

## 2022-09-27 PROCEDURE — 97110 THERAPEUTIC EXERCISES: CPT

## 2022-09-27 PROCEDURE — 3700000000 HC ANESTHESIA ATTENDED CARE: Performed by: ORTHOPAEDIC SURGERY

## 2022-09-27 PROCEDURE — 97166 OT EVAL MOD COMPLEX 45 MIN: CPT

## 2022-09-27 PROCEDURE — 6360000002 HC RX W HCPCS: Performed by: STUDENT IN AN ORGANIZED HEALTH CARE EDUCATION/TRAINING PROGRAM

## 2022-09-27 PROCEDURE — 3600000015 HC SURGERY LEVEL 5 ADDTL 15MIN: Performed by: ORTHOPAEDIC SURGERY

## 2022-09-27 PROCEDURE — 97162 PT EVAL MOD COMPLEX 30 MIN: CPT

## 2022-09-27 PROCEDURE — 3600000005 HC SURGERY LEVEL 5 BASE: Performed by: ORTHOPAEDIC SURGERY

## 2022-09-27 PROCEDURE — 64450 NJX AA&/STRD OTHER PN/BRANCH: CPT | Performed by: ANESTHESIOLOGY

## 2022-09-27 PROCEDURE — 6360000002 HC RX W HCPCS: Performed by: ORTHOPAEDIC SURGERY

## 2022-09-27 PROCEDURE — 97116 GAIT TRAINING THERAPY: CPT

## 2022-09-27 PROCEDURE — 97535 SELF CARE MNGMENT TRAINING: CPT

## 2022-09-27 PROCEDURE — 3700000001 HC ADD 15 MINUTES (ANESTHESIA): Performed by: ORTHOPAEDIC SURGERY

## 2022-09-27 PROCEDURE — 27130 TOTAL HIP ARTHROPLASTY: CPT | Performed by: ORTHOPAEDIC SURGERY

## 2022-09-27 PROCEDURE — 2500000003 HC RX 250 WO HCPCS: Performed by: NURSE ANESTHETIST, CERTIFIED REGISTERED

## 2022-09-27 DEVICE — FEMORAL HEAD Ø 36 SIZE M
Type: IMPLANTABLE DEVICE | Site: HIP | Status: FUNCTIONAL
Brand: MECTACER BIOLOX DELTA FEMORAL BALL HEAD

## 2022-09-27 DEVICE — FLAT PE  HC LINER Ø 36 / F
Type: IMPLANTABLE DEVICE | Site: HIP | Status: FUNCTIONAL
Brand: MPACT ACETABULAR SYSTEM

## 2022-09-27 DEVICE — ACETABULAR SHELL Ø58 TWO HOLES
Type: IMPLANTABLE DEVICE | Site: HIP | Status: FUNCTIONAL
Brand: MPACT ACETABULAR SYSTEM

## 2022-09-27 RX ORDER — ONDANSETRON 2 MG/ML
4 INJECTION INTRAMUSCULAR; INTRAVENOUS EVERY 6 HOURS PRN
Status: DISCONTINUED | OUTPATIENT
Start: 2022-09-27 | End: 2022-09-27 | Stop reason: HOSPADM

## 2022-09-27 RX ORDER — SODIUM CHLORIDE 9 MG/ML
INJECTION, SOLUTION INTRAVENOUS CONTINUOUS
Status: DISCONTINUED | OUTPATIENT
Start: 2022-09-27 | End: 2022-09-27

## 2022-09-27 RX ORDER — OXYCODONE HYDROCHLORIDE AND ACETAMINOPHEN 5; 325 MG/1; MG/1
1 TABLET ORAL EVERY 6 HOURS PRN
Qty: 28 TABLET | Refills: 0 | Status: SHIPPED | OUTPATIENT
Start: 2022-09-27 | End: 2022-10-03 | Stop reason: SDUPTHER

## 2022-09-27 RX ORDER — SODIUM CHLORIDE 0.9 % (FLUSH) 0.9 %
5-40 SYRINGE (ML) INJECTION EVERY 12 HOURS SCHEDULED
Status: DISCONTINUED | OUTPATIENT
Start: 2022-09-27 | End: 2022-09-27 | Stop reason: HOSPADM

## 2022-09-27 RX ORDER — TRAMADOL HYDROCHLORIDE 50 MG/1
50 TABLET ORAL ONCE
Status: COMPLETED | OUTPATIENT
Start: 2022-09-27 | End: 2022-09-27

## 2022-09-27 RX ORDER — EPHEDRINE SULFATE/0.9% NACL/PF 50 MG/5 ML
SYRINGE (ML) INTRAVENOUS PRN
Status: DISCONTINUED | OUTPATIENT
Start: 2022-09-27 | End: 2022-09-27 | Stop reason: SDUPTHER

## 2022-09-27 RX ORDER — TRAMADOL HYDROCHLORIDE 50 MG/1
50 TABLET ORAL EVERY 6 HOURS PRN
Status: CANCELLED | OUTPATIENT
Start: 2022-09-27

## 2022-09-27 RX ORDER — VANCOMYCIN HYDROCHLORIDE 1 G/20ML
INJECTION, POWDER, LYOPHILIZED, FOR SOLUTION INTRAVENOUS
Status: DISCONTINUED
Start: 2022-09-27 | End: 2022-09-27 | Stop reason: HOSPADM

## 2022-09-27 RX ORDER — TRANEXAMIC ACID 100 MG/ML
INJECTION, SOLUTION INTRAVENOUS
Status: DISCONTINUED
Start: 2022-09-27 | End: 2022-09-27 | Stop reason: HOSPADM

## 2022-09-27 RX ORDER — GABAPENTIN 300 MG/1
300 CAPSULE ORAL ONCE
Status: DISCONTINUED | OUTPATIENT
Start: 2022-09-27 | End: 2022-09-27

## 2022-09-27 RX ORDER — FENTANYL CITRATE 50 UG/ML
INJECTION, SOLUTION INTRAMUSCULAR; INTRAVENOUS PRN
Status: DISCONTINUED | OUTPATIENT
Start: 2022-09-27 | End: 2022-09-27 | Stop reason: SDUPTHER

## 2022-09-27 RX ORDER — TRAMADOL HYDROCHLORIDE 50 MG/1
100 TABLET ORAL EVERY 6 HOURS PRN
Status: CANCELLED | OUTPATIENT
Start: 2022-09-27

## 2022-09-27 RX ORDER — OXYCODONE HYDROCHLORIDE 5 MG/1
5 TABLET ORAL EVERY 4 HOURS PRN
Status: DISCONTINUED | OUTPATIENT
Start: 2022-09-27 | End: 2022-09-27 | Stop reason: HOSPADM

## 2022-09-27 RX ORDER — OXYCODONE HYDROCHLORIDE 5 MG/1
5 TABLET ORAL EVERY 4 HOURS PRN
Status: CANCELLED | OUTPATIENT
Start: 2022-09-27

## 2022-09-27 RX ORDER — CELECOXIB 200 MG/1
200 CAPSULE ORAL ONCE
Status: DISCONTINUED | OUTPATIENT
Start: 2022-09-27 | End: 2022-09-27

## 2022-09-27 RX ORDER — ONDANSETRON 2 MG/ML
INJECTION INTRAMUSCULAR; INTRAVENOUS PRN
Status: DISCONTINUED | OUTPATIENT
Start: 2022-09-27 | End: 2022-09-27 | Stop reason: SDUPTHER

## 2022-09-27 RX ORDER — SODIUM CHLORIDE 0.9 % (FLUSH) 0.9 %
5-40 SYRINGE (ML) INJECTION PRN
Status: DISCONTINUED | OUTPATIENT
Start: 2022-09-27 | End: 2022-09-27 | Stop reason: HOSPADM

## 2022-09-27 RX ORDER — MIDAZOLAM HYDROCHLORIDE 1 MG/ML
INJECTION INTRAMUSCULAR; INTRAVENOUS PRN
Status: DISCONTINUED | OUTPATIENT
Start: 2022-09-27 | End: 2022-09-27 | Stop reason: SDUPTHER

## 2022-09-27 RX ORDER — ONDANSETRON 2 MG/ML
4 INJECTION INTRAMUSCULAR; INTRAVENOUS
Status: DISCONTINUED | OUTPATIENT
Start: 2022-09-27 | End: 2022-09-27 | Stop reason: HOSPADM

## 2022-09-27 RX ORDER — OXYCODONE HYDROCHLORIDE 5 MG/1
10 TABLET ORAL EVERY 4 HOURS PRN
Status: DISCONTINUED | OUTPATIENT
Start: 2022-09-27 | End: 2022-09-27 | Stop reason: HOSPADM

## 2022-09-27 RX ORDER — VANCOMYCIN HYDROCHLORIDE 1 G/20ML
INJECTION, POWDER, LYOPHILIZED, FOR SOLUTION INTRAVENOUS PRN
Status: DISCONTINUED | OUTPATIENT
Start: 2022-09-27 | End: 2022-09-27 | Stop reason: ALTCHOICE

## 2022-09-27 RX ORDER — DEXAMETHASONE SODIUM PHOSPHATE 10 MG/ML
INJECTION, SOLUTION INTRAMUSCULAR; INTRAVENOUS PRN
Status: DISCONTINUED | OUTPATIENT
Start: 2022-09-27 | End: 2022-09-27 | Stop reason: SDUPTHER

## 2022-09-27 RX ORDER — ACETAMINOPHEN 500 MG
1000 TABLET ORAL ONCE
Status: COMPLETED | OUTPATIENT
Start: 2022-09-27 | End: 2022-09-27

## 2022-09-27 RX ORDER — KETAMINE HCL IN NACL, ISO-OSM 100MG/10ML
SYRINGE (ML) INJECTION PRN
Status: DISCONTINUED | OUTPATIENT
Start: 2022-09-27 | End: 2022-09-27 | Stop reason: SDUPTHER

## 2022-09-27 RX ORDER — BUPIVACAINE HYDROCHLORIDE 7.5 MG/ML
INJECTION, SOLUTION INTRASPINAL
Status: COMPLETED | OUTPATIENT
Start: 2022-09-27 | End: 2022-09-27

## 2022-09-27 RX ORDER — ASPIRIN 81 MG/1
81 TABLET, CHEWABLE ORAL 2 TIMES DAILY
Status: DISCONTINUED | OUTPATIENT
Start: 2022-09-28 | End: 2022-09-27 | Stop reason: HOSPADM

## 2022-09-27 RX ORDER — SODIUM CHLORIDE 9 MG/ML
INJECTION, SOLUTION INTRAVENOUS CONTINUOUS
Status: DISCONTINUED | OUTPATIENT
Start: 2022-09-27 | End: 2022-09-27 | Stop reason: HOSPADM

## 2022-09-27 RX ORDER — HYDROMORPHONE HYDROCHLORIDE 1 MG/ML
0.5 INJECTION, SOLUTION INTRAMUSCULAR; INTRAVENOUS; SUBCUTANEOUS EVERY 5 MIN PRN
Status: DISCONTINUED | OUTPATIENT
Start: 2022-09-27 | End: 2022-09-27 | Stop reason: HOSPADM

## 2022-09-27 RX ORDER — AMLODIPINE BESYLATE 10 MG/1
10 TABLET ORAL DAILY
Status: DISCONTINUED | OUTPATIENT
Start: 2022-09-28 | End: 2022-09-27 | Stop reason: HOSPADM

## 2022-09-27 RX ORDER — FENTANYL CITRATE 50 UG/ML
25 INJECTION, SOLUTION INTRAMUSCULAR; INTRAVENOUS EVERY 5 MIN PRN
Status: DISCONTINUED | OUTPATIENT
Start: 2022-09-27 | End: 2022-09-27 | Stop reason: HOSPADM

## 2022-09-27 RX ORDER — OXYCODONE HYDROCHLORIDE 5 MG/1
10 TABLET ORAL EVERY 4 HOURS PRN
Status: CANCELLED | OUTPATIENT
Start: 2022-09-27

## 2022-09-27 RX ORDER — DEXAMETHASONE SODIUM PHOSPHATE 10 MG/ML
10 INJECTION, SOLUTION INTRAMUSCULAR; INTRAVENOUS ONCE
Status: DISCONTINUED | OUTPATIENT
Start: 2022-09-27 | End: 2022-09-27 | Stop reason: HOSPADM

## 2022-09-27 RX ORDER — PREGABALIN 75 MG/1
75 CAPSULE ORAL ONCE
Status: DISCONTINUED | OUTPATIENT
Start: 2022-09-27 | End: 2022-09-27

## 2022-09-27 RX ORDER — ASPIRIN 81 MG/1
81 TABLET ORAL 2 TIMES DAILY
Qty: 84 TABLET | Refills: 0 | Status: SHIPPED | OUTPATIENT
Start: 2022-09-27 | End: 2022-11-08

## 2022-09-27 RX ORDER — KETOROLAC TROMETHAMINE 30 MG/ML
30 INJECTION, SOLUTION INTRAMUSCULAR; INTRAVENOUS EVERY 6 HOURS PRN
Status: DISCONTINUED | OUTPATIENT
Start: 2022-09-27 | End: 2022-09-27 | Stop reason: HOSPADM

## 2022-09-27 RX ORDER — PREGABALIN 75 MG/1
75 CAPSULE ORAL 2 TIMES DAILY
Status: DISCONTINUED | OUTPATIENT
Start: 2022-09-27 | End: 2022-09-27 | Stop reason: HOSPADM

## 2022-09-27 RX ORDER — SODIUM CHLORIDE, SODIUM LACTATE, POTASSIUM CHLORIDE, CALCIUM CHLORIDE 600; 310; 30; 20 MG/100ML; MG/100ML; MG/100ML; MG/100ML
INJECTION, SOLUTION INTRAVENOUS CONTINUOUS
Status: DISCONTINUED | OUTPATIENT
Start: 2022-09-27 | End: 2022-09-27

## 2022-09-27 RX ORDER — OXYCODONE HYDROCHLORIDE 5 MG/1
5 TABLET ORAL
Status: DISCONTINUED | OUTPATIENT
Start: 2022-09-27 | End: 2022-09-27 | Stop reason: HOSPADM

## 2022-09-27 RX ORDER — ACETAMINOPHEN 500 MG
1000 TABLET ORAL EVERY 6 HOURS
Status: DISCONTINUED | OUTPATIENT
Start: 2022-09-27 | End: 2022-09-27 | Stop reason: HOSPADM

## 2022-09-27 RX ORDER — MIDAZOLAM HYDROCHLORIDE 1 MG/ML
2 INJECTION INTRAMUSCULAR; INTRAVENOUS ONCE
Status: COMPLETED | OUTPATIENT
Start: 2022-09-27 | End: 2022-09-27

## 2022-09-27 RX ORDER — ROPIVACAINE HYDROCHLORIDE 2 MG/ML
INJECTION, SOLUTION EPIDURAL; INFILTRATION; PERINEURAL
Status: COMPLETED | OUTPATIENT
Start: 2022-09-27 | End: 2022-09-27

## 2022-09-27 RX ORDER — TRAMADOL HYDROCHLORIDE 50 MG/1
50 TABLET ORAL EVERY 6 HOURS
Status: CANCELLED | OUTPATIENT
Start: 2022-09-27

## 2022-09-27 RX ORDER — LIDOCAINE HYDROCHLORIDE 10 MG/ML
1 INJECTION, SOLUTION EPIDURAL; INFILTRATION; INTRACAUDAL; PERINEURAL
Status: DISCONTINUED | OUTPATIENT
Start: 2022-09-27 | End: 2022-09-27 | Stop reason: HOSPADM

## 2022-09-27 RX ORDER — SODIUM CHLORIDE 9 MG/ML
INJECTION, SOLUTION INTRAVENOUS PRN
Status: DISCONTINUED | OUTPATIENT
Start: 2022-09-27 | End: 2022-09-27 | Stop reason: HOSPADM

## 2022-09-27 RX ORDER — HYDROMORPHONE HYDROCHLORIDE 1 MG/ML
0.5 INJECTION, SOLUTION INTRAMUSCULAR; INTRAVENOUS; SUBCUTANEOUS EVERY 5 MIN PRN
Status: COMPLETED | OUTPATIENT
Start: 2022-09-27 | End: 2022-09-27

## 2022-09-27 RX ORDER — ACETAMINOPHEN 500 MG
500 TABLET ORAL EVERY 6 HOURS PRN
Status: CANCELLED | OUTPATIENT
Start: 2022-09-27

## 2022-09-27 RX ORDER — LIDOCAINE HYDROCHLORIDE 20 MG/ML
INJECTION, SOLUTION EPIDURAL; INFILTRATION; INTRACAUDAL; PERINEURAL PRN
Status: DISCONTINUED | OUTPATIENT
Start: 2022-09-27 | End: 2022-09-27 | Stop reason: SDUPTHER

## 2022-09-27 RX ORDER — PROPOFOL 10 MG/ML
INJECTION, EMULSION INTRAVENOUS CONTINUOUS PRN
Status: DISCONTINUED | OUTPATIENT
Start: 2022-09-27 | End: 2022-09-27 | Stop reason: SDUPTHER

## 2022-09-27 RX ADMIN — KETOROLAC TROMETHAMINE 30 MG: 30 INJECTION, SOLUTION INTRAMUSCULAR at 16:23

## 2022-09-27 RX ADMIN — OXYCODONE 10 MG: 5 TABLET ORAL at 14:54

## 2022-09-27 RX ADMIN — ACETAMINOPHEN 1000 MG: 500 TABLET ORAL at 16:23

## 2022-09-27 RX ADMIN — ACETAMINOPHEN 1000 MG: 500 TABLET ORAL at 09:18

## 2022-09-27 RX ADMIN — MIDAZOLAM 1 MG: 1 INJECTION INTRAMUSCULAR; INTRAVENOUS at 10:43

## 2022-09-27 RX ADMIN — DEXAMETHASONE SODIUM PHOSPHATE 10 MG: 10 INJECTION INTRAMUSCULAR; INTRAVENOUS at 12:02

## 2022-09-27 RX ADMIN — HYDROMORPHONE HYDROCHLORIDE 0.5 MG: 1 INJECTION, SOLUTION INTRAMUSCULAR; INTRAVENOUS; SUBCUTANEOUS at 14:04

## 2022-09-27 RX ADMIN — LIDOCAINE HYDROCHLORIDE 40 MG: 20 INJECTION, SOLUTION EPIDURAL; INFILTRATION; INTRACAUDAL; PERINEURAL at 11:18

## 2022-09-27 RX ADMIN — ONDANSETRON 4 MG: 2 INJECTION, SOLUTION INTRAMUSCULAR; INTRAVENOUS at 12:22

## 2022-09-27 RX ADMIN — MIDAZOLAM HYDROCHLORIDE 2 MG: 1 INJECTION, SOLUTION INTRAMUSCULAR; INTRAVENOUS at 09:25

## 2022-09-27 RX ADMIN — TRAMADOL HYDROCHLORIDE 50 MG: 50 TABLET, COATED ORAL at 09:18

## 2022-09-27 RX ADMIN — Medication 5 MG: at 12:35

## 2022-09-27 RX ADMIN — MIDAZOLAM 1 MG: 1 INJECTION INTRAMUSCULAR; INTRAVENOUS at 11:15

## 2022-09-27 RX ADMIN — HYDROMORPHONE HYDROCHLORIDE 0.5 MG: 1 INJECTION, SOLUTION INTRAMUSCULAR; INTRAVENOUS; SUBCUTANEOUS at 15:45

## 2022-09-27 RX ADMIN — HYDROMORPHONE HYDROCHLORIDE 0.5 MG: 1 INJECTION, SOLUTION INTRAMUSCULAR; INTRAVENOUS; SUBCUTANEOUS at 14:24

## 2022-09-27 RX ADMIN — HYDROMORPHONE HYDROCHLORIDE 0.5 MG: 1 INJECTION, SOLUTION INTRAMUSCULAR; INTRAVENOUS; SUBCUTANEOUS at 15:24

## 2022-09-27 RX ADMIN — SODIUM CHLORIDE, POTASSIUM CHLORIDE, SODIUM LACTATE AND CALCIUM CHLORIDE: 600; 310; 30; 20 INJECTION, SOLUTION INTRAVENOUS at 12:30

## 2022-09-27 RX ADMIN — SODIUM CHLORIDE, POTASSIUM CHLORIDE, SODIUM LACTATE AND CALCIUM CHLORIDE: 600; 310; 30; 20 INJECTION, SOLUTION INTRAVENOUS at 09:18

## 2022-09-27 RX ADMIN — TRANEXAMIC ACID 1000 MG: 1 INJECTION, SOLUTION INTRAVENOUS at 11:18

## 2022-09-27 RX ADMIN — HYDROMORPHONE HYDROCHLORIDE 0.5 MG: 1 INJECTION, SOLUTION INTRAMUSCULAR; INTRAVENOUS; SUBCUTANEOUS at 13:46

## 2022-09-27 RX ADMIN — PROPOFOL 50 MCG/KG/MIN: 10 INJECTION, EMULSION INTRAVENOUS at 11:18

## 2022-09-27 RX ADMIN — CEFAZOLIN 2000 MG: 10 INJECTION, POWDER, FOR SOLUTION INTRAVENOUS at 11:09

## 2022-09-27 RX ADMIN — HYDROMORPHONE HYDROCHLORIDE 0.5 MG: 1 INJECTION, SOLUTION INTRAMUSCULAR; INTRAVENOUS; SUBCUTANEOUS at 15:04

## 2022-09-27 RX ADMIN — OXYCODONE 10 MG: 5 TABLET ORAL at 19:15

## 2022-09-27 RX ADMIN — Medication 100 MCG: at 10:47

## 2022-09-27 RX ADMIN — BUPIVACAINE HYDROCHLORIDE IN DEXTROSE 15 MG: 7.5 INJECTION, SOLUTION SUBARACHNOID at 11:03

## 2022-09-27 RX ADMIN — Medication 10 MG: at 11:16

## 2022-09-27 RX ADMIN — ROPIVACAINE HYDROCHLORIDE 60 ML: 2 INJECTION, SOLUTION EPIDURAL; INFILTRATION at 09:25

## 2022-09-27 RX ADMIN — Medication 10 MG: at 12:03

## 2022-09-27 RX ADMIN — TRANEXAMIC ACID 1000 MG: 1 INJECTION, SOLUTION INTRAVENOUS at 12:15

## 2022-09-27 RX ADMIN — Medication 10 MG: at 11:20

## 2022-09-27 RX ADMIN — Medication 10 MG: at 11:40

## 2022-09-27 ASSESSMENT — PAIN DESCRIPTION - DESCRIPTORS
DESCRIPTORS: ACHING;THROBBING
DESCRIPTORS: ACHING
DESCRIPTORS: ACHING;BURNING

## 2022-09-27 ASSESSMENT — PAIN SCALES - GENERAL
PAINLEVEL_OUTOF10: 8
PAINLEVEL_OUTOF10: 7
PAINLEVEL_OUTOF10: 8
PAINLEVEL_OUTOF10: 4
PAINLEVEL_OUTOF10: 8
PAINLEVEL_OUTOF10: 7

## 2022-09-27 ASSESSMENT — PAIN - FUNCTIONAL ASSESSMENT
PAIN_FUNCTIONAL_ASSESSMENT: 0-10
PAIN_FUNCTIONAL_ASSESSMENT: PREVENTS OR INTERFERES SOME ACTIVE ACTIVITIES AND ADLS
PAIN_FUNCTIONAL_ASSESSMENT: PREVENTS OR INTERFERES SOME ACTIVE ACTIVITIES AND ADLS

## 2022-09-27 ASSESSMENT — PAIN DESCRIPTION - ORIENTATION
ORIENTATION: RIGHT
ORIENTATION: RIGHT

## 2022-09-27 ASSESSMENT — PAIN DESCRIPTION - LOCATION
LOCATION: HIP
LOCATION: HIP

## 2022-09-27 ASSESSMENT — LIFESTYLE VARIABLES: SMOKING_STATUS: 0

## 2022-09-27 NOTE — ANESTHESIA PRE PROCEDURE
Department of Anesthesiology  Preprocedure Note       Name:  Hilary Strange   Age:  74 y.o.  :  1955                                          MRN:  [id number]         Date:  2022      Surgeon: Bill Monson):  Megan Hahn DO    Procedure: Procedure(s):  ** CASE IN O.R. WITH GI STAFF** EGD ESOPHAGOGASTRODUODENOSCOPY  COLONOSCOPY    Department of Anesthesiology  Pre-Anesthesia Evaluation/Consultation         Name:  Hilary Strange                                         Age:  74 y.o. MRN:  [id number]             Medications  No current outpatient medications on file. No current facility-administered medications for this visit.        Allergies   Allergen Reactions    Codeine Other (See Comments)     Don;t agree with him    Shrimp (Diagnostic) Hives     Patient Active Problem List   Diagnosis    Hyperlipidemia    Gout    CKD (chronic kidney disease) stage 3, GFR 30-59 ml/min ([facility])    Wrist pain, right    Swelling of joint, wrist, right    Pain in joint, multiple sites    Obesity    Adjustment reaction    Vertigo    Pain in joint    Spinal stenosis, lumbar region, without neurogenic claudication    Facet arthropathy, lumbar    Chronic back pain    Acute bilateral low back pain with sciatica    Rhabdomyolysis     Past Medical History:   Diagnosis Date    Chronic back pain     Chronic kidney disease     Gout     Hyperlipidemia     Hypertension     Murmur     Obesity 2014    Osteoarthritis     Restless leg syndrome     Shoulder pain     Shrimp allergy     allergic reaction to shrimp    Spinal stenosis, lumbar region, without neurogenic claudication 2016    Swelling of joint, wrist, right     Thoracic back pain     Vertigo     Wears glasses     READING    Wrist pain, right      Past Surgical History:   Procedure Laterality Date    COLONOSCOPY  2014    int hemorrhoids, repeat in 10 years    COLONOSCOPY  2020    COLONOSCOPY N/A 2020 COLONOSCOPY WITH BIOPSY performed by Jackie Story MD at [address] Bilateral 10/17/2016    NASAL ENDOSCOPY; RIGHT NASAL CAUTERY    NERVE BLOCK  11/02/2016    duramorph 1.5mg celestone 9mg    NERVE BLOCK  04/03/2017    duramorph 1.5mg & decadron 10mg    NERVE BLOCK  10/09/2017    duramorph epidural, decadron 10mg, morphine 1.5mg    UPPER GASTROINTESTINAL ENDOSCOPY  05/27/2020    UPPER GASTROINTESTINAL ENDOSCOPY N/A 5/27/2020    EGD BIOPSY performed by [facility] MD at [facility]     Social History     Tobacco Use    Smoking status: Never    Smokeless tobacco: Never   Vaping Use    Vaping Use: Never used   Substance Use Topics    Alcohol use: No    Drug use: No         Vital Signs (Current)   There were no vitals filed for this visit. Vital Signs Statistics (for past 48 hrs)     No data recorded  BP Readings from Last 3 Encounters:   09/15/22 (!) 151/49   07/28/22 138/69   04/28/22 (!) 158/66       BMI  There is no height or weight on file to calculate BMI.     CBC   Lab Results   Component Value Date/Time    WBC 4.4 09/15/2022 09:47 AM    RBC 4.05 09/15/2022 09:47 AM    RBC 3.74 09/10/2011 12:27 PM    HGB 12.6 09/15/2022 09:47 AM    HCT 39.1 09/15/2022 09:47 AM    MCV 96.5 09/15/2022 09:47 AM    RDW 12.5 09/15/2022 09:47 AM     09/15/2022 09:47 AM     09/10/2011 12:27 PM       CMP    Lab Results   Component Value Date/Time     09/15/2022 09:47 AM    K 4.4 09/15/2022 09:47 AM     09/15/2022 09:47 AM    CO2 26 09/15/2022 09:47 AM    BUN 12 09/15/2022 09:47 AM    CREATININE 0.92 09/15/2022 09:47 AM    GFRAA >60 09/15/2022 09:47 AM    LABGLOM >60 09/15/2022 09:47 AM    GLUCOSE 87 09/15/2022 09:47 AM    GLUCOSE 108 09/10/2011 12:27 PM    PROT 7.7 09/15/2022 09:47 AM    PROT 7.1 01/17/2013 11:16 AM    CALCIUM 9.7 09/15/2022 09:47 AM    BILITOT 0.7 09/15/2022 09:47 AM    ALKPHOS 117 09/15/2022 09:47 AM    AST 21 09/15/2022 09:47 AM    ALT 28 09/15/2022 09:47 AM       BMP    Lab Results   Component Value Date/Time     09/15/2022 09:47 AM    K 4.4 09/15/2022 09:47 AM     09/15/2022 09:47 AM    CO2 26 09/15/2022 09:47 AM    BUN 12 09/15/2022 09:47 AM    CREATININE 0.92 09/15/2022 09:47 AM    CALCIUM 9.7 09/15/2022 09:47 AM    GFRAA >60 09/15/2022 09:47 AM    LABGLOM >60 09/15/2022 09:47 AM    GLUCOSE 87 09/15/2022 09:47 AM    GLUCOSE 108 09/10/2011 12:27 PM       POC Testing  No results for input(s): POCGLU, POCNA, POCK, POCCL, POCBUN, POCHEMO, POCHCT in the last 72 hours. Coags  No results found for: PROTIME, INR, APTT    HCG (If Applicable) No results found for: PREGTESTUR, PREGSERUM, HCG, HCGQUANT     ABGs No results found for: PHART, PO2ART, QXI5AMP, DWY3DAV, BEART, L0VKEJRS     Type & Screen (If Applicable)  No results found for: LABABO, [address]    Radiology (If Applicable)    Cardiac Testing (If Applicable)     EKG (If Applicable) LVH          Medications prior to admission:   Prior to Admission medications    Medication Sig Start Date End Date Taking? Authorizing Provider   ketorolac (TORADOL) 10 MG tablet Take 10 mg by mouth every 6 hours as needed 8/9/22   Historical Provider, MD   amLODIPine (NORVASC) 10 MG tablet Take 10 mg by mouth daily    Historical Provider, MD   allopurinol (ZYLOPRIM) 300 MG tablet Take 1 tablet by mouth in the morning. 7/28/22   Suman Farmer MD   gabapentin (NEURONTIN) 300 MG capsule Take 1 capsule by mouth in the morning and 1 capsule at noon and 1 capsule before bedtime. Do all this for 30 days. 7/28/22 9/15/22  Suman Faremr MD   magnesium oxide (MAG-OX) 400 MG tablet Take 1 tablet by mouth in the morning. 7/28/22   Suman Farmer MD   pantoprazole (PROTONIX) 40 MG tablet 1 tablet daily 7/28/22   Suman Farmer MD   ferrous sulfate (FEROSUL) 325 (65 Fe) MG tablet TAKE 1 TABLET BY MOUTH ONCE DAILY.  3/29/22   Suman Farmer MD   tiZANidine (ZANAFLEX) 4 MG tablet TAKE 1 TABLET BY MOUTH EVERY 8 HOURS AS NEEDED FOR SPASMS. Patient taking differently: Take 4 mg by mouth nightly as needed 3/29/22   Keshav Schulte MD   baclofen (LIORESAL) 10 MG tablet Take 10 mg by mouth 2 times daily as needed 2/28/22   Historical Provider, MD   oxyCODONE-acetaminophen (PERCOCET)  MG per tablet Take 1 tablet by mouth 3 times daily. 10/2/20   Historical Provider, MD   diphenhydrAMINE (BENADRYL) 25 MG capsule Take 1-2 tablets by mouth every 6 hours as needed for itching. 10/12/20   Keshav Schulte MD   diclofenac sodium (VOLTAREN) 1 % GEL Apply 2 g topically 2 times daily    Historical Provider, MD   Melatonin 10 MG CAPS Take by mouth     Historical Provider, MD       Current medications:    No current outpatient medications on file. No current facility-administered medications for this visit. Allergies:     Allergies   Allergen Reactions    Codeine Other (See Comments)     Don;t agree with him    Shrimp (Diagnostic) Hives       Problem List:    Patient Active Problem List   Diagnosis Code    Hyperlipidemia E78.5    Gout M10.9    CKD (chronic kidney disease) stage 3, GFR 30-59 ml/min ([facility]) N18.30    Wrist pain, right M25.531    Swelling of joint, wrist, right M25.431    Pain in joint, multiple sites M25.50    Obesity E66.9    Adjustment reaction F43.20    Vertigo R42    Pain in joint M25.50    Spinal stenosis, lumbar region, without neurogenic claudication M48.061    Facet arthropathy, lumbar M47.816    Chronic back pain M54.9, G89.29    Acute bilateral low back pain with sciatica M54.40    Rhabdomyolysis M62.82       Past Medical History:        Diagnosis Date    Chronic back pain     Chronic kidney disease     Gout     Hyperlipidemia     Hypertension     Murmur     Obesity 09/19/2014    Osteoarthritis     Restless leg syndrome     Shoulder pain     Shrimp allergy     allergic reaction to shrimp    Spinal stenosis, lumbar region, without neurogenic claudication 04/13/2016    Swelling of joint, wrist, right     Thoracic back pain     Vertigo     Wears glasses     READING    Wrist pain, right        Past Surgical History:        Procedure Laterality Date    COLONOSCOPY  11/2014    int hemorrhoids, repeat in 10 years    COLONOSCOPY  05/27/2020    COLONOSCOPY N/A 5/27/2020    COLONOSCOPY WITH BIOPSY performed by Larry Poe MD at [address] Bilateral 10/17/2016    NASAL ENDOSCOPY; RIGHT NASAL CAUTERY    NERVE BLOCK  11/02/2016    duramorph 1.5mg celestone 9mg    NERVE BLOCK  04/03/2017    duramorph 1.5mg & decadron 10mg    NERVE BLOCK  10/09/2017    duramorph epidural, decadron 10mg, morphine 1.5mg    UPPER GASTROINTESTINAL ENDOSCOPY  05/27/2020    UPPER GASTROINTESTINAL ENDOSCOPY N/A 5/27/2020    EGD BIOPSY performed by Larry Poe MD at \[facility]"" End[facility]       Social History:    Social History     Tobacco Use    Smoking status: Never    Smokeless tobacco: Never   Substance Use Topics    Alcohol use: No                                Counseling given: Not Answered      Vital Signs (Current): There were no vitals filed for this visit.                                            BP Readings from Last 3 Encounters:   09/15/22 (!) 151/49   07/28/22 138/69   04/28/22 (!) 158/66       NPO Status:                                                                                 BMI:   Wt Readings from Last 3 Encounters:   09/15/22 214 lb (97.1 kg)   08/29/22 225 lb (102.1 kg)   07/28/22 228 lb (103.4 kg)     There is no height or weight on file to calculate BMI.    CBC:   Lab Results   Component Value Date/Time    WBC 4.4 09/15/2022 09:47 AM    RBC 4.05 09/15/2022 09:47 AM    RBC 3.74 09/10/2011 12:27 PM    HGB 12.6 09/15/2022 09:47 AM    HCT 39.1 09/15/2022 09:47 AM    MCV 96.5 09/15/2022 09:47 AM    RDW 12.5 09/15/2022 09:47 AM     09/15/2022 09:47 AM     09/10/2011 12:27 PM       CMP:   Lab Results   Component Value Date/Time     09/15/2022 09:47 AM    K 4.4 09/15/2022 09:47 AM     09/15/2022 09:47 AM    CO2 26 09/15/2022 09:47 AM    BUN 12 09/15/2022 09:47 AM    CREATININE 0.92 09/15/2022 09:47 AM    GFRAA >60 09/15/2022 09:47 AM    LABGLOM >60 09/15/2022 09:47 AM    GLUCOSE 87 09/15/2022 09:47 AM    GLUCOSE 108 09/10/2011 12:27 PM    PROT 7.7 09/15/2022 09:47 AM    PROT 7.1 01/17/2013 11:16 AM    CALCIUM 9.7 09/15/2022 09:47 AM    BILITOT 0.7 09/15/2022 09:47 AM    ALKPHOS 117 09/15/2022 09:47 AM    AST 21 09/15/2022 09:47 AM    ALT 28 09/15/2022 09:47 AM       POC Tests: No results for input(s): POCGLU, POCNA, POCK, POCCL, POCBUN, POCHEMO, POCHCT in the last 72 hours. Coags: No results found for: PROTIME, INR, APTT    HCG (If Applicable): No results found for: PREGTESTUR, PREGSERUM, HCG, HCGQUANT     ABGs: No results found for: PHART, PO2ART, JWS5SUF, AZP0AGP, BEART, W2XKUUUB     Type & Screen (If Applicable):  No results found for: LABABO, LABRH    Drug/Infectious Status (If Applicable):  Lab Results   Component Value Date/Time    HEPCAB NONREACTIVE 06/26/2018 03:07 PM       COVID-19 Screening (If Applicable):   Lab Results   Component Value Date/Time    COVID19 Not Detected 09/09/2021 12:15 AM    COVID19 Not Detected 05/24/2020 10:23 PM         Anesthesia Evaluation   no history of anesthetic complications:   Airway: Mallampati: II     Neck ROM: full     Dental:          Pulmonary:normal exam        (-) not a current smoker                           Cardiovascular:  Exercise tolerance: good (>4 METS),   (+) hypertension:, valvular problems/murmurs: AI, CHF: diastolic, hyperlipidemia      ECG reviewed      Echocardiogram reviewed    Cleared by cardiology           ROS comment: -cp,syn,pnd    8/22 CONCLUSIONS     Summary  Global left ventricular systolic function is normal.  No obvious wall motion abnormality seen. Aortic valve is trileaflet. There is moderate aortic insufficiency with an  eccentric jet.  Pressure half time is 342 msec.  Mitral valve sclerosis with mild stenosis (Mean gradient is 4mmHg. MV PHT  131msec. MVA is 1.68cm2)  No pericardial effusion. Grade III (severe) left ventricular diastolic dysfunction. Neuro/Psych:   (+) neuromuscular disease:,    (-) seizures and CVA           GI/Hepatic/Renal:   (+) renal disease: CRI,      (-) GERD      ROS comment: GI bleed. Endo/Other:    (+) : arthritis:., .                  ROS comment: gout Abdominal:             Vascular: Other Findings:             Anesthesia Plan      general, regional and spinal     ASA 3     (Cardiac cleared)  Induction: intravenous. MIPS: Postoperative opioids intended and Prophylactic antiemetics administered. Anesthetic plan and risks discussed with patient. Use of blood products discussed with patient whom consented to blood products. Plan discussed with CRNA.                     Deon Hankins MD   9/27/2022

## 2022-09-27 NOTE — PROGRESS NOTES
Ashley Key was evaluated today and a DME order was entered for a wheeled walker because he requires this to successfully complete daily living tasks of ambulating. A wheeled walker is necessary due to the patient's unsteady gait, upper body weakness, and inability to  an ambulation device; and he can ambulate only by pushing a walker instead of a lesser assistive device such as a cane, crutch, or standard walker. The need for this equipment was discussed with the patient and he understands and is in agreement.

## 2022-09-27 NOTE — PLAN OF CARE
PROTOCOLS  NURSING IMPLEMENTED    TOTAL JOINT DVT/PE  VENOUS THROMBOEMBOLISM PROPHYLAXIS  (Nursing Automatically Implement)    Renny Garza  [id number]  [unfilled]  9/27/22    YES DVT RISK FACTOR SCORE YES MAJOR BLEEDING RISK FACTORS SCORE     [x] 48years old or greater (1)   [] Hx. Easy Bleeding (1)      [] Heart failure (2)   [] NSAID Use in Last 5 Days (2)      [] Varicose veins - Hx. (1)   [] Gastrointestinal or Genitourinary bleeding in Last 14 Days (2)      [] Myocardial Infarction - Hx. (1)         [] Cancer - Hx. (2)         [] Atrial fibrillation - Hx. (1)         [] Ischemic Stroke - Hx. (1)         [] Diabetes Mellitus - Hx. (1)         [] Previous DVT/PE - Hx.  (2)         [] Hormone Replacement Therapy (1)         [x] Obesity (1)         [] Paralysis (1)         [] Pregnancy (1)         [] Smoking (1)                   [] Thromophilia (1)   []   Mild to Moderate Bleeding (2)      [x] Total Hip Arthroplasty (1)   [] Active Bleeding (4)      [] Family history of PE or DVT? (4) (Consider the following labs to test for presence of inhibitor deficiency state:) Factor V Leiden, Prothrombin Gene Mutation, Protein S Deficiency, Protien C Deficiency, Antithrombin Deficiency   [] Malignant Hypertension (2)        [] Thrombocytopenia 20k to 100k (2)        [] Thrombocytopenia less than 20k (4)        [] Bleeding Diathesis (4)        [] \"Bloody Stick\" Epidural or Spinal (2)     TOTAL DVT SCORE   TOTAL BLEEDING SCORE      [x] CLASS A   Standard Risk DVT (0-3)    [x] CLASS X Standard Risk Bleeding (0-4)      [] CLASS B Elevated Risk DVT (greater than 3)    [] CLASS Y High Risk Bleeding (greater than 4)     FINAL MATRIX (e.g. AY)       *If allergic to ASA use Warfarin  *BY patient consider no treatment  **Consider venous filter with high risk PE  **If on Coumadin pre-op, then restart night of surgery      [x]  DVT Prophylaxis: Class AX, AY    Ecotrin 81 mg by mouth BID starting day of surgery for 6 weeks for all total joints. (If allergic to aspirin, give eliquis 2.5mg BID X 14 days (knees) or 35 days  (hips) starting first day postop at 0600). []  DVT Prophylaxis:  Class BX (curt choice)    []Eliquis 2.5mg BID x 14 days (knees) or 35 days (hips) starting first day postop      at 0600      [] Lovenox 40 mg subcu daily starting first day postop at 0600 x 14 days                             (knees) or 35 days (hips)    Ecotrin 81 mg PO BID-start when Lovenox is finished. (Teach injection prior to discharge.)       [] Xarelto 10 mg by mouth daily starting first day postop at 0600     14 days (knees) or 35 days (hips). If creatinine clearance less than 30 mL/min, give Lovenox 30 mg subcu   Daily starting first day postop at 0600. Ecotrin 81 mg PO BID-starting   when Lovenox is finished. (Teach injection prior to      discharge.)  (For discharge fill throughout the 10 mg daily with no    refills.)      []  DVT Prophylaxis:  Class BY (curt choice)               []  Eliquis 2.5mg BID x 14 days (knees) or 35 days (hips) starting first day                              postop at 0600        []  Ecotrin 81 mg PO BID x 6weeks (all joints)      []  Lovenox 40mg SQ daily to start at 0600 first day post-Op day. 14 days for knees, 35 days for hips    Ecotrin 81 mg PO BID - start when Lovenox is finished. (Teach injection prior to discharge.)       [] Xarelto 10 mg PO daily starting first day Post-Op at 0600    14 days (knees) or 35 days (hips)    If Creatinine Clearance less than 30ml/min, give Lovenox 30 mg    SQ daily starting first day Post-Op at 0600 x 14 days (knees) or 35   days (hips). Ecotrin 81 mg PO BID - start when Lovenox is finished.     (Teach injection prior to discharge.)  (For discharge fill throughout the   10 mg daily #32 with no refills.)      Electronically signed by Mariza Anton DO on 9/27/2022 at 1:02 PM

## 2022-09-27 NOTE — ANESTHESIA POSTPROCEDURE EVALUATION
Department of Anesthesiology  Postprocedure Note    Patient: Jerri Tomas  MRN: [id number]  YOB: 1955  Date of evaluation: 9/27/2022      Procedure Summary     Date: 09/27/22 Room / Location: [place] [address] / [place] [address]    Anesthesia Start: 3299 Anesthesia Stop: 1252    Procedure: RIGHT HIP TOTAL ARTHROPLASTY ANTERIOR APPROACH - [facility] (Right: Hip) Diagnosis:       Arthritis of right hip      (Arthritis of right hip [M16.11])    Surgeons: Tevin Rock DO Responsible Provider: Elida Hernandez MD    Anesthesia Type: general, regional, spinal ASA Status: 3          Anesthesia Type: No value filed.     Manav Phase I: Manav Score: 10    Manav Phase II:        Anesthesia Post Evaluation    Patient location during evaluation: PACU  Patient participation: complete - patient participated  Level of consciousness: awake  Airway patency: patent  Nausea & Vomiting: no nausea  Complications: no  Cardiovascular status: blood pressure returned to baseline  Respiratory status: acceptable  Hydration status: euvolemic  Comments: Multimodal analgesia pain management as indicated by procedure  Multimodal analgesia pain management approach

## 2022-09-27 NOTE — BRIEF OP NOTE
Brief Postoperative Note      Patient: Ashley Key  YOB: 1955  MRN: [id number]    Date of Procedure: 9/27/2022    Pre-Op Diagnosis: Arthritis of right hip [M16.11]    Post-Op Diagnosis: Arthritis of right hip       Procedure(s):  RIGHT HIP TOTAL ARTHROPLASTY ANTERIOR APPROACH - [facility]    Surgeon(s):  Madelaine Hinkle DO    Assistant:  Resident: Ed Diana DO; Pippa Rob DO    Anesthesia: General    Estimated Blood Loss (mL): 250     IVF: 1200 crystalloid    Complications: None    Specimens:   * No specimens in log *    Implants:  Implant Name Type Inv. Item Serial No.  Lot No. LRB No. Used Action   SHELL ACET [id number] LNR SZ F 2 H MPACT - [id number]  SHELL ACET [id number] LNR SZ F 2 H MPACT  [employer] [facility]- [id number] Right 1 Implanted   LINER ACET SZ F [id number] FLAT UHMWPE HIGHCROSS MPACT - [id number]  LINER ACET SZ F ID36MM FLAT UHMWPE HIGHCROSS MPACT  [employer] [facility]-WD [id number] Right 1 Implanted   HEAD FEM M [id number] MECTACER BIOLOX DELT - [id number]  HEAD FEM M [id number] MECTACER BIOLOX DELT  [employer]-WD [id number] Right 1 Implanted   medacta femoral stem 10mm     [id number] Right 1 Implanted         Drains: * No LDAs found *    Findings: Arthritis of right hip. See op note.     Electronically signed by Ed Diana DO on 9/27/2022 at 12:54 PM

## 2022-09-27 NOTE — CARE COORDINATION
DC Planning    Walker order /documentation has been faxed to [facility]-requested delivery today. [employer] [phone] or [phone].

## 2022-09-27 NOTE — ANESTHESIA PROCEDURE NOTES
Peripheral Block    Patient location during procedure: pre-op  Reason for block: procedure for pain, post-op pain management and at surgeon's request  Start time: 9/27/2022 9:25 AM  End time: 9/27/2022 9:34 AM  Staffing  Performed: anesthesiologist   Anesthesiologist: Moise Reza MD  Preanesthetic Checklist  Completed: patient identified, IV checked, site marked, risks and benefits discussed, surgical/procedural consents, equipment checked, pre-op evaluation, timeout performed, anesthesia consent given, oxygen available, monitors applied/VS acknowledged, fire risk safety assessment completed and verbalized and blood product R/B/A discussed and consented  Peripheral Block   Patient position: supine  Prep: ChloraPrep  Provider prep: sterile gloves and mask  Patient monitoring: cardiac monitor, frequent blood pressure checks, IV access, oxygen, responsive to questions, continuous capnometry and continuous pulse ox  Block type: Fascia iliaca  Laterality: right  Injection technique: single-shot  Guidance: ultrasound guided  Local infiltration: lidocaine  Infiltration strength: 1 %  Local infiltration: lidocaine  Dose: 2 mL    Needle   Needle type: pencil-tip   Needle gauge: 20 G  Needle localization: ultrasound guidance  Assessment   Injection assessment: negative aspiration for heme, no paresthesia on injection, local visualized surrounding nerve on ultrasound and no intravascular symptoms  Paresthesia pain: none  Slow fractionated injection: yes  Hemodynamics: stable  Real-time US image taken/store: yes  Outcomes: patient tolerated procedure well and uncomplicated    Additional Notes  Preprocedure ready time 7101  Medications Administered  ropivacaine (NAROPIN) injection 0.2% - Perineural   60 mL - 9/27/2022 9:25:00 AM

## 2022-09-27 NOTE — OP NOTE
Operative Note      Patient: Adin Chacko  YOB: 1955  MRN: [id number]    Date of Procedure: 9/27/2022    Pre-Op Diagnosis: Arthritis of right hip [M16.11]    Post-Op Diagnosis: Same       Procedure(s):  RIGHT HIP TOTAL ARTHROPLASTY ANTERIOR APPROACH - [facility]    Surgeon(s):  Bernabe Plascencia DO    Assistant:   Resident: Geo Montoya DO; Madi Andrea DO    Anesthesia: General    Estimated Blood Loss (mL): 086    Complications: None    Specimens:   * No specimens in log *    Implants:  Implant Name Type Inv. Item Serial No.  Lot No. LRB No. Used Action   SHELL ACET [id number] LNR SZ F 2 H MPACT - [id number]  SHELL ACET [id number] LNR SZ F 2 H MPACT  [employer]WD [id number] Right 1 Implanted   LINER ACET SZ F [id number] FLAT UHMWPE HIGHCROSS MPACT - [id number]  LINER ACET SZ F [id number] FLAT UHMWPE HIGHCROSS MPACT  [employer]WD [id number] Right 1 Implanted   HEAD FEM M [id number] MECTACER BIOLOX DELT - [id number]  HEAD FEM M [id number] MECTACER BIOLOX DELT  [employer]WD [id number] Right 1 Implanted   medacta femoral stem 10mm     [id number] Right 1 Implanted         Drains: * No LDAs found *    Findings: Per operative note    Detailed Description of Procedure:                 NARRATIVE SUMMARY:  The patient is a 46-year-old male who presented to  [facility] Surgical Department for right total hip  arthroplasty. The patient has had progressively worsening right hip  pain, which has failed to improve despite conservative therapy to  include activity modification. His symptoms are greatly affecting his  usual activities of daily living, and as a result, he has indicated that he  would like surgical intervention at this time. The patient was identified preoperatively, where consent was obtained, signed,  placed on the chart. The procedure was described, questions were  answered.   The risks of the procedure were described to include, but not  limited to, the risk of anesthesia; infection; bleeding; need for  further surgery in the future; numbness, tingling, weakness, or pain to  the left lower extremity; scar; stiffness; hip dislocation; fracture;  leg-length inequality; DVT; and death. The patient notes understanding of these  risks and states he wishes to proceed. The pateint was administered IV antibiotics perioperatively. The patient was also  administered 1 gm of IV tranexamic acid just prior to incision and a  second 1-gm aliquot just after closure of incision. General anesthesia  was affected after a regional block was affected to the operativelower  extremity. The operative foot and ankle were well padded and placed in a  traction boot dowd for the [facility] [employer] table. The nonoperative lower  extremity was placed on a well-padded Nielsen stand. The operative lower  extremity was then prepped and draped in sterile fashion after an  appropriate surgical time-out. Incision was made over the muscle belly  of the tensor fascia debbie for a total incision length of approximately  4-4.5 inches. Sharp incision was carried through the skin and  subcutaneous tissue. Full-thickness skin flaps were raised over the  fascia of the fascia debbie, which was split in-line with the skin  incision. A subfascial dissection was carried anteriorly over the  muscle belly of the TFL, exposing the tensor-sartorial interval, where  the circumflex vessels were found, ligated, isolated, and divided. The  pericapsular fat was removed, and a subrectus and iliocapsularis blunt  dissection was carried out over the anterior capsule, exposing it. Anterior capsulectomy was affected with electrocautery, and retractors  were placed over the superior and inferior femoral neck. A femoral neck  cut was then made approximately 1.5-2.0 cm proximal to the lesser  trochanter, and the femoral head was removed from the acetabulum.    Labrum and pulvinar were removed from the acetabulum and then sequential  reaming of the acetabulum was made to the appropriate size. The  appropriately sized acetabular cup was then placed and impacted at an  appropriate theta angle and version until fully seated. It was noted be  quite stable, and supplemental screw fixation was not felt to be  necessary. A neutral lip liner was placed, impacted until fully seated. Attention was then drawn to exposure of the proximal femur, which was  done by release of the pubofemoral and ischiofemoral ligaments,  retractor placed against the medial proximal femur, external rotation,  extension, and adduction, bringing the proximal femur into the incision. Once this was done, sequential box osteotome, canal finding reamer, and  broaching of the femoral canal was made until the appropriately sized  broach was felt to be in place. This was left in place, and trial head  and necks were placed. Hip was reduced, put through range of motion,  was found to be stable throughout the arc of range of motion. Superimposed fluoroscopic images showed restoration of leg length and  offset, and the hip was stable throughout the arc of range of motion. The hip was then gently dislocated. Trials were removed. Implantable  components were placed and impacted until fully seated. Hip was once  again reduced, put through range of motion, was found to be stable with  restoration of leg length and offset on superimposed fluoroscopic  images. The hip was thoroughly irrigated with 1/2 liter of irrisept solution for 90 seconds and then suctioned. 1 gram vancomycin powder was placed deep within the joint. The fascia was closed using absorbable suture, as was the subcutaneous  tissue. Running subcuticular Stratafix suture was utilized for  subcuticular closure. Dermabond was used cutaneously. Sterile dressing  was applied. Anesthesia was reversed. The patient was taken to the  postoperative recovery room in stable condition.   There were no  immediate postoperative complications, and the procedure was tolerated  well.                          Electronically signed by Kelsie Alvarado DO on 9/27/2022 at 12:23 PM

## 2022-09-27 NOTE — DISCHARGE INSTRUCTIONS
ANY ORTHOPEDIC QUESTIONS OR ANY OTHER CONCERNS YOU MAY CALL THE ORTHOPEDIC COORDINATOR:  Juliane Verdin RN, MSN  [phone]  [email or web address]    DISCHARGE INSTRUCTIONS  Caring for yourself after joint replacement surgery (Total Hip and Total Knee Replacement)    Activity and Therapy  Receive physical therapy three times per week. (Pain medication one hour prior to therapy)   Perform PT exercises on own when not receiving home or outpatient PT. Ideally exercises should be at least two times a day. Increase level of activity and ambulation each day. Perform deep breathing exercises daily. Patient provides self-care when possible. Work on Range of motion for Total knee patients. No pillow under the knee for Total knee patients. Elevate the surgical leg when seated. Diet:  Increase oral intake of fruits, fiber and water to prevent constipation. Drink fluids frequently and take stool softeners to aid in bowel motility. Increase protein intake/reduce high-sugar intake to help promote healing and prevent infection. Incision Care:  Keep Aquacel or other dressing intact until seen and removed by surgeon, unless saturated, in which case, call surgeon and request instructions. If dressing falls off, call surgeon. [facility] on in the am and off in the pm to reduce swelling. Ice affected area four times a day, for twenty minutes. Pain Medications and Anticoagulant  You have been place on an anticoagulant to prevent blood clots. Take this medication exactly as prescribed. Be alert for signs of bleeding. Take care not to injure yourself. You have been provided pain medicine to control your pain. Do not take more narcotics than prescribed. You may begin weaning from narcotics as your pain level improves by decreasing the amount or frequency of the narcotics. You may also take plain acetaminophen as an alternate to the narcotics. Never exceed the recommended dosage.    Ice, rest and elevating in a bathtub, pool, or hot tub until your incision is fully closed and any drains are removed. Dont scrub, pick, scratch, or pull at your incision. Dont use oils, lotions, or creams on your incision unless your healthcare provider approves it. Follow-up  You will have one or more follow-up visits with your healthcare provider. These are needed to check how well youre healing. Your drain, stitches, or staples may also be removed during these visits. Do not miss your follow-up visit, even if you are feeling better. Call your healthcare provider right away if you have the following:  Fever of 100.4°F (38°C) or higher, or as advised by your healthcare provider. Chest pain or trouble breathing. Pain or tenderness in your leg(s). Increased pain, redness, swelling, bleeding, or foul-smelling drainage at the incision site. Incision changes, separates or is hot to the touch. Problems with the drain if you have one. Itchy, swollen skin; skin rash.     Medicines, Diet, and Activity:   Refer to your discharge paperwork for further instructions

## 2022-09-27 NOTE — PROGRESS NOTES
Patient discharged to home in stable condition. Patient transported to [facility] via wheelchair by [facility]. Patient home with all belongings and medication from pharmacy.

## 2022-09-27 NOTE — ANESTHESIA PROCEDURE NOTES
Spinal Block    Patient location during procedure: OR  End time: 9/27/2022 11:03 AM  Reason for block: primary anesthetic  Staffing  Performed: resident/CRNA   Resident/CRNA: LYNNE Thomas - ELIUD  Other anesthesia staff: Dolores Rubin RN  Spinal Block  Patient position: sitting  Prep: ChloraPrep  Patient monitoring: continuous pulse ox and frequent blood pressure checks  Approach: midline  Location: L3/L4  Provider prep: mask and sterile gloves  Local infiltration: lidocaine  Needle  Needle type: Pencan   Needle gauge: 24 G  Needle length: 4 in  Assessment  Sensory level: T6  Swirl obtained: Yes  CSF: clear  Attempts: 2  Hemodynamics: stable  Additional Notes  SRNA attempt X1, CRNA X1  Preanesthetic Checklist  Completed: patient identified, IV checked, site marked, risks and benefits discussed, surgical/procedural consents, equipment checked, pre-op evaluation, timeout performed, anesthesia consent given, oxygen available, monitors applied/VS acknowledged, fire risk safety assessment completed and verbalized and blood product R/B/A discussed and consented

## 2022-09-27 NOTE — H&P
Interval H&P Note    Pt Name: Justin Byrd  MRN: [id number]  YOB: 1955  Date of evaluation: 2022      [x] I have reviewed in [facility] the orthopedic surgery progress note by Dr. Juany Jimenez dated 2022 for an Interval History and Physical note. [x] I have examined  Justin Byrd  There are no changes to the patient who is scheduled for RIGHT HIP TOTAL ARTHROPLASTY ANTERIOR APPROACH - [facility] by Boyd Pallas, DO for Arthritis of right hip [M16.11]. The patient denies new health changes, fever, chills, wheezing, cough, increased SOB, chest pain, open sores or wounds. Denies hx diabetes. Last Ketorolac > 7 days. Vital signs: BP (!) 174/60   Pulse 66   Temp 97.8 °F (36.6 °C) (Temporal)   Resp 18   Ht 6' (1.829 m)   Wt 214 lb (97.1 kg)   SpO2 97%   BMI 29.02 kg/m²     Allergies:  Codeine and Shrimp (diagnostic)    Medications:    Prior to Admission medications    Medication Sig Start Date End Date Taking? Authorizing Provider   ketorolac (TORADOL) 10 MG tablet Take 10 mg by mouth every 6 hours as needed 22   Historical Provider, MD   amLODIPine (NORVASC) 10 MG tablet Take 10 mg by mouth daily    Historical Provider, MD   allopurinol (ZYLOPRIM) 300 MG tablet Take 1 tablet by mouth in the morning. 22   Eduar Garrett MD   gabapentin (NEURONTIN) 300 MG capsule Take 1 capsule by mouth in the morning and 1 capsule at noon and 1 capsule before bedtime. Do all this for 30 days. 22  Eduar Garrett MD   magnesium oxide (MAG-OX) 400 MG tablet Take 1 tablet by mouth in the morning. 22   Eduar Garrett MD   pantoprazole (PROTONIX) 40 MG tablet 1 tablet daily 22   Eduar Garrett MD   ferrous sulfate (FEROSUL) 325 (65 Fe) MG tablet TAKE 1 TABLET BY MOUTH ONCE DAILY. 3/29/22   Eduar Garrett MD   tiZANidine (ZANAFLEX) 4 MG tablet TAKE 1 TABLET BY MOUTH EVERY 8 HOURS AS NEEDED FOR SPASMS.   Patient taking differently: Take 4 mg by mouth nightly as needed 3/29/22   Darlin Balderrama, MD   oxyCODONE-acetaminophen (PERCOCET)  MG per tablet Take 1 tablet by mouth 3 times daily. 10/2/20   Historical Provider, MD   diphenhydrAMINE (BENADRYL) 25 MG capsule Take 1-2 tablets by mouth every 6 hours as needed for itching. 10/12/20   Darlin Balderrama MD   diclofenac sodium (VOLTAREN) 1 % GEL Apply 2 g topically 2 times daily    Historical Provider, MD         This is a 74 y.o. male who is pleasant, cooperative, alert and oriented x3, in no acute distress. Heart: Heart sounds are normal.  HR 66 regular rate and rhythm with grade III/VI systolic murmur without gallop or rub. Lungs: Normal respiratory effort with equal expansion, good air exchange, unlabored and clear to auscultation without wheezes or rales bilaterally   Abdomen: soft, nontender, nondistended with bowel sounds active. Labs:  Recent Labs     09/15/22  0947   HGB 12.6*   HCT 39.1*   WBC 4.4   MCV 96.5         K 4.4      CO2 26   BUN 12   CREATININE 0.92   GLUCOSE 87   AST 21   ALT 28   LABALBU 4.4       No results for input(s): COVID19 in the last 720 hours.     LYNNE White CNP  Electronically signed 9/27/2022 at 9:29 AM       Demetrius Flores DO   Physician   Specialty:  Orthopedic Surgery   Progress Notes       Signed   Encounter Date:  8/29/2022          Related encounter: Office Visit from 8/29/2022 in [address] [place] [address] and Sports Medicine           Signed        Expand All Collapse All  [address] [place] [address]  Dept: [phone]     Ambulatory Orthopedic New Patient Visit        CHIEF COMPLAINT:         Chief Complaint   Patient presents with    Hip Pain       R hip pain, patient last seen 3 years ago         HISTORY OF PRESENT ILLNESS:       The patient is a 74 y.o. male who is being seen  for consultation and performed by Donato Martinez MD at [facility]            Current Medications:   Current Facility-Administered Medications          Current Outpatient Medications   Medication Sig Dispense Refill    allopurinol (ZYLOPRIM) 300 MG tablet Take 1 tablet by mouth in the morning. 30 tablet 5    gabapentin (NEURONTIN) 300 MG capsule Take 1 capsule by mouth in the morning and 1 capsule at noon and 1 capsule before bedtime. Do all this for 30 days. 90 capsule 2    magnesium oxide (MAG-OX) 400 MG tablet Take 1 tablet by mouth in the morning. 30 tablet 5    pantoprazole (PROTONIX) 40 MG tablet 1 tablet daily 30 tablet 3    ferrous sulfate (FEROSUL) 325 (65 Fe) MG tablet TAKE 1 TABLET BY MOUTH ONCE DAILY. 30 tablet 5    tiZANidine (ZANAFLEX) 4 MG tablet TAKE 1 TABLET BY MOUTH EVERY 8 HOURS AS NEEDED FOR SPASMS. 30 tablet 5    baclofen (LIORESAL) 10 MG tablet Take 10 mg by mouth 2 times daily as needed        oxyCODONE-acetaminophen (PERCOCET)  MG per tablet Take 1 tablet by mouth 3 times daily. diphenhydrAMINE (BENADRYL) 25 MG capsule Take 1-2 tablets by mouth every 6 hours as needed for itching. 30 capsule 0    diclofenac sodium (VOLTAREN) 1 % GEL Apply 2 g topically 2 times daily        Melatonin 10 MG CAPS Take by mouth           No current facility-administered medications for this visit.             Allergies:    Codeine and Shrimp (diagnostic)     Social History:   Social History               Socioeconomic History    Marital status: Single       Spouse name: Not on file    Number of children: Not on file    Years of education: Not on file    Highest education level: Not on file   Occupational History    Not on file   Tobacco Use    Smoking status: Never    Smokeless tobacco: Never   Vaping Use    Vaping Use: Never used   Substance and Sexual Activity    Alcohol use: No    Drug use: No    Sexual activity: Not on file   Other Topics Concern    Not on file   Social History Narrative    Not on file      Social Determinants of Health          Financial Resource Strain: Low Risk     Difficulty of Paying Living Expenses: Not hard at all   Food Insecurity: No Food Insecurity    Worried About Running Out of Food in the Last Year: Never true    Ran Out of Food in the Last Year: Never true   Transportation Needs: Not on file   Physical Activity: Not on file   Stress: Not on file   Social Connections: Not on file   Intimate Partner Violence: Not on file   Housing Stability: Not on file            Family History:  Family History         Family History   Problem Relation Age of Onset    Heart Disease Brother 48    Other Father      Other Brother           HIV    Other Brother                 REVIEW OF SYSTEMS:  Review of Systems   Constitutional:  Positive for activity change. Negative for fever. HENT:  Negative for dental problem. Eyes:  Negative for discharge. Respiratory:  Negative for shortness of breath. Cardiovascular:  Negative for chest pain. Gastrointestinal:  Negative for abdominal pain. Genitourinary: Negative. Musculoskeletal:  Positive for arthralgias. Skin:         Negative for rash   Neurological:  Positive for weakness. Psychiatric/Behavioral:  Negative for confusion. I have reviewed the CC, HPI, ROS, PMH, FHX, Social History. I agree with the documentation provided by other staff, residents and havereviewed their documentation prior to providing my signature indicating agreement. PHYSICAL EXAM:  Ht 6' (1.829 m)   Wt 225 lb (102.1 kg)   BMI 30.52 kg/m²  Body mass index is 30.52 kg/m². Physical Exam  Gen: alert and oriented  Psych:  Appropriate affect; Appropriate knowledge base; Appropriate mood; No hallucinations; Head: normocephalic atraumatic   Chest: symmetric chest excursion  Pelvis: stable  Ortho Exam  Extremity:Patient ambulates with mild shortening weightbearing phase and antalgic gait to the Right lower extremity.   There is no erythema, warmth, skin lesions, signs of infection. Positive hip logroll and Stinchfield test is noted. Patient has full range of motion of the Right knee and ankle. Motor, sensory, and vascular examination to the Right lower extremity is intact without focal deficits. Patient has full range of motion of the lumbosacral spine. Radiology:      XR HIP 2-3 VW W PELVIS RIGHT     Result Date: 8/30/2022  History:   Right hip pain Findings:   Low AP pelvis,frog leg lateral xrays Right hip xrays done in the office today shows severe joint space narrowing, osteophytosis, joint line sclerosis to the Right hip. No evidence of fracture, subluxation, dislocation, radioopaque foreign body/tumor is noted. Some collapse of the superior lateral femoral head consistent with AVN is appreciated. Left Impression:   severe degenerative changes Right hip as described above. ASSESSMENT:  1. Primary osteoarthritis of right hip    2. Avascular necrosis of bone of right hip (HCC)          PLAN:     We discussed operative and nonoperative treatment for the patient's significant right hip pain and significant arthritic findings on x-ray. His symptoms of failed to improve despite conservative therapy to include activity modification, home exercise program that is been very structured, and intra-articular corticosteroid injection. He has difficulty with usual activities of daily living because of his hip pain he would like to proceed with total hip arthroplasty. All details of the procedure, as well as risks, benefits and alternatives, including the option of non operative versus operative treatment were discussed.   The patient understands that risks of the surgery include but are not limited to: bleeding, malunion/nonunion, loss of fixation, loss of reduction, hardware failure, angular or rotational deformity, length discrepancy, limp, transfusion, skin blistering or breakdown, progressive post traumatic degenerative joint disease, possible need for further surgery, bone grafting, infection, nerve injury, paralysis, numbness, blood vessel injury, excessive scaring, wound complication or breakdown, failure of symptoms to improve or actual deterioration in condition, significant acute and/or chronic pain, possible need for amputation, permanent loss of motion, and permanent loss of function. As well as the general complications of anesthesia, which include but are not limited to: myocardial infarction and/or heart attack, stroke, multi organ system failure or even possible death, prolonged hospital stay, blood clots, pulmonary embolism, abnormal reaction to medication, visual and neurological disturbances, constipation, ischemic bowel, bowel obstruction, bowel perforation, ileus and mental status changes. No guarantees were made. Return for Two weeks post op. Encounter Medications    No orders of the defined types were placed in this encounter. No orders of the defined types were placed in this encounter. This note is created with the assistance of a speech recognition program.  While intending to generate a document that actually reflects the content of the visit, the document can still have some errors including those of syntax and sound a like substitutions which may escape proof reading.   In such instances, actual meaning can be extrapolated by contextual diversion        Electronically signed by Andriy Carreon DO, Geralynn Pimenta on 8/30/2022 at 3:25 PM

## 2022-09-27 NOTE — PROGRESS NOTES
Orthopedic Coordinator Note    Patient s/p right total Hip replacement on 09/27/2022 WITH DR. Lyndon Ruiz. The following appointments are currently scheduled:    Post-op with surgeon 10/11/2022 AT [address], [facility]. Physical Therapy PT LINK ON [address] 09/30/2022 AT 1045. Wheeled walker order is  entered  Face to face documentation is ENTERED. PT NEEDS A WALKER AND DCP WILL ORDER TODAY. DVT:81MG EC ASA BID for 6 week s post-op for DVT prophylaxis.       Any questions please contact Paige Norman RN, MSN      Electronically signed by: Paige Norman RN on 9/27/2022 at 8:32 AM

## 2022-09-28 DIAGNOSIS — D50.9 IRON DEFICIENCY ANEMIA, UNSPECIFIED IRON DEFICIENCY ANEMIA TYPE: ICD-10-CM

## 2022-09-28 DIAGNOSIS — M54.6 CHRONIC BILATERAL THORACIC BACK PAIN: ICD-10-CM

## 2022-09-28 DIAGNOSIS — G89.29 CHRONIC BILATERAL THORACIC BACK PAIN: ICD-10-CM

## 2022-09-28 DIAGNOSIS — G89.18 POST-OP PAIN: ICD-10-CM

## 2022-09-28 DIAGNOSIS — M25.50 PAIN IN JOINT, MULTIPLE SITES: Chronic | ICD-10-CM

## 2022-09-28 RX ORDER — TIZANIDINE 4 MG/1
TABLET ORAL
Qty: 30 TABLET | Refills: 5 | Status: SHIPPED | OUTPATIENT
Start: 2022-09-28

## 2022-09-28 RX ORDER — FERROUS SULFATE 325(65) MG
TABLET ORAL
Qty: 30 TABLET | Refills: 5 | Status: SHIPPED | OUTPATIENT
Start: 2022-09-28

## 2022-09-28 NOTE — TELEPHONE ENCOUNTER
E-scribe request for Tizanidine and Ferrous Sulfate . Please review and e-scribe if applicable. Next Visit Date:  Future Appointments   Date Time Provider Jonathan Lundberg   10/11/2022  9:45 AM Avis White PA-C [facility] Maintenance   Topic Date Due    COVID-19 Vaccine (1) Never done    Pneumococcal 65+ years Vaccine (1 - PCV) Never done    Annual Wellness Visit (AWV)  Never done    Flu vaccine (1) 08/01/2022    Shingles vaccine (2 of 2) 01/20/2023 (Originally 1/13/2021)    Depression Screen  03/25/2023    Lipids  07/28/2027    Colorectal Cancer Screen  05/27/2030    DTaP/Tdap/Td vaccine (2 - Td or Tdap) 12/15/2031    Hepatitis C screen  Completed    HIV screen  Completed    Hepatitis A vaccine  Aged Out    Hepatitis B vaccine  Aged Out    Hib vaccine  Aged Out    Meningococcal (ACWY) vaccine  Aged Out               (applicable per patient's age: Cancer Screenings, Depression Screening, Fall Risk Screening, Immunizations)    Hemoglobin A1C (%)   Date Value   09/15/2022 4.6   03/25/2022 4.7   11/21/2017 5.0     Microalb/Crt.  Ratio (mcg/mg creat)   Date Value   01/22/2013 9     LDL Cholesterol (mg/dL)   Date Value   07/28/2022 103     AST (U/L)   Date Value   09/15/2022 21     ALT (U/L)   Date Value   09/15/2022 28     BUN (mg/dL)   Date Value   09/15/2022 12      (goal A1C is < 7)   (goal LDL is <100) need 30-50% reduction from baseline     BP Readings from Last 3 Encounters:   09/27/22 (!) 167/64   09/15/22 (!) 151/49   07/28/22 138/69    (goal /80)      All Future Testing planned in [facility]:  Lab Frequency Next Occurrence   Protein / Creatinine Ratio, Urine Once 93/70/2256   Basic Metabolic Panel Once 39/13/1673   Nasal cannula oxygen PRN             Patient Active Problem List:     Hyperlipidemia     Gout     CKD (chronic kidney disease) stage 3, GFR 30-59 ml/min (HCC)     Wrist pain, right     Swelling of joint, wrist, right     Pain in joint, multiple sites     Obesity Adjustment reaction     Vertigo     Pain in joint     Spinal stenosis, lumbar region, without neurogenic claudication     Facet arthropathy, lumbar     Chronic back pain     Acute bilateral low back pain with sciatica     Rhabdomyolysis     S/P total right hip arthroplasty

## 2022-09-28 NOTE — TELEPHONE ENCOUNTER
The patient is Post op 9/27 for  RIGHT HIP TOTAL ARTHROPLASTY ANTERIOR . The patient called in today saying he was given oxycodone- acetaminophen   5-325 tabs for 7 days after discharge from surgery . The patient is currently under pain management  care and  is receiving a stronger dose  of  oxycodone . He states that due to the recent prescription of  oxycodone 5-325 he unable to take his oxycodone  because he cannot take both dosages. He wants to return the 5-325 because they do not help with pain so he can take his normal dose of oxycodone . I told the patient I will contact the provider and return his call.   Please advise

## 2022-09-28 NOTE — PROGRESS NOTES
level  Home Access: Stairs to enter without rails  Entrance Stairs - Number of Steps: 2 \"regular\" steps +1 \"giant\" step into house. Bathroom Shower/Tub: Walk-in shower  Bathroom Toilet: Standard  Home Equipment: Cane;Walker, rolling  ADL Assistance: Independent (occasionally with socks)  Homemaking Assistance: Independent  Ambulation Assistance: Independent (cane in community, occ at home)  Transfer Assistance: Independent  Active : Yes  Type of Occupation: owns own company; tree services, owns properties. Leisure & Hobbies: owns sports cars  ADL  Feeding: Independent  Grooming: Independent  UE Bathing: Independent  LE Bathing: Moderate assistance  UE Dressing: Independent  LE Dressing: Moderate assistance  Toileting: Minimal assistance  Additional Comments: pt ed on thigh high stockings wearing schedule; B stockings applied. Pt also ed on LB ADL techs with pt being encouraged to complete ADL tasks as independently as possible. Pt somewhat resistive to education. Wife present at end of session to receive ed. Pt/wife also ed on MARTA precuations. Fair + understanding noted. Goals  Patient Goals   Patient goals : to go home this date  Short Term Goals  Time Frame for Short term goals: by discharge, pt will  Short Term Goal 1: demo and verb good understanding of fall prevention techs, EC/WS techs, equip needs, MARTA precautions, ADL techs, and d/c recommendations  Short Term Goal 2: demo CGA with functional mob in room with good safety with use of RW/DME as needed for safe d/c home this date    Assessment  Performance deficits / Impairments: Decreased functional mobility ; Decreased ADL status; Decreased safe awareness;Decreased endurance;Decreased posture;Decreased balance  Assessment: pt ok for same day d/c  Prognosis: Good  Decision Making: Medium Complexity  Plan  Times per Week: 1 visit with shannen  Current Treatment Recommendations: Balance training;Functional mobility training; Endurance training;Equipment evaluation, education, & procurement;Positioning;Patient/Caregiver education & training; Safety education & training;Self-Care / ADL       Therapy Time   Individual Concurrent Group Co-treatment   Time In  1625         Time Out  5954         Minutes  72          Treatment min: 61  Co-treatment with PT warranted first time up day of surgery. Cotx due to potential risk of decreased sensation, muscle control and proprioception from spinal epidural and/or regional block. Decreased safety and independence requiring 2 skilled therapy professionals to address individual discipline's goals.          Irvin East, OT

## 2022-09-28 NOTE — PROGRESS NOTES
Physical Therapy  Facility/Department: [facility] MED SURG  Physical Therapy Initial Assessment- day of surgery    Name: Dayday Hopkins  : 1955  MRN: [id number]  Date of Service: 2022    Discharge Recommendations:       Pt presenting with new musculoskeletal dysfunction and would benefit from additional therapy at time of discharge. Please refer to the AM-PAC score for current functional status. Rt. MARTA with Dr. Jesus Marquez, 22     Patient Diagnosis(es): The encounter diagnosis was Post-op pain. Past Medical History:  has a past medical history of Chronic back pain, Chronic kidney disease, Gout, Hyperlipidemia, Hypertension, Murmur, Obesity, Osteoarthritis, Restless leg syndrome, Shoulder pain, Shrimp allergy, Spinal stenosis, lumbar region, without neurogenic claudication, Swelling of joint, wrist, right, Thoracic back pain, Vertigo, Wears glasses, and Wrist pain, right. Past Surgical History:  has a past surgical history that includes nasal endoscopy (Bilateral, 10/17/2016); Nerve Block (2016); Nerve Block (2017); Colonoscopy (2014); Nerve Block (10/09/2017); Upper gastrointestinal endoscopy (2020); Colonoscopy (2020); Upper gastrointestinal endoscopy (N/A, 2020); Colonoscopy (N/A, 2020); and Total hip arthroplasty (Right, 2022).     Assessment      Requires PT Follow-Up: Yes  Activity Tolerance  Activity Tolerance: Patient tolerated treatment well     Plan   Plan  Plan weeks: d/c day of surgery  Safety Devices  Type of Devices: Patient at risk for falls, Gait belt, Bed alarm in place, Left in bed, Nurse notified     Restrictions  Restrictions/Precautions  Restrictions/Precautions: Fall Risk, Up as Tolerated, Bed Alarm, Surgical protocol, General Precautions, Weight Bearing  Lower Extremity Weight Bearing Restrictions  Right Lower Extremity Weight Bearing: Weight Bearing As Tolerated     Subjective   General  Chart Reviewed: Yes  Patient assessed for rehabilitation services?: Yes  Family / Caregiver Present: Yes (wife present for edu. at end of eval)  Follows Commands: Within Functional Limits         Social/Functional History  Social/Functional History  Lives With: Spouse  Type of Home: House  Home Layout: Two level, Bed/Bath upstairs, 1/2 bath on main level  Home Access: Stairs to enter without rails  Entrance Stairs - Number of Steps: 2 \"regular\" steps +1 \"giant\" step into house. Bathroom Shower/Tub: Walk-in shower  Bathroom Toilet: Standard  Home Equipment: stacy Gomez  Has the patient had two or more falls in the past year or any fall with injury in the past year?: Yes  ADL Assistance: Independent (occasionally with socks)  Homemaking Assistance: Independent  Ambulation Assistance: Independent (cane in community, occ at home)  Transfer Assistance: Independent  Active : Yes  Type of Occupation: owns own company; tree services, owns properties. Leisure & Hobbies: owns sports cars  Vision/Hearing  Vision  Vision: Impaired  Vision Exceptions: Wears glasses for reading  Hearing  Hearing: Within functional limits    Cognition   Orientation  Overall Orientation Status: Within Normal Limits  Cognition  Overall Cognitive Status: Exceptions  Following Commands: Follows multistep commands with repitition  Attention Span: Attends with cues to redirect  Problem Solving: Assistance required to generate solutions;Assistance required to identify errors made;Assistance required to correct errors made;Assistance required to implement solutions  Insights: Decreased awareness of deficits     Objective        Observation/Palpation  Observation: impulsive at times; did not allow gait belt to be held for sit to stand transfers; receptive to most edu.         AROM RLE (degrees)  RLE General AROM: hip/ knee flexion to 40 degrees supine via heel slide  AROM LLE (degrees)  LLE AROM : WFL              Bed mobility  Supine to Sit: Minimal assistance  Sit to Supine: Minimal assistance  Transfers  Sit to Stand: Stand by assistance (close SBA x 2- pt not allowing gait belt to be held)  Stand to sit: Stand by assistance  Ambulation  Surface: level tile  Device: Rolling Walker  Assistance: Contact guard assistance  Quality of Gait:  Once standing safely at bedside with RW, took pt. through pre-gait sequence to ensure quad control prior to ambulation. Assessed control of knee during static standing, wt. shifts and steps in place. Pt. With slight buckling of knee so immobilizer used for safety during gait. Distance: 40ft, 45ft    Stairs  Stairs Height: 6\"  Rails: Bilateral  Assistance: Contact guard assistance  Comment: good demo stepping sequence; knee immobilizer                 OutComes Score           AM-PAC Score  AM-PAC Inpatient Mobility Raw Score : 18 (09/28/22 0821)  AM-PAC Inpatient T-Scale Score : 43.63 (09/28/22 0821)  Mobility Inpatient CMS 0-100% Score: 46.58 (09/28/22 0821)  Mobility Inpatient CMS G-Code Modifier : CK (09/28/22 1983)          Tinneti Score       Goals  Pt. has met all PT goals for safe same day discharge including safe bed mobility and transfer techniques, safe ambulation with RW including walker safety, training for safe stair negotiation, HEP and edu. for frequent mobility at home, and verbal review of car transfer. Pt. educated on all surgery specific precautions. Time taken to answer all questions from pt. and  to ensure preparedness. Education  Patient Education  Education Given To: Patient; Family  Education Provided: Role of Therapy;Plan of Care;Home Exercise Program  Education Provided Comments: review with wife how to assist pt. in/out of bed with wife demo'ing and how to support pt.  on stairs; see Ex section, see GOALS section  Education Method: Demonstration;Verbal;Printed Information/Hand-outs  Education Outcome: Verbalized understanding;Demonstrated understanding      Therapy Time   Individual Concurrent Group Co-treatment   Time In 1646         Time Out 1821         Minutes 95          Treatment time:  85min. Co-treatment with OT warranted first time up day of surgery. Cotx due to potential risk of decreased sensation, muscle control and proprioception from spinal epidural and/or regional block. Decreased safety and independence requiring 2 skilled therapy professionals to address individual discipline's goals.             Lucio Alcantar, PT

## 2022-09-29 NOTE — TELEPHONE ENCOUNTER
I spoke to the patient about his pain management contract and he said that pain management advised him to wait till the 7 day supply of oxycodone 5 mg was completed in order for him to get his back on the oxycodone 10 mg.  The patient asked if there was anything else he could do for the next 7 days due to the oxycodone 5mg not helping with pain

## 2022-10-03 RX ORDER — OXYCODONE HYDROCHLORIDE AND ACETAMINOPHEN 5; 325 MG/1; MG/1
2 TABLET ORAL EVERY 6 HOURS PRN
Qty: 16 TABLET | Refills: 0 | Status: SHIPPED | OUTPATIENT
Start: 2022-10-03 | End: 2022-10-05

## 2022-10-03 NOTE — TELEPHONE ENCOUNTER
Post op 9/27 for  RIGHT HIP TOTAL ARTHROPLASTY ANTERIOR. The patient called today asking for a refill on five mg tablets  for the reminding 2 day  until his pain management contract can be filled  on 10/5.   Thank you

## 2022-10-10 DIAGNOSIS — M25.551 HIP PAIN, RIGHT: Primary | ICD-10-CM

## 2022-10-11 NOTE — PROGRESS NOTES
[address] [place] [address] AND SPORTS MEDICINE  [address] [address]  Dept: [phone]  Dept Fax: [phone]        Post Operative Follow Up    Subjective:     Chief Complaint   Patient presents with    Post-Op Check     R MARTA dos 9/27/22     Post Op Surgery:     The patient is here for a follow up after having a right total hip arthroplasty. Date of surgery was 9/27/2022. Therefore the patient is 2 weeks postop. He is going to physical therapy 3 times a week. He is taking Percocet 10/325 mg and using a walker. Patient states they are feeling better, but is still having a to use Percocet. He is in pain management. Review of Systems   Constitutional:  Positive for activity change. Negative for appetite change, fatigue and fever. Respiratory: Negative. Negative for apnea, cough, chest tightness and shortness of breath. Cardiovascular: Negative. Negative for chest pain, palpitations and leg swelling. Gastrointestinal:  Negative for abdominal distention, abdominal pain, constipation, diarrhea, nausea and vomiting. Genitourinary:  Negative for difficulty urinating, dysuria and hematuria. Musculoskeletal:  Positive for arthralgias and gait problem. Negative for joint swelling and myalgias. Skin:  Negative for color change and rash. Neurological:  Negative for dizziness, weakness, numbness and headaches. Psychiatric/Behavioral:  Positive for sleep disturbance. I have reviewed the CC, HPI, ROS, PMH, FHX, Social History, and if not present in this note, I have reviewed in the patient's chart. I agree with the documentation provided by other staff and have reviewed their documentation prior to providing my signature indicating agreement. Vitals:   BP (!) 157/60   Pulse 71   Resp 14   Ht 6' (1.829 m)   Wt 214 lb (97.1 kg)   BMI 29.02 kg/m²  Body mass index is 29.02 kg/m².   Physical Examination:     Orthopedics:    GENERAL: Alert and oriented X3 in no acute distress. SKIN: Intact without lesions or ulcerations. Incision is healing well with no signs of infection. NEURO: Intact to sensory and motor testing. VASC: Capillary refill is less than 3 seconds. Post Op Exam:    LOCATION: Right hip  SITE: Distal neurocirculatory status is intact. EXAM: Sensation is intact to light touch, there is full motor function of the extremity. ROM: 90 degrees     Radiology:   HIP X-RAY    AP and standing AP of the pelvis and supine AP and frog leg lateral of the right hip reveals a total hip arthroplasty in proper position with no subsidence, loosening (DeLee-Charnley zones 1-3 of the acetabulum or Gruen zones 1-7 of the femoral component) or osteolysis present. There is no acute bony abnormalities including soft tissue masses, periosteal reaction or lytic bony lesions. Impression: right total hip arthroplasty with no complicating features. Assessment:     1. Status post total replacement of right hip      Plan:   Post Op Treatment : Patient had the treatment regimen reviewed today 2 weeks status post hip arthroplasty. I reviewed the films with the patient. During today's visit, we discussed that he is not happy with physical therapy and feels like he can do the exercises on his own. I did explain that they will start doing more things with him. He has very tight muscles due to the fact that his hip was so bad for so long prior to surgery. They will be working on those with him. He can progress to a cane when therapy feels he is ready. He will not get any more narcotic pain medication from our office since he is being seen by pain management. The patient then stated that they understand the plan and at this time, the patient has opted this incision daily with soap and water and following up with Dr. Bevin Ahumada in 4 weeks.   The patient will call the office immediately with any problems that may arise. No orders of the defined types were placed in this encounter. No orders of the defined types were placed in this encounter.         Electronically signed by Lincoln Lin PA-C, on 10/12/2022 at 10:38 AM

## 2022-10-12 ENCOUNTER — OFFICE VISIT (OUTPATIENT)
Dept: ORTHOPEDIC SURGERY | Age: 67
End: 2022-10-12

## 2022-10-12 VITALS
HEIGHT: 72 IN | BODY MASS INDEX: 28.99 KG/M2 | HEART RATE: 71 BPM | RESPIRATION RATE: 14 BRPM | SYSTOLIC BLOOD PRESSURE: 157 MMHG | DIASTOLIC BLOOD PRESSURE: 60 MMHG | WEIGHT: 214 LBS

## 2022-10-12 DIAGNOSIS — Z96.641 STATUS POST TOTAL REPLACEMENT OF RIGHT HIP: Primary | ICD-10-CM

## 2022-10-12 PROCEDURE — 99024 POSTOP FOLLOW-UP VISIT: CPT | Performed by: PHYSICIAN ASSISTANT

## 2022-10-12 ASSESSMENT — ENCOUNTER SYMPTOMS
ABDOMINAL PAIN: 0
RESPIRATORY NEGATIVE: 1
NAUSEA: 0
ABDOMINAL DISTENTION: 0
VOMITING: 0
DIARRHEA: 0
CONSTIPATION: 0
CHEST TIGHTNESS: 0
COUGH: 0
COLOR CHANGE: 0
APNEA: 0
SHORTNESS OF BREATH: 0

## 2022-10-25 DIAGNOSIS — G89.29 CHRONIC BILATERAL THORACIC BACK PAIN: ICD-10-CM

## 2022-10-25 DIAGNOSIS — M54.6 CHRONIC BILATERAL THORACIC BACK PAIN: ICD-10-CM

## 2022-10-26 RX ORDER — GABAPENTIN 300 MG/1
CAPSULE ORAL
Qty: 90 CAPSULE | Refills: 1 | Status: SHIPPED | OUTPATIENT
Start: 2022-10-26 | End: 2022-11-25

## 2022-10-26 NOTE — TELEPHONE ENCOUNTER
E-scribe request for Gabapentin. Please review and e-scribe if applicable. Next Visit Date:  Future Appointments   Date Time Provider Jonathan Tamika   11/7/2022 10:45 AM GUILLE Rondon/ [facility] 64 Maintenance   Topic Date Due    COVID-19 Vaccine (1) Never done    Pneumococcal 65+ years Vaccine (1 - PCV) Never done    Annual Wellness Visit (AWV)  Never done    Flu vaccine (1) 08/01/2022    Shingles vaccine (2 of 2) 01/20/2023 (Originally 1/13/2021)    Depression Screen  03/25/2023    Lipids  07/28/2027    Colorectal Cancer Screen  05/27/2030    DTaP/Tdap/Td vaccine (2 - Td or Tdap) 12/15/2031    Hepatitis C screen  Completed    HIV screen  Completed    Hepatitis A vaccine  Aged Out    Hib vaccine  Aged Out    Meningococcal (ACWY) vaccine  Aged Out               (applicable per patient's age: Cancer Screenings, Depression Screening, Fall Risk Screening, Immunizations)    Hemoglobin A1C (%)   Date Value   09/15/2022 4.6   03/25/2022 4.7   11/21/2017 5.0     Microalb/Crt.  Ratio (mcg/mg creat)   Date Value   01/22/2013 9     LDL Cholesterol (mg/dL)   Date Value   07/28/2022 103     AST (U/L)   Date Value   09/15/2022 21     ALT (U/L)   Date Value   09/15/2022 28     BUN (mg/dL)   Date Value   09/15/2022 12      (goal A1C is < 7)   (goal LDL is <100) need 30-50% reduction from baseline     BP Readings from Last 3 Encounters:   10/12/22 (!) 157/60   09/27/22 (!) 167/64   09/15/22 (!) 151/49    (goal /80)      All Future Testing planned in [facility]:  Lab Frequency Next Occurrence   Protein / Creatinine Ratio, Urine Once 77/47/3357   Basic Metabolic Panel Once 11/41/7519   Nasal cannula oxygen PRN             Patient Active Problem List:     Hyperlipidemia     Gout     CKD (chronic kidney disease) stage 3, GFR 30-59 ml/min (HCC)     Wrist pain, right     Swelling of joint, wrist, right     Pain in joint, multiple sites     Obesity     Adjustment reaction     Vertigo     Pain in joint     Spinal stenosis, lumbar region, without neurogenic claudication     Facet arthropathy, lumbar     Chronic back pain     Acute bilateral low back pain with sciatica     Rhabdomyolysis     S/P total right hip arthroplasty

## 2022-11-04 RX ORDER — MAGNESIUM OXIDE 400 MG/1
400 TABLET ORAL DAILY
Qty: 30 TABLET | Refills: 5 | Status: SHIPPED | OUTPATIENT
Start: 2022-11-04

## 2022-11-04 NOTE — TELEPHONE ENCOUNTER
Magnesium refill  Pt on waitlist     Health Maintenance   Topic Date Due    COVID-19 Vaccine (1) Never done    Pneumococcal 65+ years Vaccine (1 - PCV) Never done    Annual Wellness Visit (AWV)  Never done    Flu vaccine (1) 08/01/2022    Shingles vaccine (2 of 2) 01/20/2023 (Originally 1/13/2021)    Depression Screen  03/25/2023    Lipids  07/28/2027    Colorectal Cancer Screen  05/27/2030    DTaP/Tdap/Td vaccine (2 - Td or Tdap) 12/15/2031    Hepatitis C screen  Completed    HIV screen  Completed    Hepatitis A vaccine  Aged Out    Hib vaccine  Aged Out    Meningococcal (ACWY) vaccine  Aged Out             (applicable per patient's age: Cancer Screenings, Depression Screening, Fall Risk Screening, Immunizations)    Hemoglobin A1C (%)   Date Value   09/15/2022 4.6   03/25/2022 4.7   11/21/2017 5.0     Microalb/Crt.  Ratio (mcg/mg creat)   Date Value   01/22/2013 9     LDL Cholesterol (mg/dL)   Date Value   07/28/2022 103     AST (U/L)   Date Value   09/15/2022 21     ALT (U/L)   Date Value   09/15/2022 28     BUN (mg/dL)   Date Value   09/15/2022 12      (goal A1C is < 7)   (goal LDL is <100) need 30-50% reduction from baseline     BP Readings from Last 3 Encounters:   10/12/22 (!) 157/60   09/27/22 (!) 167/64   09/15/22 (!) 151/49    (goal /80)      All Future Testing planned in [facility]:  Lab Frequency Next Occurrence   Protein / Creatinine Ratio, Urine Once 65/76/7469   Basic Metabolic Panel Once 57/35/9772   Nasal cannula oxygen PRN        Next Visit Date:  Future Appointments   Date Time Provider Jonathan Lundberg   11/7/2022 11:00 AM Carmella Gutierrez PA-C [id number]            Patient Active Problem List:     Hyperlipidemia     Gout     CKD (chronic kidney disease) stage 3, GFR 30-59 ml/min ([facility])     Wrist pain, right     Swelling of joint, wrist, right     Pain in joint, multiple sites     Obesity     Adjustment reaction     Vertigo     Pain in joint     Spinal stenosis, lumbar region, without neurogenic claudication     Facet arthropathy, lumbar     Chronic back pain     Acute bilateral low back pain with sciatica     Rhabdomyolysis     S/P total right hip arthroplasty

## 2022-11-07 ENCOUNTER — OFFICE VISIT (OUTPATIENT)
Dept: ORTHOPEDIC SURGERY | Age: 67
End: 2022-11-07

## 2022-11-07 VITALS — WEIGHT: 214 LBS | RESPIRATION RATE: 12 BRPM | HEIGHT: 72 IN | BODY MASS INDEX: 28.99 KG/M2

## 2022-11-07 DIAGNOSIS — Z96.641 STATUS POST TOTAL REPLACEMENT OF RIGHT HIP: Primary | ICD-10-CM

## 2022-11-07 PROCEDURE — 99024 POSTOP FOLLOW-UP VISIT: CPT | Performed by: PHYSICIAN ASSISTANT

## 2022-11-07 ASSESSMENT — ENCOUNTER SYMPTOMS
VOMITING: 0
RESPIRATORY NEGATIVE: 1
CHEST TIGHTNESS: 0
COUGH: 0
CONSTIPATION: 0
COLOR CHANGE: 0
ABDOMINAL DISTENTION: 0
SHORTNESS OF BREATH: 0
ABDOMINAL PAIN: 0
NAUSEA: 0
DIARRHEA: 0
APNEA: 0

## 2022-11-07 NOTE — PROGRESS NOTES
[address] [place] [address] AND SPORTS MEDICINE  [address] [place] [address]  Dept: [phone]  Dept Fax: [phone]        Post Operative Follow Up    Subjective:     Chief Complaint   Patient presents with    Post-Op Check     R MARTA DOS 09/27/22     Post Op Surgery:     The patient is here for a follow up after having a right total hip arthroplasty. Date of surgery was 9/27/2022. Therefore the patient is 6 weeks postop. Patient went to physical therapy a few times and then stopped because he said he could do it all on his own. He is not taking anything for the pain. He does ask if he would be okay for him to do martial arts after his total hip arthroplasty. Overall, he states he has having no pain in his hip at this time. Review of Systems   Constitutional:  Positive for activity change. Negative for appetite change, fatigue and fever. Respiratory: Negative. Negative for apnea, cough, chest tightness and shortness of breath. Cardiovascular: Negative. Negative for chest pain, palpitations and leg swelling. Gastrointestinal:  Negative for abdominal distention, abdominal pain, constipation, diarrhea, nausea and vomiting. Genitourinary:  Negative for difficulty urinating, dysuria and hematuria. Musculoskeletal:  Positive for arthralgias. Negative for gait problem, joint swelling and myalgias. Skin:  Negative for color change and rash. Neurological:  Negative for dizziness, weakness, numbness and headaches. Psychiatric/Behavioral:  Negative for sleep disturbance. I have reviewed the CC, HPI, ROS, PMH, FHX, Social History, and if not present in this note, I have reviewed in the patient's chart. I agree with the documentation provided by other staff and have reviewed their documentation prior to providing my signature indicating agreement.   Vitals:   Resp 12   Ht 6' (1.829 m)   Wt 214 lb (97.1 kg) BMI 29.02 kg/m²  Body mass index is 29.02 kg/m². Physical Examination:     Orthopedics:    GENERAL: Alert and oriented X3 in no acute distress. SKIN: Intact without lesions or ulcerations. Incision is well-healed with no signs of infection. NEURO: Intact to sensory and motor testing. VASC: Capillary refill is less than 3 seconds. Post Op Exam:    LOCATION: Right hip  SITE: Distal neurocirculatory status is intact. EXAM: Sensation is intact to light touch, there is full motor function of the extremity. ROM: 90 degrees of hip flexion, 30 degrees of abduction, 20 degrees of adduction, 30 of external rotation and 10 of internal rotation. Radiology:   No results found. Assessment:     1. Status post total replacement of right hip      Plan:   Post Op Treatment : Patient had the treatment regimen reviewed today 6 weeks status post right total hip arthroplasty. I reviewed the films with the patient. During today's visit, I discussed with the patient that I do not think martial arts is a good choice. I do think he could benefit from doing more physical therapy to work on range of motion and strengthening. They can also massage the scar to help break up scar tissue. He has decided to return to physical therapy and follow-up with Dr. David Anglin in 6 weeks. The patient will call the office immediately with any problems that may arise. No orders of the defined types were placed in this encounter. No orders of the defined types were placed in this encounter.         Electronically signed by Justin Casanova PA-C, on 11/7/2022 at 12:01 PM

## 2022-11-16 ENCOUNTER — TELEPHONE (OUTPATIENT)
Dept: INTERNAL MEDICINE | Age: 67
End: 2022-11-16

## 2022-11-16 NOTE — LETTER
[address] [address]  Phone: [phone]  Fax: [phone]    Aubrie Rubi MD        November 16, 2022     [address] [place] [address]      Dear Martín Brown: We missed seeing you for a scheduled appointment at [address] [place] [address] with Aubrie Rubi MD on 11/16/2022. We're sorry you were unable to keep your appointment and hope that you are doing well. We ask that you please call 24 hours in advance if you are unable to make your appointment, so that we can give that time to another patient in need. We care about you and the management of your healthcare and want to make sure that you follow up as recommended. To provide quality care and timely appointments to all our patients, you may be dismissed from the practice if you do not show for three (3) scheduled appointments within a 12-month period. We would like to continue treating your healthcare needs. Please call the office to reschedule your appointment, if needed.      Sincerely,        Aubrie Rubi MD

## 2022-12-19 ENCOUNTER — OFFICE VISIT (OUTPATIENT)
Dept: ORTHOPEDIC SURGERY | Age: 67
End: 2022-12-19

## 2022-12-19 VITALS — BODY MASS INDEX: 32.51 KG/M2 | WEIGHT: 240 LBS | HEIGHT: 72 IN | RESPIRATION RATE: 16 BRPM | OXYGEN SATURATION: 100 %

## 2022-12-19 DIAGNOSIS — Z96.641 STATUS POST TOTAL REPLACEMENT OF RIGHT HIP: Primary | ICD-10-CM

## 2022-12-19 PROCEDURE — 99024 POSTOP FOLLOW-UP VISIT: CPT | Performed by: ORTHOPAEDIC SURGERY

## 2022-12-22 ASSESSMENT — ENCOUNTER SYMPTOMS
EYE DISCHARGE: 0
ROS SKIN COMMENTS: NEGATIVE FOR RASH
ABDOMINAL PAIN: 0
SHORTNESS OF BREATH: 0

## 2022-12-22 NOTE — PROGRESS NOTES
[address] [place] [address] AND SPORTS MEDICINE  [facility]  [address] [facility] [address]  Dept: [phone]  Dept Fax: [phone]        Postoperative follow-up note    Subjective:   Heather Palacio is a 74y.o. year old male who presents to our office today for postoperative followup regarding his   1. Status post total replacement of right hip    . Chief Complaint   Patient presents with    Hip Pain     Right       Rocio Tyson is a 45-year-old male who presents the office today for recheck status post right total hip arthroplasty on 9/27/2022. The patient is entirely happy with the result and happy that he underwent surgery. Review of Systems   Constitutional:  Positive for activity change. Negative for fever. HENT:  Negative for dental problem. Eyes:  Negative for discharge. Respiratory:  Negative for shortness of breath. Cardiovascular:  Negative for chest pain. Gastrointestinal:  Negative for abdominal pain. Genitourinary: Negative. Musculoskeletal:  Positive for arthralgias. Skin:  Pallor: .rmd.        Negative for rash   Neurological:  Positive for weakness. Psychiatric/Behavioral:  Negative for confusion. I have reviewed the CC, HPI, ROS, PMH, FHX, Social History, and if not present in this note, I have reviewed in the patient's chart. I agree with the documentation provided by other staff and have reviewed their documentation prior to providing my signature indicating agreement. Objective :   General: Heather Palacio is a 74 y.o. male who is alert and oriented and sitting comfortably in our office. Ortho Exam  MS:   Incision right hip is healing well without signs of infection. Patient ambulates without antalgia or short weightbearing phase. Motor, sensory, vascular examination right lower extremity is grossly intact without focal deficits. Neuro: alert.  oriented  Eyes: Extra-ocular muscles intact  Mouth: Oral mucosa moist. No perioral lesions  Pulm: Respirations unlabored and regular. Skin: warm, well perfused  Psych:   Patient has good fund of knowledge and displays understanging of exam, diagnosis, and plan. Radiology: No results found. Assessment:      1. Status post total replacement of right hip         Plan:      Patient is doing well with her right hip and then transition to home exercise program.  The patient is going to continue exercises and I plan to see him back in 1 year or sooner as necessary. Repeat clinical and radiographic evaluation will be made at the next visit. Follow up: No follow-ups on file. No orders of the defined types were placed in this encounter. No orders of the defined types were placed in this encounter. This note is created with the assistance of a speech recognition program.  While intending to generate a document that actually reflects the content of the visit, the document can still have some errors including those of syntax and sound a like substitutions which may escape proof reading.   In such instances, actual meaning can be extrapolated by contextual diversion      Electronically signed by Noman Duke DO, [place] Lefevre on 12/22/2022 at 7:49 AM

## 2022-12-27 DIAGNOSIS — G89.29 CHRONIC BILATERAL THORACIC BACK PAIN: ICD-10-CM

## 2022-12-27 DIAGNOSIS — M54.6 CHRONIC BILATERAL THORACIC BACK PAIN: ICD-10-CM

## 2022-12-27 NOTE — TELEPHONE ENCOUNTER
Request for gabapentin. Please review and e-scribe to pharmacy listed in chart if appropriate. Thank you. Next Visit Date: 1/10/2023  Last Visit Date: 7/28/2022    No future appointments. Health Maintenance   Topic Date Due    COVID-19 Vaccine (1) Never done    Pneumococcal 65+ years Vaccine (1 - PCV) Never done    GFR test (Diabetes, CKD 3-4, OR last GFR 15-59)  Never done    Annual Wellness Visit (AWV)  Never done    Flu vaccine (1) 08/01/2022    Shingles vaccine (2 of 2) 01/20/2023 (Originally 1/13/2021)    Depression Screen  03/25/2023    Lipids  07/28/2027    Colorectal Cancer Screen  05/27/2030    DTaP/Tdap/Td vaccine (2 - Td or Tdap) 12/15/2031    Hepatitis C screen  Completed    HIV screen  Completed    Hepatitis A vaccine  Aged Out    Hib vaccine  Aged Out    Meningococcal (ACWY) vaccine  Aged Out       Hemoglobin A1C (%)   Date Value   09/15/2022 4.6   03/25/2022 4.7   11/21/2017 5.0             ( goal A1C is < 7)   Microalb/Crt.  Ratio (mcg/mg creat)   Date Value   01/22/2013 9     LDL Cholesterol (mg/dL)   Date Value   07/28/2022 103       (goal LDL is <100)   AST (U/L)   Date Value   09/15/2022 21     ALT (U/L)   Date Value   09/15/2022 28     BUN (mg/dL)   Date Value   09/15/2022 12     BP Readings from Last 3 Encounters:   10/12/22 (!) 157/60   09/27/22 (!) 167/64   09/15/22 (!) 151/49          (goal 120/80)    All Future Testing planned in [facility]  Lab Frequency Next Occurrence   Protein / Creatinine Ratio, Urine Once 67/64/5888   Basic Metabolic Panel Once 52/87/9469   Nasal cannula oxygen PRN          Patient Active Problem List:     Hyperlipidemia     Gout     CKD (chronic kidney disease) stage 3, GFR 30-59 ml/min ([facility])     Wrist pain, right     Swelling of joint, wrist, right     Pain in joint, multiple sites     Obesity     Adjustment reaction     Vertigo     Pain in joint     Spinal stenosis, lumbar region, without neurogenic claudication     Facet arthropathy, lumbar     Chronic back pain     Acute bilateral low back pain with sciatica     Rhabdomyolysis     S/P total right hip arthroplasty

## 2022-12-29 RX ORDER — GABAPENTIN 300 MG/1
CAPSULE ORAL
Qty: 90 CAPSULE | Refills: 0 | Status: SHIPPED | OUTPATIENT
Start: 2022-12-29 | End: 2023-01-28

## 2023-01-10 RX ORDER — BACLOFEN 10 MG/1
TABLET ORAL
COMMUNITY
Start: 2022-12-27

## 2023-01-16 ENCOUNTER — TELEPHONE (OUTPATIENT)
Dept: INTERNAL MEDICINE | Age: 68
End: 2023-01-16

## 2023-01-16 NOTE — TELEPHONE ENCOUNTER
Pt has never had AWV completed. PC to pt to schedule, no answer. Left HIPAA compliant message identifying self and nature of call, requested call back to writer, phone number given. Will tentatively put pt on writer's schedule for 3/7/23, he is already coming in for visit with PCP that date at 56.

## 2023-01-21 DIAGNOSIS — E79.0 HYPERURICEMIA: ICD-10-CM

## 2023-01-23 RX ORDER — ALLOPURINOL 300 MG/1
TABLET ORAL
Qty: 30 TABLET | Refills: 5 | Status: SHIPPED | OUTPATIENT
Start: 2023-01-23

## 2023-01-23 NOTE — TELEPHONE ENCOUNTER
Request for allopurinol      /Next Visit Date:3/7/23  Future Appointments   Date Time Provider Jonathan Lundberg   3/7/2023  9:30 AM SCHEDULE, [facility] NURSE [facility]   3/7/2023 10:00 AM Mindi Silvestre MD [address] [facility] Maintenance   Topic Date Due    COVID-19 Vaccine (1) Never done    Pneumococcal 65+ years Vaccine (1 - PCV) Never done    Shingles vaccine (2 of 2) 01/13/2021    Annual Wellness Visit (AWV)  Never done    Flu vaccine (1) 08/01/2022    Depression Screen  03/25/2023    GFR test (Diabetes, CKD 3-4, OR last GFR 15-59)  09/15/2023    Lipids  07/28/2027    Colorectal Cancer Screen  05/27/2030    DTaP/Tdap/Td vaccine (2 - Td or Tdap) 12/15/2031    Hepatitis C screen  Completed    HIV screen  Completed    Hepatitis A vaccine  Aged Out    Hib vaccine  Aged Out    Meningococcal (ACWY) vaccine  Aged Out       Hemoglobin A1C (%)   Date Value   09/15/2022 4.6   03/25/2022 4.7   11/21/2017 5.0             ( goal A1C is < 7)   Microalb/Crt.  Ratio (mcg/mg creat)   Date Value   01/22/2013 9     LDL Cholesterol (mg/dL)   Date Value   07/28/2022 103       (goal LDL is <100)   AST (U/L)   Date Value   09/15/2022 21     ALT (U/L)   Date Value   09/15/2022 28     BUN (mg/dL)   Date Value   09/15/2022 12     BP Readings from Last 3 Encounters:   10/12/22 (!) 157/60   09/27/22 (!) 167/64   09/15/22 (!) 151/49          (goal 120/80)    All Future Testing planned in [facility]  Lab Frequency Next Occurrence   Protein / Creatinine Ratio, Urine Once 77/42/9599   Basic Metabolic Panel Once 36/18/4519   Nasal cannula oxygen PRN          Patient Active Problem List:     Hyperlipidemia     Gout     CKD (chronic kidney disease) stage 3, GFR 30-59 ml/min ([facility])     Wrist pain, right     Swelling of joint, wrist, right     Pain in joint, multiple sites     Obesity     Adjustment reaction     Vertigo     Pain in joint     Spinal stenosis, lumbar region, without neurogenic claudication     Facet arthropathy, lumbar Chronic back pain     Acute bilateral low back pain with sciatica     Rhabdomyolysis     S/P total right hip arthroplasty

## 2023-01-26 DIAGNOSIS — G89.29 CHRONIC BILATERAL THORACIC BACK PAIN: ICD-10-CM

## 2023-01-26 DIAGNOSIS — M54.6 CHRONIC BILATERAL THORACIC BACK PAIN: ICD-10-CM

## 2023-01-26 DIAGNOSIS — I10 PRIMARY HYPERTENSION: Primary | ICD-10-CM

## 2023-01-26 RX ORDER — GABAPENTIN 300 MG/1
CAPSULE ORAL
Qty: 90 CAPSULE | Refills: 0 | Status: CANCELLED | OUTPATIENT
Start: 2023-01-26 | End: 2023-02-25

## 2023-01-27 RX ORDER — GABAPENTIN 300 MG/1
CAPSULE ORAL
Qty: 90 CAPSULE | Refills: 0 | Status: SHIPPED | OUTPATIENT
Start: 2023-01-27 | End: 2023-02-28

## 2023-01-27 RX ORDER — GABAPENTIN 300 MG/1
CAPSULE ORAL
Qty: 90 CAPSULE | Refills: 0 | OUTPATIENT
Start: 2023-01-27

## 2023-01-27 RX ORDER — AMLODIPINE BESYLATE 10 MG/1
10 TABLET ORAL DAILY
Qty: 30 TABLET | Refills: 1 | Status: SHIPPED | OUTPATIENT
Start: 2023-01-27 | End: 2023-03-07 | Stop reason: SDUPTHER

## 2023-01-30 NOTE — TELEPHONE ENCOUNTER
Patient need refill right away.
Why is high priority?
Facet arthropathy, lumbar     Chronic back pain     Acute bilateral low back pain with sciatica     Rhabdomyolysis     S/P total right hip arthroplasty

## 2023-02-25 DIAGNOSIS — G89.29 CHRONIC BILATERAL THORACIC BACK PAIN: ICD-10-CM

## 2023-02-25 DIAGNOSIS — M54.6 CHRONIC BILATERAL THORACIC BACK PAIN: ICD-10-CM

## 2023-02-28 RX ORDER — GABAPENTIN 300 MG/1
CAPSULE ORAL
Qty: 90 CAPSULE | Refills: 1 | Status: SHIPPED | OUTPATIENT
Start: 2023-02-28 | End: 2023-02-28

## 2023-03-07 ENCOUNTER — OFFICE VISIT (OUTPATIENT)
Dept: INTERNAL MEDICINE | Age: 68
End: 2023-03-07

## 2023-03-07 VITALS
WEIGHT: 243.6 LBS | SYSTOLIC BLOOD PRESSURE: 137 MMHG | DIASTOLIC BLOOD PRESSURE: 71 MMHG | BODY MASS INDEX: 34.88 KG/M2 | OXYGEN SATURATION: 97 % | TEMPERATURE: 97.7 F | HEIGHT: 70 IN

## 2023-03-07 DIAGNOSIS — Z00.00 WELCOME TO MEDICARE PREVENTIVE VISIT: ICD-10-CM

## 2023-03-07 DIAGNOSIS — G89.29 CHRONIC RIGHT SHOULDER PAIN: ICD-10-CM

## 2023-03-07 DIAGNOSIS — M25.531 RIGHT WRIST PAIN: ICD-10-CM

## 2023-03-07 DIAGNOSIS — I51.89 DIASTOLIC DYSFUNCTION WITHOUT HEART FAILURE: ICD-10-CM

## 2023-03-07 DIAGNOSIS — I10 PRIMARY HYPERTENSION: ICD-10-CM

## 2023-03-07 DIAGNOSIS — M25.511 CHRONIC RIGHT SHOULDER PAIN: ICD-10-CM

## 2023-03-07 DIAGNOSIS — E78.00 PURE HYPERCHOLESTEROLEMIA: ICD-10-CM

## 2023-03-07 DIAGNOSIS — Z00.00 INITIAL MEDICARE ANNUAL WELLNESS VISIT: Primary | ICD-10-CM

## 2023-03-07 DIAGNOSIS — N18.31 STAGE 3A CHRONIC KIDNEY DISEASE (HCC): ICD-10-CM

## 2023-03-07 RX ORDER — AMLODIPINE BESYLATE 10 MG/1
10 TABLET ORAL DAILY
Qty: 30 TABLET | Refills: 5 | Status: SHIPPED | OUTPATIENT
Start: 2023-03-07

## 2023-03-07 SDOH — ECONOMIC STABILITY: FOOD INSECURITY: WITHIN THE PAST 12 MONTHS, THE FOOD YOU BOUGHT JUST DIDN'T LAST AND YOU DIDN'T HAVE MONEY TO GET MORE.: NEVER TRUE

## 2023-03-07 SDOH — ECONOMIC STABILITY: FOOD INSECURITY: WITHIN THE PAST 12 MONTHS, YOU WORRIED THAT YOUR FOOD WOULD RUN OUT BEFORE YOU GOT MONEY TO BUY MORE.: NEVER TRUE

## 2023-03-07 SDOH — ECONOMIC STABILITY: HOUSING INSECURITY
IN THE LAST 12 MONTHS, WAS THERE A TIME WHEN YOU DID NOT HAVE A STEADY PLACE TO SLEEP OR SLEPT IN A SHELTER (INCLUDING NOW)?: NO

## 2023-03-07 SDOH — ECONOMIC STABILITY: INCOME INSECURITY: HOW HARD IS IT FOR YOU TO PAY FOR THE VERY BASICS LIKE FOOD, HOUSING, MEDICAL CARE, AND HEATING?: NOT HARD AT ALL

## 2023-03-07 ASSESSMENT — PATIENT HEALTH QUESTIONNAIRE - PHQ9
SUM OF ALL RESPONSES TO PHQ QUESTIONS 1-9: 0
SUM OF ALL RESPONSES TO PHQ9 QUESTIONS 1 & 2: 0
1. LITTLE INTEREST OR PLEASURE IN DOING THINGS: 0
SUM OF ALL RESPONSES TO PHQ QUESTIONS 1-9: 0
2. FEELING DOWN, DEPRESSED OR HOPELESS: 0

## 2023-03-07 ASSESSMENT — LIFESTYLE VARIABLES
HOW OFTEN DURING THE LAST YEAR HAVE YOU FAILED TO DO WHAT WAS NORMALLY EXPECTED FROM YOU BECAUSE OF DRINKING: 0
HOW OFTEN DURING THE LAST YEAR HAVE YOU BEEN UNABLE TO REMEMBER WHAT HAPPENED THE NIGHT BEFORE BECAUSE YOU HAD BEEN DRINKING: 0
HAS A RELATIVE, FRIEND, DOCTOR, OR ANOTHER HEALTH PROFESSIONAL EXPRESSED CONCERN ABOUT YOUR DRINKING OR SUGGESTED YOU CUT DOWN: 0
HOW OFTEN DURING THE LAST YEAR HAVE YOU NEEDED AN ALCOHOLIC DRINK FIRST THING IN THE MORNING TO GET YOURSELF GOING AFTER A NIGHT OF HEAVY DRINKING: 0
HOW MANY STANDARD DRINKS CONTAINING ALCOHOL DO YOU HAVE ON A TYPICAL DAY: 3 OR 4
HOW OFTEN DO YOU HAVE A DRINK CONTAINING ALCOHOL: 4 OR MORE TIMES A WEEK
HOW OFTEN DURING THE LAST YEAR HAVE YOU FOUND THAT YOU WERE NOT ABLE TO STOP DRINKING ONCE YOU HAD STARTED: 0
HAVE YOU OR SOMEONE ELSE BEEN INJURED AS A RESULT OF YOUR DRINKING: 0
HOW OFTEN DURING THE LAST YEAR HAVE YOU HAD A FEELING OF GUILT OR REMORSE AFTER DRINKING: 0

## 2023-03-07 ASSESSMENT — ENCOUNTER SYMPTOMS
COUGH: 0
SHORTNESS OF BREATH: 0
ABDOMINAL PAIN: 0
WHEEZING: 0
BLOOD IN STOOL: 0
BACK PAIN: 1

## 2023-03-07 NOTE — PROGRESS NOTES
Medicare Annual Wellness Visit    Gato Samaniego is here for Medicare AWV (First AWV) and Wrist Pain (Right wrist up to rotator cuff, sees pain management. They ordered x-rays but pt can't find order)    Assessment & Plan   Initial Medicare annual wellness visit      Recommendations for Preventive Services Due: see orders and patient instructions/AVS.  Recommended screening schedule for the next 5-10 years is provided to the patient in written form: see Patient Instructions/AVS.     No follow-ups on file. Subjective       Patient's complete Health Risk Assessment and screening values have been reviewed and are found in Flowsheets. The following problems were reviewed today and where indicated follow up appointments were made and/or referrals ordered. Positive Risk Factor Screenings with Interventions:         Alcohol Screening:  Alcohol Use: Heavy Drinker    Frequency of Alcohol Consumption: 4 or more times a week    Average Number of Drinks: 3 or 4    Frequency of Binge Drinking: Monthly      AUDIT-C Score: 7   AUDIT Total Score: 7    Interpretation of AUDIT-C score:   3-7 indicates potential alcohol risk. 8 or more is associated with harmful or hazardous drinking. 15 or more in women, and 15 or more in men, is likely to indicate alcohol dependence. Interventions:  Patient declined any further intervention or treatment              Opioid Risk: (Low risk score <55) Opioid risk score: 30    Patient is low risk for opioid use disorder or overdose. Last PDMP Elbaudilio Grubbs as Reviewed:  Review User Review Instant Review Result   Trevor Crouch 2/28/2023  8:02 AM     Reviewed PDMP [1]     Last Controlled Substance Monitoring Documentation      Flowsheet Row Refill from 3/4/2019 in [address] [facility] Prescription Monitoring Report for this patient was reviewed today.  filed at 03/05/2019 1229   Periodic Controlled Substance Monitoring No signs of potential drug abuse or diversion identified: otherwise, see note documentation filed at 03/05/2019 9966              General HRA Questions:  Select all that apply: (!) New or Increased Pain, New or Increased Fatigue, Stress, Anger (increased pain in right wrist to arm; notices increased fatigue when he misses his iron pill, also feels more tired when he's not working out; less busy in winter so feels more stressed; anger is r/t to not being able to do things he wants to do)    Pain Interventions:  Patient follows with pain management clinic outside [facility]    Fatigue Interventions:  Patient comments: Pt states fatigue is related to missing his iron pills on occasion as well as not having been as physically active as he usually is. Pt states he is less busy with work in the winter and that leads him to being more idle than usual.     Stress Interventions:  Patient comments: Again, pt attributes stress to being less busy with work and not having things to do    Anger Interventions:  Patient comments: States his anger is more frustration with not being able to accomplish the things/goals he has in life. He has always wanted to start a school but has not been able to realize that dream yet.        Weight and Activity:  Physical Activity: Sufficiently Active    Days of Exercise per Week: 7 days    Minutes of Exercise per Session: 90 min     On average, how many days per week do you engage in moderate to strenuous exercise (like a brisk walk)?: 7 days  Have you lost any weight without trying in the past 3 months?: No (actively trying to lose weight, build muscle)  Body mass index: (!) 34.7    Obesity Interventions:  Patient declines any further evaluation or treatment            Vision Screen:  Do you have difficulty driving, watching TV, or doing any of your daily activities because of your eyesight?: (!) Yes (can't see up close, will send list of providers based on insurance)  Have you had an eye exam within the past year?: (!) No (will send list of providers)  No results found. Interventions:    Writer will send a list of providers from insurance website    Safety:  Do all of your stairways have a railing or banister?: (!) No  Interventions:  Education provided, advised he can contact the [employer] [facility] for possible assistance with railings    ADL's:   Patient reports needing help with:  Select all that apply: (!) Dressing (sometimes needs help with socks)  Interventions:  Patient comments: Pt states he usually does things on his own but will occasionally ask for help with getting his socks on when he's \"feeling lazy\"    Advanced Directives:  Do you have a Living Will?: (!) No (education provided, referral placed)    Intervention:  has NO advanced directive  - referred to ACP Coordinator                          Objective   Vitals:    03/07/23 0958   BP: 137/71   Site: Left Upper Arm   Position: Sitting   Cuff Size: Large Adult   Temp: 97.7 °F (36.5 °C)   TempSrc: Infrared   SpO2: 97%   Weight: 243 lb 9.6 oz (110.5 kg)   Height: 5' 10.25\" (1.784 m)      Body mass index is 34.7 kg/m². Allergies   Allergen Reactions    Codeine Other (See Comments)     Don;t agree with him    Shrimp (Diagnostic) Hives     Prior to Visit Medications    Medication Sig Taking? Authorizing Provider   amLODIPine (NORVASC) 10 MG tablet Take 1 tablet by mouth daily Yes Yeimy Stone MD   allopurinol (ZYLOPRIM) 300 MG tablet TAKE 1 TABLET BY MOUTH ONCE DAILY IN THE MORNING. Yes Yeimy Stone MD   diclofenac sodium (VOLTAREN) 1 % GEL  Yes Historical Provider, MD   baclofen (LIORESAL) 10 MG tablet  Yes Historical Provider, MD   magnesium oxide (MAG-OX) 400 MG tablet Take 1 tablet by mouth daily Yes Yeimy Stone MD   tiZANidine (ZANAFLEX) 4 MG tablet TAKE 1 TABLET BY MOUTH EVERY 8 HOURS AS NEEDED FOR SPASMS. Yes Tracey Casillas MD   ferrous sulfate (FEROSUL) 325 (65 Fe) MG tablet TAKE 1 TABLET BY MOUTH ONCE DAILY.  Yes Tracey Casillas MD   oxyCODONE-acetaminophen (PERCOCET)  MG per tablet Take 1 tablet by mouth 3 times daily. Yes Historical Provider, MD   diclofenac sodium (VOLTAREN) 1 % GEL Apply 2 g topically 2 times daily Yes Historical Provider, MD   gabapentin (NEURONTIN) 300 MG capsule TAKE 1 CAPSULE BY MOUTH 3 TIMES A DAY (IN THE MORNING, AT NOON AND BEFORE BEDTIME) FOR 30 DAYS. Deepa Tracey MD   aspirin EC 81 MG EC tablet Take 1 tablet by mouth 2 times daily  Gerri Kim DO   ketorolac (TORADOL) 10 MG tablet Take 10 mg by mouth every 6 hours as needed  Patient not taking: Reported on 3/7/2023  Historical Provider, MD   pantoprazole (PROTONIX) 40 MG tablet 1 tablet daily  Deepa Tracey MD   diphenhydrAMINE (BENADRYL) 25 MG capsule Take 1-2 tablets by mouth every 6 hours as needed for itching. Patient not taking: Reported on 3/7/2023  Deepa Tracey MD       [facility] (Including outside providers/suppliers regularly involved in providing care):   Patient Care Team:  Deepa Tracey MD as PCP - General (Internal Medicine)  Deepa Tracey MD as PCP - Empaneled Provider  Deepa Tracey MD as Referring Physician (Internal Medicine)  Maria Luisa Chavez MD as Consulting Physician (Gastroenterology)     Reviewed and updated this visit:  Allergies  Meds         I, Eren Boone RN, 3/7/2023, performed the documented evaluation under the direct supervision of the attending physician.       Eren Boone RN WDL

## 2023-03-07 NOTE — PATIENT INSTRUCTIONS
6 mon f/u     Referral clinical specialist     -Caroline Guerrier orders given to patient, they will have them done before their next visit. Personalized Preventive Plan for Justin Byrd - 3/7/2023  Medicare offers a range of preventive health benefits. Some of the tests and screenings are paid in full while other may be subject to a deductible, co-insurance, and/or copay. Some of these benefits include a comprehensive review of your medical history including lifestyle, illnesses that may run in your family, and various assessments and screenings as appropriate. After reviewing your medical record and screening and assessments performed today your provider may have ordered immunizations, labs, imaging, and/or referrals for you. A list of these orders (if applicable) as well as your Preventive Care list are included within your After Visit Summary for your review. Other Preventive Recommendations:    A preventive eye exam performed by an eye specialist is recommended every 1-2 years to screen for glaucoma; cataracts, macular degeneration, and other eye disorders. A preventive dental visit is recommended every 6 months. Try to get at least 150 minutes of exercise per week or 10,000 steps per day on a pedometer . Order or download the FREE \"Exercise & Physical Activity: Your Everyday Guide\" from [employer]. Call 2-611.206.2306 or search [employer] online. You need 5503-1360 mg of calcium and 7796-0318 IU of vitamin D per day. It is possible to meet your calcium requirement with diet alone, but a vitamin D supplement is usually necessary to meet this goal.  When exposed to the sun, use a sunscreen that protects against both UVA and UVB radiation with an SPF of 30 or greater. Reapply every 2 to 3 hours or after sweating, drying off with a towel, or swimming. Always wear a seat belt when traveling in a car.  Always wear a helmet when riding a bicycle or motorcycle. Fatigue: Care Instructions  Your Care Instructions     Fatigue is a feeling of tiredness, exhaustion, or lack of energy. You may feel fatigue because of too much or not enough activity. It can also come from stress, lack of sleep, boredom, and poor diet. Many medical problems, such as viral infections, can cause fatigue. Emotional problems, especially depression, are often the cause of fatigue. Fatigue is most often a symptom of another problem. Treatment for fatigue depends on the cause. For example, if you have fatigue because you have a certain health problem, treating this problem also treats your fatigue. If depression or anxiety is the cause, treatment may help. Follow-up care is a key part of your treatment and safety. Be sure to make and go to all appointments, and call your doctor if you are having problems. It's also a good idea to know your test results and keep a list of the medicines you take. How can you care for yourself at home? Get regular exercise. But don't overdo it. Go back and forth between rest and exercise. Get plenty of rest.  Eat a healthy diet. Do not skip meals, especially breakfast.  Reduce your use of caffeine, tobacco, and alcohol. Caffeine is most often found in coffee, tea, cola drinks, and chocolate. Limit medicines that can cause fatigue. This includes tranquilizers and cold and allergy medicines. When should you call for help? Watch closely for changes in your health, and be sure to contact your doctor if:    You have new symptoms such as fever or a rash.     Your fatigue gets worse.     You have been feeling down, depressed, or hopeless. Or you may have lost interest in things that you usually enjoy.     You are not getting better as expected. Where can you learn more? Go to http://www.woods.com/ and enter T274 to learn more about \"Fatigue: Care Instructions. \"  Current as of: February 9, 2022               Content Version: 13.5  © 5150-5589 [employer].   Care instructions adapted under license by [employer]. If you have questions about a medical condition or this instruction, always ask your healthcare professional. [employer] disclaims any warranty or liability for your use of this information.           Learning About Stress  What is stress?     Stress is what you feel when you have to handle more than you are used to. Stress is a fact of life for most people, and it affects everyone differently. What causes stress for you may not be stressful for someone else.  A lot of things can cause stress. You may feel stress when you go on a job interview, take a test, or run a race. This kind of short-term stress is normal and even useful. It can help you if you need to work hard or react quickly. For example, stress can help you finish an important job on time.  Stress also can last a long time. Long-term stress is caused by stressful situations or events. Examples of long-term stress include long-term health problems, ongoing problems at work, or conflicts in your family. Long-term stress can harm your health.  How does stress affect your health?  When you are stressed, your body responds as though you are in danger. It makes hormones that speed up your heart, make you breathe faster, and give you a burst of energy. This is called the fight-or-flight stress response. If the stress is over quickly, your body goes back to normal and no harm is done.  But if stress happens too often or lasts too long, it can have bad effects. Long-term stress can make you more likely to get sick, and it can make symptoms of some diseases worse. If you tense up when you are stressed, you may develop neck, shoulder, or low back pain. Stress is linked to high blood pressure and heart disease.  Stress also harms your emotional health. It can make you vazquez, tense, or depressed. Your relationships may suffer, and you may not do well at work  or school. What can you do to manage stress? How to relax your mind   Write. It may help to write about things that are bothering you. This helps you find out how much stress you feel and what is causing it. When you know this, you can find better ways to cope. Let your feelings out. Talk, laugh, cry, and express anger when you need to. Talking with friends, family, a counselor, or a member of the clergy about your feelings is a healthy way to relieve stress. Do something you enjoy. For example, listen to music or go to a movie. Practice your hobby or do volunteer work. Meditate. This can help you relax, because you are not worrying about what happened before or what may happen in the future. Do guided imagery. Imagine yourself in any setting that helps you feel calm. You can use audiotapes, books, or a teacher to guide you. How to relax your body   Do something active. Exercise or activity can help reduce stress. Walking is a great way to get started. Even everyday activities such as housecleaning or yard work can help. Do breathing exercises. For example:  From a standing position, bend forward from the waist with your knees slightly bent. Let your arms dangle close to the floor. Breathe in slowly and deeply as you return to a standing position. Roll up slowly and lift your head last.  Hold your breath for just a few seconds in the standing position. Breathe out slowly and bend forward from the waist.  Try yoga or saskia chi. These techniques combine exercise and meditation. You may need some training at first to learn them. What can you do to prevent stress? Manage your time. This helps you find time to do the things you want and need to do. Get enough sleep. Your body recovers from the stresses of the day while you are sleeping. Get support. Your family, friends, and community can make a difference in how you experience stress. Where can you learn more?   Go to http://www.woods.com/ and enter X999 to learn more about \"Learning About Stress. \"  Current as of: October 6, 2021               Content Version: 13.5  © 2006-2022 [employer]. Care instructions adapted under license by [facility] ([facility]). If you have questions about a medical condition or this instruction, always ask your healthcare professional. [address] any warranty or liability for your use of this information. Learning About Managing Anger  What causes anger? Many things can cause anger: Stress at work or at home. Social situations that make you angry. A response to everyday events. Anger signals your body to prepare for a fight. This reaction is often called \"fight or flight. \" When you get angry, adrenaline and other hormones are released into your blood. Then your blood pressure goes up, your heart beats faster, and you breathe faster. When you express anger in a healthy way, it can inspire change and make you productive. But if you don't have the skills to express anger in a healthy way, anger can build up. You may hurt others--and yourself--emotionally and even physically. Violent behavior often starts with verbal threats or fairly minor incidents. But over time, it can involve physical harm. It can include physical, verbal, or sexual abuse of an intimate partner (domestic violence), a child (child abuse), or an older adult (elder abuse). It can also make you sick. Anger and constant hostility keep your blood pressure high. They increase your chances of having another health problem, such as depression, a heart attack, or a stroke. Some people with post-traumatic stress disorder (PTSD) feel angry and on alert all the time. It may feel like there are no other ways to react when you are angry. But when you learn to work with anger in appropriate and healthy ways, your anger no longer controls you. How can you manage your anger?   The first step to managing anger is to be more aware of it. Note the thoughts, feelings, and emotions that you have when you get angry. Practice noticing these signs of anger when you are calm. If you are more aware of the signs of anger, you can take steps to manage it. Here are a few tips:  Think before you act. Take time to stop and cool down when you feel yourself getting angry. Count to 10 while you take slow, steady breaths. Practice some other form of mental relaxation.  Learn the feelings that lead to angry outbursts. Anger and hostility may be a symptom of unhappy feelings or depression about your job, your relationship, or other aspects of your personal life.  Avoid situations that lead to angry outbursts. If standing in line bothers you, do errands at less busy times.  Express anger in a healthy way. You might:  Go for a short walk or jog.  Draw, paint, or listen to music to release the anger.  Write in a daily journal.  Use \"I\" statements, not \"you\" statements, to discuss your anger. Say \"I don't feel valued when my needs are not being met\" instead of \"You make me mad when you are so inconsiderate.\"  Take care of yourself.  Exercise regularly.  Eat a variety of healthy foods. Don't skip meals.  Try to get 8 hours of sleep each night.  Limit your use of alcohol, and don't use drugs.  Practice yoga, meditation, or saskia chi to relax.  Explore other resources that may be available through your job or your community.  Contact your human resources department at work. You might be able to get services through an employee assistance program.  Contact your local hospital, mental health facility, or health department. Ask what types of programs or support groups are available in your area.  Do not keep guns in your home. If you must have guns in your home, unload them and lock them up. Lock ammunition in a separate place. Keep guns away from children.  Where can you find help?  If anger or stress starts to harm your work or personal relationships, you might seek help.  You can learn ways to manage your feelings and actions. Talk to someone you trust, or find a counselor. There are groups in your area that can connect you with people to talk to. [facility] . This service from the national Substance Abuse and [address] can help you find local counselors. Search online at [employer]. [facility] Hospit[facility].gov or call 8-222-602STORYS.JPHELP (981 083 171), or TDD 9-586.504.2358. Parents Anonymous. Self-help groups that serve parents under stress, as well as children who have been abused, are available throughout the [place] [facility], [place] [facility] and [facility]. To find a group in your area, search online or in your phone book under Parents Anonymous or call (896) 861-0816. Where can you learn more? Go to http://[facility].newman.com/ and enter Z357 to learn more about \"Learning About Managing Anger. \"  Current as of: February 9, 2022               Content Version: 13.5  © 2006-2022 Healthwise, [employer]. Care instructions adapted under license by [facility] ([facility]). If you have questions about a medical condition or this instruction, always ask your healthcare professional. [place] [address] any warranty or liability for your use of this information. Learning About Vision Tests  What are vision tests? The four most common vision tests are visual acuity tests, refraction, visual field tests, and color vision tests. Visual acuity (sharpness) tests  These tests are used: To see if you need glasses or contact lenses. To monitor an eye problem. To check an eye injury. Visual acuity tests are done as part of routine exams. You may also have this test when you get your 's license or apply for some types of jobs. Visual field tests  These tests are used: To check for vision loss in any area of your range of vision. To screen for certain eye diseases.   To look for nerve damage after a stroke, head injury, or other problem that could reduce blood flow to the brain. Refraction and color tests  A refraction test is done to find the right prescription for glasses and contact lenses. A color vision test is done to check for color blindness. Color vision is often tested as part of a routine exam. You may also have this test when you apply for a job where recognizing different colors is important, such as , electronics, or the [employer]. How are vision tests done? Visual acuity test   You cover one eye at a time. You read aloud from a wall chart across the room. You read aloud from a small card that you hold in your hand. Refraction   You look into a special device. The device puts lenses of different strengths in front of each eye to see how strong your glasses or contact lenses need to be. Visual field tests   Your doctor may have you look through special machines. Or your doctor may simply have you stare straight ahead while they move a finger into and out of your field of vision. Color vision test   You look at pieces of printed test patterns in various colors. You say what number or symbol you see. Your doctor may have you trace the number or symbol using a pointer. How do these tests feel? There is very little chance of having a problem from this test. If dilating drops are used for a vision test, they may make the eyes sting and cause a medicine taste in the mouth. Follow-up care is a key part of your treatment and safety. Be sure to make and go to all appointments, and call your doctor if you are having problems. It's also a good idea to know your test results and keep a list of the medicines you take. Where can you learn more? Go to http://www.newman.com/ and enter G551 to learn more about \"Learning About Vision Tests. \"  Current as of: October 12, 2022               Content Version: 13.5  © 6130-2244 Healthwise, [employer].    Care instructions adapted under license by [facility]. If you have questions about a medical condition or this instruction, always ask your healthcare professional. [address] any warranty or liability for your use of this information. Learning About Activities of Daily Living  What are activities of daily living? Activities of daily living (ADLs) are the basic self-care tasks you do every day. As you age, and if you have health problems, you may find that it's harder to do these things for yourself. That's when you may need some help. Your doctor uses ADLs to measure how much help you need. Knowing what you can and can't do for yourself is an important first step to getting help. And when you have the help you need, you can stay as independent as possible. Your doctor will want to know if you are able to do tasks such as: Take a bath or shower without help. Go to the bathroom by yourself. Dress and undress without help. Shave, comb your hair, and brush teeth on your own. Get in and out of bed or a chair without help. Feed yourself without help. If you are having trouble doing basic self-care tasks, talk with your doctor. You may want to bring a caregiver or family member who can help the doctor understand your needs and abilities. How will a doctor assess your ADLs? Asking about ADLs is part of a routine health checkup your doctor will likely do as you age. Your health check might be done in a doctor's office, in your home, or at a hospital. The goal is to find out if you are having any problems that could make your health problems worse or that make it unsafe for you to be on your own. To measure your ADLs, your doctor will ask how hard it is for you to do routine tasks. He or she may also want to know if you have changed the way you do a task because of a health problem. He or she may watch how you:  Walk back and forth. Keep your balance while you stand or walk.   Move from sitting to standing or from a bed to a chair. Button or unbutton a shirt or sweater. Remove and put on your shoes. It's normal to feel a little worried or anxious if you find you can't do all the things you used to be able to do. Talking with your doctor about ADLs isn't a test that you either pass or fail. It's just a way to get more information about your health and safety. Follow-up care is a key part of your treatment and safety. Be sure to make and go to all appointments, and call your doctor if you are having problems. It's also a good idea to know your test results and keep a list of the medicines you take. Current as of: October 6, 2021               Content Version: 13.5  © 2006-2022 Healthwise, [employer]. Care instructions adapted under license by [facility] ([facility]). If you have questions about a medical condition or this instruction, always ask your healthcare professional. [place] [address] any warranty or liability for your use of this information. Advance Directives: Care Instructions  Overview  An advance directive is a legal way to state your wishes at the end of your life. It tells your family and your doctor what to do if you can't say what you want. There are two main types of advance directives. You can change them any time your wishes change. Living will. This form tells your family and your doctor your wishes about life support and other treatment. The form is also called a declaration. Medical power of . This form lets you name a person to make treatment decisions for you when you can't speak for yourself. This person is called a health care agent (health care proxy, health care surrogate). The form is also called a durable power of  for health care. If you do not have an advance directive, decisions about your medical care may be made by a family member, or by a doctor or a  who doesn't know you.   It may help to think of an advance directive as a gift to the people who care for you. If you have one, they won't have to make tough decisions by themselves. For more information, including forms for your state, see the [address] website (www.caringinfo.org/planning/advance-directives/). Follow-up care is a key part of your treatment and safety. Be sure to make and go to all appointments, and call your doctor if you are having problems. It's also a good idea to know your test results and keep a list of the medicines you take. What should you include in an advance directive? Many states have a unique advance directive form. (It may ask you to address specific issues.) Or you might use a universal form that's approved by many states. If your form doesn't tell you what to address, it may be hard to know what to include in your advance directive. Use the questions below to help you get started. Who do you want to make decisions about your medical care if you are not able to? What life-support measures do you want if you have a serious illness that gets worse over time or can't be cured? What are you most afraid of that might happen? (Maybe you're afraid of having pain, losing your independence, or being kept alive by machines.)  Where would you prefer to die? (Your home? A hospital? A nursing home?)  Do you want to donate your organs when you die? Do you want certain Denominational practices performed before you die? When should you call for help? Be sure to contact your doctor if you have any questions. Where can you learn more? Go to http://www.TextualAds.com/ and enter R264 to learn more about \"Advance Directives: Care Instructions. \"  Current as of: June 16, 2022               Content Version: 13.5  © 2080-5341 Healthwise, Incorporated. Care instructions adapted under license by [facility] ([facility]).  If you have questions about a medical condition or this instruction, always ask your healthcare professional. [address] any warranty or liability for your use of this information. Starting a Weight Loss Plan: Care Instructions  Overview     If you're thinking about losing weight, it can be hard to know where to start. Your doctor can help you set up a weight loss plan that best meets your needs. You may want to take a class on nutrition or exercise, or you could join a weight loss support group. If you have questions about how to make changes to your eating or exercise habits, ask your doctor about seeing a registered dietitian or an exercise specialist.  It can be a big challenge to lose weight. But you don't have to make huge changes at once. Make small changes, and stick with them. When those changes become habit, add a few more changes. If you don't think you're ready to make changes right now, try to pick a date in the future. Make an appointment to see your doctor to discuss whether the time is right for you to start a plan. Follow-up care is a key part of your treatment and safety. Be sure to make and go to all appointments, and call your doctor if you are having problems. It's also a good idea to know your test results and keep a list of the medicines you take. How can you care for yourself at home? Set realistic goals. Many people expect to lose much more weight than is likely. A weight loss of 5% to 10% of your body weight may be enough to improve your health. Get family and friends involved to provide support. Talk to them about why you are trying to lose weight, and ask them to help. They can help by participating in exercise and having meals with you, even if they may be eating something different. Find what works best for you. If you do not have time or do not like to cook, a program that offers meal replacement bars or shakes may be better for you.  Or if you like to prepare meals, finding a plan that includes daily menus and recipes may be best.  Ask your doctor about other health professionals who can help you achieve your weight loss goals. A dietitian can help you make healthy changes in your diet. An exercise specialist or  can help you develop a safe and effective exercise program.  A counselor or psychiatrist can help you cope with issues such as depression, anxiety, or family problems that can make it hard to focus on weight loss. Consider joining a support group for people who are trying to lose weight. Your doctor can suggest groups in your area. Where can you learn more? Go to http://www.woods.com/ and enter U357 to learn more about \"Starting a Weight Loss Plan: Care Instructions. \"  Current as of: August 25, 2022               Content Version: 13.5  © 2006-2022 [employer]. Care instructions adapted under license by [facility] ([facility]). If you have questions about a medical condition or this instruction, always ask your healthcare professional. [place] [address] any warranty or liability for your use of this information. A Healthy Heart: Care Instructions  Your Care Instructions     Coronary artery disease, also called heart disease, occurs when a substance called plaque builds up in the vessels that supply oxygen-rich blood to your heart muscle. This can narrow the blood vessels and reduce blood flow. A heart attack happens when blood flow is completely blocked. A high-fat diet, smoking, and other factors increase the risk of heart disease. Your doctor has found that you have a chance of having heart disease. You can do lots of things to keep your heart healthy. It may not be easy, but you can change your diet, exercise more, and quit smoking. These steps really work to lower your chance of heart disease. Follow-up care is a key part of your treatment and safety. Be sure to make and go to all appointments, and call your doctor if you are having problems. It's also a good idea to know your test results and keep a list of the medicines you take.   How can you care for yourself at home? Diet    Use less salt when you cook and eat. This helps lower your blood pressure. Taste food before salting. Add only a little salt when you think you need it. With time, your taste buds will adjust to less salt.     Eat fewer snack items, fast foods, canned soups, and other high-salt, high-fat, processed foods.     Read food labels and try to avoid saturated and trans fats. They increase your risk of heart disease by raising cholesterol levels.     Limit the amount of solid fat-butter, margarine, and shortening-you eat. Use olive, peanut, or canola oil when you cook. Bake, broil, and steam foods instead of frying them.     Eat a variety of fruit and vegetables every day. Dark green, deep orange, red, or yellow fruits and vegetables are especially good for you. Examples include spinach, carrots, peaches, and berries.     Foods high in fiber can reduce your cholesterol and provide important vitamins and minerals. High-fiber foods include whole-grain cereals and breads, oatmeal, beans, brown rice, citrus fruits, and apples.     Eat lean proteins. Heart-healthy proteins include seafood, lean meats and poultry, eggs, beans, peas, nuts, seeds, and soy products.     Limit drinks and foods with added sugar. These include candy, desserts, and soda pop. Lifestyle changes    If your doctor recommends it, get more exercise. Walking is a good choice. Bit by bit, increase the amount you walk every day. Try for at least 30 minutes on most days of the week. You also may want to swim, bike, or do other activities.     Do not smoke. If you need help quitting, talk to your doctor about stop-smoking programs and medicines. These can increase your chances of quitting for good. Quitting smoking may be the most important step you can take to protect your heart. It is never too late to quit.     Limit alcohol to 2 drinks a day for men and 1 drink a day for women. Too much alcohol can cause health problems.   Manage other health problems such as diabetes, high blood pressure, and high cholesterol. If you think you may have a problem with alcohol or drug use, talk to your doctor. Medicines    Take your medicines exactly as prescribed. Call your doctor if you think you are having a problem with your medicine.     If your doctor recommends aspirin, take the amount directed each day. Make sure you take aspirin and not another kind of pain reliever, such as acetaminophen (Tylenol). When should you call for help? Call 911 if you have symptoms of a heart attack. These may include:    Chest pain or pressure, or a strange feeling in the chest.     Sweating.     Shortness of breath.     Pain, pressure, or a strange feeling in the back, neck, jaw, or upper belly or in one or both shoulders or arms.     Lightheadedness or sudden weakness.     A fast or irregular heartbeat. After you call 911, the  may tell you to chew 1 adult-strength or 2 to 4 low-dose aspirin. Wait for an ambulance. Do not try to drive yourself. Watch closely for changes in your health, and be sure to contact your doctor if you have any problems. Where can you learn more? Go to http://www.newman.com/ and enter F075 to learn more about \"A Healthy Heart: Care Instructions. \"  Current as of: September 7, 2022               Content Version: 13.5  © 4668-0422 [employer]. Care instructions adapted under license by [facility] ([facility]). If you have questions about a medical condition or this instruction, always ask your healthcare professional. [place] [address] any warranty or liability for your use of this information. Personalized Preventive Plan for Gato [place] - 3/7/2023  Medicare offers a range of preventive health benefits. Some of the tests and screenings are paid in full while other may be subject to a deductible, co-insurance, and/or copay.     Some of these benefits include a comprehensive review of your medical history including lifestyle, illnesses that may run in your family, and various assessments and screenings as appropriate. After reviewing your medical record and screening and assessments performed today your provider may have ordered immunizations, labs, imaging, and/or referrals for you. A list of these orders (if applicable) as well as your Preventive Care list are included within your After Visit Summary for your review. Other Preventive Recommendations:    A preventive eye exam performed by an eye specialist is recommended every 1-2 years to screen for glaucoma; cataracts, macular degeneration, and other eye disorders. A preventive dental visit is recommended every 6 months. Try to get at least 150 minutes of exercise per week or 10,000 steps per day on a pedometer . Order or download the FREE \"Exercise & Physical Activity: Your Everyday Guide\" from [employer]. Call 6-290.718.1637 or search [employer] online. You need 6632-4878 mg of calcium and 3475-7956 IU of vitamin D per day. It is possible to meet your calcium requirement with diet alone, but a vitamin D supplement is usually necessary to meet this goal.  When exposed to the sun, use a sunscreen that protects against both UVA and UVB radiation with an SPF of 30 or greater. Reapply every 2 to 3 hours or after sweating, drying off with a towel, or swimming. Always wear a seat belt when traveling in a car. Always wear a helmet when riding a bicycle or motorcycle.

## 2023-03-07 NOTE — PROGRESS NOTES
[facility]    Progress Note    Date of patient's visit: 3/7/2023    Patient's Name:  Jerri Tomas    YOB: 1955            Patient Care Team:  Elio Babb MD as PCP - General (Internal Medicine)  Elio Babb MD as PCP - Empaneled Provider  Elio Babb MD as Referring Physician (Internal Medicine)  Sommer Ramos MD as Consulting Physician (Gastroenterology)    REASON FOR VISIT: Routine outpatient follow     Chief Complaint   Patient presents with    Medicare AWV     First AWV    Wrist Pain     Right wrist up to rotator cuff, sees pain management. They ordered x-rays but pt can't find order    Health Maintenance     Declined flu, declined pneumonia, declined shingles         HISTORY OF PRESENT ILLNESS:    History was obtained from the patient. Jerri Tomas is a 74 y.o. is here for annual wellness visit and routine follow-up. Blood pressure is controlled. He has been taking amlodipine. Pain in his hip is much better since he had right total hip arthroplasty. Range of motion is much better. He continues to have back pain and follows up with pain management. He has also had right shoulder and right wrist pain for 2 or 3 months. He says his pain management physicians ordered x-rays but he lost the papers and he is asking me to give him a referral for x-rays. He is concerned about prostate cancer. He says he has been hearing a lot about it. He is not symptomatic. No frequency hematuria or nocturia. Last PSA was in November 2022. Advised he can get another PSA later this summer.           Past Medical History:   Diagnosis Date    Chronic back pain     Chronic kidney disease     Gout     Hyperlipidemia     Hypertension     Murmur     Obesity 09/19/2014    Osteoarthritis     Restless leg syndrome     Shoulder pain     Shrimp allergy     allergic reaction to shrimp    Spinal stenosis, lumbar region, without neurogenic claudication 04/13/2016    Swelling of joint, wrist, right     Thoracic back pain     Vertigo     Wears glasses     READING    Wrist pain, right        Past Surgical History:   Procedure Laterality Date    COLONOSCOPY  11/2014    int hemorrhoids, repeat in 10 years    COLONOSCOPY  05/27/2020    COLONOSCOPY N/A 05/27/2020    COLONOSCOPY WITH BIOPSY performed by Rudine Gaucher, MD at [facility] Bilateral 10/17/2016    NASAL ENDOSCOPY; RIGHT NASAL CAUTERY    NERVE BLOCK  11/02/2016    duramorph 1.5mg celestone 9mg    NERVE BLOCK  04/03/2017    duramorph 1.5mg & decadron 10mg    NERVE BLOCK  10/09/2017    duramorph epidural, decadron 10mg, morphine 1.5mg    TOTAL HIP ARTHROPLASTY Right 09/27/2022    TOTAL HIP ARTHROPLASTY Right 9/27/2022    RIGHT HIP TOTAL ARTHROPLASTY ANTERIOR APPROACH - Aleksandr Taylor performed by Timmy Abreu DO at [facility]. 106  05/27/2020    UPPER GASTROINTESTINAL ENDOSCOPY N/A 05/27/2020    EGD BIOPSY performed by Rudine Gaucher, MD at [facility]         ALLERGIES      Allergies   Allergen Reactions    Codeine Other (See Comments)     Don;t agree with him    Shrimp (Diagnostic) Hives       MEDICATIONS:      Current Outpatient Medications on File Prior to Visit   Medication Sig Dispense Refill    amLODIPine (NORVASC) 10 MG tablet Take 1 tablet by mouth daily 30 tablet 1    allopurinol (ZYLOPRIM) 300 MG tablet TAKE 1 TABLET BY MOUTH ONCE DAILY IN THE MORNING. 30 tablet 5    diclofenac sodium (VOLTAREN) 1 % GEL       baclofen (LIORESAL) 10 MG tablet       magnesium oxide (MAG-OX) 400 MG tablet Take 1 tablet by mouth daily 30 tablet 5    tiZANidine (ZANAFLEX) 4 MG tablet TAKE 1 TABLET BY MOUTH EVERY 8 HOURS AS NEEDED FOR SPASMS. 30 tablet 5    ferrous sulfate (FEROSUL) 325 (65 Fe) MG tablet TAKE 1 TABLET BY MOUTH ONCE DAILY. 30 tablet 5    oxyCODONE-acetaminophen (PERCOCET)  MG per tablet Take 1 tablet by mouth 3 times daily.       diclofenac sodium (VOLTAREN) 1 % GEL Apply 2 g topically 2 times daily      gabapentin (NEURONTIN) 300 MG capsule TAKE 1 CAPSULE BY MOUTH 3 TIMES A DAY (IN THE MORNING, AT NOON AND BEFORE BEDTIME) FOR 30 DAYS. 90 capsule 1    aspirin EC 81 MG EC tablet Take 1 tablet by mouth 2 times daily 84 tablet 0    ketorolac (TORADOL) 10 MG tablet Take 10 mg by mouth every 6 hours as needed (Patient not taking: Reported on 3/7/2023)      pantoprazole (PROTONIX) 40 MG tablet 1 tablet daily 30 tablet 3    diphenhydrAMINE (BENADRYL) 25 MG capsule Take 1-2 tablets by mouth every 6 hours as needed for itching. (Patient not taking: Reported on 3/7/2023) 30 capsule 0     No current facility-administered medications on file prior to visit. HISTORY    Reviewed and no change from previous record. Victoria Ion  reports that he has never smoked. He has never used smokeless tobacco.    FAMILY HISTORY:    Reviewed and No change from previous visit    HEALTH MAINTENANCE DUE:      Health Maintenance Due   Topic Date Due    COVID-19 Vaccine (1) Never done    Pneumococcal 65+ years Vaccine (1 - PCV) Never done    Shingles vaccine (2 of 2) 01/13/2021    Flu vaccine (1) 08/01/2022    Depression Screen  03/25/2023       REVIEW OF SYSTEMS:    12 point review of symptoms completed and found to be normal except noted in the HPI    Review of Systems   Constitutional:  Negative for fatigue and unexpected weight change. Respiratory:  Negative for cough, shortness of breath and wheezing. Cardiovascular:  Negative for chest pain, palpitations and leg swelling. Gastrointestinal:  Negative for abdominal pain and blood in stool. Genitourinary:  Negative for frequency and hematuria. Musculoskeletal:  Positive for arthralgias and back pain. Negative for gait problem. Neurological:  Negative for dizziness, weakness and headaches.       PHYSICAL EXAM:     Vitals:    03/07/23 0958   BP: 137/71   Site: Left Upper Arm   Position: Sitting   Cuff Size: Large Adult   Temp: 97.7 °F (36.5 °C)   TempSrc: Infrared   SpO2: 97%   Weight: 243 lb 9.6 oz (110.5 kg)   Height: 5' 10.25\" (1.784 m)     Body mass index is 34.7 kg/m². BP Readings from Last 3 Encounters:   03/07/23 137/71   10/12/22 (!) 157/60   09/27/22 (!) 167/64        Wt Readings from Last 3 Encounters:   03/07/23 243 lb 9.6 oz (110.5 kg)   12/19/22 240 lb (108.9 kg)   11/07/22 214 lb (97.1 kg)       Physical Exam  Vitals and nursing note reviewed. Constitutional:       Appearance: Normal appearance. HENT:      Head: Normocephalic and atraumatic. Eyes:      Extraocular Movements: Extraocular movements intact. Conjunctiva/sclera: Conjunctivae normal.      Pupils: Pupils are equal, round, and reactive to light. Cardiovascular:      Rate and Rhythm: Normal rate and regular rhythm. Heart sounds: Murmur heard. Pulmonary:      Effort: Pulmonary effort is normal.      Breath sounds: Normal breath sounds. No wheezing. Musculoskeletal:      Right lower leg: No edema. Left lower leg: No edema. Skin:     General: Skin is warm and dry. Neurological:      General: No focal deficit present. Mental Status: He is alert and oriented to person, place, and time.              LABORATORY FINDINGS:    CBC:  Lab Results   Component Value Date/Time    WBC 4.4 09/15/2022 09:47 AM    HGB 12.6 09/15/2022 09:47 AM     09/15/2022 09:47 AM     09/10/2011 12:27 PM     BMP:    Lab Results   Component Value Date/Time     09/15/2022 09:47 AM    K 4.4 09/15/2022 09:47 AM     09/15/2022 09:47 AM    CO2 26 09/15/2022 09:47 AM    BUN 12 09/15/2022 09:47 AM    CREATININE 0.92 09/15/2022 09:47 AM    GLUCOSE 87 09/15/2022 09:47 AM    GLUCOSE 108 09/10/2011 12:27 PM     HEMOGLOBIN A1C:   Lab Results   Component Value Date/Time    LABA1C 4.6 09/15/2022 09:47 AM     MICROALBUMIN URINE:   Lab Results   Component Value Date/Time    MICROALBUR <6 01/22/2013 09:53 PM     FASTING LIPID PANEL:  Lab Results Component Value Date    CHOL 225 (H) 07/28/2022    HDL 51 07/28/2022    TRIG 357 (H) 07/28/2022     Lab Results   Component Value Date    LDLCHOLESTEROL 103 07/28/2022       LIVER PROFILE:  Lab Results   Component Value Date/Time    ALT 28 09/15/2022 09:47 AM    AST 21 09/15/2022 09:47 AM    PROT 7.7 09/15/2022 09:47 AM    PROT 7.1 01/17/2013 11:16 AM    BILITOT 0.7 09/15/2022 09:47 AM    BILIDIR 0.20 08/13/2020 01:30 AM    LABALBU 4.4 09/15/2022 09:47 AM    LABALBU 4.4 09/10/2011 12:27 PM      THYROID FUNCTION:   Lab Results   Component Value Date/Time    TSH 0.68 06/11/2020 02:03 PM      URINEANALYSIS: No results found for: LABURIN  ASSESSMENT AND PLAN:    1. Initial Medicare annual wellness visit    - [facility] Referral to [facility] Clinical Specialist    2. Primary hypertension    - amLODIPine (NORVASC) 10 MG tablet; Take 1 tablet by mouth daily  Dispense: 30 tablet; Refill: 5  - Basic Metabolic Panel; Future    3. Chronic right shoulder pain    - XR SHOULDER RIGHT (MIN 2 VIEWS); Future    4. Stage 3a chronic kidney disease (HCC)  BMP    - Protein / Creatinine Ratio, Urine; Future    5. Right wrist pain    - XR WRIST RIGHT (MIN 3 VIEWS); Future    6. Diastolic dysfunction without heart failure  Follow up with cardiology     7. Pure hypercholesterolemia    - Lipid Panel; Future    8. Welcome to Medicare preventive visit            FOLLOW UP AND INSTRUCTIONS:   Return in about 6 months (around 9/7/2023). Romi Dural received counseling on the following healthy behaviors: nutrition, exercise, and medication adherence    Reviewed prior labs and health maintenance. Discussed use, benefit, and side effects of prescribed medications. Barriers to medication compliance addressed. All patient questions answered. Pt voiced understanding.      Patient given educational materials - see patient instructions    Clearance Ek  Attending Physician, [facility], Internal Medicine Residency Program  [facility] Health Physician - [place]  3/7/2023, 10:46 AM

## 2023-03-08 ENCOUNTER — CLINICAL DOCUMENTATION (OUTPATIENT)
Dept: SPIRITUAL SERVICES | Age: 68
End: 2023-03-08

## 2023-03-08 NOTE — ACP (ADVANCE CARE PLANNING)
Advance Care Planning   Ambulatory ACP Specialist Patient Outreach    Date:  3/8/2023  ACP Specialist:  Misa Apodaca    Outreach call to patient in follow-up to ACP Specialist referral from: Gordy Macedo MD    [x] PCP  [] Provider   [] Ambulatory Care Management [] Other for Reason:    [x] Advance Directive Assistance  [] Code Status Discussion  [] Complete Portable DNR Order  [] Discuss Goals of Care  [] Complete POST/MOST  [] Early ACP Decision-Making  [] Other    Date Referral Received: 3/7/23    Today's Outreach:  [x] First   [] Second  [] Third                               First outreach made by [x]  phone  [] email []   [employer]     Intervention:  [x] Spoke with Patient  [] Left VM requesting return call      Outcome: Scheduled ACP Conversation Call for Thursday March 16th at 11am.       Next Step:   [x] ACP scheduled conversation  [] Outreach again in one week               [] Email / Mail [address]  [] Email / Mail Advance Directive            [] Close Referral. Routing closure to referring provider/staff and to ACP Specialist . [] Closure Letter mailed to Patient with Invitation to Contact ACP Specialist if/when ready.     Thank you for this referral.

## 2023-03-15 ENCOUNTER — CLINICAL DOCUMENTATION (OUTPATIENT)
Dept: SPIRITUAL SERVICES | Age: 68
End: 2023-03-15

## 2023-03-15 NOTE — ACP (ADVANCE CARE PLANNING)
Advance Care Planning   Ambulatory ACP Specialist Patient Outreach    Date:  3/15/2023  ACP Specialist:  MICHAEL Hu    Outreach call to patient in follow-up to ACP Specialist referral from: Ceasar Morales MD    [x] PCP  [] Provider   [] Ambulatory Care Management [] Other for Reason:    [x] Advance Directive Assistance  [] Code Status Discussion  [] Complete Portable DNR Order  [] Discuss Goals of Care  [] Complete POST/MOST  [x] Early ACP Decision-Making  [] Other    Date Referral Received:03/07/2023    Today's Outreach:  [] First   [] Second  [] Third  [x] [address] outreach made by []  phone  [] email []   [employer]     Intervention:  [x] Spoke with Patient  [] Left VM requesting return call      Aisha Castleman placed today re: ACP visit set for Mar 16 at [address] [place] [address] pt some info for review; pt remains available for this date/time. Next Step:   [x] ACP scheduled conversation  [] Outreach again in one week               [x] Email / Mail ACP Info Sheets  [x] Email / Mail Advance Directive            [] Close Referral. Routing closure to referring provider/staff and to ACP Specialist . [] Closure Letter mailed to Patient with Invitation to Contact ACP Specialist if/when ready.     Thank you for this referral.

## 2023-03-16 ENCOUNTER — CLINICAL DOCUMENTATION (OUTPATIENT)
Dept: SPIRITUAL SERVICES | Age: 68
End: 2023-03-16

## 2023-03-16 NOTE — ACP (ADVANCE CARE PLANNING)
Advance Care Planning     Advance Care Planning Clinical Specialist  Conversation Note      Date of ACP Conversation: 3/16/2023    Conversation Conducted with: Patient with Decision Making Capacity    ACP Clinical Specialist: Denyce Boas, LISW      Healthcare Decision Maker:     Current Designated Healthcare Decision Maker:     Primary Decision Maker: emerita quijano *HIPAA* - Other - [phone]    Today we reviewed role of HCDM and pt shared that would be his wife, aJh Lassiter. Reviewed HCPOA document as well. Care Preferences    Hospitalization: \"If your health worsens and it becomes clear that your chance of recovery is unlikely, what would your preference be regarding hospitalization? \"    Choice:  [x] The patient wants hospitalization. [] The patient prefers comfort-focused treatment without hospitalization. Ventilation: \"If you were in your present state of health and suddenly became very ill and were unable to breathe on your own, what would your preference be about the use of a ventilator (breathing machine) if it were available to you? \"      If the patient would desire the use of ventilator (breathing machine), answer \"yes\". If not, \"no\":  Yes; we will review at next visit     \"If your health worsens and it becomes clear that your chance of recovery is unlikely, what would your preference be about the use of a ventilator (breathing machine) if it were available to you? \"     Would the patient desire the use of ventilator (breathing machine)?: Yes; to be reviewed at next visit. Resuscitation  \"CPR works best to restart the heart when there is a sudden event, like a heart attack, in someone who is otherwise healthy. Unfortunately, CPR does not typically restart the heart for people who have serious health conditions or who are very sick. \"    \"In the event your heart stopped as a result of an underlying serious health condition, would you want attempts to be made to restart your heart (answer \"yes\" for attempt to resuscitate) or would you prefer a natural death (answer \"no\" for do not attempt to resuscitate)? \"  Will review this at next visit. [] Yes   [x] No   Educated Patient / Glennette Joyce regarding differences between Advance Directives and portable DNR orders. -will review this at next visit. Length of ACP Conversation in minutes:  20    Conversation Outcomes:  ACP discussion completed; plan to meet with pt again on Thursday March 23 at 9am.     Follow-up plan:    [x] Schedule follow-up conversation to continue planning  [] Referred individual to Provider for additional questions/concerns   [] Advised patient/agent/surrogate to review completed ACP document and update if needed with changes in condition, patient preferences or care setting    [] This note routed to one or more involved healthcare providers    Summary:  Met with pt to offer ACP education and support. Reviewed ACP understanding and offered education as we explored HCDM, experiences and the importance of having wishes in writing. Pt shared is  and would name spouse as HCPOA. We also reviewed living will and having those wishes in writing (re: life-sustaining treatment). Pt has been provided a copy of the [place] AD documents. He plans to review these and we will meet again next Thursday March 23 at [facility] assistance. Thanked pt for taking my call and will continue to follow.

## 2023-03-24 DIAGNOSIS — M54.6 CHRONIC BILATERAL THORACIC BACK PAIN: ICD-10-CM

## 2023-03-24 DIAGNOSIS — M25.50 PAIN IN JOINT, MULTIPLE SITES: Chronic | ICD-10-CM

## 2023-03-24 DIAGNOSIS — G89.29 CHRONIC BILATERAL THORACIC BACK PAIN: ICD-10-CM

## 2023-03-24 DIAGNOSIS — D50.9 IRON DEFICIENCY ANEMIA, UNSPECIFIED IRON DEFICIENCY ANEMIA TYPE: ICD-10-CM

## 2023-03-26 RX ORDER — TIZANIDINE 4 MG/1
TABLET ORAL
Qty: 30 TABLET | Refills: 1 | Status: SHIPPED | OUTPATIENT
Start: 2023-03-26

## 2023-03-26 RX ORDER — FERROUS SULFATE 325(65) MG
TABLET ORAL
Qty: 30 TABLET | Refills: 1 | Status: SHIPPED | OUTPATIENT
Start: 2023-03-26

## 2023-04-20 ENCOUNTER — HOSPITAL ENCOUNTER (OUTPATIENT)
Dept: GENERAL RADIOLOGY | Age: 68
Discharge: HOME | End: 2023-04-22
Payer: MEDICARE

## 2023-04-20 ENCOUNTER — HOSPITAL ENCOUNTER (OUTPATIENT)
Age: 68
Discharge: HOME | End: 2023-04-22
Payer: MEDICARE

## 2023-04-20 ENCOUNTER — HOSPITAL ENCOUNTER (OUTPATIENT)
Dept: MRI IMAGING | Age: 68
Discharge: HOME | End: 2023-04-22
Payer: MEDICARE

## 2023-04-20 DIAGNOSIS — G89.29 CHRONIC RIGHT SHOULDER PAIN: ICD-10-CM

## 2023-04-20 DIAGNOSIS — M54.17 LUMBOSACRAL NEURITIS: ICD-10-CM

## 2023-04-20 DIAGNOSIS — M25.511 CHRONIC RIGHT SHOULDER PAIN: ICD-10-CM

## 2023-04-20 DIAGNOSIS — M25.531 RIGHT WRIST PAIN: ICD-10-CM

## 2023-04-20 PROCEDURE — 73030 X-RAY EXAM OF SHOULDER: CPT

## 2023-04-20 PROCEDURE — 72148 MRI LUMBAR SPINE W/O DYE: CPT

## 2023-04-20 PROCEDURE — 73110 X-RAY EXAM OF WRIST: CPT

## 2023-04-27 DIAGNOSIS — M54.6 CHRONIC BILATERAL THORACIC BACK PAIN: ICD-10-CM

## 2023-04-27 DIAGNOSIS — G89.29 CHRONIC BILATERAL THORACIC BACK PAIN: ICD-10-CM

## 2023-05-03 RX ORDER — GABAPENTIN 300 MG/1
CAPSULE ORAL
Qty: 90 CAPSULE | Refills: 3 | Status: SHIPPED | OUTPATIENT
Start: 2023-05-03 | End: 2023-06-01

## 2023-05-27 DIAGNOSIS — M25.50 PAIN IN JOINT, MULTIPLE SITES: Chronic | ICD-10-CM

## 2023-05-27 DIAGNOSIS — G89.29 CHRONIC BILATERAL THORACIC BACK PAIN: ICD-10-CM

## 2023-05-27 DIAGNOSIS — M54.6 CHRONIC BILATERAL THORACIC BACK PAIN: ICD-10-CM

## 2023-05-27 DIAGNOSIS — D50.9 IRON DEFICIENCY ANEMIA, UNSPECIFIED IRON DEFICIENCY ANEMIA TYPE: ICD-10-CM

## 2023-05-31 RX ORDER — TIZANIDINE 4 MG/1
TABLET ORAL
Qty: 30 TABLET | Refills: 5 | Status: SHIPPED | OUTPATIENT
Start: 2023-05-31

## 2023-05-31 RX ORDER — FERROUS SULFATE 325(65) MG
TABLET ORAL
Qty: 30 TABLET | Refills: 5 | OUTPATIENT
Start: 2023-05-31

## 2023-06-02 DIAGNOSIS — D50.9 IRON DEFICIENCY ANEMIA, UNSPECIFIED IRON DEFICIENCY ANEMIA TYPE: ICD-10-CM

## 2023-06-02 RX ORDER — FERROUS SULFATE 325(65) MG
TABLET ORAL
Qty: 30 TABLET | Refills: 1 | OUTPATIENT
Start: 2023-06-02

## 2023-07-20 ENCOUNTER — OFFICE VISIT (OUTPATIENT)
Dept: INTERNAL MEDICINE | Age: 68
End: 2023-07-20
Payer: MEDICARE

## 2023-07-20 VITALS
WEIGHT: 244.6 LBS | OXYGEN SATURATION: 97 % | SYSTOLIC BLOOD PRESSURE: 146 MMHG | BODY MASS INDEX: 33.13 KG/M2 | HEART RATE: 62 BPM | TEMPERATURE: 97.9 F | DIASTOLIC BLOOD PRESSURE: 66 MMHG | HEIGHT: 72 IN

## 2023-07-20 DIAGNOSIS — G47.33 OSA (OBSTRUCTIVE SLEEP APNEA): ICD-10-CM

## 2023-07-20 DIAGNOSIS — I10 UNCONTROLLED HYPERTENSION: ICD-10-CM

## 2023-07-20 DIAGNOSIS — I50.33 ACUTE ON CHRONIC DIASTOLIC CONGESTIVE HEART FAILURE (HCC): Primary | ICD-10-CM

## 2023-07-20 DIAGNOSIS — R60.0 PEDAL EDEMA: ICD-10-CM

## 2023-07-20 PROCEDURE — 3077F SYST BP >= 140 MM HG: CPT

## 2023-07-20 PROCEDURE — 3078F DIAST BP <80 MM HG: CPT

## 2023-07-20 PROCEDURE — 99214 OFFICE O/P EST MOD 30 MIN: CPT

## 2023-07-20 PROCEDURE — 1123F ACP DISCUSS/DSCN MKR DOCD: CPT

## 2023-07-20 RX ORDER — LISINOPRIL 10 MG/1
10 TABLET ORAL DAILY
Qty: 90 TABLET | Refills: 1 | Status: CANCELLED | OUTPATIENT
Start: 2023-07-20

## 2023-07-20 RX ORDER — LOSARTAN POTASSIUM 50 MG/1
50 TABLET ORAL DAILY
Qty: 90 TABLET | Refills: 1 | Status: SHIPPED | OUTPATIENT
Start: 2023-07-20

## 2023-07-20 RX ORDER — AMLODIPINE BESYLATE 5 MG/1
5 TABLET ORAL DAILY
Qty: 30 TABLET | Refills: 5 | Status: SHIPPED | OUTPATIENT
Start: 2023-07-20

## 2023-07-20 RX ORDER — FUROSEMIDE 80 MG
80 TABLET ORAL DAILY
Qty: 30 TABLET | Refills: 2 | Status: SHIPPED | OUTPATIENT
Start: 2023-07-20

## 2023-07-20 ASSESSMENT — ENCOUNTER SYMPTOMS
SINUS PAIN: 0
ABDOMINAL PAIN: 0
NAUSEA: 0
VOMITING: 0
COLOR CHANGE: 0
SORE THROAT: 0
FACIAL SWELLING: 0
ALLERGIC/IMMUNOLOGIC NEGATIVE: 1
COUGH: 0
DIARRHEA: 0
CHEST TIGHTNESS: 0
CONSTIPATION: 0
WHEEZING: 0
SHORTNESS OF BREATH: 1
BACK PAIN: 0

## 2023-07-20 ASSESSMENT — PATIENT HEALTH QUESTIONNAIRE - PHQ9
SUM OF ALL RESPONSES TO PHQ QUESTIONS 1-9: 0
2. FEELING DOWN, DEPRESSED OR HOPELESS: 0
SUM OF ALL RESPONSES TO PHQ QUESTIONS 1-9: 0
SUM OF ALL RESPONSES TO PHQ QUESTIONS 1-9: 0
1. LITTLE INTEREST OR PLEASURE IN DOING THINGS: 0
SUM OF ALL RESPONSES TO PHQ9 QUESTIONS 1 & 2: 0
SUM OF ALL RESPONSES TO PHQ QUESTIONS 1-9: 0

## 2023-07-20 NOTE — PROGRESS NOTES
Attending Physician Statement  I have discussed the care of Stoney Diesel,  including pertinent history and exam findings,  with the resident. I have seen and examined the patient and the key elements of all parts of the encounter have been performed by me. I agree with the assessment, plan and orders as documented by the resident. (GC Modifier)     Diagnosis Orders   1. Acute on chronic diastolic congestive heart failure (HCC)  furosemide (LASIX) 80 MG tablet    Brain Natriuretic Peptide    Protein / Creatinine Ratio, Urine    Comprehensive Metabolic Panel      2. Uncontrolled hypertension  amLODIPine (NORVASC) 5 MG tablet    losartan (COZAAR) 50 MG tablet      3. EUGENIA (obstructive sleep apnea)  Sleep Study with PAP Titration    Baseline Diagnostic Sleep Study      4.  Pedal edema  furosemide (LASIX) 80 MG tablet    Brain Natriuretic Peptide    Protein / Creatinine Ratio, Urine    Comprehensive Metabolic Panel    losartan (COZAAR) 50 MG tablet    Sleep Study with PAP Titration    Baseline Diagnostic Sleep Study          Reduce amlodipine to 5 mg   Start losartan 50 mg   Lasix 80 mg once a day   Fluid intake 40-50oz a day   Low salt , high potassium diet   Cmp and rtc in 2 weeks for follow up on hypertension and pedal edema      MD CONRAD Lei  Attending Physician, [address] [place] [address], Internal Medicine Residency Program  [address] [facility]  7/20/2023, 10:23 AM
Metabolic Panel; Future    Primary hypertension  -     amLODIPine (NORVASC) 5 MG tablet; Take 1 tablet by mouth daily  -     losartan (COZAAR) 50 MG tablet; Take 1 tablet by mouth daily    EUGENIA (obstructive sleep apnea)  -     Sleep Study with PAP Titration; Future  -     Baseline Diagnostic Sleep Study; Future              ______________________________________________________________________  Follow up and instructions:  No follow-ups on file. Angelica Horton received counseling on the following healthy behaviors: nutrition, exercise, and medication adherence    Discussed use, benefit, and side effects of prescribed medications. Barriers to medication compliance addressed. All patient questions answered. Pt voiced understanding. Patient given educational materials - see patient instructions    Tamy Travis MD   Internal Medicine  7/20/2023, 10:20 AM    This note is created with the assistance of a speech-recognition program. While intending to generate a document that actually reflects the content of the visit, the document can still have some mistakes which may not have been identified and corrected by editing.

## 2023-07-26 DIAGNOSIS — E79.0 HYPERURICEMIA: ICD-10-CM

## 2023-07-26 DIAGNOSIS — D50.9 IRON DEFICIENCY ANEMIA, UNSPECIFIED IRON DEFICIENCY ANEMIA TYPE: ICD-10-CM

## 2023-07-28 NOTE — TELEPHONE ENCOUNTER
Request for ferosul and allopurinol       Next Visit Date:8/4/23  Future Appointments   Date Time Provider [address]   8/4/2023  9:30 AM Kassi Emerson MD [facility]   8/4/2023  7:30  Celine Rojas RM [address] [facility]   9/29/2023 10:30 AM Bhanu Olivares, APRN - CNP [facility] [address] Maintenance   Topic Date Due    COVID-19 Vaccine (1) Never done    Pneumococcal 65+ years Vaccine (1 - PCV) Never done    Shingles vaccine (2 of 2) 01/13/2021    Flu vaccine (1) 08/01/2023    GFR test (Diabetes, CKD 3-4, OR last GFR 15-59)  09/15/2023    Annual Wellness Visit (AWV)  03/07/2024    Depression Screen  07/20/2024    Lipids  07/28/2027    Colorectal Cancer Screen  05/27/2030    DTaP/Tdap/Td vaccine (2 - Td or Tdap) 12/15/2031    Hepatitis C screen  Completed    Hepatitis A vaccine  Aged Out    Hib vaccine  Aged Out    Meningococcal (ACWY) vaccine  Aged Out    Diabetes screen  Discontinued    HIV screen  Discontinued    Prostate Specific Antigen (PSA) Screening or Monitoring  Discontinued       Hemoglobin A1C (%)   Date Value   09/15/2022 4.6   03/25/2022 4.7   11/21/2017 5.0             ( goal A1C is < 7)   No components found for: LABMICR  LDL Cholesterol (mg/dL)   Date Value   07/28/2022 103       (goal LDL is <100)   AST (U/L)   Date Value   09/15/2022 21     ALT (U/L)   Date Value   09/15/2022 28     BUN (mg/dL)   Date Value   09/15/2022 12     BP Readings from Last 3 Encounters:   07/20/23 (!) 146/66   03/07/23 137/71   10/12/22 (!) 157/60          (goal 120/80)    All Future Testing planned in [facility]  Lab Frequency Next Occurrence   Protein / Creatinine Ratio, Urine Once 53/66/3570   Basic Metabolic Panel Once 30/40/8720   Lipid Panel Once 78/11/7911   Basic Metabolic Panel Once 77/81/0470   Protein / Creatinine Ratio, Urine Once 03/07/2023   Brain Natriuretic Peptide Once 07/20/2023   Protein / Creatinine Ratio, Urine Once 07/20/2023   Comprehensive Metabolic Panel Once 72/12/0099

## 2023-07-31 RX ORDER — FERROUS SULFATE 325(65) MG
TABLET ORAL
Qty: 30 TABLET | Refills: 1 | Status: SHIPPED | OUTPATIENT
Start: 2023-07-31

## 2023-07-31 RX ORDER — ALLOPURINOL 300 MG/1
TABLET ORAL
Qty: 30 TABLET | Refills: 5 | Status: SHIPPED | OUTPATIENT
Start: 2023-07-31

## 2023-09-13 ENCOUNTER — HOSPITAL ENCOUNTER (OUTPATIENT)
Age: 68
Setting detail: SPECIMEN
Discharge: HOME | End: 2023-09-13

## 2023-09-13 ENCOUNTER — OFFICE VISIT (OUTPATIENT)
Dept: INTERNAL MEDICINE | Age: 68
End: 2023-09-13
Payer: MEDICARE

## 2023-09-13 VITALS
BODY MASS INDEX: 32.53 KG/M2 | HEIGHT: 72 IN | DIASTOLIC BLOOD PRESSURE: 70 MMHG | OXYGEN SATURATION: 96 % | WEIGHT: 240.2 LBS | TEMPERATURE: 97.2 F | SYSTOLIC BLOOD PRESSURE: 150 MMHG | HEART RATE: 62 BPM

## 2023-09-13 DIAGNOSIS — Z12.5 ENCOUNTER FOR PROSTATE CANCER SCREENING: ICD-10-CM

## 2023-09-13 DIAGNOSIS — G47.33 OSA (OBSTRUCTIVE SLEEP APNEA): ICD-10-CM

## 2023-09-13 DIAGNOSIS — I50.32 CHRONIC DIASTOLIC CONGESTIVE HEART FAILURE (HCC): ICD-10-CM

## 2023-09-13 DIAGNOSIS — F10.90 CHRONIC ALCOHOL USE: ICD-10-CM

## 2023-09-13 DIAGNOSIS — I10 PRIMARY HYPERTENSION: ICD-10-CM

## 2023-09-13 DIAGNOSIS — I50.33 ACUTE ON CHRONIC DIASTOLIC CONGESTIVE HEART FAILURE (HCC): ICD-10-CM

## 2023-09-13 DIAGNOSIS — Z23 NEED FOR INFLUENZA VACCINATION: ICD-10-CM

## 2023-09-13 DIAGNOSIS — M25.50 PAIN IN JOINT, MULTIPLE SITES: ICD-10-CM

## 2023-09-13 DIAGNOSIS — H66.001 NON-RECURRENT ACUTE SUPPURATIVE OTITIS MEDIA OF RIGHT EAR WITHOUT SPONTANEOUS RUPTURE OF TYMPANIC MEMBRANE: ICD-10-CM

## 2023-09-13 DIAGNOSIS — I10 UNCONTROLLED HYPERTENSION: Primary | ICD-10-CM

## 2023-09-13 DIAGNOSIS — R60.0 PEDAL EDEMA: ICD-10-CM

## 2023-09-13 LAB
ALBUMIN SERPL-MCNC: 4.4 G/DL (ref 3.5–5.2)
ALBUMIN/GLOB SERPL: 1.2 {RATIO} (ref 1–2.5)
ALP SERPL-CCNC: 123 U/L (ref 40–129)
ALT SERPL-CCNC: 27 U/L (ref 5–41)
ANION GAP SERPL CALCULATED.3IONS-SCNC: 16 MMOL/L (ref 9–17)
AST SERPL-CCNC: 36 U/L
BILIRUB SERPL-MCNC: 0.6 MG/DL (ref 0.3–1.2)
BNP SERPL-MCNC: 220 PG/ML
BUN SERPL-MCNC: 15 MG/DL (ref 8–23)
CALCIUM SERPL-MCNC: 9.3 MG/DL (ref 8.6–10.4)
CHLORIDE SERPL-SCNC: 98 MMOL/L (ref 98–107)
CO2 SERPL-SCNC: 21 MMOL/L (ref 20–31)
CREAT SERPL-MCNC: 1.3 MG/DL (ref 0.7–1.2)
GFR SERPL CREATININE-BSD FRML MDRD: >60 ML/MIN/1.73M2
GLUCOSE SERPL-MCNC: 94 MG/DL (ref 70–99)
POTASSIUM SERPL-SCNC: 4.8 MMOL/L (ref 3.7–5.3)
PROT SERPL-MCNC: 8 G/DL (ref 6.4–8.3)
PSA SERPL-MCNC: 1.33 NG/ML
SODIUM SERPL-SCNC: 135 MMOL/L (ref 135–144)

## 2023-09-13 PROCEDURE — 3078F DIAST BP <80 MM HG: CPT | Performed by: INTERNAL MEDICINE

## 2023-09-13 PROCEDURE — 1123F ACP DISCUSS/DSCN MKR DOCD: CPT | Performed by: INTERNAL MEDICINE

## 2023-09-13 PROCEDURE — 3077F SYST BP >= 140 MM HG: CPT | Performed by: INTERNAL MEDICINE

## 2023-09-13 PROCEDURE — 99214 OFFICE O/P EST MOD 30 MIN: CPT | Performed by: INTERNAL MEDICINE

## 2023-09-13 PROCEDURE — 90686 IIV4 VACC NO PRSV 0.5 ML IM: CPT | Performed by: INTERNAL MEDICINE

## 2023-09-13 RX ORDER — AMOXICILLIN AND CLAVULANATE POTASSIUM 875; 125 MG/1; MG/1
1 TABLET, FILM COATED ORAL 2 TIMES DAILY
Qty: 14 TABLET | Refills: 0 | Status: SHIPPED | OUTPATIENT
Start: 2023-09-13 | End: 2023-09-20

## 2023-09-13 RX ORDER — CELECOXIB 200 MG/1
CAPSULE ORAL
COMMUNITY
Start: 2023-07-14

## 2023-09-13 ASSESSMENT — ENCOUNTER SYMPTOMS
FACIAL SWELLING: 0
SHORTNESS OF BREATH: 0
SINUS PRESSURE: 0
SORE THROAT: 0
WHEEZING: 0
COUGH: 0
BACK PAIN: 1

## 2023-09-13 NOTE — PROGRESS NOTES
[facility]    Progress Note    Date of patient's visit: 9/13/2023    Patient's Name:  Brenda Sahni    YOB: 1955            Patient Care Team:  Ignacio Banks MD as PCP - General (Internal Medicine)  Ignacio Banks MD as PCP - Empaneled Provider  Ignacio Banks MD as Referring Physician (Internal Medicine)  Elizabeth Rodriguez MD as Consulting Physician (Gastroenterology)    REASON FOR VISIT: Routine outpatient follow     Chief Complaint   Patient presents with    Otitis Media     In right ear. Occurring 2 weeks    Pain     Left side and left arm         HISTORY OF PRESENT ILLNESS:    History was obtained from the patient. Brenda Sahni is a 79 y.o. is here for right ear pain for 2 to 3 weeks. .  No hearing loss. He denies any URI symptoms prior to ear pain. No fever or chills. No ear drainage. He was seen here a few months ago by another physician. At that time he was diagnosed with acute on chronic diastolic heart failure. He started on furosemide 80 mg daily. His abdomen had been decreased due to leg edema. He was advised to follow-up with labs. He has not had labs done. He had imaging that showed a few months. He denies chest pain or shortness of breath but still has some edema though it is much improved. He had a echo last year. His LV function is normal but he does have severe left ventricular dysfunction and moderate aortic insufficiency. He had seen cardiology last year. He cannot remember the names of medications he is taking. He did not bring his bottles with him. He also states he is taking some over-the-counter water pill. On further questioning he says he urinates frequently especially at night. He wakes up between 4 3 times every day and sometimes uses a bedside urinal as he does not want to get out of bed. He denies dribbling or incontinence but does have urinary urgency during the day.   He was started on furosemide

## 2023-09-13 NOTE — PATIENT INSTRUCTIONS
Patient was put on a wait list and will be contacted to schedule their next follow up appointment once the schedule is available. If the patient is in need of an appointment before their next visit please call the office at [phone]. After Visit Summary  given and reviewed. SG    Labs given to patient, they will have them done before their next visit. Medications e-scribe to pharmacy of pt's choice.

## 2023-09-14 LAB
CREAT UR-MCNC: 83.9 MG/DL (ref 39–259)
TOTAL PROTEIN, URINE: 8 MG/DL
URINE TOTAL PROTEIN CREATININE RATIO: 0.1

## 2023-09-20 ENCOUNTER — APPOINTMENT (OUTPATIENT)
Dept: GENERAL RADIOLOGY | Age: 68
End: 2023-09-20
Payer: MEDICARE

## 2023-09-20 ENCOUNTER — HOSPITAL ENCOUNTER (EMERGENCY)
Age: 68
Discharge: LEFT BEFORE DISCHARGE | End: 2023-09-20
Attending: EMERGENCY MEDICINE
Payer: MEDICARE

## 2023-09-20 VITALS
OXYGEN SATURATION: 95 % | DIASTOLIC BLOOD PRESSURE: 58 MMHG | TEMPERATURE: 97.3 F | SYSTOLIC BLOOD PRESSURE: 153 MMHG | RESPIRATION RATE: 21 BRPM | BODY MASS INDEX: 32.79 KG/M2 | WEIGHT: 242.06 LBS | HEIGHT: 72 IN | HEART RATE: 79 BPM

## 2023-09-20 DIAGNOSIS — Z53.21 ELOPED FROM EMERGENCY DEPARTMENT: ICD-10-CM

## 2023-09-20 DIAGNOSIS — M79.89 LEG SWELLING: Primary | ICD-10-CM

## 2023-09-20 LAB
ALBUMIN SERPL-MCNC: 4.2 G/DL (ref 3.5–5.2)
ALBUMIN/GLOB SERPL: 1.3 {RATIO} (ref 1–2.5)
ALP SERPL-CCNC: 122 U/L (ref 40–129)
ALT SERPL-CCNC: 23 U/L (ref 5–41)
ANION GAP SERPL CALCULATED.3IONS-SCNC: 16 MMOL/L (ref 9–17)
AST SERPL-CCNC: 27 U/L
BASOPHILS # BLD: 0.05 K/UL (ref 0–0.2)
BASOPHILS NFR BLD: 1 % (ref 0–2)
BILIRUB SERPL-MCNC: 0.7 MG/DL (ref 0.3–1.2)
BNP SERPL-MCNC: 207 PG/ML
BUN SERPL-MCNC: 17 MG/DL (ref 8–23)
CALCIUM SERPL-MCNC: 8.8 MG/DL (ref 8.6–10.4)
CHLORIDE SERPL-SCNC: 96 MMOL/L (ref 98–107)
CO2 SERPL-SCNC: 20 MMOL/L (ref 20–31)
CREAT SERPL-MCNC: 1.2 MG/DL (ref 0.7–1.2)
EOSINOPHIL # BLD: 0.16 K/UL (ref 0–0.44)
EOSINOPHILS RELATIVE PERCENT: 4 % (ref 1–4)
ERYTHROCYTE [DISTWIDTH] IN BLOOD BY AUTOMATED COUNT: 12.3 % (ref 11.8–14.4)
GFR SERPL CREATININE-BSD FRML MDRD: >60 ML/MIN/1.73M2
GLUCOSE SERPL-MCNC: 103 MG/DL (ref 70–99)
HCT VFR BLD AUTO: 35.1 % (ref 40.7–50.3)
HGB BLD-MCNC: 12 G/DL (ref 13–17)
IMM GRANULOCYTES # BLD AUTO: <0.03 K/UL (ref 0–0.3)
IMM GRANULOCYTES NFR BLD: 0 %
LYMPHOCYTES NFR BLD: 0.98 K/UL (ref 1.1–3.7)
LYMPHOCYTES RELATIVE PERCENT: 26 % (ref 24–43)
MCH RBC QN AUTO: 32.3 PG (ref 25.2–33.5)
MCHC RBC AUTO-ENTMCNC: 34.2 G/DL (ref 28.4–34.8)
MCV RBC AUTO: 94.4 FL (ref 82.6–102.9)
MONOCYTES NFR BLD: 0.57 K/UL (ref 0.1–1.2)
MONOCYTES NFR BLD: 15 % (ref 3–12)
NEUTROPHILS NFR BLD: 54 % (ref 36–65)
NEUTS SEG NFR BLD: 1.96 K/UL (ref 1.5–8.1)
NRBC BLD-RTO: 0 PER 100 WBC
PLATELET # BLD AUTO: 319 K/UL (ref 138–453)
PMV BLD AUTO: 9.3 FL (ref 8.1–13.5)
POTASSIUM SERPL-SCNC: 4.3 MMOL/L (ref 3.7–5.3)
PROT SERPL-MCNC: 7.5 G/DL (ref 6.4–8.3)
RBC # BLD AUTO: 3.72 M/UL (ref 4.21–5.77)
SODIUM SERPL-SCNC: 132 MMOL/L (ref 135–144)
TROPONIN I SERPL HS-MCNC: 19 NG/L (ref 0–22)
WBC OTHER # BLD: 3.7 K/UL (ref 3.5–11.3)

## 2023-09-20 PROCEDURE — 84484 ASSAY OF TROPONIN QUANT: CPT

## 2023-09-20 PROCEDURE — 99285 EMERGENCY DEPT VISIT HI MDM: CPT

## 2023-09-20 PROCEDURE — 71045 X-RAY EXAM CHEST 1 VIEW: CPT

## 2023-09-20 PROCEDURE — 93005 ELECTROCARDIOGRAM TRACING: CPT | Performed by: EMERGENCY MEDICINE

## 2023-09-20 PROCEDURE — 80053 COMPREHEN METABOLIC PANEL: CPT

## 2023-09-20 PROCEDURE — 83880 ASSAY OF NATRIURETIC PEPTIDE: CPT

## 2023-09-20 PROCEDURE — 85025 COMPLETE CBC W/AUTO DIFF WBC: CPT

## 2023-09-20 ASSESSMENT — ENCOUNTER SYMPTOMS
SHORTNESS OF BREATH: 0
COUGH: 0
ABDOMINAL PAIN: 0
VOICE CHANGE: 0
DIARRHEA: 0
VOMITING: 0
NAUSEA: 0
FACIAL SWELLING: 0
WHEEZING: 0

## 2023-09-20 ASSESSMENT — PAIN SCALES - GENERAL: PAINLEVEL_OUTOF10: 3

## 2023-09-20 ASSESSMENT — PAIN - FUNCTIONAL ASSESSMENT: PAIN_FUNCTIONAL_ASSESSMENT: 0-10

## 2023-09-20 ASSESSMENT — PAIN DESCRIPTION - ORIENTATION: ORIENTATION: LEFT;RIGHT

## 2023-09-20 NOTE — ED NOTES
Pt took self off monitor. Pt stated \"I feel worse now than I did before I got here. I'm leaving\". Writer and REINIER Awan tried to explain the importance of staying and risks of leaving. Pt stated \"Thanks, but I'm leaving\". Pt asked for IV out. Writer removed IV and pt eloped.       Boni Chavez RN  09/20/23 [id number]

## 2023-09-20 NOTE — ED PROVIDER NOTES
[facility] ED  Emergency Department Encounter  Emergency Medicine Attending     Pt Lillian Cabezas  MRN: [id number]  [address] [facility] [place] 1955  Date of evaluation: 9/20/23  PCP:  Royce Robertson MD      [address]       Chief Complaint   Patient presents with    Chest Pain    Arm Pain    Leg Swelling       HISTORY OF PRESENT ILLNESS  (Location/Symptom, Timing/Onset, Context/Setting, Quality, Duration, Modifying Factors, Severity.)      Panchito Clancy is a 79 y.o. male who presents with new onset lower extrem swelling recently, he had this in the past and it lasted for several weeks and went away on its own, he just had Seaboilwith a lot of salt several days ago, now has ostomy swelling. He states he typically does not have this problem, denies any heart history, does take amlodipine however denies ever had a heart attack, stroke, blood clot, does not smoke, does drink 8-12 beers per day. PAST MEDICAL / SURGICAL / SOCIAL / FAMILY HISTORY      has a past medical history of Chronic back pain, Chronic kidney disease, Gout, Hyperlipidemia, Hypertension, Murmur, Obesity, Osteoarthritis, Restless leg syndrome, Shoulder pain, Shrimp allergy, Spinal stenosis, lumbar region, without neurogenic claudication, Swelling of joint, wrist, right, Thoracic back pain, Vertigo, Wears glasses, and Wrist pain, right.       has a past surgical history that includes nasal endoscopy (Bilateral, 10/17/2016); Nerve Block (11/02/2016); Nerve Block (04/03/2017); Colonoscopy (11/2014); Nerve Block (10/09/2017); Upper gastrointestinal endoscopy (05/27/2020); Colonoscopy (05/27/2020); Upper gastrointestinal endoscopy (N/A, 05/27/2020); Colonoscopy (N/A, 05/27/2020); Total hip arthroplasty (Right, 09/27/2022); and Total hip arthroplasty (Right, 9/27/2022).       Social History     Socioeconomic History    Marital status: Single     Spouse name: Not on file    Number of children: Not on file    Years of education: Not

## 2023-09-20 NOTE — ED NOTES
Pt to ED with c/o intermittent CP and swelling in lower extremities.      Mary Grace Perez RN  09/20/23 [id number]

## 2023-09-21 LAB
EKG ATRIAL RATE: 67 BPM
EKG P AXIS: 42 DEGREES
EKG P-R INTERVAL: 216 MS
EKG Q-T INTERVAL: 392 MS
EKG QRS DURATION: 92 MS
EKG QTC CALCULATION (BAZETT): 414 MS
EKG R AXIS: 1 DEGREES
EKG T AXIS: 30 DEGREES
EKG VENTRICULAR RATE: 67 BPM

## 2023-09-21 PROCEDURE — 93010 ELECTROCARDIOGRAM REPORT: CPT | Performed by: INTERNAL MEDICINE

## 2023-09-28 DIAGNOSIS — I10 PRIMARY HYPERTENSION: ICD-10-CM

## 2023-09-29 ENCOUNTER — HOSPITAL ENCOUNTER (OUTPATIENT)
Dept: GENERAL RADIOLOGY | Age: 68
End: 2023-09-29
Payer: MEDICARE

## 2023-09-29 ENCOUNTER — HOSPITAL ENCOUNTER (OUTPATIENT)
Age: 68
End: 2023-09-29
Payer: MEDICARE

## 2023-09-29 ENCOUNTER — OFFICE VISIT (OUTPATIENT)
Dept: NEUROSURGERY | Age: 68
End: 2023-09-29
Payer: MEDICARE

## 2023-09-29 VITALS
DIASTOLIC BLOOD PRESSURE: 55 MMHG | HEART RATE: 59 BPM | HEIGHT: 72 IN | WEIGHT: 240 LBS | TEMPERATURE: 97 F | SYSTOLIC BLOOD PRESSURE: 113 MMHG | BODY MASS INDEX: 32.51 KG/M2

## 2023-09-29 DIAGNOSIS — M48.062 LUMBAR STENOSIS WITH NEUROGENIC CLAUDICATION: ICD-10-CM

## 2023-09-29 DIAGNOSIS — M48.062 LUMBAR STENOSIS WITH NEUROGENIC CLAUDICATION: Primary | ICD-10-CM

## 2023-09-29 PROCEDURE — 99203 OFFICE O/P NEW LOW 30 MIN: CPT | Performed by: NURSE PRACTITIONER

## 2023-09-29 PROCEDURE — 72110 X-RAY EXAM L-2 SPINE 4/>VWS: CPT

## 2023-09-29 PROCEDURE — 1123F ACP DISCUSS/DSCN MKR DOCD: CPT | Performed by: NURSE PRACTITIONER

## 2023-09-29 RX ORDER — AMLODIPINE BESYLATE 10 MG/1
10 TABLET ORAL DAILY
Qty: 30 TABLET | Refills: 1 | OUTPATIENT
Start: 2023-09-29

## 2023-09-29 NOTE — PROGRESS NOTES
CORD: Serpiginous appearance of the nerve roots in the thecal sac at  the L1 level, near the level of the conus. SOFT TISSUES: No paraspinal mass identified. Asymmetric atrophy of the right  psoas muscle. L1-L2: Disc bulge with marginal osteophytes. Moderate bilateral facet joint  disease. Prominent epidural fat. Severe spinal canal stenosis with  effacement of CSF surrounding the nerve roots in the thecal sac. Moderate to  severe bilateral neural foraminal narrowing. L2-L3: Disc bulge with posterior marginal osteophytes. Prominent epidural  fat. Moderate bilateral facet joint disease. Severe spinal canal stenosis  with effacement of CSF surrounding the nerve roots in the thecal sac. Moderate to severe bilateral neural foraminal narrowing. L3-L4: Disc bulge with posterior marginal osteophytes. Prominent epidural  fat. Moderate to severe bilateral facet joint disease. Severe spinal canal  stenosis with effacement of CSF surrounding the nerve roots in the thecal  sac. Severe right and moderate to severe left neural foraminal narrowing. L4-L5: Disc bulge with posterior marginal osteophytes. Severe bilateral  facet joint disease with left facet/perifacet edema. Small amount of fluid  in the left greater than right facet joints. Prominent epidural fat. Severe  spinal canal stenosis with effacement of CSF surrounding the nerve roots in  the thecal sac. Severe bilateral neural foraminal narrowing. L5-S1: No significant disc protrusion or spinal canal stenosis. Moderate to  severe bilateral facet joint disease with trace fluid in the bilateral facet  joints. Mild-to-moderate bilateral neural foraminal narrowing. Assessment and Plan:      1. Lumbar stenosis with neurogenic claudication          Plan: Recommend obtaining upright x-rays including flexion-extension to evaluate for alignment/instability.   We will reach out to pain management for records regarding specific

## 2023-10-04 ENCOUNTER — TELEPHONE (OUTPATIENT)
Dept: INTERNAL MEDICINE | Age: 68
End: 2023-10-04

## 2023-10-04 ENCOUNTER — HOSPITAL ENCOUNTER (EMERGENCY)
Age: 68
Discharge: HOME | End: 2023-10-04
Attending: EMERGENCY MEDICINE
Payer: MEDICARE

## 2023-10-04 VITALS
RESPIRATION RATE: 18 BRPM | SYSTOLIC BLOOD PRESSURE: 173 MMHG | TEMPERATURE: 97 F | OXYGEN SATURATION: 96 % | HEART RATE: 67 BPM | DIASTOLIC BLOOD PRESSURE: 65 MMHG

## 2023-10-04 DIAGNOSIS — H66.90 CHRONIC OTITIS MEDIA, UNSPECIFIED OTITIS MEDIA TYPE: Primary | ICD-10-CM

## 2023-10-04 PROCEDURE — 99283 EMERGENCY DEPT VISIT LOW MDM: CPT | Performed by: EMERGENCY MEDICINE

## 2023-10-04 RX ORDER — AZITHROMYCIN 250 MG/1
TABLET, FILM COATED ORAL
Qty: 1 PACKET | Refills: 0 | Status: SHIPPED | OUTPATIENT
Start: 2023-10-04 | End: 2023-10-08

## 2023-10-04 ASSESSMENT — ENCOUNTER SYMPTOMS
VOMITING: 0
DIARRHEA: 0
WHEEZING: 0
SINUS PAIN: 0
NAUSEA: 0
EYE REDNESS: 0
FACIAL SWELLING: 0
SINUS PRESSURE: 0
RHINORRHEA: 0
SORE THROAT: 0
SHORTNESS OF BREATH: 0
CONSTIPATION: 0
COUGH: 0

## 2023-10-04 ASSESSMENT — PAIN SCALES - GENERAL: PAINLEVEL_OUTOF10: 10

## 2023-10-04 ASSESSMENT — PAIN DESCRIPTION - ORIENTATION: ORIENTATION: RIGHT

## 2023-10-04 ASSESSMENT — PAIN DESCRIPTION - LOCATION: LOCATION: EAR

## 2023-10-04 ASSESSMENT — PAIN DESCRIPTION - DESCRIPTORS: DESCRIPTORS: ACHING

## 2023-10-04 ASSESSMENT — PAIN - FUNCTIONAL ASSESSMENT: PAIN_FUNCTIONAL_ASSESSMENT: 0-10

## 2023-10-04 NOTE — DISCHARGE INSTRUCTIONS
You were seen in the ER today for an ear infection. You have been sent an antibiotic to the pharmacy. Please complete the antibiotic regimen. He has follow-up with ENT doctors. Please call and make an appointment to see the ENT doctors within the next few days so that they can evaluate your ear more thoroughly.

## 2023-10-04 NOTE — ED PROVIDER NOTES
[address] ED  Emergency Department Encounter  Emergency Medicine Resident     Pt Jason العراقي  MRN: [id number]  [address] 1955  Date of evaluation: 10/4/23  PCP:  Leona Eisenmenger, MD  Note Started: 3:28 PM EDT      CHIEF COMPLAINT       Chief Complaint   Patient presents with    Otalgia     Right ear, was on abx but didn't clear up       HISTORY OF PRESENT ILLNESS  (Location/Symptom, Timing/Onset, Context/Setting, Quality, Duration, Modifying Factors, Severity.)      Cherie Gardner is a 79 y.o. male who presents with 3 weeks of ear pain. Patient reports that he had otitis media that was seen and treated by his PCP with Augmentin for 7 days. He reports that he missed a few doses and thinks that he did not complete the regimen of antibiotics prescribed to him. Patient now reports pain has extended into his jaw and he has trouble chewing. He denies pain on the external ear and behind the ear. He denies fever, chills, nausea, vomiting, headache, shortness of breath, chest pain, abdominal pain. Of note on arrival patient's blood pressure was 170/57 and repeat blood pressure was 173/65. Patient states that this is low for him and that he has blood pressures in the 474F systolic. Patient appears to be asymptomatic at this time. Patient reports the pain is \"really bad\" but does not want any pain medication at this time. He reports that when he goes home in the afternoon he likes to take a prescribed gabapentin and Percocet per his pain management regimen. PAST MEDICAL / SURGICAL / SOCIAL / FAMILY HISTORY      has a past medical history of Chronic back pain, Chronic kidney disease, Gout, Hyperlipidemia, Hypertension, Murmur, Obesity, Osteoarthritis, Restless leg syndrome, Shoulder pain, Shrimp allergy, Spinal stenosis, lumbar region, without neurogenic claudication, Swelling of joint, wrist, right, Thoracic back pain, Vertigo, Wears glasses, and Wrist pain, right. has a past surgical history that includes nasal endoscopy (Bilateral, 10/17/2016); Nerve Block (11/02/2016); Nerve Block (04/03/2017); Colonoscopy (11/2014); Nerve Block (10/09/2017); Upper gastrointestinal endoscopy (05/27/2020); Colonoscopy (05/27/2020); Upper gastrointestinal endoscopy (N/A, 05/27/2020); Colonoscopy (N/A, 05/27/2020); Total hip arthroplasty (Right, 09/27/2022); and Total hip arthroplasty (Right, 9/27/2022). Social History     Socioeconomic History    Marital status:      Spouse name: Not on file    Number of children: Not on file    Years of education: Not on file    Highest education level: Not on file   Occupational History    Not on file   Tobacco Use    Smoking status: Never    Smokeless tobacco: Never   Vaping Use    Vaping Use: Never used   Substance and Sexual Activity    Alcohol use: No    Drug use: No    Sexual activity: Not on file   Other Topics Concern    Not on file   Social History Narrative    Not on file     Social Determinants of Health     Financial Resource Strain: Low Risk  (3/7/2023)    Overall Financial Resource Strain (CARDIA)     Difficulty of Paying Living Expenses: Not hard at all   Food Insecurity: No Food Insecurity (3/7/2023)    Hunger Vital Sign     Worried About Running Out of Food in the Last Year: Never true     [address] in the Last Year: Never true   Transportation Needs: Unknown (3/7/2023)    PRAPARE - Transportation     Lack of Transportation (Medical): Not on file     Lack of Transportation (Non-Medical):  No   Physical Activity: Sufficiently Active (3/7/2023)    Exercise Vital Sign     Days of Exercise per Week: 7 days     Minutes of Exercise per Session: 90 min   Stress: Not on file   Social Connections: Not on file   Intimate Partner Violence: Not on file   Housing Stability: Unknown (3/7/2023)    Housing Stability Vital Sign     Unable to Pay for Housing in the Last Year: Not on file     Number of Places Lived in the Last Year: Not on

## 2023-10-04 NOTE — ED NOTES
Patient presents to ED for right ear pain that has been ongoing for approximately 3 weeks. Patient states he was on 7 day course of antibiotics and completed the dose, but does report missing a few doses. Patient states pain has not gotten better, rather has progressively gotten worse. Patient reports decreased hearing in R ear and pain that now extends down to jar. Reports pain 10/10. Patienta/o x 4 with even nonlabored respirations, speaking in complete sentences, NAD noted at this time.      Aime Ivy, VIVIANA  46/25/02 [id number]

## 2023-10-04 NOTE — TELEPHONE ENCOUNTER
Patient calling stating that his ear pain is really bad. Patient was seen on 9/13/23 for ear pain and was given Augmentin for 7 days. Patient states that he screwed up and was drinking alcohol while taking them and thinks they did not work. Patient denies any drainage but states that the pain goes into his jaw.

## 2023-10-06 NOTE — TELEPHONE ENCOUNTER
BP was ok at recent Neurosurgery visit.  Continue norvasc 5 mg
Called and informed the patient to  5mg Norvasc. No further questions or concerns at this time.
It appears pt was most recently on 5 mg as per dispense report, although when he saw Dr. Benjamín Parkinson earlier this month it was unclear what he was taking. Can we check with pt/pharmacy what he was most recently dispensed?
Pharmacy confirmed that pt is on the 5 mg but pt is requesting the 10 mg which he used to be on but was discontinued on 7/20/23 due to side effects at a Same Day visit c/o leg swelling.
joint, wrist, right     Pain in joint, multiple sites     Obesity     Adjustment reaction     Vertigo     Pain in joint     Spinal stenosis, lumbar region, without neurogenic claudication     Facet arthropathy, lumbar     Chronic back pain     Acute bilateral low back pain with sciatica     Rhabdomyolysis     S/P total right hip arthroplasty

## 2023-10-17 ENCOUNTER — HOSPITAL ENCOUNTER (INPATIENT)
Age: 68
LOS: 3 days | Discharge: HOME | End: 2023-10-20
Attending: INTERNAL MEDICINE
Payer: MEDICARE

## 2023-10-17 ENCOUNTER — HOSPITAL ENCOUNTER (OUTPATIENT)
Age: 68
Setting detail: SPECIMEN
Discharge: HOME | End: 2023-10-17
Attending: INTERNAL MEDICINE
Payer: MEDICARE

## 2023-10-17 ENCOUNTER — APPOINTMENT (OUTPATIENT)
Age: 68
End: 2023-10-17
Attending: INTERNAL MEDICINE
Payer: MEDICARE

## 2023-10-17 DIAGNOSIS — N18.31 STAGE 3A CHRONIC KIDNEY DISEASE (HCC): ICD-10-CM

## 2023-10-17 DIAGNOSIS — M48.061 SPINAL STENOSIS, LUMBAR REGION, WITHOUT NEUROGENIC CLAUDICATION: ICD-10-CM

## 2023-10-17 DIAGNOSIS — H66.91 ACUTE BACTERIAL INFECTION OF RIGHT MIDDLE EAR: Primary | ICD-10-CM

## 2023-10-17 DIAGNOSIS — M54.50 CHRONIC BILATERAL LOW BACK PAIN WITHOUT SCIATICA: ICD-10-CM

## 2023-10-17 DIAGNOSIS — G89.29 CHRONIC BILATERAL LOW BACK PAIN WITHOUT SCIATICA: ICD-10-CM

## 2023-10-17 DIAGNOSIS — H60.21 ACUTE MALIGNANT OTITIS EXTERNA OF RIGHT EAR: ICD-10-CM

## 2023-10-17 DIAGNOSIS — M25.431 SWELLING OF JOINT, WRIST, RIGHT: ICD-10-CM

## 2023-10-17 LAB
ALBUMIN SERPL-MCNC: 4.7 G/DL (ref 3.5–5.2)
ALBUMIN/GLOB SERPL: 1.3 {RATIO} (ref 1–2.5)
ALP SERPL-CCNC: 126 U/L (ref 40–129)
ALT SERPL-CCNC: 22 U/L (ref 5–41)
ANION GAP SERPL CALCULATED.3IONS-SCNC: 11 MMOL/L (ref 9–17)
AST SERPL-CCNC: 30 U/L
BILIRUB SERPL-MCNC: 0.8 MG/DL (ref 0.3–1.2)
BUN SERPL-MCNC: 12 MG/DL (ref 8–23)
CA-I BLD-SCNC: 1.23 MMOL/L (ref 1.13–1.33)
CALCIUM SERPL-MCNC: 9.6 MG/DL (ref 8.6–10.4)
CHLORIDE SERPL-SCNC: 95 MMOL/L (ref 98–107)
CO2 SERPL-SCNC: 25 MMOL/L (ref 20–31)
CREAT SERPL-MCNC: 1.1 MG/DL (ref 0.7–1.2)
GFR SERPL CREATININE-BSD FRML MDRD: >60 ML/MIN/1.73M2
GLUCOSE SERPL-MCNC: 85 MG/DL (ref 70–99)
INR PPP: 1.1
LACTIC ACID, WHOLE BLOOD: 2.2 MMOL/L (ref 0.7–2.1)
MAGNESIUM SERPL-MCNC: 2.3 MG/DL (ref 1.6–2.6)
POTASSIUM SERPL-SCNC: 4.6 MMOL/L (ref 3.7–5.3)
PROT SERPL-MCNC: 8.2 G/DL (ref 6.4–8.3)
PROTHROMBIN TIME: 13.8 SEC (ref 11.7–14.9)
SODIUM SERPL-SCNC: 131 MMOL/L (ref 135–144)

## 2023-10-17 PROCEDURE — 6370000000 HC RX 637 (ALT 250 FOR IP)

## 2023-10-17 PROCEDURE — 85610 PROTHROMBIN TIME: CPT

## 2023-10-17 PROCEDURE — 83735 ASSAY OF MAGNESIUM: CPT

## 2023-10-17 PROCEDURE — 6360000002 HC RX W HCPCS

## 2023-10-17 PROCEDURE — 87641 MR-STAPH DNA AMP PROBE: CPT

## 2023-10-17 PROCEDURE — 2060000000 HC ICU INTERMEDIATE R&B

## 2023-10-17 PROCEDURE — 09957ZX DRAINAGE OF RIGHT MIDDLE EAR, VIA NATURAL OR ARTIFICIAL OPENING, DIAGNOSTIC: ICD-10-PCS | Performed by: STUDENT IN AN ORGANIZED HEALTH CARE EDUCATION/TRAINING PROGRAM

## 2023-10-17 PROCEDURE — 70491 CT SOFT TISSUE NECK W/DYE: CPT

## 2023-10-17 PROCEDURE — 2580000003 HC RX 258

## 2023-10-17 PROCEDURE — 6360000004 HC RX CONTRAST MEDICATION

## 2023-10-17 PROCEDURE — 80053 COMPREHEN METABOLIC PANEL: CPT

## 2023-10-17 PROCEDURE — 99291 CRITICAL CARE FIRST HOUR: CPT | Performed by: INTERNAL MEDICINE

## 2023-10-17 PROCEDURE — 70481 CT ORBIT/EAR/FOSSA W/DYE: CPT

## 2023-10-17 RX ORDER — LORAZEPAM 2 MG/1
2 TABLET ORAL
Status: DISCONTINUED | OUTPATIENT
Start: 2023-10-17 | End: 2023-10-20 | Stop reason: HOSPADM

## 2023-10-17 RX ORDER — LORAZEPAM 2 MG/ML
3 INJECTION INTRAMUSCULAR
Status: DISCONTINUED | OUTPATIENT
Start: 2023-10-17 | End: 2023-10-20 | Stop reason: HOSPADM

## 2023-10-17 RX ORDER — LORAZEPAM 2 MG/ML
1 INJECTION INTRAMUSCULAR
Status: DISCONTINUED | OUTPATIENT
Start: 2023-10-17 | End: 2023-10-20 | Stop reason: HOSPADM

## 2023-10-17 RX ORDER — ACETAMINOPHEN 325 MG/1
650 TABLET ORAL EVERY 6 HOURS PRN
Status: DISCONTINUED | OUTPATIENT
Start: 2023-10-17 | End: 2023-10-20 | Stop reason: HOSPADM

## 2023-10-17 RX ORDER — FUROSEMIDE 40 MG/1
80 TABLET ORAL DAILY
Status: DISCONTINUED | OUTPATIENT
Start: 2023-10-17 | End: 2023-10-20 | Stop reason: HOSPADM

## 2023-10-17 RX ORDER — SODIUM CHLORIDE 9 MG/ML
INJECTION, SOLUTION INTRAVENOUS PRN
Status: DISCONTINUED | OUTPATIENT
Start: 2023-10-17 | End: 2023-10-20 | Stop reason: HOSPADM

## 2023-10-17 RX ORDER — PANTOPRAZOLE SODIUM 40 MG/1
40 TABLET, DELAYED RELEASE ORAL
Status: DISCONTINUED | OUTPATIENT
Start: 2023-10-18 | End: 2023-10-20 | Stop reason: HOSPADM

## 2023-10-17 RX ORDER — LORAZEPAM 1 MG/1
1 TABLET ORAL
Status: DISCONTINUED | OUTPATIENT
Start: 2023-10-17 | End: 2023-10-20 | Stop reason: HOSPADM

## 2023-10-17 RX ORDER — DEXAMETHASONE SODIUM PHOSPHATE 1 MG/ML
2 SOLUTION/ DROPS OPHTHALMIC 2 TIMES DAILY
Status: DISCONTINUED | OUTPATIENT
Start: 2023-10-17 | End: 2023-10-20 | Stop reason: HOSPADM

## 2023-10-17 RX ORDER — HYDRALAZINE HYDROCHLORIDE 20 MG/ML
10 INJECTION INTRAMUSCULAR; INTRAVENOUS ONCE
Status: COMPLETED | OUTPATIENT
Start: 2023-10-17 | End: 2023-10-17

## 2023-10-17 RX ORDER — AMLODIPINE BESYLATE 10 MG/1
10 TABLET ORAL DAILY
Status: DISCONTINUED | OUTPATIENT
Start: 2023-10-18 | End: 2023-10-17

## 2023-10-17 RX ORDER — AMLODIPINE BESYLATE 5 MG/1
5 TABLET ORAL DAILY
Status: DISCONTINUED | OUTPATIENT
Start: 2023-10-17 | End: 2023-10-17

## 2023-10-17 RX ORDER — ACETAMINOPHEN 650 MG/1
650 SUPPOSITORY RECTAL EVERY 6 HOURS PRN
Status: DISCONTINUED | OUTPATIENT
Start: 2023-10-17 | End: 2023-10-20 | Stop reason: HOSPADM

## 2023-10-17 RX ORDER — DIPHENHYDRAMINE HCL 25 MG
25 TABLET ORAL ONCE
Status: COMPLETED | OUTPATIENT
Start: 2023-10-17 | End: 2023-10-17

## 2023-10-17 RX ORDER — METOPROLOL TARTRATE 5 MG/5ML
5 INJECTION INTRAVENOUS EVERY 6 HOURS PRN
Status: DISCONTINUED | OUTPATIENT
Start: 2023-10-17 | End: 2023-10-17

## 2023-10-17 RX ORDER — SODIUM CHLORIDE 0.9 % (FLUSH) 0.9 %
5-40 SYRINGE (ML) INJECTION EVERY 12 HOURS SCHEDULED
Status: DISCONTINUED | OUTPATIENT
Start: 2023-10-17 | End: 2023-10-20 | Stop reason: HOSPADM

## 2023-10-17 RX ORDER — LORAZEPAM 2 MG/1
4 TABLET ORAL
Status: DISCONTINUED | OUTPATIENT
Start: 2023-10-17 | End: 2023-10-20 | Stop reason: HOSPADM

## 2023-10-17 RX ORDER — SODIUM CHLORIDE 0.9 % (FLUSH) 0.9 %
5-40 SYRINGE (ML) INJECTION PRN
Status: DISCONTINUED | OUTPATIENT
Start: 2023-10-17 | End: 2023-10-20 | Stop reason: HOSPADM

## 2023-10-17 RX ORDER — LANOLIN ALCOHOL/MO/W.PET/CERES
100 CREAM (GRAM) TOPICAL DAILY
Status: DISCONTINUED | OUTPATIENT
Start: 2023-10-17 | End: 2023-10-20 | Stop reason: HOSPADM

## 2023-10-17 RX ORDER — AMLODIPINE BESYLATE 10 MG/1
10 TABLET ORAL DAILY
Status: DISCONTINUED | OUTPATIENT
Start: 2023-10-17 | End: 2023-10-20 | Stop reason: HOSPADM

## 2023-10-17 RX ORDER — ONDANSETRON 4 MG/1
4 TABLET, ORALLY DISINTEGRATING ORAL EVERY 8 HOURS PRN
Status: DISCONTINUED | OUTPATIENT
Start: 2023-10-17 | End: 2023-10-20 | Stop reason: HOSPADM

## 2023-10-17 RX ORDER — LOSARTAN POTASSIUM 50 MG/1
50 TABLET ORAL DAILY
Status: DISCONTINUED | OUTPATIENT
Start: 2023-10-17 | End: 2023-10-20 | Stop reason: HOSPADM

## 2023-10-17 RX ORDER — ENOXAPARIN SODIUM 100 MG/ML
40 INJECTION SUBCUTANEOUS DAILY
Status: DISCONTINUED | OUTPATIENT
Start: 2023-10-17 | End: 2023-10-20 | Stop reason: HOSPADM

## 2023-10-17 RX ORDER — CIPROFLOXACIN HYDROCHLORIDE 3.5 MG/ML
2 SOLUTION/ DROPS TOPICAL 2 TIMES DAILY
Status: DISCONTINUED | OUTPATIENT
Start: 2023-10-17 | End: 2023-10-20 | Stop reason: HOSPADM

## 2023-10-17 RX ORDER — LORAZEPAM 2 MG/ML
2 INJECTION INTRAMUSCULAR
Status: DISCONTINUED | OUTPATIENT
Start: 2023-10-17 | End: 2023-10-20 | Stop reason: HOSPADM

## 2023-10-17 RX ORDER — LORAZEPAM 2 MG/ML
4 INJECTION INTRAMUSCULAR
Status: DISCONTINUED | OUTPATIENT
Start: 2023-10-17 | End: 2023-10-20 | Stop reason: HOSPADM

## 2023-10-17 RX ORDER — POLYETHYLENE GLYCOL 3350 17 G/17G
17 POWDER, FOR SOLUTION ORAL DAILY PRN
Status: DISCONTINUED | OUTPATIENT
Start: 2023-10-17 | End: 2023-10-20 | Stop reason: HOSPADM

## 2023-10-17 RX ORDER — GABAPENTIN 300 MG/1
300 CAPSULE ORAL 3 TIMES DAILY
Status: DISCONTINUED | OUTPATIENT
Start: 2023-10-17 | End: 2023-10-20 | Stop reason: HOSPADM

## 2023-10-17 RX ORDER — ONDANSETRON 2 MG/ML
4 INJECTION INTRAMUSCULAR; INTRAVENOUS EVERY 6 HOURS PRN
Status: DISCONTINUED | OUTPATIENT
Start: 2023-10-17 | End: 2023-10-20 | Stop reason: HOSPADM

## 2023-10-17 RX ORDER — HYDRALAZINE HYDROCHLORIDE 20 MG/ML
10 INJECTION INTRAMUSCULAR; INTRAVENOUS EVERY 6 HOURS PRN
Status: DISCONTINUED | OUTPATIENT
Start: 2023-10-17 | End: 2023-10-20 | Stop reason: HOSPADM

## 2023-10-17 RX ORDER — CHOLECALCIFEROL (VITAMIN D3) 125 MCG
5 CAPSULE ORAL NIGHTLY PRN
Status: DISCONTINUED | OUTPATIENT
Start: 2023-10-17 | End: 2023-10-20 | Stop reason: HOSPADM

## 2023-10-17 RX ADMIN — Medication 5 MG: at 23:04

## 2023-10-17 RX ADMIN — DEXAMETHASONE SODIUM PHOSPHATE 2 DROP: 1 SOLUTION/ DROPS OPHTHALMIC at 16:33

## 2023-10-17 RX ADMIN — SODIUM CHLORIDE, PRESERVATIVE FREE 10 ML: 5 INJECTION INTRAVENOUS at 20:30

## 2023-10-17 RX ADMIN — VANCOMYCIN HYDROCHLORIDE 1250 MG: 1.25 INJECTION, POWDER, LYOPHILIZED, FOR SOLUTION INTRAVENOUS at 16:32

## 2023-10-17 RX ADMIN — GABAPENTIN 300 MG: 300 CAPSULE ORAL at 20:29

## 2023-10-17 RX ADMIN — IOPAMIDOL 75 ML: 755 INJECTION, SOLUTION INTRAVENOUS at 14:05

## 2023-10-17 RX ADMIN — HYDRALAZINE HYDROCHLORIDE 10 MG: 20 INJECTION, SOLUTION INTRAMUSCULAR; INTRAVENOUS at 14:18

## 2023-10-17 RX ADMIN — ACETAMINOPHEN 650 MG: 325 TABLET ORAL at 23:03

## 2023-10-17 RX ADMIN — HYDRALAZINE HYDROCHLORIDE 10 MG: 20 INJECTION, SOLUTION INTRAMUSCULAR; INTRAVENOUS at 21:19

## 2023-10-17 RX ADMIN — Medication 100 MG: at 20:33

## 2023-10-17 RX ADMIN — DIPHENHYDRAMINE HYDROCHLORIDE 25 MG: 25 TABLET ORAL at 18:29

## 2023-10-17 RX ADMIN — CIPROFLOXACIN HYDROCHLORIDE 2 DROP: 3 SOLUTION/ DROPS OPHTHALMIC at 16:32

## 2023-10-17 NOTE — PROGRESS NOTES
Patient presents from ENT clinic walking in to unit, alert and oriented x4. STAT CT completed, patient returned back to bed and placed on pulse ox and blood pressure. However, refusing to wear EKG cords. Around 4pm ENT comes to bedside and explains CT results to patient and the seriousness of the infection. Writer then gives patient some space to process new information. Cynthia Scotty returns back in patient room to administer prescribed antibiotics. Patient appears anxious and upset. Writer states she is going to hook patient up to IV and give antibiotics, patient states \"I do not want the antibiotics, there is no point\" Writer tried to education patient on the importance of antibiotics. Patient continued to refuse treatment. Writer then left room and notified resident.     Electronically signed by Alyx Barber RN on 10/17/2023 at 4:41 PM

## 2023-10-17 NOTE — CARE COORDINATION
Case Management Assessment  Initial Evaluation    Date/Time of Evaluation: 10/17/2023 2:55 PM  Assessment Completed by: Woodrow Garrett RN    If patient is discharged prior to next notation, then this note serves as note for discharge by case management. 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 (P) Yes Crystal Flores MD)    Chart Reviewed: Yes      History Provided by: Edmond Wilder) Patient  Patient Orientation: (P) Alert and Oriented, Person, Place, Situation, Self    Patient Cognition: (P) Alert    Hospitalization in the last 30 days (Readmission):  No    If yes, Readmission Assessment in  Navigator will be completed.     Advance Directives:      Code Status: Full Code   Patient's Primary Decision Maker is: (P) Legal Next of Kin    Primary Decision Maker: emerita quijano *HIPAA* - Other - [phone]    Discharge Planning:    Patient lives with: (P) Spouse/Significant Other Type of Home: (P) House  Primary Care Giver: (P) Self  Patient Support Systems include: (P) Spouse/Significant Other, Children, Family Members   Current Financial resources: (P) Medicaid, Medicare  Current community resources: (P) None  Current services prior to admission: (P) Durable Medical Equipment            Current DME: (P) Cane            Type of Home Care services:  (P) None    ADLS  Prior functional level: (P) Independent in ADLs/IADLs  Current functional level: (P) Independent in ADLs/IADLs    PT AM-PAC:   /24  OT AM-PAC:   /24    Family can provide assistance at DC: (P) Yes  Would you like Case Management to discuss the discharge plan with any other family members/significant others, and if so, who? (P) No  Plans to Return

## 2023-10-17 NOTE — PROGRESS NOTES
Patient agreed to IV antibiotics, welts on forearm started to appear, resident notified. Benadryl order obtained, patient refused benadryl. Report called to [facility] nurse, all questions answered. Patient sent to new room with all belongings.     Electronically signed by Shelby Waldron RN on 10/17/2023 at 6:29 PM

## 2023-10-18 ENCOUNTER — APPOINTMENT (OUTPATIENT)
Dept: GENERAL RADIOLOGY | Age: 68
End: 2023-10-18
Attending: INTERNAL MEDICINE
Payer: MEDICARE

## 2023-10-18 PROBLEM — H66.91 ACUTE BACTERIAL INFECTION OF RIGHT MIDDLE EAR: Status: ACTIVE | Noted: 2023-10-18

## 2023-10-18 LAB
BASOPHILS # BLD: 0.05 K/UL (ref 0–0.2)
BASOPHILS NFR BLD: 1 % (ref 0–2)
EOSINOPHIL # BLD: 0.09 K/UL (ref 0–0.44)
EOSINOPHILS RELATIVE PERCENT: 2 % (ref 1–4)
ERYTHROCYTE [DISTWIDTH] IN BLOOD BY AUTOMATED COUNT: 12.2 % (ref 11.8–14.4)
HCT VFR BLD AUTO: 38.1 % (ref 40.7–50.3)
HGB BLD-MCNC: 12.1 G/DL (ref 13–17)
IMM GRANULOCYTES # BLD AUTO: <0.03 K/UL (ref 0–0.3)
IMM GRANULOCYTES NFR BLD: 0 %
INR PPP: 1.1
LYMPHOCYTES NFR BLD: 0.81 K/UL (ref 1.1–3.7)
LYMPHOCYTES RELATIVE PERCENT: 18 % (ref 24–43)
MCH RBC QN AUTO: 31.8 PG (ref 25.2–33.5)
MCHC RBC AUTO-ENTMCNC: 31.8 G/DL (ref 28.4–34.8)
MCV RBC AUTO: 100 FL (ref 82.6–102.9)
MICROORGANISM SPEC CULT: NO GROWTH
MICROORGANISM/AGENT SPEC: ABNORMAL
MICROORGANISM/AGENT SPEC: ABNORMAL
MONOCYTES NFR BLD: 0.79 K/UL (ref 0.1–1.2)
MONOCYTES NFR BLD: 17 % (ref 3–12)
MRSA, DNA, NASAL: NEGATIVE
NEUTROPHILS NFR BLD: 62 % (ref 36–65)
NEUTS SEG NFR BLD: 2.81 K/UL (ref 1.5–8.1)
NRBC BLD-RTO: 0 PER 100 WBC
PARTIAL THROMBOPLASTIN TIME: 29.1 SEC (ref 23–36.5)
PLATELET # BLD AUTO: 300 K/UL (ref 138–453)
PMV BLD AUTO: 9.5 FL (ref 8.1–13.5)
PROTHROMBIN TIME: 14.4 SEC (ref 11.7–14.9)
RBC # BLD AUTO: 3.81 M/UL (ref 4.21–5.77)
SPECIMEN DESCRIPTION: ABNORMAL
SPECIMEN DESCRIPTION: NORMAL
WBC OTHER # BLD: 4.6 K/UL (ref 3.5–11.3)

## 2023-10-18 PROCEDURE — 6370000000 HC RX 637 (ALT 250 FOR IP)

## 2023-10-18 PROCEDURE — 6360000002 HC RX W HCPCS: Performed by: INTERNAL MEDICINE

## 2023-10-18 PROCEDURE — 36415 COLL VENOUS BLD VENIPUNCTURE: CPT

## 2023-10-18 PROCEDURE — 99231 SBSQ HOSP IP/OBS SF/LOW 25: CPT | Performed by: STUDENT IN AN ORGANIZED HEALTH CARE EDUCATION/TRAINING PROGRAM

## 2023-10-18 PROCEDURE — 6360000002 HC RX W HCPCS

## 2023-10-18 PROCEDURE — 85025 COMPLETE CBC W/AUTO DIFF WBC: CPT

## 2023-10-18 PROCEDURE — 2060000000 HC ICU INTERMEDIATE R&B

## 2023-10-18 PROCEDURE — 2580000003 HC RX 258: Performed by: INTERNAL MEDICINE

## 2023-10-18 PROCEDURE — 85730 THROMBOPLASTIN TIME PARTIAL: CPT

## 2023-10-18 PROCEDURE — 85610 PROTHROMBIN TIME: CPT

## 2023-10-18 PROCEDURE — 99222 1ST HOSP IP/OBS MODERATE 55: CPT | Performed by: INTERNAL MEDICINE

## 2023-10-18 PROCEDURE — 2580000003 HC RX 258

## 2023-10-18 PROCEDURE — 99233 SBSQ HOSP IP/OBS HIGH 50: CPT | Performed by: INTERNAL MEDICINE

## 2023-10-18 PROCEDURE — 74018 RADEX ABDOMEN 1 VIEW: CPT

## 2023-10-18 RX ORDER — DIPHENHYDRAMINE HYDROCHLORIDE 50 MG/ML
25 INJECTION INTRAMUSCULAR; INTRAVENOUS EVERY 6 HOURS PRN
Status: DISCONTINUED | OUTPATIENT
Start: 2023-10-18 | End: 2023-10-20 | Stop reason: HOSPADM

## 2023-10-18 RX ORDER — ALLOPURINOL 300 MG/1
300 TABLET ORAL EVERY MORNING
Status: DISCONTINUED | OUTPATIENT
Start: 2023-10-18 | End: 2023-10-20 | Stop reason: HOSPADM

## 2023-10-18 RX ORDER — OXYCODONE HYDROCHLORIDE 5 MG/1
5 TABLET ORAL 3 TIMES DAILY
Status: DISCONTINUED | OUTPATIENT
Start: 2023-10-18 | End: 2023-10-20 | Stop reason: HOSPADM

## 2023-10-18 RX ORDER — LANOLIN ALCOHOL/MO/W.PET/CERES
325 CREAM (GRAM) TOPICAL
Status: DISCONTINUED | OUTPATIENT
Start: 2023-10-18 | End: 2023-10-20 | Stop reason: HOSPADM

## 2023-10-18 RX ORDER — OXYCODONE AND ACETAMINOPHEN 10; 325 MG/1; MG/1
1 TABLET ORAL 3 TIMES DAILY
Status: DISCONTINUED | OUTPATIENT
Start: 2023-10-18 | End: 2023-10-18 | Stop reason: CLARIF

## 2023-10-18 RX ORDER — TIZANIDINE 4 MG/1
4 TABLET ORAL EVERY 8 HOURS PRN
Status: DISCONTINUED | OUTPATIENT
Start: 2023-10-18 | End: 2023-10-20 | Stop reason: HOSPADM

## 2023-10-18 RX ORDER — OXYCODONE HYDROCHLORIDE AND ACETAMINOPHEN 5; 325 MG/1; MG/1
1 TABLET ORAL 3 TIMES DAILY
Status: DISCONTINUED | OUTPATIENT
Start: 2023-10-18 | End: 2023-10-20 | Stop reason: HOSPADM

## 2023-10-18 RX ORDER — LANOLIN ALCOHOL/MO/W.PET/CERES
400 CREAM (GRAM) TOPICAL DAILY
Status: DISCONTINUED | OUTPATIENT
Start: 2023-10-18 | End: 2023-10-20 | Stop reason: HOSPADM

## 2023-10-18 RX ORDER — DOCUSATE SODIUM 100 MG/1
100 CAPSULE, LIQUID FILLED ORAL DAILY
Status: DISCONTINUED | OUTPATIENT
Start: 2023-10-19 | End: 2023-10-20 | Stop reason: HOSPADM

## 2023-10-18 RX ADMIN — OXYCODONE HYDROCHLORIDE 5 MG: 5 TABLET ORAL at 16:54

## 2023-10-18 RX ADMIN — AMLODIPINE BESYLATE 10 MG: 10 TABLET ORAL at 09:27

## 2023-10-18 RX ADMIN — VANCOMYCIN HYDROCHLORIDE 1250 MG: 1.25 INJECTION, POWDER, LYOPHILIZED, FOR SOLUTION INTRAVENOUS at 04:42

## 2023-10-18 RX ADMIN — CEFEPIME 2000 MG: 2 INJECTION, POWDER, FOR SOLUTION INTRAVENOUS at 17:02

## 2023-10-18 RX ADMIN — GABAPENTIN 300 MG: 300 CAPSULE ORAL at 16:54

## 2023-10-18 RX ADMIN — OXYCODONE HYDROCHLORIDE 5 MG: 5 TABLET ORAL at 09:27

## 2023-10-18 RX ADMIN — ALLOPURINOL 300 MG: 300 TABLET ORAL at 12:12

## 2023-10-18 RX ADMIN — OXYCODONE HYDROCHLORIDE AND ACETAMINOPHEN 1 TABLET: 5; 325 TABLET ORAL at 09:27

## 2023-10-18 RX ADMIN — CIPROFLOXACIN HYDROCHLORIDE 2 DROP: 3 SOLUTION/ DROPS OPHTHALMIC at 09:27

## 2023-10-18 RX ADMIN — SODIUM CHLORIDE, PRESERVATIVE FREE 10 ML: 5 INJECTION INTRAVENOUS at 22:16

## 2023-10-18 RX ADMIN — SODIUM CHLORIDE, PRESERVATIVE FREE 10 ML: 5 INJECTION INTRAVENOUS at 22:15

## 2023-10-18 RX ADMIN — OXYCODONE HYDROCHLORIDE AND ACETAMINOPHEN 1 TABLET: 5; 325 TABLET ORAL at 22:11

## 2023-10-18 RX ADMIN — VANCOMYCIN HYDROCHLORIDE 1250 MG: 1.25 INJECTION, POWDER, LYOPHILIZED, FOR SOLUTION INTRAVENOUS at 15:09

## 2023-10-18 RX ADMIN — OXYCODONE HYDROCHLORIDE 5 MG: 5 TABLET ORAL at 22:11

## 2023-10-18 RX ADMIN — FUROSEMIDE 80 MG: 40 TABLET ORAL at 09:27

## 2023-10-18 RX ADMIN — CIPROFLOXACIN HYDROCHLORIDE 2 DROP: 3 SOLUTION/ DROPS OPHTHALMIC at 22:11

## 2023-10-18 RX ADMIN — GABAPENTIN 300 MG: 300 CAPSULE ORAL at 01:48

## 2023-10-18 RX ADMIN — DICLOFENAC SODIUM 2 G: 10 GEL TOPICAL at 01:49

## 2023-10-18 RX ADMIN — PANTOPRAZOLE SODIUM 40 MG: 40 TABLET, DELAYED RELEASE ORAL at 09:26

## 2023-10-18 RX ADMIN — MAGNESIUM GLUCONATE 500 MG ORAL TABLET 400 MG: 500 TABLET ORAL at 16:54

## 2023-10-18 RX ADMIN — LOSARTAN POTASSIUM 50 MG: 50 TABLET, FILM COATED ORAL at 09:27

## 2023-10-18 RX ADMIN — GABAPENTIN 300 MG: 300 CAPSULE ORAL at 22:11

## 2023-10-18 RX ADMIN — OXYCODONE HYDROCHLORIDE AND ACETAMINOPHEN 1 TABLET: 5; 325 TABLET ORAL at 16:54

## 2023-10-18 RX ADMIN — FERROUS SULFATE TAB EC 325 MG (65 MG FE EQUIVALENT) 325 MG: 325 (65 FE) TABLET DELAYED RESPONSE at 09:28

## 2023-10-18 RX ADMIN — DIPHENHYDRAMINE HYDROCHLORIDE 25 MG: 50 INJECTION, SOLUTION INTRAMUSCULAR; INTRAVENOUS at 04:24

## 2023-10-18 RX ADMIN — DEXAMETHASONE SODIUM PHOSPHATE 2 DROP: 1 SOLUTION/ DROPS OPHTHALMIC at 22:11

## 2023-10-18 RX ADMIN — DEXAMETHASONE SODIUM PHOSPHATE 2 DROP: 1 SOLUTION/ DROPS OPHTHALMIC at 09:27

## 2023-10-18 RX ADMIN — CEFEPIME 2000 MG: 2 INJECTION, POWDER, FOR SOLUTION INTRAVENOUS at 06:39

## 2023-10-18 RX ADMIN — Medication 100 MG: at 09:27

## 2023-10-18 ASSESSMENT — PAIN SCALES - GENERAL
PAINLEVEL_OUTOF10: 7
PAINLEVEL_OUTOF10: 7
PAINLEVEL_OUTOF10: 0
PAINLEVEL_OUTOF10: 7

## 2023-10-18 ASSESSMENT — ENCOUNTER SYMPTOMS
NAUSEA: 0
ABDOMINAL DISTENTION: 0
WHEEZING: 0
VOMITING: 0
SHORTNESS OF BREATH: 0
APNEA: 0
EYE PAIN: 0
DIARRHEA: 0
CHOKING: 0
CONSTIPATION: 0
ABDOMINAL PAIN: 0
BLOOD IN STOOL: 0
COUGH: 0
STRIDOR: 0

## 2023-10-18 ASSESSMENT — PAIN DESCRIPTION - DESCRIPTORS: DESCRIPTORS: ACHING

## 2023-10-18 ASSESSMENT — PAIN DESCRIPTION - LOCATION: LOCATION: EAR

## 2023-10-18 ASSESSMENT — PAIN DESCRIPTION - ORIENTATION: ORIENTATION: RIGHT

## 2023-10-18 NOTE — PROGRESS NOTES
[address]     Department of Internal Medicine - Staff Internal Medicine Service   ICU PATIENT TRANSFER NOTE        Patient:  Siobhan Sue  YOB: 1955  MRN: [id number]     Acct: [de-identified]     Admit date: 10/17/2023    Code Status:-  Full code     Reason for ICU Admission:-       SUPPORT DEVICES: [] Ventilator [] BIPAP  [] Nasal Cannula [x] Room Air    Consultations:- [] Cardiology [] Nephrology  [] Hemo onco  [] GI                               [] ID [x] ENT  [] Rheum [] Endo   []Physiotherapy                                 Others:-     NUTRITION:  [] NPO [] Tube Feeding (Specify: ) [] TPN  [x] PO    Central Lines:- [x] No   [] Yes           If yes - Days/Date of Insertion. Pt seen,examined and Chart reviewed. ICU COURSE:    Siobhan Sue is a 79 y.o. with PMH of   -chronic back pain  -HLD  -HTN  -gout     Presented from the ENT clinic with worsening R ear drainage and pain, despite taking oral antibiotics and ear drops. He reports that he had otitis media that was seen and treated by his PCP with Augmentin for 7 days around 9/29. He states that he had no improvement and instead developed significant cheek and jaw pain that caused him to have trouble chewing. Because of this he presented to the ED on 10/4. He was prescribed a Z-byron and was discharged. Since being discharged from the emergency department he endorses some improvement in his pain but still with mild persistence. He states that he has noticed some dried secretions from his right ear that he has been able to pick with his finger. He has noticed green and yellow discharge on Q-tips. Denies any blood from his ear canal.  States that his hearing on his right side is still diminished and has intermittent popping sensation. Jaw pain improved but with mild persistence. Denies any recent fevers, night sweats, weight loss. Has not used antibiotic eardrops within this timeframe.  In the ENT clinic,

## 2023-10-18 NOTE — PROGRESS NOTES
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 Chart & results reviewed. On room air, blood pressures slightly high   - vital signs stable, afebrile   -no acute events overnight  -on cefepime and vanc   -pain persistent but better,     ROS:  Constitutional:  negative for chills, fevers, sweats  Respiratory:  negative for cough, dyspnea on exertion, hemoptysis, shortness of breath, wheezing  Cardiovascular:  negative for chest pain, chest pressure/discomfort, lower extremity edema, palpitations  Gastrointestinal:  negative for abdominal pain, constipation, diarrhea, nausea, vomiting  Neurological:  negative for dizziness, headache    BRIEF HISTORY     Siobhan Sue is a 79 y.o. with PMH of   -chronic back pain  -HLD  -HTN  -gout     Presented from the ENT clinic with worsening R ear drainage and pain, despite taking oral antibiotics and ear drops. He reports that he had otitis media that was seen and treated by his PCP with Augmentin for 7 days around . He states that he had no improvement and instead developed significant cheek and jaw pain that caused him to have trouble chewing. Because of this he presented to the ED on 10/4. He was prescribed a Z-byron and was discharged. Since being discharged from the emergency department he endorses some improvement in his pain but still with mild persistence  In the ENT clinic, The patient's head was turned and the right ear was examined using the operating microscope.  He has

## 2023-10-18 NOTE — PLAN OF CARE
Problem: Discharge Planning  Goal: Discharge to home or other facility with appropriate resources  Outcome: Progressing     Problem: ABCDS Injury Assessment  Goal: Absence of physical injury  Outcome: Progressing  Flowsheets (Taken 10/17/2023 2000)  Absence of Physical Injury: Implement safety measures based on patient assessment

## 2023-10-18 NOTE — PROGRESS NOTES
Offered to place another IV d/t pt c/o burning. Pt declined at this time he did allow to flush the IV and blood return was noted.

## 2023-10-18 NOTE — PROGRESS NOTES
MD notified of pain in Left latera side which he describes as sharp pain with movement he is unable to sleep on either side due to this pain. Pt states this pain started prior to his ear infection.

## 2023-10-18 NOTE — CARE COORDINATION
Consult received for consideration of rehab. Met with pt who stated he lives with his wife. Pt stated he is self-employed, has [employer]  in place. Pt reports he drinks alcohol daily, 2-3 beers (12oz). He denies all drug use. Pt stated he has no concerns with his drinking. He denies it has caused him any problems in his life. He denies family has been concerned. No prior tx or AA involvement. He declines the need for any tx resources.

## 2023-10-18 NOTE — PROGRESS NOTES
ENT/OTOLARYNGOLOGY SUBSEQUENT CARE PROGRESS NOTE     REASON FOR CARE: Treatment resistant otitis externa with middle ear infection and mastoid effusion - no evidence of coalescent mastoiditis on imaging     HISTORY OF PRESENT ILLNESS:   Carli Mars is a 79 y.o. who failed outpatient treatment of otitis externa after 3 rounds of oral and topical antibiotics. He has developed worsening EAC stenosis with multiple bullous lesions and worsening pain to the jaw and cheek. The decision was made by myself to directly admit him from clinic to rule out malignant otitis externa and/or mastoiditis. CT scan obtained 10/17 without concerns for destructive bone infection, however, with external and middle ear infection with near complete canal stenosis. Subjective: Persistent ear discomfort/pain this morning, but states jaw pain has improved slightly. No culture results at this time. Pertinent Examination:   GENERAL: well developed and well nourished and in no acute distress  HEAD: normocephalic and atraumatic  EYES: no eyelid swelling, no conjunctival injection or exudate, pupils equal round and reactive to light  EXTERNAL EARS: normal  EAR EXAM:  Right Ear: Ear drops had been just applied to the ear on arrival. Left in place at this time. NOSE: nares patent, normal mucosa  MOUTH/THROAT: mucous membranes moist, no focal lesions, no tonsillar enlargement or exudate  NECK: non-tender, full range of motion, no mass, no focal lymphadenopathy  RESPIRATORY: Normal expansion. Clear to auscultation. No rales, rhonchi, or wheezing. NEUROLOGICAL:  cranial nerves II-XII are grossly intact     RELEVANT LABS/STUDIES:   Additional data reviewed:    CT Tbone (10/17/2023): IMPRESSION:  Mild circumferential thickening of the right external auditory canal that may  relate to chronic otitis externa and correlation with direct visualization is  needed.      Fluid throughout the right middle ear cavity representing effusion versus

## 2023-10-18 NOTE — CONSULTS
[facility] -   Infectious diseases evaluation  admission date 10/17/2023    reason for consultation:   Antibiotic management for acute otitis externa. Impression :   Current:  Right-sided middle ear infection  R malignant otitis external w collapse of the External canal  No Osteo on CT  Hypertension  Diastolic heart failure  Gout    Other:    Discussion / summary of stay / plan of care   Recent wet loss and increased bilat LE edema  Need to rule out diabetes vs malignancy as a cause of the malignant otitis   Recommendations   Continue cefepime and vancomycin. Based on pending sensitivities and cultures we will adjust antibiotic management  Continue to monitor for ear pain/jaw pain. Get A1C for possible un noticed DM as a cause of the malignant otitis    Infection Control Recommendations   [place] Precautions    Antimicrobial Stewardship Recommendations   Simplification of therapy  Targeted therapy      History of Present Illness:   Initial history:  Sharmaine Palencia is a 79y.o.-year-old male past medical history of diastolic heart failure, hypertension, restless leg syndrome. Patient was sent from his ENT clinic. Patient states that he has been having right-sided ear pain for a month. Was treated by his PCP with Augmentin and Cipro drops for 7 days towards the end of September. Patient states that about 5 days afterwards he was still having intense right-sided ear pain but also developed cheek and jaw pain. Patient once again went to the ED on October 4 in which she was prescribed a Z-Raghu and discharged. In the ENT office patient's ear was examined under microscope in which persistent draining and multiple bolus of soft tissue masses on the bony posterior canal was found. 1 of these bullae was needle aspirated which showed purulent fluid. Cultures from this so far have been negative. CT imaging does not reveal any osteomyelitis.   Does show fluid

## 2023-10-19 LAB
BASOPHILS # BLD: 0.05 K/UL (ref 0–0.2)
BASOPHILS NFR BLD: 1 % (ref 0–2)
EOSINOPHIL # BLD: 0.16 K/UL (ref 0–0.44)
EOSINOPHILS RELATIVE PERCENT: 3 % (ref 1–4)
ERYTHROCYTE [DISTWIDTH] IN BLOOD BY AUTOMATED COUNT: 12.5 % (ref 11.8–14.4)
EST. AVERAGE GLUCOSE BLD GHB EST-MCNC: 97 MG/DL
HBA1C MFR BLD: 5 % (ref 4–6)
HCT VFR BLD AUTO: 37.2 % (ref 40.7–50.3)
HGB BLD-MCNC: 12.3 G/DL (ref 13–17)
IMM GRANULOCYTES # BLD AUTO: <0.03 K/UL (ref 0–0.3)
IMM GRANULOCYTES NFR BLD: 0 %
INR PPP: 1.1
LYMPHOCYTES NFR BLD: 1.11 K/UL (ref 1.1–3.7)
LYMPHOCYTES RELATIVE PERCENT: 21 % (ref 24–43)
MCH RBC QN AUTO: 31.7 PG (ref 25.2–33.5)
MCHC RBC AUTO-ENTMCNC: 33.1 G/DL (ref 28.4–34.8)
MCV RBC AUTO: 95.9 FL (ref 82.6–102.9)
MONOCYTES NFR BLD: 0.68 K/UL (ref 0.1–1.2)
MONOCYTES NFR BLD: 13 % (ref 3–12)
NEUTROPHILS NFR BLD: 62 % (ref 36–65)
NEUTS SEG NFR BLD: 3.35 K/UL (ref 1.5–8.1)
NRBC BLD-RTO: 0 PER 100 WBC
PLATELET # BLD AUTO: 323 K/UL (ref 138–453)
PMV BLD AUTO: 9.7 FL (ref 8.1–13.5)
PROTHROMBIN TIME: 14.2 SEC (ref 11.7–14.9)
RBC # BLD AUTO: 3.88 M/UL (ref 4.21–5.77)
WBC OTHER # BLD: 5.4 K/UL (ref 3.5–11.3)

## 2023-10-19 PROCEDURE — 6360000002 HC RX W HCPCS: Performed by: INTERNAL MEDICINE

## 2023-10-19 PROCEDURE — 6360000002 HC RX W HCPCS

## 2023-10-19 PROCEDURE — 6370000000 HC RX 637 (ALT 250 FOR IP): Performed by: INTERNAL MEDICINE

## 2023-10-19 PROCEDURE — 36415 COLL VENOUS BLD VENIPUNCTURE: CPT

## 2023-10-19 PROCEDURE — 6370000000 HC RX 637 (ALT 250 FOR IP)

## 2023-10-19 PROCEDURE — 2580000003 HC RX 258: Performed by: INTERNAL MEDICINE

## 2023-10-19 PROCEDURE — 85025 COMPLETE CBC W/AUTO DIFF WBC: CPT

## 2023-10-19 PROCEDURE — 2580000003 HC RX 258

## 2023-10-19 PROCEDURE — 99231 SBSQ HOSP IP/OBS SF/LOW 25: CPT | Performed by: STUDENT IN AN ORGANIZED HEALTH CARE EDUCATION/TRAINING PROGRAM

## 2023-10-19 PROCEDURE — 99233 SBSQ HOSP IP/OBS HIGH 50: CPT | Performed by: INTERNAL MEDICINE

## 2023-10-19 PROCEDURE — 1200000000 HC SEMI PRIVATE

## 2023-10-19 PROCEDURE — 99232 SBSQ HOSP IP/OBS MODERATE 35: CPT | Performed by: INTERNAL MEDICINE

## 2023-10-19 PROCEDURE — 83036 HEMOGLOBIN GLYCOSYLATED A1C: CPT

## 2023-10-19 PROCEDURE — 85610 PROTHROMBIN TIME: CPT

## 2023-10-19 RX ORDER — AMLODIPINE BESYLATE 10 MG/1
10 TABLET ORAL DAILY
Qty: 30 TABLET | Refills: 3 | Status: SHIPPED | OUTPATIENT
Start: 2023-10-20

## 2023-10-19 RX ORDER — OXYCODONE AND ACETAMINOPHEN 10; 325 MG/1; MG/1
1 TABLET ORAL 3 TIMES DAILY
Qty: 9 TABLET | Refills: 0 | Status: SHIPPED | OUTPATIENT
Start: 2023-10-19 | End: 2023-10-22

## 2023-10-19 RX ORDER — CIPROFLOXACIN HYDROCHLORIDE 3.5 MG/ML
2 SOLUTION/ DROPS TOPICAL 2 TIMES DAILY
Qty: 10 ML | Refills: 0 | Status: SHIPPED | OUTPATIENT
Start: 2023-10-19 | End: 2023-12-08

## 2023-10-19 RX ORDER — CARVEDILOL 12.5 MG/1
12.5 TABLET ORAL 2 TIMES DAILY WITH MEALS
Status: DISCONTINUED | OUTPATIENT
Start: 2023-10-19 | End: 2023-10-20 | Stop reason: HOSPADM

## 2023-10-19 RX ORDER — LINEZOLID 600 MG/1
600 TABLET, FILM COATED ORAL EVERY 12 HOURS SCHEDULED
Qty: 28 TABLET | Refills: 0 | Status: SHIPPED | OUTPATIENT
Start: 2023-10-19 | End: 2023-11-02

## 2023-10-19 RX ORDER — DEXAMETHASONE SODIUM PHOSPHATE 1 MG/ML
2 SOLUTION/ DROPS OPHTHALMIC 2 TIMES DAILY
Qty: 10 ML | Refills: 0 | Status: SHIPPED | OUTPATIENT
Start: 2023-10-19

## 2023-10-19 RX ORDER — PSEUDOEPHEDRINE HCL 30 MG
100 TABLET ORAL DAILY PRN
Qty: 15 CAPSULE | Refills: 0 | Status: SHIPPED | OUTPATIENT
Start: 2023-10-19 | End: 2023-11-03

## 2023-10-19 RX ORDER — CARVEDILOL 12.5 MG/1
12.5 TABLET ORAL 2 TIMES DAILY WITH MEALS
Qty: 60 TABLET | Refills: 3 | Status: SHIPPED | OUTPATIENT
Start: 2023-10-19

## 2023-10-19 RX ORDER — LINEZOLID 600 MG/1
600 TABLET, FILM COATED ORAL 2 TIMES DAILY
Status: DISCONTINUED | OUTPATIENT
Start: 2023-10-19 | End: 2023-10-20 | Stop reason: HOSPADM

## 2023-10-19 RX ADMIN — LINEZOLID 600 MG: 600 TABLET, FILM COATED ORAL at 15:51

## 2023-10-19 RX ADMIN — CEFEPIME 2000 MG: 2 INJECTION, POWDER, FOR SOLUTION INTRAVENOUS at 16:52

## 2023-10-19 RX ADMIN — OXYCODONE HYDROCHLORIDE AND ACETAMINOPHEN 1 TABLET: 5; 325 TABLET ORAL at 22:12

## 2023-10-19 RX ADMIN — FUROSEMIDE 80 MG: 40 TABLET ORAL at 07:52

## 2023-10-19 RX ADMIN — FERROUS SULFATE TAB EC 325 MG (65 MG FE EQUIVALENT) 325 MG: 325 (65 FE) TABLET DELAYED RESPONSE at 06:43

## 2023-10-19 RX ADMIN — SODIUM CHLORIDE, PRESERVATIVE FREE 10 ML: 5 INJECTION INTRAVENOUS at 07:50

## 2023-10-19 RX ADMIN — OXYCODONE HYDROCHLORIDE 5 MG: 5 TABLET ORAL at 22:12

## 2023-10-19 RX ADMIN — DIPHENHYDRAMINE HYDROCHLORIDE 25 MG: 50 INJECTION, SOLUTION INTRAMUSCULAR; INTRAVENOUS at 03:48

## 2023-10-19 RX ADMIN — GABAPENTIN 300 MG: 300 CAPSULE ORAL at 06:43

## 2023-10-19 RX ADMIN — DEXAMETHASONE SODIUM PHOSPHATE 2 DROP: 1 SOLUTION/ DROPS OPHTHALMIC at 07:52

## 2023-10-19 RX ADMIN — SODIUM CHLORIDE, PRESERVATIVE FREE 10 ML: 5 INJECTION INTRAVENOUS at 07:55

## 2023-10-19 RX ADMIN — OXYCODONE HYDROCHLORIDE AND ACETAMINOPHEN 1 TABLET: 5; 325 TABLET ORAL at 15:51

## 2023-10-19 RX ADMIN — DOCUSATE SODIUM 100 MG: 100 CAPSULE, LIQUID FILLED ORAL at 07:50

## 2023-10-19 RX ADMIN — Medication 100 MG: at 07:52

## 2023-10-19 RX ADMIN — LINEZOLID 600 MG: 600 TABLET, FILM COATED ORAL at 23:52

## 2023-10-19 RX ADMIN — MAGNESIUM GLUCONATE 500 MG ORAL TABLET 400 MG: 500 TABLET ORAL at 09:51

## 2023-10-19 RX ADMIN — ALLOPURINOL 300 MG: 300 TABLET ORAL at 07:52

## 2023-10-19 RX ADMIN — AMLODIPINE BESYLATE 10 MG: 10 TABLET ORAL at 07:52

## 2023-10-19 RX ADMIN — DEXAMETHASONE SODIUM PHOSPHATE 2 DROP: 1 SOLUTION/ DROPS OPHTHALMIC at 22:11

## 2023-10-19 RX ADMIN — LOSARTAN POTASSIUM 50 MG: 50 TABLET, FILM COATED ORAL at 07:50

## 2023-10-19 RX ADMIN — OXYCODONE HYDROCHLORIDE 5 MG: 5 TABLET ORAL at 15:51

## 2023-10-19 RX ADMIN — OXYCODONE HYDROCHLORIDE AND ACETAMINOPHEN 1 TABLET: 5; 325 TABLET ORAL at 07:53

## 2023-10-19 RX ADMIN — SODIUM CHLORIDE, PRESERVATIVE FREE 10 ML: 5 INJECTION INTRAVENOUS at 22:13

## 2023-10-19 RX ADMIN — PANTOPRAZOLE SODIUM 40 MG: 40 TABLET, DELAYED RELEASE ORAL at 07:50

## 2023-10-19 RX ADMIN — GABAPENTIN 300 MG: 300 CAPSULE ORAL at 15:52

## 2023-10-19 RX ADMIN — CIPROFLOXACIN HYDROCHLORIDE 2 DROP: 3 SOLUTION/ DROPS OPHTHALMIC at 07:51

## 2023-10-19 RX ADMIN — CIPROFLOXACIN HYDROCHLORIDE 2 DROP: 3 SOLUTION/ DROPS OPHTHALMIC at 22:12

## 2023-10-19 RX ADMIN — VANCOMYCIN HYDROCHLORIDE 1250 MG: 1.25 INJECTION, POWDER, LYOPHILIZED, FOR SOLUTION INTRAVENOUS at 03:53

## 2023-10-19 RX ADMIN — GABAPENTIN 300 MG: 300 CAPSULE ORAL at 22:12

## 2023-10-19 RX ADMIN — SODIUM CHLORIDE, PRESERVATIVE FREE 30 ML: 5 INJECTION INTRAVENOUS at 22:12

## 2023-10-19 RX ADMIN — OXYCODONE HYDROCHLORIDE 5 MG: 5 TABLET ORAL at 07:53

## 2023-10-19 RX ADMIN — CARVEDILOL 12.5 MG: 12.5 TABLET, FILM COATED ORAL at 15:54

## 2023-10-19 ASSESSMENT — ENCOUNTER SYMPTOMS
STRIDOR: 0
SHORTNESS OF BREATH: 0
APNEA: 0
CONSTIPATION: 0
PHOTOPHOBIA: 0
NAUSEA: 0
COUGH: 0
EYE PAIN: 0
ABDOMINAL PAIN: 0
VOMITING: 0
BLOOD IN STOOL: 0
CHOKING: 0
ABDOMINAL DISTENTION: 0
WHEEZING: 0
DIARRHEA: 0
EYE REDNESS: 0

## 2023-10-19 ASSESSMENT — PAIN SCALES - GENERAL
PAINLEVEL_OUTOF10: 7

## 2023-10-19 ASSESSMENT — PAIN DESCRIPTION - ORIENTATION
ORIENTATION: LOWER
ORIENTATION: LOWER

## 2023-10-19 ASSESSMENT — PAIN DESCRIPTION - DESCRIPTORS: DESCRIPTORS: ACHING;DISCOMFORT

## 2023-10-19 ASSESSMENT — PAIN DESCRIPTION - LOCATION
LOCATION: BACK
LOCATION: BACK

## 2023-10-19 NOTE — DISCHARGE INSTR - COC
Continuity of Care Form    Patient Name: James Swenson   :  1955  MRN:  [id number]    Admit date:  10/17/2023  Discharge date:  10/20/23    Code Status Order: Full Code   Advance Directives:     Admitting Physician:  No admitting provider for patient encounter. PCP: Jace Garza MD    Discharging Nurse: [facility] Unit/Room#: 7398/2679-65  Discharging Unit Phone Number: [phone]    Emergency Contact:   Extended Emergency Contact Information  Primary Emergency Contact: quijanoemerita *HIPAA*  Address: [address] [place] [address] of [address] [address] Phone: [phone]  Work Phone: [phone]  Mobile Phone: [phone]  Relation: Other   needed?  No    Past Surgical History:  Past Surgical History:   Procedure Laterality Date    COLONOSCOPY  2014    int hemorrhoids, repeat in 10 years    COLONOSCOPY  2020    COLONOSCOPY N/A 2020    COLONOSCOPY WITH BIOPSY performed by Ezekiel Aldridge MD at [address] [place] [address] Endoscopy    NASAL ENDOSCOPY Bilateral 10/17/2016    NASAL ENDOSCOPY; RIGHT NASAL CAUTERY    NERVE BLOCK  2016    duramorph 1.5mg celestone 9mg    NERVE BLOCK  2017    duramorph 1.5mg & decadron 10mg    NERVE BLOCK  10/09/2017    duramorph epidural, decadron 10mg, morphine 1.5mg    TOTAL HIP ARTHROPLASTY Right 2022    TOTAL HIP ARTHROPLASTY Right 2022    RIGHT HIP TOTAL ARTHROPLASTY ANTERIOR APPROACH - MEDACTA performed by Patrick Gill DO at [facility]  2020    UPPER GASTROINTESTINAL ENDOSCOPY N/A 2020    EGD BIOPSY performed by Ezekiel Aldridge MD at [address] [place] [address] Endoscopy       Immunization History:   Immunization History   Administered Date(s) Administered    Influenza Vaccine, unspecified formulation 2017, 10/25/2019    Influenza, AFLURIA (age 1 yrs+), FLUZONE, (age 10 mo+), MDV, 0.5mL 2017, 10/25/2019, 2020    Influenza, FLUARIX, FLULAVAL,

## 2023-10-19 NOTE — PROGRESS NOTES
ENT/OTOLARYNGOLOGY SUBSEQUENT CARE PROGRESS NOTE     REASON FOR CARE: Treatment resistant otitis externa with middle ear infection and mastoid effusion - no evidence of coalescent mastoiditis on imaging     HISTORY OF PRESENT ILLNESS:   Margaretann Holter is a 79 y.o. who failed outpatient treatment of otitis externa after 3 rounds of oral and topical antibiotics. He has developed worsening EAC stenosis with multiple bullous lesions and worsening pain to the jaw and cheek. The decision was made by myself to directly admit him from clinic to rule out malignant otitis externa and/or mastoiditis. CT scan obtained 10/17 without concerns for destructive bone infection, however, with external and middle ear infection with near complete canal stenosis. He was started on Vancomycin and Cefepime on 10/17. Subjective: States that this is the first time he has had improvement in his ear and jaw pain in over a month. Pertinent Examination:   GENERAL: well developed and well nourished and in no acute distress  HEAD: normocephalic and atraumatic  EYES: no eyelid swelling, no conjunctival injection or exudate, pupils equal round and reactive to light  EXTERNAL EARS: normal  EAR EXAM:  Right Ear: Purulence drainage from ear, however, improvement in soft tissue edema. TM bullous lesion re-appreciated    NOSE: nares patent, normal mucosa  MOUTH/THROAT: mucous membranes moist, no focal lesions, no tonsillar enlargement or exudate  NECK: non-tender, full range of motion, no mass, no focal lymphadenopathy  RESPIRATORY: Normal expansion. Clear to auscultation. No rales, rhonchi, or wheezing. NEUROLOGICAL:  cranial nerves II-XII are grossly intact     RELEVANT LABS/STUDIES:   Additional data reviewed:    CT Tbone (10/17/2023): IMPRESSION:  Mild circumferential thickening of the right external auditory canal that may  relate to chronic otitis externa and correlation with direct visualization is  needed.      Fluid throughout the

## 2023-10-19 NOTE — PROGRESS NOTES
[facility] -   Infectious diseases evaluation  admission date 10/17/2023    reason for consultation:   Antibiotic management for acute otitis externa. Impression :   Current:  Right-sided middle ear infection  R malignant otitis external w collapse of the External canal  No Osteo on CT  Hypertension  Diastolic heart failure  Gout    Other:    Discussion / summary of stay / plan of care   Recent wet loss and increased bilat LE edema  Need to rule out diabetes vs malignancy as a cause of the malignant otitis   Recommendations   Continue cefepime and zyvox   Based on pending sensitivities and cultures we will adjust antibiotic management  Continue to monitor for ear pain/jaw pain. Improvign today   A1C 5 - done for possible un noticed DM as a cause of the malignant otitis    Addendum:  Cx ear canal neg 10/17  Plan DC on cefepime and po zyvox x 14 days  Midline  'see ID office in 2 weeks  FU ENT  Keep steroids and cipro ear drops  Labs weekly    Infection Control Recommendations   [place] Precautions    Antimicrobial Stewardship Recommendations   Simplification of therapy  Targeted therapy      History of Present Illness:   Initial history:  Joselito Major is a 79y.o.-year-old male past medical history of diastolic heart failure, hypertension, restless leg syndrome. Patient was sent from his ENT clinic. Patient states that he has been having right-sided ear pain for a month. Was treated by his PCP with Augmentin and Cipro drops for 7 days towards the end of September. Patient states that about 5 days afterwards he was still having intense right-sided ear pain but also developed cheek and jaw pain. Patient once again went to the ED on October 4 in which she was prescribed a Z-Raghu and discharged.   In the ENT office patient's ear was examined under microscope in which persistent draining and multiple bolus of soft tissue masses on the bony posterior canal was found.  1 of these bullae was needle aspirated which showed purulent fluid. Cultures from this so far have been negative. CT imaging does not reveal any osteomyelitis. Does show fluid throughout the middle right ear and right mastoid air cells. Patient currently denies any right ear pain. States that the pain is only increased due to aggravation. Interval changes  10/19/2023   Patient Vitals for the past 8 hrs:   BP Temp src Pulse Resp   10/19/23 0800 -- -- 68 --   10/19/23 0750 (!) 144/70 Oral 79 18       10/19  Hemodynamically stable afebrile. Cultures are still negative. Patient reports pain is actually better. Denies any issues swallowing. No issues breathing. We will place patient on IV cefepime and Zyvox. These medications have been reconciled. Summary of relevant labs:  Labs: White blood cell count 4.6    Micro:   Culture from right ear still pending    Imaging:  CT imaging of the neck and ear did not reveal any involvement of any bony structures. There is fluid throughout the right middle ear cavity and fluid in the right mastoid air cells. I have personally reviewed the past medical history, past surgical history, medications, social history, and family history, and I haveupdated the database accordingly. Allergies:   Codeine and Shrimp (diagnostic)     Review of Systems:     Review of Systems   Constitutional:  Positive for fatigue. Negative for chills, diaphoresis and fever. HENT:  Positive for ear discharge and ear pain. Eyes:  Negative for photophobia, pain and redness. Respiratory:  Negative for apnea, cough, choking, shortness of breath, wheezing and stridor. Cardiovascular:  Negative for chest pain and leg swelling. Gastrointestinal:  Negative for abdominal distention, abdominal pain, blood in stool, constipation, diarrhea, nausea and vomiting. Endocrine: Negative for polydipsia. Genitourinary:  Negative for decreased urine volume, dysuria and urgency.

## 2023-10-19 NOTE — CARE COORDINATION
Transitional planning    Writer received signed script for IV ATB Cefepime 2000 mg 2 x daily. Writer to room to discuss with patient, wanting to go home with home care, agencies chosen are [facility], referrals sent. [facility] for infusion, call to Judith Romero, script faxed to office. 1300 vm received from April in RX with key for Cover My Meds, [id number], entered for Linezolid 600 mg. Call to [facility] with Juan, will review. 1600 call back from [facility], can accept, [facility] for Saturday morning time TBD. Patient and primary RN updated.

## 2023-10-19 NOTE — DISCHARGE INSTRUCTIONS
Please follow-up with your primary care provider within 5 to 7 days for continued care. Please make sure and follow-up with infectious disease and otolaryngology as recommended. Please continue taking Coreg 12.5 mg in addition to your previous blood pressure medications for better control of her high blood pressure. Please reach out to your PCP for further evaluation and management of your blood pressure. As per infectious disease recommendation please continue taking cefepime IV 2 mg and linezolid 600 mg tablet every 12 hours for 14 days. As per ENT/otolaryngology recommendations, please continue ciprofloxacin and dexamethasone eardrops for 14 days, and please follow-up with your ENT doctor for further evaluation and management. Your amlodipine dose has been increased to 10 mg daily. Your rest of her blood pressure medications have been continued as prescribed. Please stop taking Augmentin, ofloxacin drops. Please reach out to your PCP/pain management clinic for further refills/management of your pain medications  Please follow up with PCP for your lab work up / BMP as soon as you get your results. If you have been given medication please take them as prescribed. Do not take more medication than recommended at any given time. If you begin to experience any symptoms such as chest pain shortness of breath nausea vomiting dizziness drowsiness abdominal pain loss of consciousness or any other symptoms you find concerning please return to the ED for follow-up evaluation. Please feel free return to the hospital if your symptoms worsen or any new concerning symptoms develop. Follow-up with your primary care physician as needed for all other the concerns.

## 2023-10-19 NOTE — PLAN OF CARE
Cx ear canal neg 10/17  Plan DC on cefepime and po zyvox x 14 days  Midline  'see ID office in 2 weeks  FU ENT  Keep steroids and cipro ear drops  Labs weekly    Abdi Leong MD. Infectious Diseases

## 2023-10-19 NOTE — PROGRESS NOTES
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 Labs and chart reviewed. Vitals reviewed. Patient has no concerns and complaints. Patient denies any new symptoms. Patient reports improvement in his pain in general.  Patient was counseled regarding his blood pressure control and antibiotic management. ROS:  Constitutional:  negative for chills, fevers, sweats  Respiratory:  negative for cough, dyspnea on exertion, hemoptysis, shortness of breath, wheezing  Cardiovascular:  negative for chest pain, chest pressure/discomfort, lower extremity edema, palpitations  Gastrointestinal:  negative for abdominal pain, constipation, diarrhea, nausea, vomiting  Neurological:  negative for dizziness, headache    BRIEF HISTORY     Alta Driver is a 79 y.o. with PMH of   -chronic back pain  -HLD  -HTN  -gout     Presented from the ENT clinic with worsening R ear drainage and pain, despite taking oral antibiotics and ear drops. He reports that he had otitis media that was seen and treated by his PCP with Augmentin for 7 days around 9/29. He states that he had no improvement and instead developed significant cheek and jaw pain that caused him to have trouble chewing. Because of this he presented to the ED on 10/4. He was prescribed a Z-byron and was discharged.       Since being discharged from the emergency department he endorses some improvement in his pain but still with mild persistence  In the ENT clinic, The patient's head was

## 2023-10-20 VITALS
BODY MASS INDEX: 31.95 KG/M2 | HEART RATE: 63 BPM | RESPIRATION RATE: 21 BRPM | DIASTOLIC BLOOD PRESSURE: 59 MMHG | SYSTOLIC BLOOD PRESSURE: 143 MMHG | TEMPERATURE: 98.6 F | HEIGHT: 72 IN | WEIGHT: 235.89 LBS | OXYGEN SATURATION: 98 %

## 2023-10-20 LAB
ANION GAP SERPL CALCULATED.3IONS-SCNC: 10 MMOL/L (ref 9–17)
ANION GAP SERPL CALCULATED.3IONS-SCNC: 12 MMOL/L (ref 9–17)
BASOPHILS # BLD: 0.06 K/UL (ref 0–0.2)
BASOPHILS NFR BLD: 1 % (ref 0–2)
BUN SERPL-MCNC: 20 MG/DL (ref 8–23)
BUN SERPL-MCNC: 21 MG/DL (ref 8–23)
CALCIUM SERPL-MCNC: 8.9 MG/DL (ref 8.6–10.4)
CALCIUM SERPL-MCNC: 9 MG/DL (ref 8.6–10.4)
CHLORIDE SERPL-SCNC: 105 MMOL/L (ref 98–107)
CHLORIDE SERPL-SCNC: 105 MMOL/L (ref 98–107)
CO2 SERPL-SCNC: 22 MMOL/L (ref 20–31)
CO2 SERPL-SCNC: 23 MMOL/L (ref 20–31)
CREAT SERPL-MCNC: 1.5 MG/DL (ref 0.7–1.2)
CREAT SERPL-MCNC: 1.6 MG/DL (ref 0.7–1.2)
EOSINOPHIL # BLD: 0.35 K/UL (ref 0–0.44)
EOSINOPHILS RELATIVE PERCENT: 8 % (ref 1–4)
ERYTHROCYTE [DISTWIDTH] IN BLOOD BY AUTOMATED COUNT: 12.4 % (ref 11.8–14.4)
GFR SERPL CREATININE-BSD FRML MDRD: 47 ML/MIN/1.73M2
GFR SERPL CREATININE-BSD FRML MDRD: 51 ML/MIN/1.73M2
GLUCOSE SERPL-MCNC: 119 MG/DL (ref 70–99)
GLUCOSE SERPL-MCNC: 93 MG/DL (ref 70–99)
HCT VFR BLD AUTO: 36.2 % (ref 40.7–50.3)
HGB BLD-MCNC: 11.7 G/DL (ref 13–17)
IMM GRANULOCYTES # BLD AUTO: <0.03 K/UL (ref 0–0.3)
IMM GRANULOCYTES NFR BLD: 0 %
LYMPHOCYTES NFR BLD: 0.93 K/UL (ref 1.1–3.7)
LYMPHOCYTES RELATIVE PERCENT: 22 % (ref 24–43)
MCH RBC QN AUTO: 32.2 PG (ref 25.2–33.5)
MCHC RBC AUTO-ENTMCNC: 32.3 G/DL (ref 28.4–34.8)
MCV RBC AUTO: 99.7 FL (ref 82.6–102.9)
MONOCYTES NFR BLD: 0.56 K/UL (ref 0.1–1.2)
MONOCYTES NFR BLD: 14 % (ref 3–12)
NEUTROPHILS NFR BLD: 55 % (ref 36–65)
NEUTS SEG NFR BLD: 2.25 K/UL (ref 1.5–8.1)
NRBC BLD-RTO: 0 PER 100 WBC
PLATELET # BLD AUTO: 274 K/UL (ref 138–453)
PMV BLD AUTO: 9.3 FL (ref 8.1–13.5)
POTASSIUM SERPL-SCNC: 4.2 MMOL/L (ref 3.7–5.3)
POTASSIUM SERPL-SCNC: 4.3 MMOL/L (ref 3.7–5.3)
RBC # BLD AUTO: 3.63 M/UL (ref 4.21–5.77)
SODIUM SERPL-SCNC: 138 MMOL/L (ref 135–144)
SODIUM SERPL-SCNC: 139 MMOL/L (ref 135–144)
WBC OTHER # BLD: 4.2 K/UL (ref 3.5–11.3)

## 2023-10-20 PROCEDURE — 6370000000 HC RX 637 (ALT 250 FOR IP)

## 2023-10-20 PROCEDURE — 36415 COLL VENOUS BLD VENIPUNCTURE: CPT

## 2023-10-20 PROCEDURE — 6360000002 HC RX W HCPCS: Performed by: INTERNAL MEDICINE

## 2023-10-20 PROCEDURE — 6370000000 HC RX 637 (ALT 250 FOR IP): Performed by: INTERNAL MEDICINE

## 2023-10-20 PROCEDURE — 05HY33Z INSERTION OF INFUSION DEVICE INTO UPPER VEIN, PERCUTANEOUS APPROACH: ICD-10-PCS | Performed by: INTERNAL MEDICINE

## 2023-10-20 PROCEDURE — 99233 SBSQ HOSP IP/OBS HIGH 50: CPT | Performed by: INTERNAL MEDICINE

## 2023-10-20 PROCEDURE — 2580000003 HC RX 258

## 2023-10-20 PROCEDURE — 85025 COMPLETE CBC W/AUTO DIFF WBC: CPT

## 2023-10-20 PROCEDURE — 6360000002 HC RX W HCPCS

## 2023-10-20 PROCEDURE — 2580000003 HC RX 258: Performed by: INTERNAL MEDICINE

## 2023-10-20 PROCEDURE — 80048 BASIC METABOLIC PNL TOTAL CA: CPT

## 2023-10-20 PROCEDURE — 2709999900 HC NON-CHARGEABLE SUPPLY

## 2023-10-20 PROCEDURE — 99232 SBSQ HOSP IP/OBS MODERATE 35: CPT | Performed by: INTERNAL MEDICINE

## 2023-10-20 RX ORDER — 0.9 % SODIUM CHLORIDE 0.9 %
1000 INTRAVENOUS SOLUTION INTRAVENOUS ONCE
Status: COMPLETED | OUTPATIENT
Start: 2023-10-20 | End: 2023-10-20

## 2023-10-20 RX ADMIN — DOCUSATE SODIUM 100 MG: 100 CAPSULE, LIQUID FILLED ORAL at 08:36

## 2023-10-20 RX ADMIN — SODIUM CHLORIDE, PRESERVATIVE FREE 10 ML: 5 INJECTION INTRAVENOUS at 08:38

## 2023-10-20 RX ADMIN — CIPROFLOXACIN HYDROCHLORIDE 2 DROP: 3 SOLUTION/ DROPS OPHTHALMIC at 08:36

## 2023-10-20 RX ADMIN — OXYCODONE HYDROCHLORIDE 5 MG: 5 TABLET ORAL at 08:36

## 2023-10-20 RX ADMIN — FERROUS SULFATE TAB EC 325 MG (65 MG FE EQUIVALENT) 325 MG: 325 (65 FE) TABLET DELAYED RESPONSE at 06:39

## 2023-10-20 RX ADMIN — LOSARTAN POTASSIUM 50 MG: 50 TABLET, FILM COATED ORAL at 08:34

## 2023-10-20 RX ADMIN — Medication 100 MG: at 08:34

## 2023-10-20 RX ADMIN — CEFEPIME 2000 MG: 2 INJECTION, POWDER, FOR SOLUTION INTRAVENOUS at 12:58

## 2023-10-20 RX ADMIN — CEFEPIME 2000 MG: 2 INJECTION, POWDER, FOR SOLUTION INTRAVENOUS at 03:49

## 2023-10-20 RX ADMIN — OXYCODONE HYDROCHLORIDE AND ACETAMINOPHEN 1 TABLET: 5; 325 TABLET ORAL at 08:36

## 2023-10-20 RX ADMIN — SODIUM CHLORIDE 1000 ML: 9 INJECTION, SOLUTION INTRAVENOUS at 10:16

## 2023-10-20 RX ADMIN — GABAPENTIN 300 MG: 300 CAPSULE ORAL at 06:39

## 2023-10-20 RX ADMIN — DEXAMETHASONE SODIUM PHOSPHATE 2 DROP: 1 SOLUTION/ DROPS OPHTHALMIC at 08:37

## 2023-10-20 RX ADMIN — GABAPENTIN 300 MG: 300 CAPSULE ORAL at 15:06

## 2023-10-20 RX ADMIN — LINEZOLID 600 MG: 600 TABLET, FILM COATED ORAL at 08:35

## 2023-10-20 RX ADMIN — FUROSEMIDE 80 MG: 40 TABLET ORAL at 08:36

## 2023-10-20 RX ADMIN — AMLODIPINE BESYLATE 10 MG: 10 TABLET ORAL at 08:36

## 2023-10-20 RX ADMIN — CARVEDILOL 12.5 MG: 12.5 TABLET, FILM COATED ORAL at 16:27

## 2023-10-20 RX ADMIN — ALLOPURINOL 300 MG: 300 TABLET ORAL at 08:36

## 2023-10-20 RX ADMIN — OXYCODONE HYDROCHLORIDE 5 MG: 5 TABLET ORAL at 16:26

## 2023-10-20 RX ADMIN — PANTOPRAZOLE SODIUM 40 MG: 40 TABLET, DELAYED RELEASE ORAL at 06:39

## 2023-10-20 RX ADMIN — CARVEDILOL 12.5 MG: 12.5 TABLET, FILM COATED ORAL at 08:38

## 2023-10-20 RX ADMIN — MAGNESIUM GLUCONATE 500 MG ORAL TABLET 400 MG: 500 TABLET ORAL at 08:34

## 2023-10-20 RX ADMIN — SODIUM CHLORIDE, PRESERVATIVE FREE 10 ML: 5 INJECTION INTRAVENOUS at 08:39

## 2023-10-20 RX ADMIN — OXYCODONE HYDROCHLORIDE AND ACETAMINOPHEN 1 TABLET: 5; 325 TABLET ORAL at 16:26

## 2023-10-20 ASSESSMENT — ENCOUNTER SYMPTOMS
NAUSEA: 0
APNEA: 0
DIARRHEA: 0
ABDOMINAL DISTENTION: 0
CONSTIPATION: 0
COUGH: 0
CHOKING: 0
WHEEZING: 0
EYE REDNESS: 0
PHOTOPHOBIA: 0
SHORTNESS OF BREATH: 0
ABDOMINAL PAIN: 0
EYE PAIN: 0
STRIDOR: 0
BLOOD IN STOOL: 0
VOMITING: 0

## 2023-10-20 ASSESSMENT — PAIN SCALES - GENERAL
PAINLEVEL_OUTOF10: 7
PAINLEVEL_OUTOF10: 7

## 2023-10-20 ASSESSMENT — PAIN DESCRIPTION - ORIENTATION: ORIENTATION: LOWER

## 2023-10-20 ASSESSMENT — PAIN DESCRIPTION - LOCATION: LOCATION: BACK

## 2023-10-20 ASSESSMENT — PAIN DESCRIPTION - DESCRIPTORS: DESCRIPTORS: ACHING;DISCOMFORT

## 2023-10-20 NOTE — PLAN OF CARE
Problem: Discharge Planning  Goal: Discharge to home or other facility with appropriate resources  Outcome: Progressing  Flowsheets (Taken 10/20/2023 0800)  Discharge to home or other facility with appropriate resources: Identify barriers to discharge with patient and caregiver     Problem: ABCDS Injury Assessment  Goal: Absence of physical injury  Outcome: Progressing  Flowsheets (Taken 10/20/2023 0800)  Absence of Physical Injury: Implement safety measures based on patient assessment     Problem: Pain  Goal: Verbalizes/displays adequate comfort level or baseline comfort level  Outcome: Progressing     Problem: Safety - Adult  Goal: Free from fall injury  Outcome: Progressing  Flowsheets (Taken 10/20/2023 0800)  Free From Fall Injury: Instruct family/caregiver on patient safety

## 2023-10-20 NOTE — CARE COORDINATION
Transitional planning      Writer to room to discuss plan, plan is home, will be on IV ATB, needs midline placed, [facility] for home care and [facility] for infusion IV Cefepime 2 x daily for 12 days, Dr Aretha Lane to follow. Call from [facility] with [facility] requesting patient insurance card faxed, this is done. [id number] writer to room to updated patient on POC, agreeable and verbalized understanding. Call to [facility] and left vm.

## 2023-10-20 NOTE — PROCEDURES
Midline insertion note: PICC order - Midline ordered in comments. Prescribed therapy: Cefepime 14 days  Product type: BARD MST PowerMidline  History/Labs/Allergies Reviewed  Placed By: Jourdan Ferguson  - RN IV Team  Assistant: Staff -RN  Time out Performed using Two Identifiers  Lot #: [id number]  Expiration date: 05/31/2024  Catheter size: 3 [place]  Total length: 14 cm  External catheter length: 0 cm  Insertion site: Left Cephalic vein - measures 2.90 cm with an area CVR of 2.5%. (Preffered area based CVR would be less than 20%). Number of attempts: 1  Estimated blood loss: 1 ml  Placement verified by: positive blood return & flushes easily  Special equipment used: ultrasound & micro-introducer technique   Catheter securement: Adhesive SUPENTA securement device  Catheter securement adhesive (SecureportIV) utilized at insertion site  Dressing applied: Tegaderm CHG  Lidocaine administered intradermally conc.1%: 2 mL    Midline education:     [ x ] Discussed with patient/Family or POA prior to procedure. Risks and Benefits along with reason for procedure were discussed and teaching was reinforced with an education handout on Midline insertion. [facility] FAQ Catheter Associated Blood Stream Infections and [address] [place] [address] REV. 7/13 Nursing and Booklet left at bedside or in chart. Patient (Family or POA) acknowledged understanding of information taught and agreed to procedure. [  ] Was not discussed with patient/family or POA due to pts medical status at time of procedure. pts family or POA not available to discuss Midline education.  [facility] FAQ Catheter Associated Blood Stream Infections and [address] [place] [address] REV. 7/13 Nursing and Booklet left at bedside or in chart

## 2023-10-20 NOTE — PROGRESS NOTES
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 Labs and chart reviewed. Vitals reviewed. - awaiting picc line placement     Patient has no concerns and complaints. Patient denies any new symptoms. Patient reports improvement in his pain in general.  Patient was counseled regarding his blood pressure control and antibiotic management. ROS:  Constitutional:  negative for chills, fevers, sweats  Respiratory:  negative for cough, dyspnea on exertion, hemoptysis, shortness of breath, wheezing  Cardiovascular:  negative for chest pain, chest pressure/discomfort, lower extremity edema, palpitations  Gastrointestinal:  negative for abdominal pain, constipation, diarrhea, nausea, vomiting  Neurological:  negative for dizziness, headache    BRIEF HISTORY     Shayne Ivey is a 79 y.o. with PMH of   -chronic back pain  -HLD  -HTN  -gout     Presented from the ENT clinic with worsening R ear drainage and pain, despite taking oral antibiotics and ear drops. He reports that he had otitis media that was seen and treated by his PCP with Augmentin for 7 days around 9/29. He states that he had no improvement and instead developed significant cheek and jaw pain that caused him to have trouble chewing. Because of this he presented to the ED on 10/4. He was prescribed a Z-byron and was discharged.       Since being discharged from the emergency department he endorses some improvement in his pain but still with mild persistence  In the ENT

## 2023-10-20 NOTE — PROGRESS NOTES
[facility] [address] -   Infectious diseases evaluation  admission date 10/17/2023    reason for consultation:   Antibiotic management for acute otitis externa. Impression :   Current:  Right-sided middle ear infection  R malignant otitis external w collapse of the External canal  No Osteo on CT  Hypertension  Diastolic heart failure  Gout    Other:    Discussion / summary of stay / plan of care   Recent wet loss and increased bilat LE edema  Need to rule out diabetes vs malignancy as a cause of the malignant otitis   Recommendations   Continue cefepime and zyvox   Based on pending sensitivities and cultures we will adjust antibiotic management  Continue to monitor for ear pain/jaw pain. Improvign today   A1C 5  Okay to DC on antibiotics after midline is placed per ID. Addendum:  Cx ear canal neg 10/17  Plan DC on cefepime and po zyvox x 14 days  Midline  'see ID office in 2 weeks  FU ENT  Keep steroids and cipro ear drops  Labs weekly    Infection Control Recommendations   [place] Precautions    Antimicrobial Stewardship Recommendations   Simplification of therapy  Targeted therapy      History of Present Illness:   Initial history:  Jai Gomes is a 79y.o.-year-old male past medical history of diastolic heart failure, hypertension, restless leg syndrome. Patient was sent from his ENT clinic. Patient states that he has been having right-sided ear pain for a month. Was treated by his PCP with Augmentin and Cipro drops for 7 days towards the end of September. Patient states that about 5 days afterwards he was still having intense right-sided ear pain but also developed cheek and jaw pain. Patient once again went to the ED on October 4 in which she was prescribed a Z-Raghu and discharged. In the ENT office patient's ear was examined under microscope in which persistent draining and multiple bolus of soft tissue masses on the bony posterior canal was found.   1 of focal deficit present. Mental Status: He is alert and oriented to person, place, and time. Cranial Nerves: No cranial nerve deficit. Motor: No weakness. Psychiatric:         Mood and Affect: Mood normal.         Behavior: Behavior normal.         Thought Content:  Thought content normal.         Judgment: Judgment normal.         Past Medical History:     Past Medical History:   Diagnosis Date    Chronic back pain     Chronic kidney disease     Gout     Hyperlipidemia     Hypertension     Murmur     Obesity 09/19/2014    Osteoarthritis     Restless leg syndrome     Shoulder pain     Shrimp allergy     allergic reaction to shrimp    Spinal stenosis, lumbar region, without neurogenic claudication 04/13/2016    Swelling of joint, wrist, right     Thoracic back pain     Vertigo     Wears glasses     READING    Wrist pain, right        Past Surgical  History:     Past Surgical History:   Procedure Laterality Date    COLONOSCOPY  11/2014    int hemorrhoids, repeat in 10 years    COLONOSCOPY  05/27/2020    COLONOSCOPY N/A 05/27/2020    COLONOSCOPY WITH BIOPSY performed by Mike Lopez MD at [address] [place] [address] 10/17/2016    NASAL ENDOSCOPY; RIGHT NASAL CAUTERY    NERVE BLOCK  11/02/2016    duramorph 1.5mg celestone 9mg    NERVE BLOCK  04/03/2017    duramorph 1.5mg & decadron 10mg    NERVE BLOCK  10/09/2017    duramorph epidural, decadron 10mg, morphine 1.5mg    TOTAL HIP ARTHROPLASTY Right 09/27/2022    TOTAL HIP ARTHROPLASTY Right 9/27/2022    RIGHT HIP TOTAL ARTHROPLASTY ANTERIOR APPROACH - MEDACTA performed by Elliott Franco DO at [facility] [address] [place] [address]  05/27/2020    UPPER GASTROINTESTINAL ENDOSCOPY N/A 05/27/2020    EGD BIOPSY performed by Mike Lopez MD at [facility]       Medications:      linezolid  600 mg Oral BID    carvedilol  12.5 mg Oral BID WC    ferrous sulfate  325 mg Oral QAM AC    oxyCODONE-acetaminophen  1 tablet Oral TID

## 2023-10-24 NOTE — DISCHARGE SUMMARY
[id number]  [phone]    Call  Please call and make an appointment with your PCP for further evaluation and management    Carey Jin MD  [address] [place] [address] [address]  [phone]    Call  Please call and make an appointment with ENT for further evaluation management      Patient Instructions:   Please follow-up with your primary care provider within 5 to 7 days for continued care. Please make sure and follow-up with infectious disease and otolaryngology as recommended. Please continue taking Coreg 12.5 mg in addition to your previous blood pressure medications for better control of her high blood pressure. Please reach out to your PCP for further evaluation and management of your blood pressure. As per infectious disease recommendation please continue taking cefepime IV 2 mg and linezolid 600 mg tablet every 12 hours for 14 days. As per ENT/otolaryngology recommendations, please continue ciprofloxacin and dexamethasone eardrops for 14 days, and please follow-up with your ENT doctor for further evaluation and management. Your amlodipine dose has been increased to 10 mg daily. Your rest of her blood pressure medications have been continued as prescribed. Please stop taking Augmentin, ofloxacin drops. Please reach out to your PCP/pain management clinic for further refills/management of your pain medications  Please follow up with PCP for your lab work up / BMP as soon as you get your results. If you have been given medication please take them as prescribed. Do not take more medication than recommended at any given time. If you begin to experience any symptoms such as chest pain shortness of breath nausea vomiting dizziness drowsiness abdominal pain loss of consciousness or any other symptoms you find concerning please return to the ED for follow-up evaluation. Please feel free return to the hospital if your symptoms worsen or any new concerning symptoms develop.   Follow-up with your

## 2024-11-18 NOTE — ED PROVIDER NOTES
[facility] ED  Emergency Department Encounter  EmergencyMedicine Resident     Pt Fartun Yu  MRN: [id number]  [place] 1955  Date of evaluation: 4/28/22  PCP:  Sony Pop MD    [address] [place] [address]       Chief Complaint   Patient presents with    Wound Check       HISTORY OF PRESENT ILLNESS  (Location/Symptom, Timing/Onset, Context/Setting, Quality, Duration, Modifying Factors, Severity.)      Zahra Carcamo is a 74 y.o. male who presents today for removal.  Patient has staples placed proximally 10 days ago, denies any pain drainage. No medical complaints requesting get staples removed. PAST MEDICAL / SURGICAL / SOCIAL / FAMILY HISTORY      has a past medical history of Chronic back pain, Chronic kidney disease, Gout, Hyperlipidemia, Hypertension, Obesity, Osteoarthritis, Restless leg syndrome, Shoulder pain, Shrimp allergy, Spinal stenosis, lumbar region, without neurogenic claudication, Swelling of joint, wrist, right, Thoracic back pain, Vertigo, Wears glasses, and Wrist pain, right.     has a past surgical history that includes nasal endoscopy (Bilateral, 10/17/2016); Nerve Block (11/02/2016); Nerve Block (04/03/2017); Colonoscopy (11/2014); Nerve Block (10/09/2017); Upper gastrointestinal endoscopy (05/27/2020); Colonoscopy (05/27/2020); Upper gastrointestinal endoscopy (N/A, 5/27/2020); and Colonoscopy (N/A, 5/27/2020).     Social History     Socioeconomic History    Marital status: Single     Spouse name: Not on file    Number of children: Not on file    Years of education: Not on file    Highest education level: Not on file   Occupational History    Not on file   Tobacco Use    Smoking status: Never Smoker    Smokeless tobacco: Never Used   Vaping Use    Vaping Use: Never used   Substance and Sexual Activity    Alcohol use: No    Drug use: No    Sexual activity: Not on file   Other Topics Concern    Not on file   Social History Narrative    Not on file Social Determinants of Health     Financial Resource Strain:     Difficulty of Paying Living Expenses: Not on file   Food Insecurity:     Worried About Running Out of Food in the Last Year: Not on file    Mohsen of Food in the Last Year: Not on file   Transportation Needs:     Lack of Transportation (Medical): Not on file    Lack of Transportation (Non-Medical): Not on file   Physical Activity:     Days of Exercise per Week: Not on file    Minutes of Exercise per Session: Not on file   Stress:     Feeling of Stress : Not on file   Social Connections:     Frequency of Communication with Friends and Family: Not on file    Frequency of Social Gatherings with Friends and Family: Not on file    Attends Jainism Services: Not on file    Active Member of [address] [place] [address] [employer] or Organizations: Not on file    Attends Club or Organization Meetings: Not on file    Marital Status: Not on file   Intimate Partner Violence:     Fear of Current or Ex-Partner: Not on file    Emotionally Abused: Not on file    Physically Abused: Not on file    Sexually Abused: Not on file   Housing Stability:     Unable to Pay for Housing in the Last Year: Not on file    Number of [place] in the Last Year: Not on file    Unstable Housing in the Last Year: Not on file       Family History   Problem Relation Age of Onset    Heart Disease Brother 48    Other Father     Other Brother         HIV    Other Brother        Allergies:  Codeine and Shrimp (diagnostic)    Home Medications:  Prior to Admission medications    Medication Sig Start Date End Date Taking? Authorizing Provider   meloxicam (MOBIC) 15 MG tablet TAKE 1 TABLET BY MOUTH ONCE DAILY. 4/20/22   Cathy Carlton MD   gabapentin (NEURONTIN) 300 MG capsule TAKE 1 CAPSULE BY MOUTH 3 TIMES A DAY. 3/29/22 4/29/22  Cathy Carlton MD   ferrous sulfate (FEROSUL) 325 (65 Fe) MG tablet TAKE 1 TABLET BY MOUTH ONCE DAILY.  3/29/22   Cathy Carlton MD   tiZANidine (ZANAFLEX) 4 MG tablet TAKE 1 TABLET BY MOUTH EVERY 8 HOURS AS NEEDED FOR SPASMS. 3/29/22   Sd Aguilar MD   baclofen (LIORESAL) 10 MG tablet Take 10 mg by mouth 2 times daily as needed 2/28/22   Historical Provider, MD   amLODIPine (NORVASC) 10 MG tablet TAKE 1 TABLET BY MOUTH ONE TIME A DAY 3/25/22   Sd Aguilar MD   pantoprazole (PROTONIX) 40 MG tablet TAKE 1 TABLET BY MOUTH ONCE DAILY. 3/1/22   Sd Aguilar MD   allopurinol (ZYLOPRIM) 300 MG tablet TAKE 1 TABLET BY MOUTH ONCE DAILY. 1/3/22   Sd Aguilar MD   ketorolac (TORADOL) 10 MG tablet Take 10 mg by mouth daily  8/26/21   Historical Provider, MD   oxyCODONE-acetaminophen (PERCOCET)  MG per tablet Take 1 tablet by mouth 3 times daily. 10/2/20   Historical Provider, MD   diphenhydrAMINE (BENADRYL) 25 MG capsule Take 1-2 tablets by mouth every 6 hours as needed for itching. 10/12/20   Sd Aguilar MD   magnesium oxide (MAG-OX) 400 MG tablet TAKE 1 TABLET BY MOUTH ONE TIME A DAY 7/30/20   Sd Aguilar MD   diclofenac sodium (VOLTAREN) 1 % GEL Apply 2 g topically 2 times daily    Historical Provider, MD   Melatonin 10 MG CAPS Take by mouth     Historical Provider, MD       REVIEW OF SYSTEMS    (2-9 systems for level 4, 10 or more for level 5)      Review of Systems   Skin:        7 staples    Neurological: Negative for dizziness, light-headedness and headaches. PHYSICAL EXAM   (up to 7 for level 4, 8 or more for level 5)      INITIAL VITALS:   BP (!) 158/66   Pulse 71   Temp 97.3 °F (36.3 °C) (Oral)   Resp 16   Ht 6' (1.829 m)   SpO2 96%   BMI 31.46 kg/m²     Physical Exam  Vitals reviewed. Constitutional:       General: He is not in acute distress. Appearance: Normal appearance. He is not ill-appearing, toxic-appearing or diaphoretic. HENT:      Head: Normocephalic. Comments: 7 staples with well approximated scabbing wound on the top of the head, no signs of infection  Cardiovascular:      Rate and Rhythm: Normal rate. Pulmonary:      Comments: Breathing comfortable room air, special chest rise, speaking full sentences, no evidence respiratory stress  Neurological:      General: No focal deficit present. Mental Status: He is alert and oriented to person, place, and time. Psychiatric:         Mood and Affect: Mood normal.         Behavior: Behavior normal.         Thought Content: Thought content normal.         Judgment: Judgment normal.         DIFFERENTIAL  DIAGNOSIS     PLAN (LABS / IMAGING / EKG):  Orders Placed This Encounter   Procedures    SUTURE REMOVAL       MEDICATIONS ORDERED:  No orders of the defined types were placed in this encounter. DDX: Staple removal    DIAGNOSTIC RESULTS / EMERGENCY DEPARTMENT COURSE / MDM   :  No results found for this visit on 04/28/22. RADIOLOGY:  None    EKG  None    All EKG's are interpreted by the Emergency Department Physician who either signs or Co-signs this chart in the absence of a cardiologist.    EMERGENCY DEPARTMENT COURSE/IMPRESSION: 72-year-old male present emergency department for staple removal, patient had 7 staples placed 11 days ago, no signs of infection and wound is well approximated, staples removed without difficulty patient tolerated well, patient educated on wound care follow-up with primary care provider strict return precautions given. PROCEDURES:  Suture Removal    Date/Time: 4/28/2022 6:41 PM  Performed by: Courtney Gonsalves DO  Authorized by: Mary Amos MD     Consent:     Consent obtained:  Verbal    Consent given by:  Patient    Risks discussed:  Bleeding, pain and wound separation    Alternatives discussed:  Observation  Procedure details:     Wound appearance:  No signs of infection, good wound healing and clean    Number of staples removed:  7  Post-procedure details:     Post-removal:  No dressing applied    Patient tolerance of procedure:   Tolerated well, no immediate complications        CONSULTS:  None    CRITICAL CARE:  None    FINAL IMPRESSION      1.  Encounter for staple removal          DISPOSITION / PLAN     DISPOSITION        PATIENT REFERRED TO:  MD Annalee Granger [address] [facility] [address] [facility] [address] [facility]  [phone]      As needed, If symptoms worsen    [facility]  [address] [place] [address]  [phone]    As needed, If symptoms worsen      DISCHARGE MEDICATIONS:  New Prescriptions    No medications on file       Mya Barger DO  Emergency Medicine Resident    (Please note that portions of thisnote were completed with a voice recognition program.  Efforts were made to edit the dictations but occasionally words are mis-transcribed.)     Mya Barger DO  Resident  04/28/22 [address] [facility] DO Leeann  Resident  04/28/22 [id number] 18-Nov-2024 09:29

## (undated) DEVICE — SYRINGE IRRIG 60ML SFT PLIABLE BLB EZ TO GRP 1 HND USE W/

## (undated) DEVICE — FORCEPS BX L240CM JAW DIA22MM ORNG STD CAP W NDL RAD JAW 4

## (undated) DEVICE — SOLUTION IV IRRIG 500ML 0.9% SODIUM CHL 2F7123

## (undated) DEVICE — SUTURE STRATAFIX SPRL SZ 2-0 L9IN ABSRB VLT MH L36MM 1/2 SXPD2B408

## (undated) DEVICE — COVER,MAYO STAND,XL,STERILE: Brand: MEDLINE

## (undated) DEVICE — WEREWOLF FASTSEAL 6.0 HEMOSTASIS WAND: Brand: FASTSEAL 6.0 HEMOSTASIS WAND

## (undated) DEVICE — ZIPPERED TOGA, 2X LARGE: Brand: FLYTE, SURGICOOL

## (undated) DEVICE — C-ARM: Brand: UNBRANDED

## (undated) DEVICE — SURGICAL PROCEDURE KIT BASIN MAJ SET UP

## (undated) DEVICE — SUTURE VCRL SZ 0 L36IN ABSRB UD L36MM CT-1 1/2 CIR J946H

## (undated) DEVICE — GLOVE SURG SZ 85 CRM LTX FREE POLYISOPRENE POLYMER BEAD ANTI

## (undated) DEVICE — 450 ML BOTTLE OF 0.05% CHLORHEXIDINE GLUCONATE IN 99.95% STERILE WATER FOR IRRIGATION, USP AND APPLICATOR.: Brand: IRRISEPT ANTIMICROBIAL WOUND LAVAGE

## (undated) DEVICE — SUTURE STRATAFIX SPRL SZ 1 L14IN ABSRB VLT L48CM CTX 1/2 SXPD2B405

## (undated) DEVICE — 6619 2 PTNT ISO SYS INCISE AREA&LT;(&GT;&&LT;)&GT;P: Brand: STERI-DRAPE™ IOBAN™ 2

## (undated) DEVICE — TOWEL,OR,DSP,ST,BLUE,DLX,XR,4/PK,20PK/CS: Brand: MEDLINE

## (undated) DEVICE — SOLUTION IV 250ML 0.9% SOD CHL PH 5 INJ USP VIAFLX PLAS

## (undated) DEVICE — BLADE ES L6IN ELASTOMERIC COAT EXT DURABLE BEND UPTO 90DEG

## (undated) DEVICE — DRESSING FOAM W4XL10IN AG SIL ADH ANTIMIC POSTOP OPTIFOAM

## (undated) DEVICE — COVER,TABLE,HEAVY DUTY,50"X90",STRL: Brand: MEDLINE

## (undated) DEVICE — APPLICATOR MEDICATED 26 CC SOLUTION HI LT ORNG CHLORAPREP

## (undated) DEVICE — NEPTUNE E-SEP 165MM SUCTION SLEEVE: Brand: NEPTUNE E-SEP

## (undated) DEVICE — SUTURE STRATAFIX SPRL SZ 3-0 L5IN ABSRB UD FS L26MM 3/8 CIR SXMP2B411

## (undated) DEVICE — OSCILLATING TIP SAW CARTRIDGE: Brand: PRECISION FALCON

## (undated) DEVICE — SUTURE ABSRB BRAID COAT UD CP NO 2 27IN VCRL J195H

## (undated) DEVICE — ADHESIVE SKIN CLSR 0.7ML TOP DERMBND ADV

## (undated) DEVICE — ERASECAUTI INTERMIT TRAY: Brand: MEDLINE INDUSTRIES, INC.

## (undated) DEVICE — LIMB HOLDER, QUICK RELEASE, 60" STRAP: Brand: MEDLINE

## (undated) DEVICE — 4-PORT MANIFOLD: Brand: NEPTUNE 2

## (undated) DEVICE — YANKAUER,FLEXIBLE HANDLE,REGLR CAPACITY: Brand: MEDLINE INDUSTRIES, INC.

## (undated) DEVICE — STERILE PATIENT PROTECTIVE PAD FOR IMP® KNEE POSITIONERS & COHESIVE WRAP (10 / CASE): Brand: DE MAYO KNEE POSITIONER®

## (undated) DEVICE — SPONGE LAP W18XL18IN WHT COT 4 PLY FLD STRUNG RADPQ DISP ST